# Patient Record
Sex: MALE | Race: ASIAN | NOT HISPANIC OR LATINO | Employment: FULL TIME | URBAN - METROPOLITAN AREA
[De-identification: names, ages, dates, MRNs, and addresses within clinical notes are randomized per-mention and may not be internally consistent; named-entity substitution may affect disease eponyms.]

---

## 2017-09-27 ENCOUNTER — OFFICE VISIT (OUTPATIENT)
Dept: FAMILY MEDICINE | Facility: CLINIC | Age: 62
End: 2017-09-27

## 2017-09-27 VITALS
HEIGHT: 65 IN | HEART RATE: 50 BPM | WEIGHT: 123.6 LBS | BODY MASS INDEX: 20.59 KG/M2 | SYSTOLIC BLOOD PRESSURE: 132 MMHG | DIASTOLIC BLOOD PRESSURE: 74 MMHG

## 2017-09-27 DIAGNOSIS — F64.0 GENDER DYSPHORIA IN ADULT: Primary | ICD-10-CM

## 2017-09-27 NOTE — MR AVS SNAPSHOT
"              After Visit Summary   9/27/2017    Larissa Rehman    MRN: [id number]           Patient Information     Date Of Birth          1955        Visit Information        Provider Department      9/27/2017 3:20 PM Lynnette Zuniga MD [facility]        Care Instructions              Informed Consent   Feminizing Medications      This form refers to the use of estrogen and/or androgen antagonists (sometimes called \"anti-androgens\" or \"androgen blockers\") by persons in the male-to-female spectrum who wish to become feminized to reduce gender dysphoria and facilitate a more feminine gender presentation. While there are risks associated with taking feminizing medications, when appropriately prescribed they can greatly improve mental health and quality of life.     Please seek another opinion if you want additional perspective on any aspect of your care.       Feminizing Effects     1. I understand that estrogen, androgen antagonists, or a combination of the two may be  prescribed to reduce male physical features and feminize my body.     2. I understand that the feminizing effects of estrogen and androgen antagonists can take several  months to a year to become noticeable, speed and degree of change is unpredictable.     3.  I understand that if I am taking estrogen I will probably develop breasts, and:        ? Breasts may take several years to develop to their full size.   ? Even if estrogen is stopped, the breast tissue that has developed will remain.   ? As soon as breasts start growing, it is recommended to have an annual breast exam.  ? There may be milky nipple discharge (galactorrhea). If you develop this, it is advised to check with a doctor to determine the cause.   ? It is not known if taking estrogen increases the risk of breast cancer.     4. I understand that the following changes are generally not permanent (that is, they will likely reverse if I stop taking feminizing " "medications):     ? Skin may become softer.   ? Muscle mass decreases and there may be a decrease in upper body strength.   ? Body hair growth may become less noticeable and grow more slowly,but won't stop.  ? Male pattern baldness may slow down, but will probably not stop completely, and hair that has already been lost will likely not grow back.   ? Fat may redistribute to a more feminine pattern (decreased in abdomen, increased on buttocks/hips/thighs - changing from \"apple shape\" to \"pear shape\").       5. I understand that taking feminizing medications will make my testicles produce less testosterone, which can affect my overall sexual function:    ? Fertility may be impaired, and I should consider sperm banking if I desire biological children.  ? Sperm may not mature, leading to reduced fertility. It is still possible to get someone pregnant however and contraception should be used if needed.  ? Testicles may shrink by 25-50%. Testicular examinations are still recommended.  ? The amount of fluid ejaculated may be reduced.   ? There is typically decrease in morning and spontaneous erections.   ? Erections may not be firm enough for penetrative sex.   ? Libido (sex drive) may decrease.     6. I understand that there are some aspects of my body that are not significantly changed by feminizing medications, though there may be other treatments that can be helpful.    ? Beavers/facial hair may grow more slowly and be less noticeable, but will not go away.   ? Voice pitch will not rise and speech patterns will not become more feminine.   ? The laryngeal prominence (\"Spencer's apple\") will not shrink.      Risks of Feminizing Medications     7. I understand that the medical effects and safety of feminizing medications are not fully understood, and that there may be long-term risks that are not yet known.     8. I understand that I am strongly advised not to take more medication than I am prescribed, as this increases health " risks. It won't help changes come faster.     9. I understand that feminizing medications can damage the liver. I have been advised that I should be monitored for possible liver damage as long as I am taking feminizing medications.     10. I understand that feminizing medications will result in changes that will be noticeable by other people, and that some people in similar circumstances have experienced harassment, discrimination, and violence, while others have lost support of loved ones. I have been advised that referrals can be made for support/counselling if this would be helpful.     Medical Risks Associated with Estrogen     11. I understand that taking estrogen increases the risk of blood clots, which can result in:     ? Pulmonary embolism (blood clot to the lungs), which may cause death.  Stroke, which may cause permanent brain damage or death   ? Heart attack   ? Chronic leg vein problems     I understand that the risk of blood clots is much worse if I smoke cigarettes, especially over age 40. I understand that the danger is so high that I should stop smoking completely if I start taking estrogen. I can ask my doctor for advice on quitting.    12. I understand that taking estrogen can increase deposits of fat around my internal organs, which is associated with increased risk for diabetes and heart disease.     13. I understand that taking estrogen can cause increased blood pressure,  estrogen increases the risk of gallstones,  estrogen can cause headaches or migraines and can cause nausea and vomiting. I have been advised that if I develop these, it is recommended that I discuss this with my doctor.     14. I understand that it is not known if taking estrogen increases the risk of non-cancerous tumors of the pituitary gland. I have been informed that although this is typically not life-threatening, it can damage vision. I understand that this will be monitored.    15. I have been informed that I am more  likely to have dangerous side effects from estrogen if I smoke, am overweight, am over 40 years old, or have a history of blood clots, high blood pressure, or a family history of breast cancer.     16. I have been informed that if I take too much estrogen, my body may convert it into testosterone, which may slow or stop feminization.     Medical Risks Associated with Androgen Antagonists     17. I have been informed that spironolactone affects the balance of water and salts in the kidneys, and that this may:     ? Increase the amount of urine produced, and I may urinate more frequently   ? Reduce blood pressure   ? Rarely, cause high levels of potassium in the blood, and this will be monitored    18. I understand that some androgen antagonists make it more difficult to evaluate the results of PSA test. If I am over 50, I should have my prostate evaluated every year.     Prevention of Medical Complications     19. I agree to take feminizing medications as prescribed and to tell my care provider if I am not happy with the treatment or am experiencing any problems.     20. I understand that the right dose or type of medication prescribed for me may not be the same as for someone else.     21. I understand that physical examinations and blood tests are needed on a regular basis (monthly, at first) to check for negative side effects of feminizing medications.     22. I understand that feminization medications can interact with other medication (including other sources of hormones), dietary supplements, herbs, alcohol, and street drugs. I understand that being honest with my care provider about what else I am taking will help prevent medical complications that could be life-threatening. I have been informed that I will continue to get medical care no matter what information I share.     23. I understand that some medical conditions make it dangerous to take estrogen or androgen antagonists. I agree to be checked for risky  conditions before the decision to start or continue feminizing medication is made.     24. I understand that I can choose to stop taking feminizing medication at any time, and that it is advised that I do this with the help of my doctor to make sure there are no negative reactions to stopping. I understand that my doctor may suggest I reduce, switch or stop taking feminizing medication, if there are severe health risks that can't be controlled.    My signature below confirms that:     ? My doctor has talked with me about the benefits and risks of feminizing medication, the possible or likely consequences of hormone therapy, and potential treatment options.   ? I understand the risks that may be involved.   ? I understand that this form covers known effects and risks and that there may be long-term effects or risks that are not yet known.   ? I have had sufficient opportunity to discuss treatment options with my doctor. All of my questions have been answered to my satisfaction.   ? I believe I have adequate knowledge on which to base informed consent to the provision of feminizing medication.     Based on this:     _____ I wish to begin taking estrogen.     _____ I wish to begin taking androgen antagonists (e.g., Spironolactone).     _____ I do not wish to begin taking feminizing medication at this time.     Whatever your current decision is, please talk with your doctor any time you have questions, concerns, or want to re-evaluate your options.         ____________________________________ __________________   Patient Signature     Date         ____________________________________ __________________   Prescribing Clinician Signature    Date    Some online resources for transgender health    [place] Transgender Health Coalition   Home to the [facility] at [address] [place] [address], [phone]. Also has support groups.  http://www.[place]transhealth.org/    [facility] for Transgender Health  Increasing  access to comprehensive, effective, and affirming healthcare services for trans and gender-variant communities.  http://www.transhealth.[facility].edu/trans?page=slade-00-05    [facility]   Community-based non-profit committed to advancing the health & wellness of LGBTQ communities through research, education and advocacy. http://www.[facility].org    [facility] Glossary of Transgender Terms  http://www.[facility].org/site/DocServer/Handout_7-C_Glossary_of_Gender_and_Transgender_Terms__fi.pdf?oemTD=3518    Safe, gender-neutral public restrooms in [facility]   http://www.[place]transhealth.org//index.php?option=com_content&task=view&id=12&Itemid    Trans Youth Support Network  For people 26 and under who identify as a trans or gender non-conforming person and want to be a part of an activist organization. Offers peer support, education and community building opportunities.   http://www.transyouthsupportnetwork.org/    Reclaim:  RECLAIM offers mental and integrative health services for LGBTQ youth and their families.  http://www.reclaim-lgbtyouth.org/    Gender Spectrum, for Trans Youth  Gender Spectrum provides education, training and support to help create a gender sensitive and inclusive environment for all children and teens. http://www.genderspectrum.org/    Steven s FTM Resource Guide  Information on topics of interest to female-to-male (FTM, F2M) trans men, and their friends and loved ones.  http://ftmguide.org/    Also check out your local [employer] resource center!  LGBTQIA Services at [facility]: http://www.[facility]/lgbtqia/  LGBTQ@[facility]: http://www.[facility].edu/multiculturallife/lgbtq/  GLBTA Programs office at  of : https://diversity.[facility].edu/glbta/          Effects and Expected Time Course of Feminizing Hormones    Effect Expected Onset Expected Maximum Effect   Body Fat redistribution 3-6 months 2-5 years   Decreased Muscle mass 3-6 months 1-2 years   Softening of  skin/decreased oiliness 3-6 months Unknown   Decreased Libido 1-3 months 1-2 years   Decreased Spontaneous Erections 1-3 months 3-6 months   Male Sexual Dysfunction Variable Variable   Breast Growth 3-6 months 2-3 years   Decreased Testicular volume 3-6 months 2-3 years   Decreased sperm production Variable Variable   Thinning and slowed growth of body and facial hair  + 6-12 months >3 years   Male pattern baldness No regrowth, loss stops 1-3 months 1-2 years   +complete removal of male facial hair and body hair requires electrolysis, laser treatment or both          Follow-ups after your visit        Who to contact     Please call your clinic at [phone] to:    Ask questions about your health    Make or cancel appointments    Discuss your medicines    Learn about your test results    Speak to your doctor   If you have compliments or concerns about an experience at your clinic, or if you wish to file a complaint, please contact [facility] Patient Relations at [phone] or email us at [email or web address]         Additional Information About Your Visit        [employer] Software Information     [employer] is an electronic gateway that provides easy, online access to your medical records. With [employer], you can request a clinic appointment, read your test results, renew a prescription or communicate with your care team.     To sign up for [employer] visit the website at [email or web address]   You will be asked to enter the access code listed below, as well as some personal information. Please follow the directions to create your username and password.     Your access code is: [email or web address]  Expires: 2017  4:13 PM     Your access code will  in 90 days. If you need help or a new code, please contact your [facility] or call [phone] for assistance.        Care EveryWhere ID     This is your Care EveryWhere ID. This could be used by other  "organizations to access your [facility] medical records  [id number]        Your Vitals Were     Pulse Height BMI (Body Mass Index)             50 5' 5\" (165.1 cm) 20.57 kg/m2          Blood Pressure from Last 3 Encounters:   09/27/17 132/74   07/28/14 117/81    Weight from Last 3 Encounters:   09/27/17 123 lb 9.6 oz (56.1 kg)   07/28/14 129 lb (58.5 kg)              Today, you had the following     No orders found for display       Primary Care Provider Office Phone # Fax #    Tk Marin [phone]       [facility] [address] [facility] [address]        Equal Access to Services     TE MOROCHO : Hadii rolando roqueo Sosandy, waaxda luqadaha, qaybta kaalmada adeegyada, gloria feldman. So [facility] [phone].    ATENCIÓN: Si habla español, tiene a bermudez disposición servicios gratuitos de asistencia lingüística. [facility] [phone].    We comply with applicable federal civil rights laws and [place] laws. We do not discriminate on the basis of race, color, national origin, age, disability sex, sexual orientation or gender identity.            Thank you!     Thank you for choosing [facility]  for your care. Our goal is always to provide you with excellent care. Hearing back from our patients is one way we can continue to improve our services. Please take a few minutes to complete the written survey that you may receive in the mail after your visit with us. Thank you!             Your Updated Medication List - Protect others around you: Learn how to safely use, store and throw away your medicines at www.disposemymeds.org.          This list is accurate as of: 9/27/17  4:19 PM.  Always use your most recent med list.                   Brand Name Dispense Instructions for use Diagnosis    ASPIRIN PO      Take 81 mg by mouth daily        BENADRYL PO      Take 25 mg by mouth        EPINEPHrine 0.3 MG/0.3ML injection 2-pack    " EPIPEN/ADRENACLICK/or ANY BX GENERIC EQUIV    2 each    Inject 0.3 mLs (0.3 mg) into the muscle once as needed for anaphylaxis        SIMVASTATIN PO

## 2017-09-27 NOTE — PATIENT INSTRUCTIONS
"          Informed Consent   Feminizing Medications      This form refers to the use of estrogen and/or androgen antagonists (sometimes called \"anti-androgens\" or \"androgen blockers\") by persons in the male-to-female spectrum who wish to become feminized to reduce gender dysphoria and facilitate a more feminine gender presentation. While there are risks associated with taking feminizing medications, when appropriately prescribed they can greatly improve mental health and quality of life.     Please seek another opinion if you want additional perspective on any aspect of your care.       Feminizing Effects     1. I understand that estrogen, androgen antagonists, or a combination of the two may be  prescribed to reduce male physical features and feminize my body.     2. I understand that the feminizing effects of estrogen and androgen antagonists can take several  months to a year to become noticeable, speed and degree of change is unpredictable.     3.  I understand that if I am taking estrogen I will probably develop breasts, and:        ? Breasts may take several years to develop to their full size.   ? Even if estrogen is stopped, the breast tissue that has developed will remain.   ? As soon as breasts start growing, it is recommended to have an annual breast exam.  ? There may be milky nipple discharge (galactorrhea). If you develop this, it is advised to check with a doctor to determine the cause.   ? It is not known if taking estrogen increases the risk of breast cancer.     4. I understand that the following changes are generally not permanent (that is, they will likely reverse if I stop taking feminizing medications):     ? Skin may become softer.   ? Muscle mass decreases and there may be a decrease in upper body strength.   ? Body hair growth may become less noticeable and grow more slowly,but won't stop.  ? Male pattern baldness may slow down, but will probably not stop completely, and hair that has " "already been lost will likely not grow back.   ? Fat may redistribute to a more feminine pattern (decreased in abdomen, increased on buttocks/hips/thighs - changing from \"apple shape\" to \"pear shape\").       5. I understand that taking feminizing medications will make my testicles produce less testosterone, which can affect my overall sexual function:    ? Fertility may be impaired, and I should consider sperm banking if I desire biological children.  ? Sperm may not mature, leading to reduced fertility. It is still possible to get someone pregnant however and contraception should be used if needed.  ? Testicles may shrink by 25-50%. Testicular examinations are still recommended.  ? The amount of fluid ejaculated may be reduced.   ? There is typically decrease in morning and spontaneous erections.   ? Erections may not be firm enough for penetrative sex.   ? Libido (sex drive) may decrease.     6. I understand that there are some aspects of my body that are not significantly changed by feminizing medications, though there may be other treatments that can be helpful.    ? Beavers/facial hair may grow more slowly and be less noticeable, but will not go away.   ? Voice pitch will not rise and speech patterns will not become more feminine.   ? The laryngeal prominence (\"Spencer's apple\") will not shrink.      Risks of Feminizing Medications     7. I understand that the medical effects and safety of feminizing medications are not fully understood, and that there may be long-term risks that are not yet known.     8. I understand that I am strongly advised not to take more medication than I am prescribed, as this increases health risks. It won't help changes come faster.     9. I understand that feminizing medications can damage the liver. I have been advised that I should be monitored for possible liver damage as long as I am taking feminizing medications.     10. I understand that feminizing medications will result in changes " that will be noticeable by other people, and that some people in similar circumstances have experienced harassment, discrimination, and violence, while others have lost support of loved ones. I have been advised that referrals can be made for support/counselling if this would be helpful.     Medical Risks Associated with Estrogen     11. I understand that taking estrogen increases the risk of blood clots, which can result in:     ? Pulmonary embolism (blood clot to the lungs), which may cause death.  Stroke, which may cause permanent brain damage or death   ? Heart attack   ? Chronic leg vein problems     I understand that the risk of blood clots is much worse if I smoke cigarettes, especially over age 40. I understand that the danger is so high that I should stop smoking completely if I start taking estrogen. I can ask my doctor for advice on quitting.    12. I understand that taking estrogen can increase deposits of fat around my internal organs, which is associated with increased risk for diabetes and heart disease.     13. I understand that taking estrogen can cause increased blood pressure,  estrogen increases the risk of gallstones,  estrogen can cause headaches or migraines and can cause nausea and vomiting. I have been advised that if I develop these, it is recommended that I discuss this with my doctor.     14. I understand that it is not known if taking estrogen increases the risk of non-cancerous tumors of the pituitary gland. I have been informed that although this is typically not life-threatening, it can damage vision. I understand that this will be monitored.    15. I have been informed that I am more likely to have dangerous side effects from estrogen if I smoke, am overweight, am over 40 years old, or have a history of blood clots, high blood pressure, or a family history of breast cancer.     16. I have been informed that if I take too much estrogen, my body may convert it into testosterone, which  may slow or stop feminization.     Medical Risks Associated with Androgen Antagonists     17. I have been informed that spironolactone affects the balance of water and salts in the kidneys, and that this may:     ? Increase the amount of urine produced, and I may urinate more frequently   ? Reduce blood pressure   ? Rarely, cause high levels of potassium in the blood, and this will be monitored    18. I understand that some androgen antagonists make it more difficult to evaluate the results of PSA test. If I am over 50, I should have my prostate evaluated every year.     Prevention of Medical Complications     19. I agree to take feminizing medications as prescribed and to tell my care provider if I am not happy with the treatment or am experiencing any problems.     20. I understand that the right dose or type of medication prescribed for me may not be the same as for someone else.     21. I understand that physical examinations and blood tests are needed on a regular basis (monthly, at first) to check for negative side effects of feminizing medications.     22. I understand that feminization medications can interact with other medication (including other sources of hormones), dietary supplements, herbs, alcohol, and street drugs. I understand that being honest with my care provider about what else I am taking will help prevent medical complications that could be life-threatening. I have been informed that I will continue to get medical care no matter what information I share.     23. I understand that some medical conditions make it dangerous to take estrogen or androgen antagonists. I agree to be checked for risky conditions before the decision to start or continue feminizing medication is made.     24. I understand that I can choose to stop taking feminizing medication at any time, and that it is advised that I do this with the help of my doctor to make sure there are no negative reactions to stopping. I  understand that my doctor may suggest I reduce, switch or stop taking feminizing medication, if there are severe health risks that can't be controlled.    My signature below confirms that:     ? My doctor has talked with me about the benefits and risks of feminizing medication, the possible or likely consequences of hormone therapy, and potential treatment options.   ? I understand the risks that may be involved.   ? I understand that this form covers known effects and risks and that there may be long-term effects or risks that are not yet known.   ? I have had sufficient opportunity to discuss treatment options with my doctor. All of my questions have been answered to my satisfaction.   ? I believe I have adequate knowledge on which to base informed consent to the provision of feminizing medication.     Based on this:     _____ I wish to begin taking estrogen.     _____ I wish to begin taking androgen antagonists (e.g., Spironolactone).     _____ I do not wish to begin taking feminizing medication at this time.     Whatever your current decision is, please talk with your doctor any time you have questions, concerns, or want to re-evaluate your options.         ____________________________________ __________________   Patient Signature     Date         ____________________________________ __________________   Prescribing Clinician Signature    Date    Some online resources for transgender health    [place] Transgender Health Coalition   Home to the [facility] at [address] [place] [address], [phone]. Also has support groups.  http://www.[place]transhealth.org/    Center of Excellence for Transgender Health  Increasing access to comprehensive, effective, and affirming healthcare services for trans and gender-variant communities.  http://www.transhealth.[facility].edu/trans?page=slade-00-05    [facility]   Community-based non-profit committed to advancing the health & wellness of LGBTQ communities through  research, education and advocacy. http://www.rainbowhealth.org    [facility] Glossary of Transgender Terms  http://www.[facility].org/site/DocServer/Handout_7-C_Glossary_of_Gender_and_Transgender_Terms__fi.pdf?cneIY=0669    Safe, gender-neutral public restrooms in [facility]   http://www.[place]transhealth.org//index.php?option=com_content&task=view&id=12&Itemid    Trans Youth Support Network  For people 26 and under who identify as a trans or gender non-conforming person and want to be a part of an activist organization. Offers peer support, education and community building opportunities.   http://www.transyouthsupportnetwork.org/    Reclaim:  RECLAIM offers mental and integrative health services for LGBTQ youth and their families.  http://www.reclaim-lgbtyouth.org/    Gender Spectrum, for Trans Youth  Gender Spectrum provides education, training and support to help create a gender sensitive and inclusive environment for all children and teens. http://www.genderspectrum.org/    Steven [facility] FTM Resource Guide  Information on topics of interest to female-to-male (FTM, F2M) trans men, and their friends and loved ones.  http://ftmguide.org/    Also check out your local [employer] resource center!  LGBTQIA Services at [facility]: http://www.[facility]/lgbtqia/  LGBTQ@[facility]: http://www.[facility]/multiculturallife/lgbtq/  GLBTA Programs office at [facility]: https://diversity.[facility].edu/glbta/          Effects and Expected Time Course of Feminizing Hormones    Effect Expected Onset Expected Maximum Effect   Body Fat redistribution 3-6 months 2-5 years   Decreased Muscle mass 3-6 months 1-2 years   Softening of skin/decreased oiliness 3-6 months Unknown   Decreased Libido 1-3 months 1-2 years   Decreased Spontaneous Erections 1-3 months 3-6 months   Male Sexual Dysfunction Variable Variable   Breast Growth 3-6 months 2-3 years   Decreased Testicular volume 3-6 months 2-3 years   Decreased sperm production Variable  Variable   Thinning and slowed growth of body and facial hair  + 6-12 months >3 years   Male pattern baldness No regrowth, loss stops 1-3 months 1-2 years   +complete removal of male facial hair and body hair requires electrolysis, laser treatment or both

## 2017-09-27 NOTE — PROGRESS NOTES
Preceptor Attestation:   Patient seen and discussed with the resident. Assessment and plan reviewed with resident and agreed upon.   Supervising Physician:  Meng Avendaño MD  [facility]

## 2017-10-06 ENCOUNTER — TELEPHONE (OUTPATIENT)
Dept: FAMILY MEDICINE | Facility: CLINIC | Age: 62
End: 2017-10-06

## 2017-10-31 ENCOUNTER — OFFICE VISIT (OUTPATIENT)
Dept: FAMILY MEDICINE | Facility: CLINIC | Age: 62
End: 2017-10-31

## 2017-10-31 VITALS
TEMPERATURE: 97.7 F | RESPIRATION RATE: 16 BRPM | HEIGHT: 65 IN | OXYGEN SATURATION: 100 % | SYSTOLIC BLOOD PRESSURE: 130 MMHG | WEIGHT: 125.4 LBS | BODY MASS INDEX: 20.89 KG/M2 | HEART RATE: 53 BPM | DIASTOLIC BLOOD PRESSURE: 69 MMHG

## 2017-10-31 DIAGNOSIS — F64.0 GENDER DYSPHORIA IN ADULT: Primary | ICD-10-CM

## 2017-10-31 NOTE — Clinical Note
"Davon Mojica I work at [facility] in our new Gender Support consultation clinic with Dr. Boyer. I hope we get to meet soon!   I am referring this patient to you to discuss an orchiectomy. Keith is very sweet, and identifies as non-binary/\"thirdsex\".  Please see my note for more details.   Thanks!  Kirstin Johns"

## 2017-10-31 NOTE — PROGRESS NOTES
"    [facility] Gender Support Clinic Consultation:        Referred by: Yonathan Zuniga MD  Referral Question: Discuss options for gender related care in older, non-binary patient  History Provided by: Patient        Assessment:     1. Gender Dysphoria  2. Hypercholesterolemia      Today s visit included assessment of interventions to alleviate symptoms related to gender dysphoria or gender nonconformity, including psychological support, medical treatment, and options for social support or changes in gender expression.     Keith identities as \"thirdsex\", and uses non-binary gender pronouns (they/them/theirs) and is hoping to live rather gender-less.  Keith has a realistic understanding that, at age 62, physical changes through medical therapy would be hard to achieve, and they aren't really looking for this. Keith is hoping for \"castration\" in order to feel more comfortable in their body as thirdsex/non-binary and to feel out of the binary. Since medical therapy with androgen blockers and estrogen may have significant risks (CVA, VTE, etc) at their age, and since Keith isn't desiring physical feminization, I think an evaluation for an orchiectomy would be very appropriate. Keith was very interested in pursuing this option. I told Keith the surgeon he sees would need to be comfortable with the desired procedure and would likely need letters of support from his therapist and/or medical providers. We'd be happy to write one if needed.    If orchiectomy is not deemed appropriate, or if Keith, after consultation with surgery, decides this is not the right approach to take, Keith can return to [facility] for consideration of low dose feminization medical therapy, understanding that there may be risks of adverse events. Keith is quite healthy and physically active, so I think risk would be fairly low. Keith is very much willing to undergo potential risks in order to suppress testosterone and feel more non-binary.          Recommendations: " "     -     PLASTIC SURGERY REFERRAL - INTERNAL, N. Will request to be evaluated by Dr. Mojica for consideration or gender affirming surgery  --    If no orchiectomy, recommended to return to [facility] (Dr. Zuniga) and consider starting low dose spironolactone/transdermal estrogen therapy through informed consent process. Goal of therapy would be to suppress total testosterone to ideally < 200 ng/dL with close monitoring and modification or further risk factors (cholesterol, blood pressure, etc). If this is pursued, may benefit from switching primary care to [facility].      Kirstin Johns DO  [facility]         HPI:       Keith is a 62 year old individual that uses pronouns they/them/theirs and is consulting to discuss gender affirming care. Keith has previously seen Dr. Zuniga at [facility] to establish care, and this note from 9/27/17 was reviewed before our visit today.    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 I was emotionally and physically ignoring everything\". Keith has become more aware of their gender though aging, and Keith doesn't want to age \"as a man\". Keith reports that seeing an article in the NYT some years back about \"thirdsex\" was very enlightening in developing a non-binary identity. 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    Keith states they desire a \"physical or chemical castration\". Keith feels very uncomfortable with a \"big penis\", and has had this discomfort ever since a young age.  Keith does not want to experience erections and would like to feel more comfortable in their body when touching themself. Keith is not interested in vaginoplasy or breast augmentation. Keith doesn't have significant desire to change noticeable physical characteristics, and also knows this would be hard to do at age 62. Keith did try hormones off the " "internet for a couple months a year or so ago, but stopped after not seeing much physical difference. Keith states they are ok with possible risks of feminizing medications if it means being able to live as more non-binary.    In terms of social support, Keith has a great relationship with their PCP, Tk Marin (they've actually been friends since college), and Keith is planning to tell Dr. Marin about their non-binary gender next week at an upcoming physical exam. Keith has good support with two friends, who are aware of his gender identity. Keith's daughter is unaware of Keith's gender, but Keith thinks she has some idea and is going to tell her soon.    Keith has been seeing Debbi Watkins for gender related therapy, and this has been helpful. Keith was also was seeing a gender therapist, Graham Mcgee, but not anymore. Keith reports no mental health diagnoses or treatments in the past (besides gender dysphoria).    CVD/stroke history/family history?: Dad had \"heart trouble\" but notes he was quite obese.   Smoking?: No  High cholesterol:? Yes, treated through Dr. Marin. Says recent HDL was 120. No recent labs in our system.     Outside records reviewed.  ----  No past surgical history on file.    Patient Active Problem List   Diagnosis     Familial hypercholesteremia     Gender dysphoria in adult     Seasonal mood disorder (H)     Past Medical History:   Diagnosis Date     High cholesterol        Current Outpatient Prescriptions   Medication Sig Dispense Refill     SIMVASTATIN PO        ASPIRIN PO Take 81 mg by mouth daily       DiphenhydrAMINE HCl (BENADRYL PO) Take 25 mg by mouth       EPINEPHrine (EPIPEN) 0.3 MG/0.3ML injection Inject 0.3 mLs (0.3 mg) into the muscle once as needed for anaphylaxis 2 each 1       No family history on file.    Allergies   Allergen Reactions     Bee Venom        Social History     Social History     Marital status: Single     Spouse name: N/A     Number of children: N/A     Years of education: N/A " "    Social History Main Topics     Smoking status: Never Smoker     Smokeless tobacco: Not on file     Alcohol use Yes      Comment: wine     Drug use: No     Sexual activity: Not on file     Other Topics Concern     Not on file     Social History Narrative     Lives in [facility] in [place]  Very physically active (long distance runner)  Coaches running at [employer]    Sexual health and relationships:  Current:   None. Not interested in sexual relationships  Past:      Women    Mental Health Assessment:  Non gender related therapist?  No  Gender related therapist? Yes, Debbi Watkins  Chemical use history No  Psych dx history: None, No diagnoses in the past or treatment for mental health           Review of Systems:       ROS: 10 point ROS neg other than the symptoms noted above in the HPI.          Physical Exam:     Vitals:    10/31/17 0807   BP: 130/69   Pulse: 53   Resp: 16   Temp: 97.7  F (36.5  C)   TempSrc: Oral   SpO2: 100%   Weight: 125 lb 6.4 oz (56.9 kg)   Height: 5' 5\" (165.1 cm)     BMI= There is no height or weight on file to calculate BMI.   Wt Readings from Last 10 Encounters:   09/27/17 123 lb 9.6 oz (56.1 kg)   07/28/14 129 lb (58.5 kg)     Appearance: No distress, calm and pleasant  GENERAL:: healthy, alert and no distress  RESP: normal respirations on room air  CV: bradycardic, well perfused  : deferred  Psych: Alert and oriented times 3; coherent speech, normal rate and volume, able to articulate logical thoughts, able to abstract reason, no tangential thoughts, no hallucinations or delusions. Affect is bright.         Labs:     Admission on 07/28/2014, Discharged on 07/28/2014   Component Date Value Ref Range Status     Interpretation ECG 07/28/2014 Click View Image link to view waveform and result   Final         I spent 60 min face to face with the patient and >50% was spent counselling the patient about the above medical conditions, educating patient and discussing the " recommendations and followup.

## 2017-10-31 NOTE — PATIENT INSTRUCTIONS
-I will figure out who to refer you to for a surgery (orchiectomy) referral (likely a Dr. Rizzo or Dr. Mojica)  -If you don't hear from the referral in a week or so, call our clinic back for assistance  -If you do not go a head with orchiectomy, or if it is not possible per the surgeons, please come back here to discuss further options, and discussion of risk vs benefits of medication treatment.        Thank you for coming to [facility].    **If you had lab testing today and your results are reassuring or normal they will be be mailed to you or sent to your MyChart and you will be notified by email within 7 days.   **If the lab tests need quick action we will call you with the results.  The phone number we will call with results is # [phone] (home)    (home) . If this is not the best number please call our clinic and change the number.  **If any referrals were ordered today you should be getting a call in the next week.  If you don't hear about this within a week please call one of the following numbers   If your referral is for P please call [phone]  If your referral is to outside [facility] please call the clinic number ([phone]) and ask for your team care coordinator.  If you need any refills please call your pharmacy and they will contact us.  If you have any concerns about today's visit or wish to schedule another appointment  please call our office during normal business hours  [phone] (8-5:00 M-F)  If you have urgent medical concerns please call [phone] at any time of the day.  If you have a medical emergency please call [phone]    Again thank you for choosing [facility] and please let us know how we can best partner with you to improve your and your family's health.

## 2017-10-31 NOTE — MR AVS SNAPSHOT
After Visit Summary   10/31/2017    Larissa Rehman    MRN: [id number]           Patient Information     Date Of Birth          1955        Visit Information        Provider Department      10/31/2017 8:00 AM Kirstin Fernandez DO [facility]        Care Instructions    -I will figure out who to refer you to for a surgery (orchiectomy) referral (likely a Dr. Rizzo or Dr. Mojica)  -If you don't hear from the referral in a week or so, call our clinic back for assistance  -If you do not go a head with orchiectomy, or if it is not possible per the surgeons, please come back here to discuss further options, and discussion of risk vs benefits of medication treatment.        Thank you for coming to [facility].    **If you had lab testing today and your results are reassuring or normal they will be be mailed to you or sent to your MyChart and you will be notified by email within 7 days.   **If the lab tests need quick action we will call you with the results.  The phone number we will call with results is # [phone] (home)    (home) . If this is not the best number please call our clinic and change the number.  **If any referrals were ordered today you should be getting a call in the next week.  If you don't hear about this within a week please call one of the following numbers   If your referral is for [facility] please call [phone]  If your referral is to outside [facility] please call the clinic number ([phone]) and ask for your team care coordinator.  If you need any refills please call your pharmacy and they will contact us.  If you have any concerns about today's visit or wish to schedule another appointment  please call our office during normal business hours  [phone] (8-5:00 M-F)  If you have urgent medical concerns please call [phone] at any time of the day.  If you have a medical emergency please call [phone]    Again thank you for choosing [facility] and please  "let us know how we can best partner with you to improve your and your family's health.          Follow-ups after your visit        Who to contact     Please call your clinic at [phone] to:    Ask questions about your health    Make or cancel appointments    Discuss your medicines    Learn about your test results    Speak to your doctor   If you have compliments or concerns about an experience at your clinic, or if you wish to file a complaint, please contact [facility] Patient Relations at [phone] or email us at [email or web address]         Additional Information About Your Visit        Surve[employer]alhar[employer] Information     PeerIndex is an electronic gateway that provides easy, online access to your medical records. With PeerIndex, you can request a clinic appointment, read your test results, renew a prescription or communicate with your care team.     To sign up for PeerIndex visit the website at [email or web address]   You will be asked to enter the access code listed below, as well as some personal information. Please follow the directions to create your username and password.     Your access code is: [email or web address]  Expires: 2017  4:13 PM     Your access code will  in 90 days. If you need help or a new code, please contact your [facility] or call [phone] for assistance.        Care EveryWhere ID     This is your Care EveryWhere ID. This could be used by other organizations to access your [facility] medical records  [id number]        Your Vitals Were     Pulse Temperature Respirations Height Pulse Oximetry BMI (Body Mass Index)    53 97.7  F (36.5  C) (Oral) 16 5' 5\" (165.1 cm) 100% 20.87 kg/m2       Blood Pressure from Last 3 Encounters:   10/31/17 130/69   17 132/74   14 117/81    Weight from Last 3 Encounters:   10/31/17 125 lb 6.4 oz (56.9 kg)   17 123 lb 9.6 oz (56.1 kg)   14 129 lb (58.5 kg)            "   Today, you had the following     No orders found for display       Primary Care Provider Office Phone # Fax #    Tk Marin [phone]       [facility] [address]  [facility] [address]        Equal Access to Services     TE MOROCHO : Hadii aad ku hadjosé miguelo Soomaali, waaxda luqadaha, qaybta kaalmada adeegyada, waxay idiin hayaan aderenay degrootsantos feldman. So [facility] [phone].    ATENCIÓN: Si habla español, tiene a bermudez disposición servicios gratuitos de asistencia lingüística. Llame al [phone].    We comply with applicable federal civil rights laws and [place] laws. We do not discriminate on the basis of race, color, national origin, age, disability, sex, sexual orientation, or gender identity.            Thank you!     Thank you for choosing [facility]  for your care. Our goal is always to provide you with excellent care. Hearing back from our patients is one way we can continue to improve our services. Please take a few minutes to complete the written survey that you may receive in the mail after your visit with us. Thank you!             Your Updated Medication List - Protect others around you: Learn how to safely use, store and throw away your medicines at www.disposemymeds.org.          This list is accurate as of: 10/31/17  9:02 AM.  Always use your most recent med list.                   Brand Name Dispense Instructions for use Diagnosis    ASPIRIN PO      Take 81 mg by mouth daily        BENADRYL PO      Take 25 mg by mouth        EPINEPHrine 0.3 MG/0.3ML injection 2-pack    EPIPEN/ADRENACLICK/or ANY BX GENERIC EQUIV    2 each    Inject 0.3 mLs (0.3 mg) into the muscle once as needed for anaphylaxis        SIMVASTATIN PO

## 2017-11-08 ENCOUNTER — MEDICAL CORRESPONDENCE (OUTPATIENT)
Dept: HEALTH INFORMATION MANAGEMENT | Facility: CLINIC | Age: 62
End: 2017-11-08

## 2017-11-13 ENCOUNTER — TELEPHONE (OUTPATIENT)
Dept: FAMILY MEDICINE | Facility: CLINIC | Age: 62
End: 2017-11-13

## 2017-11-13 NOTE — TELEPHONE ENCOUNTER
RN returned call to patient to give phone number for plastic surgery office [phone]. Unable to reach left detailed VM on secure line with phone number    Audrey Bowman RN

## 2017-11-13 NOTE — TELEPHONE ENCOUNTER
[facility] phone call message - order or referral request for patient:     Order or referral being requested: Order or referral to see Dr Rizzo or Dr Mojica-the patient has not heard from these offices to schedule yet.  Please assist the patient in setting this up.      Additional Comments: Please call the patient    OK to leave a message on voice mail? Yes    Primary language: English      needed? No    Call taken on November 13, 2017 at 3:06 PM by Priscilla Ríos

## 2017-11-17 ENCOUNTER — MEDICAL CORRESPONDENCE (OUTPATIENT)
Dept: HEALTH INFORMATION MANAGEMENT | Facility: CLINIC | Age: 62
End: 2017-11-17

## 2017-11-28 ENCOUNTER — OFFICE VISIT (OUTPATIENT)
Dept: PLASTIC SURGERY | Facility: CLINIC | Age: 62
End: 2017-11-28

## 2017-11-28 VITALS
HEIGHT: 65 IN | WEIGHT: 125 LBS | SYSTOLIC BLOOD PRESSURE: 129 MMHG | BODY MASS INDEX: 20.83 KG/M2 | OXYGEN SATURATION: 100 % | HEART RATE: 49 BPM | DIASTOLIC BLOOD PRESSURE: 77 MMHG

## 2017-11-28 DIAGNOSIS — F64.0 GENDER DYSPHORIA IN ADULT: Primary | ICD-10-CM

## 2017-11-28 ASSESSMENT — PAIN SCALES - GENERAL: PAINLEVEL: NO PAIN (0)

## 2017-11-28 NOTE — MR AVS SNAPSHOT
"              After Visit Summary   11/28/2017    Larissa Rehman    MRN: [id number]           Patient Information     Date Of Birth          1955        Visit Information        Provider Department      11/28/2017 5:30 PM Madhu Mjoica MD [facility]        Today's Diagnoses     Gender dysphoria in adult    -  1       Follow-ups after your visit        Who to contact     Please call your clinic at [phone] to:    Ask questions about your health    Make or cancel appointments    Discuss your medicines    Learn about your test results    Speak to your doctor   If you have compliments or concerns about an experience at your clinic, or if you wish to file a complaint, please contact [facility] Patient Relations at [phone] or email us at [email or web address]         Additional Information About Your Visit        MyChart Information     Voucherlinkt gives you secure access to your electronic health record. If you see a primary care provider, you can also send messages to your care team and make appointments. If you have questions, please call your primary care clinic.  If you do not have a primary care provider, please call [phone] and they will assist you.      GoToTags is an electronic gateway that provides easy, online access to your medical records. With GoToTags, you can request a clinic appointment, read your test results, renew a prescription or communicate with your care team.     To access your existing account, please contact your [facility] or call [phone] for assistance.        Care EveryWhere ID     This is your Care EveryWhere ID. This could be used by other organizations to access your [place] medical records  [id number]        Your Vitals Were     Pulse Height Pulse Oximetry BMI (Body Mass Index)          49 5' 5\" 100% 20.8 kg/m2         Blood Pressure from Last 3 Encounters: "   11/28/17 129/77   10/31/17 130/69   09/27/17 132/74    Weight from Last 3 Encounters:   11/28/17 125 lb   10/31/17 125 lb 6.4 oz   09/27/17 123 lb 9.6 oz              Today, you had the following     No orders found for display       Primary Care Provider Office Phone # Fax #    Tk Marin [phone]       [facility] [address] [place] [id number]        Equal Access to Services     TE MOROCHO : Hadii aad ku hadasho Soomaali, waaxda luqadaha, qaybta kaalmada adeegyada, waxay alirezain hayaan manolo cooney . So [facility] [phone].    ATENCIÓN: Si habla español, tiene a bermudez disposición servicios gratuitos de asistencia lingüística. [facility] [phone].    We comply with applicable federal civil rights laws and [place] laws. We do not discriminate on the basis of race, color, national origin, age, disability, sex, sexual orientation, or gender identity.            Thank you!     Thank you for choosing [facility] RECON[facility] SURG[facility]  for your care. Our goal is always to provide you with excellent care. Hearing back from our patients is one way we can continue to improve our services. Please take a few minutes to complete the written survey that you may receive in the mail after your visit with us. Thank you!             Your Updated Medication List - Protect others around you: Learn how to safely use, store and throw away your medicines at www.disposemymeds.org.          This list is accurate as of: 11/28/17  7:31 PM.  Always use your most recent med list.                   Brand Name Dispense Instructions for use Diagnosis    ASPIRIN PO      Take 81 mg by mouth daily        BENADRYL PO      Take 25 mg by mouth        EPINEPHrine 0.3 MG/0.3ML injection 2-pack    EPIPEN/ADRENACLICK/or ANY BX GENERIC EQUIV    2 each    Inject 0.3 mLs (0.3 mg) into the muscle once as needed for anaphylaxis        SIMVASTATIN PO

## 2017-11-28 NOTE — NURSING NOTE
"Chief Complaint   Patient presents with     Consult     transgender surgery        Vitals:    11/28/17 1650   BP: 129/77   Pulse: (!) 49   SpO2: 100%   Weight: 125 lb   Height: 5' 5\"       Body mass index is 20.8 kg/(m^2).    Killian BURGER.                  "

## 2017-11-28 NOTE — LETTER
11/28/2017     RE: Larissa Rehman  [address] [place] [address]     Dear Colleague,    Thank you for referring your patient, Larissa Rehman, to the [facility]LASTIC [facility] RECONSTRUCTIVE SURGERY at [facility]. Please see a copy of my visit note below.    REFERRING PROVIDER: Kirstin Fernandez    REASON FOR CONSULTATION: Gender dysphoria.    HPI: Larissa Rehman is a 62-year-old trans-woman who has been interested in gender confirming genital surgery for the past 50 years. However it wasn't until this year that her insurance company is willing to pay for the procedure. She comes today interested in information regarding the surgery. Reports me that she is mostly interested in penectomy and orchiectomy to remove the body parts that cause such intense dysphoria. In fact she does not want creation of a vagina due to the risks involving injury to the rectum, rectovaginal fistula, and neovaginal stenosis.    Patient transitioned to a female gender more than 50 years ago. Was evaluated for hormone therapy at [facility]. She was recommended to not undergo hormone therapy due to risks with her cardiovascular health at her age.    She reports that she does not have any sexual partners and does not plan on having any penetrative sexual intercourse in the future. She does masturbate, and is able to achieve orgasm by stimulation of her glans. She denies any history of urinary issues. Also denies any history of penile or testicular issues. She is not interested in sperm banking. She is not interested in breast augmentation.    MEDS:   Prior to Admission medications    Medication Sig Start Date End Date Taking? Authorizing Provider   SIMVASTATIN PO    Yes Reported, Patient   ASPIRIN PO Take 81 mg by mouth daily   Yes Reported, Patient   DiphenhydrAMINE HCl (BENADRYL PO) Take 25 mg by mouth   Yes Reported, Patient   EPINEPHrine (EPIPEN) 0.3 MG/0.3ML injection Inject 0.3 mLs (0.3 mg) into the  "muscle once as needed for anaphylaxis 7/28/14  Yes Kaia Vergara MD       ALLERGIES:   Allergies   Allergen Reactions     Bee Venom        PMH:   Past Medical History:   Diagnosis Date     High cholesterol        PSH: Left foot fourth metatarsal fusion    SH:   Social History   Substance Use Topics     Smoking status: Never Smoker     Smokeless tobacco: Never Used     Alcohol use Yes      Comment: wine    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    ROS: Denies chest pain, shortness of breath, MI, CVA, diabetes, DVT, PE, and bleeding disorders.    PHYSICAL EXAMINATION: /77  Pulse (!) 49  Ht 5' 5\"  Wt 125 lb  SpO2 100%  BMI 20.8 kg/m2  Gen.: In no acute distress.  On exam of the patient's genitals, the penis is uncircumcised. At rest the penile shaft has a length of 6 cm. There is no irregularity or lesion along the penile shaft or the glans. 2 testicles are palpable within the scrotum with no irregularity. Patient reports that there is sensation to light touch intact on the glans. There is no inguinal hernia bilaterally.    ASSESSMENT: Gender dysphoria    PLAN: We discussed our surgical options. Patient is interested in orchiectomy, penectomy, and clitoral plasty but does not wish to have a vagina. Risks benefits and alternatives are discussed. Patient understands the risks to include clitoris necrosis, urinary issues including stenosis stricture and abnormal stream, bleeding, infection, and need for further surgery. The patient brought in two letters of support. I have asked the patient to undergo evaluation with center for sexual health. We will see the patient back after this has been completed. All questions were answered.    Total time spent with patient was 45 min of which greater than 50% was in counseling.    Again, thank you for allowing me to participate in the care of your patient.      Sincerely,    Madhu Mojica MD    "

## 2017-11-29 NOTE — PROGRESS NOTES
REFERRING PROVIDER: Kirstin Fernandez    REASON FOR CONSULTATION: Gender dysphoria.    HPI: Larissa Rehman is a 62-year-old trans-woman who has been interested in gender confirming genital surgery for the past 50 years. However it wasn't until this year that her insurance company is willing to pay for the procedure. She comes today interested in information regarding the surgery. Reports me that she is mostly interested in penectomy and orchiectomy to remove the body parts that cause such intense dysphoria. In fact she does not want creation of a vagina due to the risks involving injury to the rectum, rectovaginal fistula, and neovaginal stenosis.    Patient transitioned to a female gender more than 50 years ago. Was evaluated for hormone therapy at [facility]. She was recommended to not undergo hormone therapy due to risks with her cardiovascular health at her age.    She reports that she does not have any sexual partners and does not plan on having any penetrative sexual intercourse in the future. She does masturbate, and is able to achieve orgasm by stimulation of her glans. She denies any history of urinary issues. Also denies any history of penile or testicular issues. She is not interested in sperm banking. She is not interested in breast augmentation.    MEDS:   Prior to Admission medications    Medication Sig Start Date End Date Taking? Authorizing Provider   SIMVASTATIN PO    Yes Reported, Patient   ASPIRIN PO Take 81 mg by mouth daily   Yes Reported, Patient   DiphenhydrAMINE HCl (BENADRYL PO) Take 25 mg by mouth   Yes Reported, Patient   EPINEPHrine (EPIPEN) 0.3 MG/0.3ML injection Inject 0.3 mLs (0.3 mg) into the muscle once as needed for anaphylaxis 7/28/14  Yes Kaia Vergara MD       ALLERGIES:   Allergies   Allergen Reactions     Bee Venom        PMH:   Past Medical History:   Diagnosis Date     High cholesterol        PSH: Left foot fourth metatarsal fusion    SH:   Social History  "  Substance Use Topics     Smoking status: Never Smoker     Smokeless tobacco: Never Used     Alcohol use Yes      Comment: wine    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    ROS: Denies chest pain, shortness of breath, MI, CVA, diabetes, DVT, PE, and bleeding disorders.    PHYSICAL EXAMINATION: /77  Pulse (!) 49  Ht 5' 5\"  Wt 125 lb  SpO2 100%  BMI 20.8 kg/m2  Gen.: In no acute distress.  On exam of the patient's genitals, the penis is uncircumcised. At rest the penile shaft has a length of 6 cm. There is no irregularity or lesion along the penile shaft or the glans. 2 testicles are palpable within the scrotum with no irregularity. Patient reports that there is sensation to light touch intact on the glans. There is no inguinal hernia bilaterally.    ASSESSMENT: Gender dysphoria    PLAN: We discussed our surgical options. Patient is interested in orchiectomy, penectomy, and clitoral plasty but does not wish to have a vagina. Risks benefits and alternatives are discussed. Patient understands the risks to include clitoris necrosis, urinary issues including stenosis stricture and abnormal stream, bleeding, infection, and need for further surgery. The patient brought in two letters of support. I have asked the patient to undergo evaluation with center for sexual health. We will see the patient back after this has been completed. All questions were answered.    Total time spent with patient was 45 min of which greater than 50% was in counseling.  "

## 2018-01-31 ENCOUNTER — OFFICE VISIT (OUTPATIENT)
Dept: OTHER | Facility: OUTPATIENT CENTER | Age: 63
End: 2018-01-31
Payer: COMMERCIAL

## 2018-01-31 DIAGNOSIS — F64.0 GENDER DYSPHORIA IN ADULT: Primary | ICD-10-CM

## 2018-01-31 ASSESSMENT — ANXIETY QUESTIONNAIRES
GAD7 TOTAL SCORE: 1
3. WORRYING TOO MUCH ABOUT DIFFERENT THINGS: SEVERAL DAYS
1. FEELING NERVOUS, ANXIOUS, OR ON EDGE: NOT AT ALL
5. BEING SO RESTLESS THAT IT IS HARD TO SIT STILL: NOT AT ALL
2. NOT BEING ABLE TO STOP OR CONTROL WORRYING: NOT AT ALL
6. BECOMING EASILY ANNOYED OR IRRITABLE: NOT AT ALL
7. FEELING AFRAID AS IF SOMETHING AWFUL MIGHT HAPPEN: NOT AT ALL

## 2018-01-31 ASSESSMENT — PATIENT HEALTH QUESTIONNAIRE - PHQ9: 5. POOR APPETITE OR OVEREATING: NOT AT ALL

## 2018-01-31 NOTE — MR AVS SNAPSHOT
After Visit Summary   1/31/2018    Larissa Rehman    MRN: [id number]           Patient Information     Date Of Birth          1955        Visit Information        Provider Department      1/31/2018 2:00 PM [facility]        Today's Diagnoses     Gender dysphoria in adult    -  1       Follow-ups after your visit        Your next 10 appointments already scheduled     Mar 29, 2018   Procedure with Madhu Mojica MD   [facility] [place], Same Day Surgery (--)    [address]  [facility] [id number] [phone]            Apr 05, 2018  8:00 AM CDT   Return Visit with Lynnette Zuniga MD   [facility]    [address],  [address]  [facility] [address]   [phone]            Apr 10, 2018  4:00 PM CDT   (Arrive by 3:45 PM)   Return Plastic Surgery with Madhu Mojica MD   [facility]astic [facility] Reconstructive [facility] ([facility])    [address]  [address]  [facility] [address] [phone]           Do not wear perfume.              Who to contact     Please call your clinic at [phone] to:    Ask questions about your health    Make or cancel appointments    Discuss your medicines    Learn about your test results    Speak to your doctor            Additional Information About Your Visit        HealthCare Partnershart Information     Social [employer]ov gives you secure access to your electronic health record. If you see a primary care provider, you can also send messages to your care team and make appointments. If you have questions, please call your primary care clinic.  If you do not have a primary care provider, please call [phone] and they will assist you.      [employer]ov is an electronic gateway that provides easy, online access to your medical records. With [employer], you can request a clinic appointment, read your test results, renew a prescription or communicate with your care team.     To  access your existing account, please contact your [facility] or call [phone] for assistance.        Care EveryWhere ID     This is your Care EveryWhere ID. This could be used by other organizations to access your [place] medical records  [id number]         Blood Pressure from Last 3 Encounters:   03/08/18 137/81   02/09/18 120/70   11/28/17 129/77    Weight from Last 3 Encounters:   03/08/18 54.4 kg (120 lb)   02/09/18 56.7 kg (125 lb)   11/28/17 56.7 kg (125 lb)              We Performed the Following     Diagnostic Assessment (complete) [03827]        Primary Care Provider Office Phone # Fax #    Tk Marin [phone]       [facility] [address] [address]        Equal Access to Services     TE MOROCHO : Hadii rolando ku hadasho Sosandy, waaxda luqadaha, qaybta kaalmada adeegyada, gloria sheikh hayjanae cooney . So [facility] [phone].    ATENCIÓN: Si habla español, tiene a bermudez disposición servicios gratuitos de asistencia lingüística. [facility] [phone].    We comply with applicable federal civil rights laws and [place] laws. We do not discriminate on the basis of race, color, national origin, age, disability, sex, sexual orientation, or gender identity.            Thank you!     Thank you for choosing [place] [facility]  for your care. Our goal is always to provide you with excellent care. Hearing back from our patients is one way we can continue to improve our services. Please take a few minutes to complete the written survey that you may receive in the mail after your visit with us. Thank you!             Your Updated Medication List - Protect others around you: Learn how to safely use, store and throw away your medicines at www.disposemymeds.org.          This list is accurate as of 1/31/18 11:59 PM.  Always use your most recent med list.                   Brand Name Dispense Instructions for use  Diagnosis    ASPIRIN PO      Take 81 mg by mouth daily        BENADRYL PO      Take 25 mg by mouth nightly as needed for Insomnia or itching        EPINEPHrine 0.3 MG/0.3ML injection 2-pack    EPIPEN/ADRENACLICK/or ANY BX GENERIC EQUIV    2 each    Inject 0.3 mLs (0.3 mg) into the muscle once as needed for anaphylaxis        SIMVASTATIN PO      Take 20 mg by mouth daily

## 2018-01-31 NOTE — PROGRESS NOTES
"[facility]  Program in Human Sexuality  Department of Family Medicine & Community Health  [facility]   [address]               [place] [address]  Phone: [phone]  Fax: [phone]  [email or web address]    Transgender Pre-Surgical Diagnostic Assessment Interview      Date of Service: 1/31/18  Client Name: Larissa Rehman, prefers Eleazar (pronounced \"Ashley\") prefers no pronouns  YOB: 1955  62 year old  MRN:  [id number]  Gender/Gender Identity: third sex  Treating Provider: Fab  Program:   PENNY  Type of Session: Assessment  Present in Session: patient only  Number of Minutes:  55                         Ethnicity: Chinese    Referral Source: Dr. Mojica    Chief Complaint/Presenting Problem and Goals:  Eleazar is pursuing gender affirmation surgery and is desiring to have a penectomy, clitoroplasty, and orchiectomy. Eleazar stated Eleazar does not desire a vaginoplasty because of the level of complications and Eleazar's age.       _________________________________________________________________________    History of gender dysphoria   Eleazar stated when they were very young they were treated openly and allowed to be girly, and as they for older they were not allowed to be girly and their dad would \"rough up\" on them to stop their girly behavior. Stated they never felt like a man or identified as male, but they just \"rolled with the punches\" and tried to ignore their gender feelings. Stated when they were a teenager they would sneak into their mom's room and weat their mom's clothes. Stated in  society and growing up in a Chinese household, they had a hard time. Stated they have always been small and this was hard for them, and they had ADHD and were suspended from school for emotional and behavioral problems. Stated their problems preoccupied them from thinking about their gender too much, but stated they walked a line of trying to present as " feminine as they could without getting beat up. Stated they were suicidal when they were young (ages 6-10) but then were able to embrace themselves as different and philosophical. Eleazar stated in their 20s it was the 1970s and they were studying at the [employer], and they identified as a hippie, and was able to dress more feminine, and drank a lot and did a lot of drugs. Stated this made tolerating living as male more tolerable because they could be more feminine and more androgynous. Stated they wanted to be a  and went to law school. Stated they met their ex-wife and were able to share values and philosophies, and were able to not think about sex.     Eleazar stated they have read about eunuchs in Chinese history and thought they would like to be a eunuch and had considered self castration in the past. Stated they identify as female inside, but with the constraints of the world they would like to do what they can to feel as congruent as they can.     Eleazar stated they decided to pursue surgery in the last few years after the rise of more information about transgender identities, and that they felt hopeful that they may be able to transition in the way they would like to. Stated they have met with several psychologists to discuss their gender concerns, and have two letters of referral for surgery from Dr. Graham Mcgee and Dr. Debbi Watkins. Eleazar reported they feel confident in their decision and aftercare plan.      Body Image/Anatomical dysphoria:  Eleazar stated they hate their genitals, and that deep inside they have identified as female. Stated they hate touching their penis and do not like masturbation. Stated they ordered hormones online, but they did not work. Stated their dysphoria focused ont ehir anatomy has been consistent since childhood, and their non-binary identity has been consistent since childhood.       Relevant Sexuality Information:  Eleazar stated they are not currently sexual, and do  not have any current partners. Stated they do not plan on being sexual, they identify as more sexless.     _________________________________________________________________________      Mental Health History    Substance Use:   Eleazar reported no concerns about substance use.     CAGE  Have you ever felt you should Cut down on your drinking or drug use?: No  Have people Annoyed you by criticizing your drinking or drug use?: No  Have you ever felt bad or Guilty about your drinking or drug use?: No  Have you ever had a drink or used drugs first thing in the morning to steady your nerves or to get rid of a hangover? (Eye opener): No  CAGE-AID SCORE: 0       Medical History   Completed a referral for hormones and because of health concerns, was referred for orchiectomy rather than start spirolactone and estradial.       Family History   Eleazar stated both of their parents have passed, over ten years ago.       Legal Issues:  none    _________________________________________________________________________     CONCLUSIONS    Strengths and Liabilities:   Eleazar is motivated to address their concerns and educated about their options.     Symptoms:  See PHQ-9 and NORTH-7 for current symptoms.    PHQ-9:  PHQ-9 ([employer]) 1/31/2018   1.  Little interest or pleasure in doing things 0   2.  Feeling down, depressed, or hopeless 0   3.  Trouble falling or staying asleep, or sleeping too much 0   4.  Feeling tired or having little energy 0   5.  Poor appetite or overeating 0   6.  Feeling bad about yourself 0   7.  Trouble concentrating 0   8.  Moving slowly or restless 0   9.  Suicidal or self-harm thoughts 0   PHQ-9 Total Score 0         PHQ-9 SCORING CARE FOR SEVERITY  For health professional use only.     Scoring - add up all selected items on PHQ-9  For every item selected:  Not at all = 0  Several days = 1  More than half the days = 2  Nearly every day = 3      Interpretation of Total Score  Total Score Depression Severity and  Recommendations   0-9 No Major Depression   10-14 Moderate.  Initial weekly follow up.  If patient is responding, monthly contacts.  Meds or therapy.   15-19 Moderate severe.  Initial weekly follow up.  If patient is responding, 2-4 week contacts.  Meds and/or therapy.   >20 Severe.  Weekly contact.  Meds and therapy.             NORTH  1. Feeling nervous, anxious, or on edge: Not at all  2. Not being able to stop or control worrying: Not at all  3. Worrying too much about different things: Several days  4. Trouble relaxing: Not at all  5. Being so restless that it is hard to sit still: Not at all  6. Becoming easily annoyed or irritable: Not at all  7. Feeling afraid, as if something awful might happen: Not at all    NORTH-7 Total Score: 1    Interpretation:    NORTH-7 total score for the 7 items ranges from 0 - 21.    0 - 5     Minimal anxiety  6 - 10   Mild anxiety  11 - 15 Moderate anxiety  16 - 21 Severe anxiety         Mental Status   Appearance:  Casually dressed, Adequately groomed and Appears stated age  Behavior/relationship to examiner/demeanor:  Cooperative, Engaged and Pleasant  Orientation: Oriented to person, place, time and situation  Speech Rate:  Normal  Speech Spontaneity:  Normal  Mood:  euthymic, calm and friendly  Affect:  Appropriate/mood-congruent  Thought Process (Associations):  Logical, Linear and Goal directed  Thought Content:  no evidence of suicidal or homicidal ideation and no overt psychosis  Abnormal Perception:  None  Attention/Concentration:  Normal  Language:  Intact  Insight:  Good  Judgment:  Good        DSM-5 Diagnosis:        Interactive Complexity  Communication difficulties present during current the psychiatric procedure included:  1. The need to manage maladaptive communication (related to e.g. high anxiety, high reactivity, repeated questions, or disagreement, etc.) among participants that complicated delivery of care.  2. Caregiver emotion/behavior that interfered with  implementation of the treatment plan.  3. Evidence/disclosure of a sentinel event and mandated report to a third party (e.g. abuse or neglect with report to state agency, etc) with initiation of discussion of the sentinel event and/or report with patient and other visit participants.  4. Use of play equipment, physical devices,  or  to overcome significant communication barriers.  5. N/A  6.   Conclusions/Recommendations/Initial Treatment Goals: (The treatment plan should be developed from the assessment and in the chart the 1st session following the completion of the assessment.  It needs to be reviewed and signed by the patient and therapist).    The client is a 62 year old Chinese person assigned male at birth who identifies as third sex, transgender. Based on the client's current report of symptoms, client meets criteria for gender dysphoria. The client's gender dysphoria causes serious distress and is appropriate for medical intervention to relieve clinical distress. Client does not meet criteria for other diagnoses at this time. Client denies safety concerns. Based on the client's reported symptoms and impact on functioning, the plan for the patient is:  1. Continue process of moving forward for genital surgery with Dr. Madhu Mojica  2. Coordinate care if needed.           Angelique Sanon, PhD, LP    Coordinator, Transgender Health Services  Program in Human Sexuality, [facility]  Department of Family Medicine and Community Health  [employer] [place] [employer]

## 2018-02-01 ENCOUNTER — TELEPHONE (OUTPATIENT)
Dept: SURGERY | Facility: CLINIC | Age: 63
End: 2018-02-01

## 2018-02-09 ENCOUNTER — OFFICE VISIT (OUTPATIENT)
Dept: PLASTIC SURGERY | Facility: CLINIC | Age: 63
End: 2018-02-09
Payer: COMMERCIAL

## 2018-02-09 VITALS
BODY MASS INDEX: 20.83 KG/M2 | HEIGHT: 65 IN | DIASTOLIC BLOOD PRESSURE: 70 MMHG | SYSTOLIC BLOOD PRESSURE: 120 MMHG | WEIGHT: 125 LBS | HEART RATE: 80 BPM | OXYGEN SATURATION: 100 %

## 2018-02-09 DIAGNOSIS — F64.0 GENDER DYSPHORIA IN ADULT: Primary | ICD-10-CM

## 2018-02-09 ASSESSMENT — PAIN SCALES - GENERAL: PAINLEVEL: NO PAIN (0)

## 2018-02-09 NOTE — LETTER
"2/9/2018       RE: Larissa Rehman  [address] [place] [address]     Dear Colleague,    Thank you for referring your patient, Larissa Rehman, to the [facility] PLASTIC AND RECONSTRUCTIVE SURGERY at [facility]. Please see a copy of my visit note below.    Patient returns for a follow-up visit for gender dysphoria.    INTERVAL HISTORY: [facility]. Continues to be interested in lower surgery. Prefers she and her pronouns, but other pronouns are OK as well. Denies previous urinary issues, denies history of UTI's, denies history of STI's, and denies history of GI issues. Again, patient discloses to me that she will not be having sexual intercourse and wishes not to undergo the risk of creating a vaginal cavity. She however does masturbate by stimulating the glans of her penis and wishes to have this ability postoperatively. She is mainly interested in removal of her penis and testicles along with feminizing the outward appearance of her genitalia.    PHYSICAL EXAMINATION:  /70  Pulse 80  Ht 5' 5\"  Wt 125 lb  SpO2 100%  BMI 20.8 kg/m2  General: In no acute distress.  On exam of the genitals, the exam has essentially unchanged from our previous visit.  Penis is uncircumcised.  2 testes are palpable within the scrotum with no irregularity.  Patient has sensation to light touch along the glans.    ASSESSMENT: Gender dysphoria.    PLAN: Patient is a candidate for zero depth vaginoplasty, which includes penectomy, orchiectomy, clitoroplasty, and labioplasty, but without creation of a vaginal cavity.  This allows treatment of patient's gender dysphoria regarding her genitals, but avoids the devastating risks of rectal injury and rectovaginal fistula along with the need for lifelong vaginal dilation.  Multiple times today the patient reaffirmed that she has no plans for sexual intercourse and does not wish creation of a vaginal " cavity.  We discussed the risks involved with zero depth vaginoplasty to include bleeding, infection, fluid collection, injury to surrounding structures, clitoris necrosis, urinary stream issues, wound healing difficulties, and need for revision surgery.  Patient accepts these risks and wishes to proceed with surgery.  All questions were answered today.    Total time spent with patient was 15 min of which greater than 50% was in counseling.    Again, thank you for allowing me to participate in the care of your patient.      Sincerely,    Madhu Mojica MD

## 2018-02-09 NOTE — PROGRESS NOTES
"Patient returns for a follow-up visit for gender dysphoria.    INTERVAL HISTORY: [facility]. Continues to be interested in lower surgery. Prefers she and her pronouns, but other pronouns are OK as well. Denies previous urinary issues, denies history of UTI's, denies history of STI's, and denies history of GI issues. Again, patient discloses to me that she will not be having sexual intercourse and wishes not to undergo the risk of creating a vaginal cavity. She however does masturbate by stimulating the glans of her penis and wishes to have this ability postoperatively. She is mainly interested in removal of her penis and testicles along with feminizing the outward appearance of her genitalia.    PHYSICAL EXAMINATION:  /70  Pulse 80  Ht 5' 5\"  Wt 125 lb  SpO2 100%  BMI 20.8 kg/m2  General: In no acute distress.  On exam of the genitals, the exam has essentially unchanged from our previous visit.  Penis is uncircumcised.  2 testes are palpable within the scrotum with no irregularity.  Patient has sensation to light touch along the glans.    ASSESSMENT: Gender dysphoria.    PLAN: Patient is a candidate for zero depth vaginoplasty, which includes penectomy, orchiectomy, clitoroplasty, and labioplasty, but without creation of a vaginal cavity.  This allows treatment of patient's gender dysphoria regarding her genitals, but avoids the devastating risks of rectal injury and rectovaginal fistula along with the need for lifelong vaginal dilation.  Multiple times today the patient reaffirmed that she has no plans for sexual intercourse and does not wish creation of a vaginal cavity.  We discussed the risks involved with zero depth vaginoplasty to include bleeding, infection, fluid collection, injury to surrounding structures, clitoris necrosis, urinary stream issues, wound healing difficulties, and need for revision surgery.  Patient accepts these risks and wishes " to proceed with surgery.  All questions were answered today.    Total time spent with patient was 15 min of which greater than 50% was in counseling.

## 2018-02-09 NOTE — NURSING NOTE
"Chief Complaint   Patient presents with     RECHECK     lower surgery        Vitals:    02/09/18 1551   BP: 120/70   Pulse: 80   SpO2: 100%   Weight: 125 lb   Height: 5' 5\"       Body mass index is 20.8 kg/(m^2).      Killian MÁRQUEZ                     "

## 2018-02-12 ENCOUNTER — TELEPHONE (OUTPATIENT)
Dept: SURGERY | Facility: CLINIC | Age: 63
End: 2018-02-12

## 2018-02-13 ASSESSMENT — PATIENT HEALTH QUESTIONNAIRE - PHQ9: SUM OF ALL RESPONSES TO PHQ QUESTIONS 1-9: 0

## 2018-02-13 ASSESSMENT — ANXIETY QUESTIONNAIRES: GAD7 TOTAL SCORE: 1

## 2018-02-19 DIAGNOSIS — F64.0 GENDER DYSPHORIA IN ADULT: Primary | ICD-10-CM

## 2018-02-22 ENCOUNTER — TELEPHONE (OUTPATIENT)
Dept: PLASTIC SURGERY | Facility: CLINIC | Age: 63
End: 2018-02-22

## 2018-02-26 ENCOUNTER — TELEPHONE (OUTPATIENT)
Dept: SURGERY | Facility: CLINIC | Age: 63
End: 2018-02-26

## 2018-02-26 NOTE — TELEPHONE ENCOUNTER
Received [facility] prior authorization approval letter-#[id number] for codes that I requested one for, will call Ashley and let her know

## 2018-03-08 ENCOUNTER — OFFICE VISIT (OUTPATIENT)
Dept: FAMILY MEDICINE | Facility: CLINIC | Age: 63
End: 2018-03-08
Payer: COMMERCIAL

## 2018-03-08 VITALS
BODY MASS INDEX: 19.97 KG/M2 | DIASTOLIC BLOOD PRESSURE: 81 MMHG | WEIGHT: 120 LBS | SYSTOLIC BLOOD PRESSURE: 137 MMHG | RESPIRATION RATE: 16 BRPM | TEMPERATURE: 97.5 F | OXYGEN SATURATION: 95 % | HEART RATE: 50 BPM

## 2018-03-08 DIAGNOSIS — Z01.818 PRE-OP EXAM: Primary | ICD-10-CM

## 2018-03-08 DIAGNOSIS — Z98.890 S/P FOOT SURGERY, LEFT: ICD-10-CM

## 2018-03-08 DIAGNOSIS — E78.01 FAMILIAL HYPERCHOLESTEREMIA: ICD-10-CM

## 2018-03-08 DIAGNOSIS — Z01.818 PREOP GENERAL PHYSICAL EXAM: ICD-10-CM

## 2018-03-08 LAB — HEMOGLOBIN: 15.3 G/DL (ref 13.3–17.7)

## 2018-03-08 NOTE — MR AVS SNAPSHOT
After Visit Summary   3/8/2018    Larissa Rehman    MRN: [id number]           Patient Information     Date Of Birth          1955        Visit Information        Provider Department      3/8/2018 8:00 AM Amelie Palmer MD [facility]        Today's Diagnoses     Pre-op exam    -  1    Preop general physical exam        S/P foot surgery, left        Familial hypercholesteremia          Care Instructions      Presurgery Checklist  You are scheduled to have surgery. The healthcare staff will try to make your stay comfortable. Use the guidelines below to remind yourself what to do before surgery. Be sure to follow any specific pre-op instructions from your surgeon or nurse.   Preparing for Surgery  Ask your surgeon if you ll need a blood transfusion during surgery and if so, how to prepare for it. In some cases, you can donate blood before surgery. If needed, this blood can be given back (transfused) to you during or after surgery.  If you are having abdominal surgery, ask what you need to do to clear your bowel.  Tell your surgeon if you have allergies to any medications or foods.  Arrange for an adult family member or friend to drive you home after surgery. If possible, have someone ready to help you at home as you recover.  Call the surgeon if you get a cold, fever, sore throat, diarrhea, or other health problem just before surgery. Your surgeon can decide whether or not to postpone the surgery.  Medications  Tell your surgeon about all medications you take, including prescription and over-the-counter products such as herbal remedies and vitamins. Ask if you should continue taking them.  Hold the aspirin for the week before surgery.  Do not take any ibuprofen or aleve the week before surgery.   You may be told to take antibiotics just before surgery to prevent infection. If so, follow instructions carefully on how to take them.    Stop Smoking  If you smoke, healing may take  longer. So at least 4 week(s) before surgery, stop smoking.  Bathing or Showering Before Surgery  If instructed, wash with antibacterial soap. Afterward, do not use lotions or powders.  If you are having surgery on the head, you may be asked to shampoo with antibacterial soap. Follow instructions for doing so.  Do Not Remove Hair from the Surgery Site  Do not shave hair from the incision site, unless you are given specific instructions to do so. Usually, if hair needs to be removed, it will be done at the hospital right before surgery.  Don t Eat or Drink  Your doctor will tell you when to stop eating and drinking. If you do not follow your doctor's instructions, your procedure may be postponed or rescheduled for another day.  If your surgeon tells you to continue any medications, take them with small sips of water.  You can brush your teeth and rinse your mouth, but don t swallow any water.  Day of Surgery  Do not wear makeup. Do not use perfume, deodorant, or hairspray. Remove nail polish and artificial nails.  Leave jewelry (including rings), watches, and other valuables at home.  Be sure to bring health insurance cards or forms and a photo ID.  Bring a list of your medications (include the name, dose, how often you take them, and the time last dose was taken).  Arrive on time at the hospital or surgery facility.          Follow-ups after your visit        Your next 10 appointments already scheduled     Mar 13, 2018  6:45 PM CDT   (Arrive by 6:30 PM)   Return Plastic Surgery with Madhu Mojica MD   [facility] Plastic and Reconstructive Surgery ([facility] [place])    [address]  [address] [facility] [address] [phone]           Do not wear perfume.            Mar 29, 2018   Procedure with Madhu Mojica MD   [facility] [place], Same Day Surgery (--)    [address] [address]   [phone]            Apr 05, 2018  8:00 AM CDT   Return Visit with Lynnette Zuniga MD    [facility] ([facility])    [address],  [address]  [facility] [address]   [phone]            Apr 10, 2018  4:00 PM CDT   (Arrive by 3:45 PM)   Return Plastic Surgery with Madhu Mojica MD   [facility] Plastic and Reconstructive Surgery ([facility] [place])    [address]  4th Floor  [facility] [id number] [phone]           Do not wear perfume.              Who to contact     Please call your clinic at [phone] to:    Ask questions about your health    Make or cancel appointments    Discuss your medicines    Learn about your test results    Speak to your doctor            Additional Information About Your Visit        [employer] Information     [employer] gives you secure access to your electronic health record. If you see a primary care provider, you can also send messages to your care team and make appointments. If you have questions, please call your primary care clinic.  If you do not have a primary care provider, please call [phone] and they will assist you.      [employer] is an electronic gateway that provides easy, online access to your medical records. With [employer], you can request a clinic appointment, read your test results, renew a prescription or communicate with your care team.     To access your existing account, please contact your [facility] or call [phone] for assistance.        Care EveryWhere ID     This is your Care EveryWhere ID. This could be used by other organizations to access your [facility] medical records  [id number]        Your Vitals Were     Pulse Temperature Respirations Pulse Oximetry BMI (Body Mass Index)       50 97.5  F (36.4  C) (Oral) 16 95% 19.97 kg/m2        Blood Pressure from Last 3 Encounters:   03/08/18 137/81   02/09/18 120/70   11/28/17 129/77    Weight from Last 3 Encounters:   03/08/18 120 lb (54.4 kg)   02/09/18 125 lb (56.7 kg)   11/28/17 125 lb (56.7  kg)              We Performed the Following     EKG 12-LEAD RADIOLOGY     Hemoglobin (HGB) ([facility])        Primary Care Provider Office Phone # Fax #    Tk Marin [phone]       [facility] [address]  [facility] [address]        Equal Access to Services     TE MOROCHO : Hadii aad ku hadasho Soomaali, waaxda luqadaha, qaybta kaalmada adeegyada, waxay idiin hayaan adeeg zanesantos lastephen feldman. So [facility] [phone].    ATENCIÓN: Si habla español, tiene a bermudez disposición servicios gratuitos de asistencia lingüística. [facility] [phone].    We comply with applicable federal civil rights laws and [place] laws. We do not discriminate on the basis of race, color, national origin, age, disability, sex, sexual orientation, or gender identity.            Thank you!     Thank you for choosing [facility]  for your care. Our goal is always to provide you with excellent care. Hearing back from our patients is one way we can continue to improve our services. Please take a few minutes to complete the written survey that you may receive in the mail after your visit with us. Thank you!             Your Updated Medication List - Protect others around you: Learn how to safely use, store and throw away your medicines at www.disposemymeds.org.          This list is accurate as of 3/8/18  9:14 AM.  Always use your most recent med list.                   Brand Name Dispense Instructions for use Diagnosis    ASPIRIN PO      Take 81 mg by mouth daily        BENADRYL PO      Take 25 mg by mouth nightly as needed for Insomnia or itching        EPINEPHrine 0.3 MG/0.3ML injection 2-pack    EPIPEN/ADRENACLICK/or ANY BX GENERIC EQUIV    2 each    Inject 0.3 mLs (0.3 mg) into the muscle once as needed for anaphylaxis        SIMVASTATIN PO      Take 20 mg by mouth daily

## 2018-03-08 NOTE — PROGRESS NOTES
Discussed the following results during the clinic visit. See progress note for details.     Amelie Palmer MD

## 2018-03-08 NOTE — PATIENT INSTRUCTIONS
Presurgery Checklist  You are scheduled to have surgery. The healthcare staff will try to make your stay comfortable. Use the guidelines below to remind yourself what to do before surgery. Be sure to follow any specific pre-op instructions from your surgeon or nurse.   Preparing for Surgery  Ask your surgeon if you ll need a blood transfusion during surgery and if so, how to prepare for it. In some cases, you can donate blood before surgery. If needed, this blood can be given back (transfused) to you during or after surgery.  If you are having abdominal surgery, ask what you need to do to clear your bowel.  Tell your surgeon if you have allergies to any medications or foods.  Arrange for an adult family member or friend to drive you home after surgery. If possible, have someone ready to help you at home as you recover.  Call the surgeon if you get a cold, fever, sore throat, diarrhea, or other health problem just before surgery. Your surgeon can decide whether or not to postpone the surgery.  Medications  Tell your surgeon about all medications you take, including prescription and over-the-counter products such as herbal remedies and vitamins. Ask if you should continue taking them.  Hold the aspirin for the week before surgery.  Do not take any ibuprofen or aleve the week before surgery.   You may be told to take antibiotics just before surgery to prevent infection. If so, follow instructions carefully on how to take them.    Stop Smoking  If you smoke, healing may take longer. So at least 4 week(s) before surgery, stop smoking.  Bathing or Showering Before Surgery  If instructed, wash with antibacterial soap. Afterward, do not use lotions or powders.  If you are having surgery on the head, you may be asked to shampoo with antibacterial soap. Follow instructions for doing so.  Do Not Remove Hair from the Surgery Site  Do not shave hair from the incision site, unless you are given specific instructions to do so.  Usually, if hair needs to be removed, it will be done at the hospital right before surgery.  Don t Eat or Drink  Your doctor will tell you when to stop eating and drinking. If you do not follow your doctor's instructions, your procedure may be postponed or rescheduled for another day.  If your surgeon tells you to continue any medications, take them with small sips of water.  You can brush your teeth and rinse your mouth, but don t swallow any water.  Day of Surgery  Do not wear makeup. Do not use perfume, deodorant, or hairspray. Remove nail polish and artificial nails.  Leave jewelry (including rings), watches, and other valuables at home.  Be sure to bring health insurance cards or forms and a photo ID.  Bring a list of your medications (include the name, dose, how often you take them, and the time last dose was taken).  Arrive on time at the hospital or surgery facility.

## 2018-03-08 NOTE — PROGRESS NOTES
[facility]  [address] [place] [address],  [address]  [facility] [address]  Phone: [phone]  Fax: [phone]    3/8/2018    Adult PRE-OP Evaluation:    Larissa Rehman, 1955 presents for pre-operative evaluation and assessment as requested by Dr. Mojica, prior to undergoing surgery/procedure for treatment of  .  Gender dysphoria  Proposed procedure: Orchiectomy, penectomy and vaginoplasty    Date of Surgery/ Procedure: 3/29/2018  Hospital/Surgical Facility:    [facility]      [address]      Fax: [phone]      Phone: [phone]     Primary Physician: Tk Marin  Type of Anesthesia Anticipated: General  History of anesthesia complications: NONE  History of  abnormal bleeding: NONE   History of blood transfusions: NO  Patient has a Health Care Directive or Living Will:  YES - has one notarized and gave to daughter. Daughter is decision maker.  Made copies.      Preoperative Questions   1. NO - Do you have a history of heart attack, stroke, stent, bypass or surgery on an artery in the head, neck, heart or legs?  2. NO - Do you ever have any pain or discomfort in your chest?  3. NO - Have you ever had a severe pain across the front of your chest lasting for half an hour or more?  4. NO - Do you have a history of Congestive Heart Failure?  5. NO - Are you troubled by shortness of breath when: walking on the level/ up a slight hill/ at night?  6. NO - Does your chest ever sound wheezy or whistling?  7. NO - Do you currently have a cold, bronchitis or other respiratory infection?  8. NO - Have you had a cold, bronchitis or other respiratory infection within the last 2 weeks?  9. NO - Do you usually have a cough?  10. NO - Do you sometimes get pains in the calves of your legs when you walk?  11. NO - Do you or anyone in your family have previous history of blood clots?  12. NO - Do you or does anyone in your family have a serious bleeding problem such as prolonged  bleeding following surgeries or cuts?  13. NO - Have you ever had problems with anemia or been told to take iron pills?  14. NO - Have you had any abnormal blood loss such as black, tarry or bloody stools, or abnormal vaginal bleeding?  15. NO - Have you ever had a blood transfusion?  16. NO - Have you or any of your relatives ever had problems with anesthesia?  17. NO - Do you have sleep apnea, excessive snoring or daytime drowsiness?  18. NO - Do you have any prosthetic heart valves?  19. NO - Do you have prosthetic joints?  20. NO - Is there any chance that you may be pregnant?    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  Recent lipids. 10/2017: HDL - 102, LDL - 70. (at this level since 2012).   On Simvastatin 10 or 20 mg daily.     Patient Active Problem List   Diagnosis     Familial hypercholesteremia     Gender dysphoria in adult     Seasonal mood disorder (H)     Stung by Bees, trouble breathing, lips swollen, 3 - 4 years ago, seen in ED and told to carry an Epipen.     Current Outpatient Prescriptions on File Prior to Visit:  SIMVASTATIN PO Take 20 mg by mouth daily   ASPIRIN PO Take 81 mg by mouth daily   DiphenhydrAMINE HCl (BENADRYL PO) Take 25 mg by mouth nightly as needed for Insomnia or itching   EPINEPHrine (EPIPEN) 0.3 MG/0.3ML injection Inject 0.3 mLs (0.3 mg) into the muscle once as needed for anaphylaxis (Patient not taking: Reported on 3/8/2018)     No current facility-administered medications on file prior to visit.     OTC products: None, except as noted above    Allergies   Allergen Reactions     Bee Venom      Latex Allergy: NO    Social History     Social History     Marital status: Single     Spouse name: N/A     Number of children: N/A     Years of education: N/A     Social History Main Topics     Smoking status: Never Smoker     Smokeless tobacco: Never Used     Alcohol use Yes      Comment: wine     Drug use: No     Sexual activity: Not Asked     Other Topics  Concern     None     Social History Narrative       REVIEW OF SYSTEMS:   Constitutional, HEENT, cardiovascular, pulmonary, GI, , musculoskeletal, neuro, skin, endocrine and psych systems are negative, except as otherwise noted.    EXAM:     Patient Vitals for the past 24 hrs:   BP Temp Temp src Pulse Resp SpO2 Weight   03/08/18 0824 137/81 - - - - - -   03/08/18 0823 134/79 97.5  F (36.4  C) Oral 50 16 95 % 120 lb (54.4 kg)     Body mass index is 19.97 kg/(m^2).  GENERAL: healthy, alert and no distress  EYES: Eyes grossly normal to inspection, extraocular movements - intact, and PERRL  HENT: ear canals- normal; TMs- normal; Nose- normal; Mouth- no ulcers, no lesions  NECK: no tenderness, no adenopathy, no asymmetry, no masses, no stiffness; thyroid- normal to palpation  RESP: lungs clear to auscultation - no rales, no rhonchi, no wheezes  CV: regular rates and rhythm, normal S1 S2, no S3 or S4 and no murmur, no click or rub -  ABDOMEN: soft, no tenderness, no  hepatosplenomegaly, no masses, normal bowel sounds  MS: extremities- no gross deformities noted, no edema  SKIN: no suspicious lesions, has multiple hyperpigmented areas on their back from scratching.   NEURO: strength and tone- normal, sensory exam- grossly normal, mentation- intact, speech- normal, reflexes- symmetric  BACK: no CVA tenderness, no paralumbar tenderness  PSYCH: Alert and oriented times 3; speech- coherent , normal rate and volume; able to articulate logical thoughts  LYMPHATICS: ant. cervical- normal, post. cervical- normal    DIAGNOSTICS:      EKG: appears normal, NSR, sinus bradycardia, normal axis, normal intervals, no acute ST/T changes c/w ischemia, no LVH by voltage criteria, nonspecific ST-T changes  Hgb: 15.3.    RISK ASSESSMENT:     Cardiovascular Risk:  -Patient is able to participate in strenuous activities without chest pain.  -The patient does not have chest pain with exertion.  -Patient does not have a history of congestive  heart failure.    -The patient does not have a history of stroke and does not have a history of valvular disease.    Pulmonary Risk:  -In terms of risk factors for pulmonary complication, the patient has no risk factors    Perioperative Complications:  -The patient does not have a history of bleeding or clotting problems in the past.    -The patient has not had complications from surgeries.    -The patient does not have a family history of any anesthesia or surgical complications.      IMPRESSION:   Reason for surgery/procedure: Gender confirming surgery    The proposed surgical procedure is considered INTERMEDIATE risk.    For above listed surgery and anesthesia:   Patient is at low risk for surgery/procedure and perioperative/procedure complications.    RECOMMENDATIONS:     Labs:  Hgb  EKG - shows some slight j point elevation consistent with healthy, bradycardic heart.  Not concerned that has ischemia.     Fasting:  May have clear liquids until 2 hours preoperatively and solids until 6 hours preoperatively    Preop Plan:  --Approval given to proceed with proposed procedure, without further diagnostic evaluation  --Patient is currently taking    Anticoagulant or Antiplatelet Medication Use  ASPIRIN: Discontinue ASA 7-10 days prior to procedure to reduce bleeding risk.  It should be resumed post-operatively.      --Patient is to take all other scheduled medications on the day of surgery although recommend not taking benadryl the night before.     Medications:  Patient should take their regular medications the morning of surgery unless otherwise instructed.    Hold aspirin 7 days prior to surgery.      Amelie Palmer MD    Please contact our office if there are any further questions or information required about this patient.

## 2018-03-13 ENCOUNTER — OFFICE VISIT (OUTPATIENT)
Dept: PLASTIC SURGERY | Facility: CLINIC | Age: 63
End: 2018-03-13
Payer: COMMERCIAL

## 2018-03-13 DIAGNOSIS — F64.0 GENDER DYSPHORIA IN ADULT: Primary | ICD-10-CM

## 2018-03-13 NOTE — LETTER
3/13/2018       RE: Larissa Rehman  [address] [place] [address]     Dear Colleague,    Thank you for referring your patient, Larissa Rehman, to the [facility] PLASTIC AND RECONSTRU[facility]IVE SURGERY at [facility]. Please see a copy of my visit note below.    Patient returns for a preoperative visit for zero depth vaginoplasty for gender dysphoria.    INTERVAL HISTORY: Patient continues to be interested in surgery. Expresses understanding of the procedure and the associated risks. 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 Was seen for a preop clearance H&P on 3/8/2018.    PHYSICAL EXAMINATION:  General: In no acute distress.  On exam, the genitalia are essentially unchanged from previous examinations. 6 cm penile shaft length. Viable and sensate glans. No rash, lesion, or scars. Scrotum appears normal size. Two palpable testes within.    ASSESSMENT: Gender dysphoria, candidate for zero depth vaginoplasty.    PLAN: Patient is scheduled for March 29, 2018. We went over the details of the procedure again. We plan to forego creation of the vaginal cavity. We discussed the risks involved again. Patient expressed understanding. Patient understands the need for postoperative admission. All questions were answered and consents obtained.    Total time spent with patient was 15 min of which greater than 50% was in counseling.    Again, thank you for allowing me to participate in the care of your patient.      Sincerely,    Madhu Mojica MD

## 2018-03-14 NOTE — PROGRESS NOTES
Patient returns for a preoperative visit for zero depth vaginoplasty for gender dysphoria.    INTERVAL HISTORY: Patient continues to be interested in surgery. Expresses understanding of the procedure and the associated risks. 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 Was seen for a preop clearance H&P on 3/8/2018.    PHYSICAL EXAMINATION:  General: In no acute distress.  On exam, the genitalia are essentially unchanged from previous examinations. 6 cm penile shaft length. Viable and sensate glans. No rash, lesion, or scars. Scrotum appears normal size. Two palpable testes within.    ASSESSMENT: Gender dysphoria, candidate for zero depth vaginoplasty.    PLAN: Patient is scheduled for March 29, 2018. We went over the details of the procedure again. We plan to forego creation of the vaginal cavity. We discussed the risks involved again. Patient expressed understanding. Patient understands the need for postoperative admission. All questions were answered and consents obtained.    Total time spent with patient was 15 min of which greater than 50% was in counseling.

## 2018-03-26 ENCOUNTER — ANESTHESIA EVENT (OUTPATIENT)
Dept: SURGERY | Facility: CLINIC | Age: 63
DRG: 876 | End: 2018-03-26
Payer: COMMERCIAL

## 2018-03-29 ENCOUNTER — ANESTHESIA (OUTPATIENT)
Dept: SURGERY | Facility: CLINIC | Age: 63
DRG: 876 | End: 2018-03-29
Payer: COMMERCIAL

## 2018-03-29 ENCOUNTER — SURGERY (OUTPATIENT)
Age: 63
End: 2018-03-29

## 2018-03-29 ENCOUNTER — HOSPITAL ENCOUNTER (INPATIENT)
Facility: CLINIC | Age: 63
LOS: 4 days | Discharge: HOME | DRG: 876 | End: 2018-04-02
Attending: ANESTHESIOLOGY | Admitting: PLASTIC SURGERY
Payer: COMMERCIAL

## 2018-03-29 DIAGNOSIS — F64.0 GENDER DYSPHORIA IN ADULT: Primary | ICD-10-CM

## 2018-03-29 LAB
ABO + RH BLD: NORMAL
ABO + RH BLD: NORMAL
ANION GAP SERPL CALCULATED.3IONS-SCNC: 6 MMOL/L (ref 3–14)
BLD GP AB SCN SERPL QL: NORMAL
BLOOD BANK CMNT PATIENT-IMP: NORMAL
BUN SERPL-MCNC: 10 MG/DL (ref 7–30)
CALCIUM SERPL-MCNC: 8.9 MG/DL (ref 8.5–10.1)
CHLORIDE SERPL-SCNC: 105 MMOL/L (ref 94–109)
CO2 SERPL-SCNC: 28 MMOL/L (ref 20–32)
CREAT SERPL-MCNC: 1.04 MG/DL (ref 0.66–1.25)
CREAT SERPL-MCNC: 1.06 MG/DL (ref 0.66–1.25)
ERYTHROCYTE [DISTWIDTH] IN BLOOD BY AUTOMATED COUNT: 13.3 % (ref 10–15)
GFR SERPL CREATININE-BSD FRML MDRD: 70 ML/MIN/1.7M2
GFR SERPL CREATININE-BSD FRML MDRD: 72 ML/MIN/1.7M2
GLUCOSE BLDC GLUCOMTR-MCNC: 121 MG/DL (ref 70–99)
GLUCOSE SERPL-MCNC: 110 MG/DL (ref 70–99)
HCT VFR BLD AUTO: 43.9 % (ref 40–53)
HGB BLD-MCNC: 14.8 G/DL (ref 13.3–17.7)
MCH RBC QN AUTO: 33.1 PG (ref 26.5–33)
MCHC RBC AUTO-ENTMCNC: 33.7 G/DL (ref 31.5–36.5)
MCV RBC AUTO: 98 FL (ref 78–100)
PLATELET # BLD AUTO: 179 10E9/L (ref 150–450)
PLATELET # BLD AUTO: 187 10E9/L (ref 150–450)
POTASSIUM SERPL-SCNC: 4 MMOL/L (ref 3.4–5.3)
RBC # BLD AUTO: 4.47 10E12/L (ref 4.4–5.9)
SODIUM SERPL-SCNC: 139 MMOL/L (ref 133–144)
SPECIMEN EXP DATE BLD: NORMAL
WBC # BLD AUTO: 3.3 10E9/L (ref 4–11)

## 2018-03-29 PROCEDURE — 12000008 ZZH R&B INTERMEDIATE UMMC

## 2018-03-29 PROCEDURE — 80048 BASIC METABOLIC PNL TOTAL CA: CPT | Performed by: PLASTIC SURGERY

## 2018-03-29 PROCEDURE — 25000128 H RX IP 250 OP 636: Performed by: PLASTIC SURGERY

## 2018-03-29 PROCEDURE — 25000125 ZZHC RX 250: Performed by: PLASTIC SURGERY

## 2018-03-29 PROCEDURE — 85027 COMPLETE CBC AUTOMATED: CPT | Performed by: PLASTIC SURGERY

## 2018-03-29 PROCEDURE — 86901 BLOOD TYPING SEROLOGIC RH(D): CPT | Performed by: PLASTIC SURGERY

## 2018-03-29 PROCEDURE — 37000008 ZZH ANESTHESIA TECHNICAL FEE, 1ST 30 MIN: Performed by: PLASTIC SURGERY

## 2018-03-29 PROCEDURE — 88302 TISSUE EXAM BY PATHOLOGIST: CPT | Performed by: PLASTIC SURGERY

## 2018-03-29 PROCEDURE — 25000128 H RX IP 250 OP 636: Performed by: ANESTHESIOLOGY

## 2018-03-29 PROCEDURE — 25000125 ZZHC RX 250: Performed by: NURSE ANESTHETIST, CERTIFIED REGISTERED

## 2018-03-29 PROCEDURE — 71000014 ZZH RECOVERY PHASE 1 LEVEL 2 FIRST HR: Performed by: PLASTIC SURGERY

## 2018-03-29 PROCEDURE — 00000146 ZZHCL STATISTIC GLUCOSE BY METER IP

## 2018-03-29 PROCEDURE — 86850 RBC ANTIBODY SCREEN: CPT | Performed by: PLASTIC SURGERY

## 2018-03-29 PROCEDURE — 25000132 ZZH RX MED GY IP 250 OP 250 PS 637: Performed by: PLASTIC SURGERY

## 2018-03-29 PROCEDURE — 25000566 ZZH SEVOFLURANE, EA 15 MIN: Performed by: PLASTIC SURGERY

## 2018-03-29 PROCEDURE — C9399 UNCLASSIFIED DRUGS OR BIOLOG: HCPCS | Performed by: NURSE ANESTHETIST, CERTIFIED REGISTERED

## 2018-03-29 PROCEDURE — 88305 TISSUE EXAM BY PATHOLOGIST: CPT | Performed by: PLASTIC SURGERY

## 2018-03-29 PROCEDURE — 86900 BLOOD TYPING SEROLOGIC ABO: CPT | Performed by: PLASTIC SURGERY

## 2018-03-29 PROCEDURE — 82565 ASSAY OF CREATININE: CPT | Performed by: PLASTIC SURGERY

## 2018-03-29 PROCEDURE — 25000128 H RX IP 250 OP 636: Performed by: NURSE ANESTHETIST, CERTIFIED REGISTERED

## 2018-03-29 PROCEDURE — 40000170 ZZH STATISTIC PRE-PROCEDURE ASSESSMENT II: Performed by: PLASTIC SURGERY

## 2018-03-29 PROCEDURE — 36000070 ZZH SURGERY LEVEL 5 EA 15 ADDTL MIN - UMMC: Performed by: PLASTIC SURGERY

## 2018-03-29 PROCEDURE — 0W4M070 CREATION OF VAGINA IN MALE PERINEUM WITH AUTOLOGOUS TISSUE SUBSTITUTE, OPEN APPROACH: ICD-10-PCS | Performed by: PLASTIC SURGERY

## 2018-03-29 PROCEDURE — 85049 AUTOMATED PLATELET COUNT: CPT | Performed by: PLASTIC SURGERY

## 2018-03-29 PROCEDURE — 27210794 ZZH OR GENERAL SUPPLY STERILE: Performed by: PLASTIC SURGERY

## 2018-03-29 PROCEDURE — 25000128 H RX IP 250 OP 636

## 2018-03-29 PROCEDURE — 36000068 ZZH SURGERY LEVEL 5 1ST 30 MIN - UMMC: Performed by: PLASTIC SURGERY

## 2018-03-29 PROCEDURE — 36415 COLL VENOUS BLD VENIPUNCTURE: CPT | Performed by: PLASTIC SURGERY

## 2018-03-29 PROCEDURE — 37000009 ZZH ANESTHESIA TECHNICAL FEE, EACH ADDTL 15 MIN: Performed by: PLASTIC SURGERY

## 2018-03-29 RX ORDER — GLYCOPYRROLATE 0.2 MG/ML
INJECTION, SOLUTION INTRAMUSCULAR; INTRAVENOUS PRN
Status: DISCONTINUED | OUTPATIENT
Start: 2018-03-29 | End: 2018-03-29

## 2018-03-29 RX ORDER — ONDANSETRON 2 MG/ML
4 INJECTION INTRAMUSCULAR; INTRAVENOUS EVERY 6 HOURS PRN
Status: DISCONTINUED | OUTPATIENT
Start: 2018-03-29 | End: 2018-04-02 | Stop reason: HOSPADM

## 2018-03-29 RX ORDER — ONDANSETRON 2 MG/ML
4 INJECTION INTRAMUSCULAR; INTRAVENOUS EVERY 30 MIN PRN
Status: DISCONTINUED | OUTPATIENT
Start: 2018-03-29 | End: 2018-03-29

## 2018-03-29 RX ORDER — OXYCODONE HCL 5 MG/5 ML
5 SOLUTION, ORAL ORAL EVERY 4 HOURS PRN
Status: DISCONTINUED | OUTPATIENT
Start: 2018-03-29 | End: 2018-03-30

## 2018-03-29 RX ORDER — SODIUM CHLORIDE, SODIUM LACTATE, POTASSIUM CHLORIDE, CALCIUM CHLORIDE 600; 310; 30; 20 MG/100ML; MG/100ML; MG/100ML; MG/100ML
INJECTION, SOLUTION INTRAVENOUS CONTINUOUS
Status: DISCONTINUED | OUTPATIENT
Start: 2018-03-29 | End: 2018-03-29 | Stop reason: HOSPADM

## 2018-03-29 RX ORDER — ONDANSETRON 4 MG/1
4 TABLET, ORALLY DISINTEGRATING ORAL EVERY 30 MIN PRN
Status: DISCONTINUED | OUTPATIENT
Start: 2018-03-29 | End: 2018-03-29 | Stop reason: HOSPADM

## 2018-03-29 RX ORDER — PROPOFOL 10 MG/ML
INJECTION, EMULSION INTRAVENOUS PRN
Status: DISCONTINUED | OUTPATIENT
Start: 2018-03-29 | End: 2018-03-29

## 2018-03-29 RX ORDER — NALOXONE HYDROCHLORIDE 0.4 MG/ML
.1-.4 INJECTION, SOLUTION INTRAMUSCULAR; INTRAVENOUS; SUBCUTANEOUS
Status: DISCONTINUED | OUTPATIENT
Start: 2018-03-29 | End: 2018-03-30

## 2018-03-29 RX ORDER — NALOXONE HYDROCHLORIDE 0.4 MG/ML
.1-.4 INJECTION, SOLUTION INTRAMUSCULAR; INTRAVENOUS; SUBCUTANEOUS
Status: DISCONTINUED | OUTPATIENT
Start: 2018-03-29 | End: 2018-04-02 | Stop reason: HOSPADM

## 2018-03-29 RX ORDER — FENTANYL CITRATE 50 UG/ML
25-50 INJECTION, SOLUTION INTRAMUSCULAR; INTRAVENOUS
Status: DISCONTINUED | OUTPATIENT
Start: 2018-03-29 | End: 2018-03-29

## 2018-03-29 RX ORDER — AMOXICILLIN 250 MG
1 CAPSULE ORAL 2 TIMES DAILY
Status: DISCONTINUED | OUTPATIENT
Start: 2018-03-29 | End: 2018-03-30

## 2018-03-29 RX ORDER — AMOXICILLIN 250 MG
2 CAPSULE ORAL 2 TIMES DAILY
Status: DISCONTINUED | OUTPATIENT
Start: 2018-03-29 | End: 2018-03-30

## 2018-03-29 RX ORDER — ONDANSETRON 2 MG/ML
4 INJECTION INTRAMUSCULAR; INTRAVENOUS EVERY 30 MIN PRN
Status: DISCONTINUED | OUTPATIENT
Start: 2018-03-29 | End: 2018-03-29 | Stop reason: HOSPADM

## 2018-03-29 RX ORDER — SODIUM CHLORIDE, SODIUM LACTATE, POTASSIUM CHLORIDE, CALCIUM CHLORIDE 600; 310; 30; 20 MG/100ML; MG/100ML; MG/100ML; MG/100ML
INJECTION, SOLUTION INTRAVENOUS CONTINUOUS
Status: DISCONTINUED | OUTPATIENT
Start: 2018-03-29 | End: 2018-03-29

## 2018-03-29 RX ORDER — DEXAMETHASONE SODIUM PHOSPHATE 4 MG/ML
INJECTION, SOLUTION INTRA-ARTICULAR; INTRALESIONAL; INTRAMUSCULAR; INTRAVENOUS; SOFT TISSUE PRN
Status: DISCONTINUED | OUTPATIENT
Start: 2018-03-29 | End: 2018-03-29

## 2018-03-29 RX ORDER — NALOXONE HYDROCHLORIDE 0.4 MG/ML
.1-.4 INJECTION, SOLUTION INTRAMUSCULAR; INTRAVENOUS; SUBCUTANEOUS
Status: DISCONTINUED | OUTPATIENT
Start: 2018-03-29 | End: 2018-03-29

## 2018-03-29 RX ORDER — SODIUM CHLORIDE, SODIUM LACTATE, POTASSIUM CHLORIDE, CALCIUM CHLORIDE 600; 310; 30; 20 MG/100ML; MG/100ML; MG/100ML; MG/100ML
INJECTION, SOLUTION INTRAVENOUS CONTINUOUS
Status: DISCONTINUED | OUTPATIENT
Start: 2018-03-29 | End: 2018-03-30

## 2018-03-29 RX ORDER — ACETAMINOPHEN 325 MG/1
650 TABLET ORAL EVERY 4 HOURS PRN
Status: DISCONTINUED | OUTPATIENT
Start: 2018-04-01 | End: 2018-04-02 | Stop reason: HOSPADM

## 2018-03-29 RX ORDER — LIDOCAINE 40 MG/G
CREAM TOPICAL
Status: DISCONTINUED | OUTPATIENT
Start: 2018-03-29 | End: 2018-03-29 | Stop reason: HOSPADM

## 2018-03-29 RX ORDER — ONDANSETRON 2 MG/ML
INJECTION INTRAMUSCULAR; INTRAVENOUS PRN
Status: DISCONTINUED | OUTPATIENT
Start: 2018-03-29 | End: 2018-03-29

## 2018-03-29 RX ORDER — GINSENG 100 MG
CAPSULE ORAL PRN
Status: DISCONTINUED | OUTPATIENT
Start: 2018-03-29 | End: 2018-03-29 | Stop reason: HOSPADM

## 2018-03-29 RX ORDER — LIDOCAINE 40 MG/G
CREAM TOPICAL
Status: DISCONTINUED | OUTPATIENT
Start: 2018-03-29 | End: 2018-04-02 | Stop reason: HOSPADM

## 2018-03-29 RX ORDER — CEFAZOLIN SODIUM 1 G/3ML
1 INJECTION, POWDER, FOR SOLUTION INTRAMUSCULAR; INTRAVENOUS SEE ADMIN INSTRUCTIONS
Status: DISCONTINUED | OUTPATIENT
Start: 2018-03-29 | End: 2018-03-29 | Stop reason: HOSPADM

## 2018-03-29 RX ORDER — FENTANYL CITRATE 50 UG/ML
25-50 INJECTION, SOLUTION INTRAMUSCULAR; INTRAVENOUS
Status: DISCONTINUED | OUTPATIENT
Start: 2018-03-29 | End: 2018-03-29 | Stop reason: HOSPADM

## 2018-03-29 RX ORDER — DIPHENHYDRAMINE HYDROCHLORIDE 50 MG/ML
25 INJECTION INTRAMUSCULAR; INTRAVENOUS EVERY 6 HOURS PRN
Status: DISCONTINUED | OUTPATIENT
Start: 2018-03-29 | End: 2018-03-30

## 2018-03-29 RX ORDER — DIPHENHYDRAMINE HCL 25 MG
25 CAPSULE ORAL EVERY 6 HOURS PRN
Status: DISCONTINUED | OUTPATIENT
Start: 2018-03-29 | End: 2018-03-30

## 2018-03-29 RX ORDER — ONDANSETRON 4 MG/1
4 TABLET, ORALLY DISINTEGRATING ORAL EVERY 30 MIN PRN
Status: DISCONTINUED | OUTPATIENT
Start: 2018-03-29 | End: 2018-03-29

## 2018-03-29 RX ORDER — FENTANYL CITRATE 50 UG/ML
INJECTION, SOLUTION INTRAMUSCULAR; INTRAVENOUS PRN
Status: DISCONTINUED | OUTPATIENT
Start: 2018-03-29 | End: 2018-03-29

## 2018-03-29 RX ORDER — ACETAMINOPHEN 325 MG/1
975 TABLET ORAL EVERY 8 HOURS
Status: DISPENSED | OUTPATIENT
Start: 2018-03-29 | End: 2018-04-01

## 2018-03-29 RX ORDER — SODIUM CHLORIDE, SODIUM LACTATE, POTASSIUM CHLORIDE, CALCIUM CHLORIDE 600; 310; 30; 20 MG/100ML; MG/100ML; MG/100ML; MG/100ML
INJECTION, SOLUTION INTRAVENOUS CONTINUOUS PRN
Status: DISCONTINUED | OUTPATIENT
Start: 2018-03-29 | End: 2018-03-29

## 2018-03-29 RX ORDER — ONDANSETRON 4 MG/1
4 TABLET, ORALLY DISINTEGRATING ORAL EVERY 6 HOURS PRN
Status: DISCONTINUED | OUTPATIENT
Start: 2018-03-29 | End: 2018-04-02 | Stop reason: HOSPADM

## 2018-03-29 RX ORDER — HYDROMORPHONE HYDROCHLORIDE 1 MG/ML
.3-.5 INJECTION, SOLUTION INTRAMUSCULAR; INTRAVENOUS; SUBCUTANEOUS
Status: DISCONTINUED | OUTPATIENT
Start: 2018-03-29 | End: 2018-03-31

## 2018-03-29 RX ORDER — HYDROMORPHONE HYDROCHLORIDE 1 MG/ML
INJECTION, SOLUTION INTRAMUSCULAR; INTRAVENOUS; SUBCUTANEOUS
Status: COMPLETED
Start: 2018-03-29 | End: 2018-03-29

## 2018-03-29 RX ORDER — OXYCODONE HYDROCHLORIDE 5 MG/1
5-10 TABLET ORAL
Status: DISCONTINUED | OUTPATIENT
Start: 2018-03-29 | End: 2018-04-02 | Stop reason: HOSPADM

## 2018-03-29 RX ORDER — BACITRACIN ZINC 500 [USP'U]/G
OINTMENT TOPICAL ONCE
Status: DISCONTINUED | OUTPATIENT
Start: 2018-03-29 | End: 2018-03-29

## 2018-03-29 RX ORDER — LIDOCAINE HYDROCHLORIDE 20 MG/ML
INJECTION, SOLUTION INFILTRATION; PERINEURAL PRN
Status: DISCONTINUED | OUTPATIENT
Start: 2018-03-29 | End: 2018-03-29

## 2018-03-29 RX ORDER — CEFAZOLIN SODIUM 2 G/100ML
2 INJECTION, SOLUTION INTRAVENOUS
Status: COMPLETED | OUTPATIENT
Start: 2018-03-29 | End: 2018-03-29

## 2018-03-29 RX ADMIN — HYDROMORPHONE HYDROCHLORIDE 0.2 MG: 1 INJECTION, SOLUTION INTRAMUSCULAR; INTRAVENOUS; SUBCUTANEOUS at 19:05

## 2018-03-29 RX ADMIN — BACITRACIN 15 G: 500 OINTMENT TOPICAL at 14:18

## 2018-03-29 RX ADMIN — DEXAMETHASONE SODIUM PHOSPHATE 4 MG: 4 INJECTION, SOLUTION INTRA-ARTICULAR; INTRALESIONAL; INTRAMUSCULAR; INTRAVENOUS; SOFT TISSUE at 09:07

## 2018-03-29 RX ADMIN — MIDAZOLAM 2 MG: 1 INJECTION INTRAMUSCULAR; INTRAVENOUS at 07:16

## 2018-03-29 RX ADMIN — HYDROMORPHONE HYDROCHLORIDE 0.5 MG: 1 INJECTION, SOLUTION INTRAMUSCULAR; INTRAVENOUS; SUBCUTANEOUS at 13:50

## 2018-03-29 RX ADMIN — ROCURONIUM BROMIDE 20 MG: 10 INJECTION INTRAVENOUS at 10:00

## 2018-03-29 RX ADMIN — SENNOSIDES AND DOCUSATE SODIUM 1 TABLET: 8.6; 5 TABLET ORAL at 20:26

## 2018-03-29 RX ADMIN — ACETAMINOPHEN 975 MG: 325 TABLET, FILM COATED ORAL at 18:22

## 2018-03-29 RX ADMIN — SODIUM CHLORIDE, POTASSIUM CHLORIDE, SODIUM LACTATE AND CALCIUM CHLORIDE: 600; 310; 30; 20 INJECTION, SOLUTION INTRAVENOUS at 07:16

## 2018-03-29 RX ADMIN — FENTANYL CITRATE 50 MCG: 50 INJECTION, SOLUTION INTRAMUSCULAR; INTRAVENOUS at 10:32

## 2018-03-29 RX ADMIN — SODIUM CHLORIDE, POTASSIUM CHLORIDE, SODIUM LACTATE AND CALCIUM CHLORIDE: 600; 310; 30; 20 INJECTION, SOLUTION INTRAVENOUS at 21:22

## 2018-03-29 RX ADMIN — ROCURONIUM BROMIDE 20 MG: 10 INJECTION INTRAVENOUS at 12:41

## 2018-03-29 RX ADMIN — FENTANYL CITRATE 50 MCG: 50 INJECTION, SOLUTION INTRAMUSCULAR; INTRAVENOUS at 07:16

## 2018-03-29 RX ADMIN — HYDROMORPHONE HYDROCHLORIDE 0.5 MG: 1 INJECTION, SOLUTION INTRAMUSCULAR; INTRAVENOUS; SUBCUTANEOUS at 13:19

## 2018-03-29 RX ADMIN — CEFAZOLIN 1 G: 1 INJECTION, POWDER, FOR SOLUTION INTRAMUSCULAR; INTRAVENOUS at 13:38

## 2018-03-29 RX ADMIN — OXYCODONE HYDROCHLORIDE 5 MG: 5 TABLET ORAL at 21:20

## 2018-03-29 RX ADMIN — ROCURONIUM BROMIDE 20 MG: 10 INJECTION INTRAVENOUS at 08:31

## 2018-03-29 RX ADMIN — ONDANSETRON 4 MG: 2 INJECTION INTRAMUSCULAR; INTRAVENOUS at 14:49

## 2018-03-29 RX ADMIN — ROCURONIUM BROMIDE 10 MG: 10 INJECTION INTRAVENOUS at 13:50

## 2018-03-29 RX ADMIN — LIDOCAINE HYDROCHLORIDE 40 MG: 20 INJECTION, SOLUTION INFILTRATION; PERINEURAL at 07:29

## 2018-03-29 RX ADMIN — ROCURONIUM BROMIDE 20 MG: 10 INJECTION INTRAVENOUS at 11:02

## 2018-03-29 RX ADMIN — BACITRACIN 15 G: 500 OINTMENT TOPICAL at 13:43

## 2018-03-29 RX ADMIN — SUGAMMADEX 125 MG: 100 INJECTION, SOLUTION INTRAVENOUS at 14:16

## 2018-03-29 RX ADMIN — SODIUM CHLORIDE, POTASSIUM CHLORIDE, SODIUM LACTATE AND CALCIUM CHLORIDE: 600; 310; 30; 20 INJECTION, SOLUTION INTRAVENOUS at 12:10

## 2018-03-29 RX ADMIN — PROPOFOL 20 MG: 10 INJECTION, EMULSION INTRAVENOUS at 14:23

## 2018-03-29 RX ADMIN — CEFAZOLIN SODIUM 2 G: 2 INJECTION, SOLUTION INTRAVENOUS at 07:45

## 2018-03-29 RX ADMIN — GLYCOPYRROLATE 0.2 MG: 0.2 INJECTION, SOLUTION INTRAMUSCULAR; INTRAVENOUS at 07:27

## 2018-03-29 RX ADMIN — FENTANYL CITRATE 50 MCG: 50 INJECTION, SOLUTION INTRAMUSCULAR; INTRAVENOUS at 07:29

## 2018-03-29 RX ADMIN — PROPOFOL 30 MG: 10 INJECTION, EMULSION INTRAVENOUS at 14:19

## 2018-03-29 RX ADMIN — FENTANYL CITRATE 50 MCG: 50 INJECTION, SOLUTION INTRAMUSCULAR; INTRAVENOUS at 08:43

## 2018-03-29 RX ADMIN — OXYCODONE HYDROCHLORIDE 5 MG: 5 TABLET ORAL at 23:31

## 2018-03-29 RX ADMIN — FENTANYL CITRATE 50 MCG: 50 INJECTION, SOLUTION INTRAMUSCULAR; INTRAVENOUS at 07:59

## 2018-03-29 RX ADMIN — ONDANSETRON 4 MG: 2 INJECTION INTRAMUSCULAR; INTRAVENOUS at 13:50

## 2018-03-29 RX ADMIN — BACITRACIN 15 G: 500 OINTMENT TOPICAL at 14:15

## 2018-03-29 RX ADMIN — CEFAZOLIN 1 G: 1 INJECTION, POWDER, FOR SOLUTION INTRAMUSCULAR; INTRAVENOUS at 11:40

## 2018-03-29 RX ADMIN — HYDROMORPHONE HYDROCHLORIDE 0.3 MG: 1 INJECTION, SOLUTION INTRAMUSCULAR; INTRAVENOUS; SUBCUTANEOUS at 18:42

## 2018-03-29 RX ADMIN — CEFAZOLIN 1 G: 1 INJECTION, POWDER, FOR SOLUTION INTRAMUSCULAR; INTRAVENOUS at 09:45

## 2018-03-29 RX ADMIN — ROCURONIUM BROMIDE 50 MG: 10 INJECTION INTRAVENOUS at 07:29

## 2018-03-29 RX ADMIN — PROPOFOL 110 MG: 10 INJECTION, EMULSION INTRAVENOUS at 07:29

## 2018-03-29 RX ADMIN — FENTANYL CITRATE 100 MCG: 50 INJECTION, SOLUTION INTRAMUSCULAR; INTRAVENOUS at 08:04

## 2018-03-29 ASSESSMENT — PAIN DESCRIPTION - DESCRIPTORS
DESCRIPTORS: SHARP;SORE;ACHING
DESCRIPTORS: SORE;PRESSURE
DESCRIPTORS: ACHING;SHARP
DESCRIPTORS: SHARP;STABBING
DESCRIPTORS: SHARP

## 2018-03-29 NOTE — IP AVS SNAPSHOT
[address] [address]    Phone:  [phone]                                       After Visit Summary   3/29/2018    Larissa Rehman    MRN: [id number]           After Visit Summary Signature Page     I have received my discharge instructions, and my questions have been answered. I have discussed any challenges I see with this plan with the nurse or doctor.    ..........................................................................................................................................  Patient/Patient Representative Signature      ..........................................................................................................................................  Patient Representative Print Name and Relationship to Patient    ..................................................               ................................................  Date                                            Time    ..........................................................................................................................................  Reviewed by Signature/Title    ...................................................              ..............................................  Date                                                            Time

## 2018-03-29 NOTE — OR NURSING
Notified Dr. Cormier with anesthesia of pt's IPI level of 5 and RR of 6-10. 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 MD ok with those values at this time as pt is awake, alert and oriented as well as maintaining O2 sats.

## 2018-03-29 NOTE — ANESTHESIA CARE TRANSFER NOTE
Patient: Larissa Rehman    Procedure(s):  Zero Depth Transgender Vaginoplasty - Wound Class: II-Clean Contaminated    Diagnosis: Gender Dysphoria In Adult   Diagnosis Additional Information: No value filed.    Anesthesia Type:   No value filed.     Note:  Airway :Face Mask  Patient transferred to:PACU  Comments: Anesthesia Care Transfer Note    Patient: Larissa Rehman    Transferred to: PACU    Patient vital signs: stable    Airway: none    Monitors on, VSS, pt. Stable, Report given to PACU RN.     Ted Hicks CRNA  3/29/2018 2:39 PM      Handoff Report: Identifed the Patient, Identified the Reponsible Provider, Reviewed the pertinent medical history, Discussed the surgical course, Reviewed Intra-OP anesthesia mangement and issues during anesthesia, Set expectations for post-procedure period and Allowed opportunity for questions and acknowledgement of understanding      Vitals: (Last set prior to Anesthesia Care Transfer)    CRNA VITALS  3/29/2018 1403 - 3/29/2018 1439      3/29/2018             Pulse: 98    SpO2: 100 %    Resp Rate (observed): 9                Electronically Signed By: ADEN Carter CRNA  March 29, 2018  2:39 PM

## 2018-03-29 NOTE — ANESTHESIA POSTPROCEDURE EVALUATION
Patient: Larissa Rehman    Procedure(s):  Zero Depth Transgender Vaginoplasty - Wound Class: II-Clean Contaminated    Diagnosis:Gender Dysphoria In Adult   Diagnosis Additional Information: No value filed.    Anesthesia Type:  No value filed.    Note:  Anesthesia Post Evaluation    Patient location during evaluation: PACU  Patient participation: Able to fully participate in evaluation  Level of consciousness: awake and alert  Pain management: adequate  Airway patency: patent  Cardiovascular status: acceptable  Respiratory status: acceptable  Hydration status: acceptable  PONV: none     Anesthetic complications: None          Last vitals:  Vitals:    03/29/18 1437 03/29/18 1445 03/29/18 1500   BP: 111/69 112/66 113/70   Resp: 19 10 12   Temp: 37.6  C (99.6  F)  37.2  C (98.9  F)   SpO2: 100% 100% 100%         Electronically Signed By: Alexa Monroe MD  March 29, 2018  3:12 PM

## 2018-03-29 NOTE — IP AVS SNAPSHOT
MRN:[id number]                      After Visit Summary   3/29/2018    Larissa Rehman    MRN: [id number]           Thank you!     Thank you for choosing [facility] for your care. Our goal is always to provide you with excellent care. Hearing back from our patients is one way we can continue to improve our services. Please take a few minutes to complete the written survey that you may receive in the mail after you visit with us. Thank you!        Patient Information     Date Of Birth          1955        Designated Caregiver       Most Recent Value    Caregiver    Will someone help with your care after discharge? yes    Name of designated caregiver neighbors    Phone number of caregiver n/a    Caregiver address n/a      About your hospital stay     You were admitted on:  March 29, 2018 You last received care in the:  [address] [facility]    You were discharged on:  April 2, 2018       Who to Call     For medical emergencies, please call 911.  For non-urgent questions about your medical care, please call your primary care provider or clinic, [phone]  For questions related to your surgery, please call your surgery clinic        Attending Provider     Provider Specialty    Tian Teran MD Anesthesiology    Plastic Surgery, Physician --    Madhu Mojica MD Plastic Surgery       Primary Care Provider Office Phone # Fax #    Tk Marin [phone]      Your next 10 appointments already scheduled     Apr 05, 2018  8:00 AM CDT   Return Visit with Lynnette Zuniga MD   [facility] ([facility])    [address] [place],  [address]  [facility] [address]   [phone]            Apr 10, 2018  4:00 PM CDT   (Arrive by 3:45 PM)   Return Plastic Surgery with Madhu Mojica MD   [facility] ([facility])    [address]  4th Floor  [facility] [id number] [phone]         "   Do not wear perfume.              Pending Results     Date and Time Order Name Status Description    3/29/2018 0822 Surgical pathology exam In process             Statement of Approval     Ordered          04/02/18 3106  I have reviewed and agree with all the recommendations and orders detailed in this document.  EFFECTIVE NOW     Approved and electronically signed by:  Madhu Mojica MD             Admission Information     Date & Time Provider Department Dept. Phone    3/29/2018 Madhu Mojica MD [address] [facility] [place] [phone]      Your Vitals Were     Blood Pressure Pulse Temperature Respirations Height Weight    105/61 (BP Location: Right arm) 56 97.2  F (36.2  C) (Oral) 16 1.664 m (5' 5.5\") 56.2 kg (123 lb 14.4 oz)    Pulse Oximetry BMI (Body Mass Index)                98% 20.3 kg/m2          MyChart Information     [employer] gives you secure access to your electronic health record. If you see a primary care provider, you can also send messages to your care team and make appointments. If you have questions, please call your primary care clinic.  If you do not have a primary care provider, please call [phone] and they will assist you.        Care EveryWhere ID     This is your Care EveryWhere ID. This could be used by other organizations to access your [facility] medical records  [id number]        Equal Access to Services     TE MOROCHO : Lenny Motley, waaxda luqadaha, qaybta kaalmada aderenayyamariah, gloria feldman. So [facility] [phone].    ATENCIÓN: Si habla español, tiene a bermudez disposición servicios gratuitos de asistencia lingüística. Llame al [phone].    We comply with applicable federal civil rights laws and [place] laws. We do not discriminate on the basis of race, color, national origin, age, disability, sex, sexual orientation, or gender identity.               Review of your medicines      START taking        Dose / Directions    oxyCODONE IR 5 " MG tablet   Commonly known as:  ROXICODONE   Used for:  Gender dysphoria in adult        Dose:  5-10 mg   Take 1-2 tablets (5-10 mg) by mouth every 6 hours as needed for moderate to severe pain maximum 6 tablet(s) per day   Quantity:  30 tablet   Refills:  0       polyethylene glycol powder   Commonly known as:  MIRALAX   Used for:  Gender dysphoria in adult        Dose:  1 capful   Take 17 g (1 capful) by mouth daily   Quantity:  510 g   Refills:  1         CONTINUE these medicines which have NOT CHANGED        Dose / Directions    ASPIRIN PO        Dose:  81 mg   Take 81 mg by mouth daily   Refills:  0       BENADRYL PO        Dose:  25 mg   Take 25 mg by mouth nightly as needed for Insomnia or itching   Refills:  0       EPINEPHrine 0.3 MG/0.3ML injection 2-pack   Commonly known as:  EPIPEN/ADRENACLICK/or ANY BX GENERIC EQUIV        Dose:  0.3 mg   Inject 0.3 mLs (0.3 mg) into the muscle once as needed for anaphylaxis   Quantity:  2 each   Refills:  1       SIMVASTATIN PO        Dose:  20 mg   Take 20 mg by mouth At Bedtime   Refills:  0            Where to get your medicines      Some of these will need a paper prescription and others can be bought over the counter. Ask your nurse if you have questions.     Bring a paper prescription for each of these medications     oxyCODONE IR 5 MG tablet    polyethylene glycol powder                Protect others around you: Learn how to safely use, store and throw away your medicines at www.disposemymeds.org.        Information about OPIOIDS     PRESCRIPTION OPIOIDS: WHAT YOU NEED TO KNOW    Prescription opioids can be used to help relieve moderate to severe pain and are often prescribed following a surgery or injury, or for certain health conditions. These medications can be an important part of treatment but also come with serious risks. It is important to work with your health care provider to make sure you are getting the safest, most effective care.    WHAT ARE THE  RISKS AND SIDE EFFECTS OF OPIOID USE?  Prescription opioids carry serious risks of addiction and overdose, especially with prolonged use. An opioid overdose, often marked by slowed breathing can cause sudden death. The use of prescription opioids can have a number of side effects as well, even when taken as directed:      Tolerance - meaning you might need to take more of a medication for the same pain relief    Physical dependence - meaning you have symptoms of withdrawal when a medication is stopped    Increased sensitivity to pain    Constipation    Nausea, vomiting, and dry mouth    Sleepiness and dizziness    Confusion    Depression    Low levels of testosterone that can result in lower sex drive, energy, and strength    Itching and sweating    RISKS ARE GREATER WITH:    History of drug misuse, substance use disorder, or overdose    Mental health conditions (such as depression or anxiety)    Sleep apnea    Older age (65 years or older)    Pregnancy    Avoid alcohol while taking prescription opioids.   Also, unless specifically advised by your health care provider, medications to avoid include:    Benzodiazepines (such as Xanax or Valium)    Muscle relaxants (such as Soma or Flexeril)    Hypnotics (such as Ambien or Lunesta)    Other prescription opioids    KNOW YOUR OPTIONS:  Talk to your health care provider about ways to manage your pain that do not involve prescription opioids. Some of these options may actually work better and have fewer risks and side effects:    Pain relievers such as acetaminophen, ibuprofen, and naproxen    Some medications that are also used for depression or seizures    Physical therapy and exercise    Cognitive behavioral therapy, a psychological, goal-directed approach, in which patients learn how to modify physical, behavioral, and emotional triggers of pain and stress    IF YOU ARE PRESCRIBED OPIOIDS FOR PAIN:    Never take opioids in greater amounts or more often than  prescribed    Follow up with your primary health care provider and work together to create a plan on how to manage your pain.    Talk about ways to help manage your pain that do not involve prescription opioids    Talk about all concerns and side effects    Help prevent misuse and abuse    Never sell or share prescription opioids    Never use another person's prescription opioids    Store prescription opioids in a secure place and out of reach of others (this may include visitors, children, friends, and family)    Visit www.cdc.gov/drugoverdose to learn about risks of opioid abuse and overdose    If you believe you may be struggling with addiction, tell your health care provider and ask for guidance or call [facility]'s National Helpline at 0-471-630-HELP    LEARN MORE / www.cdc.gov/drugoverdose/prescribing/guideline.html    Safely dispose of unused prescription opioids: Find your local drug take-back programs and more information about the importance of safe disposal at www.doseofreality.mn.gov             Medication List: This is a list of all your medications and when to take them. Check marks below indicate your daily home schedule. Keep this list as a reference.      Medications           Morning Afternoon Evening Bedtime As Needed    ASPIRIN PO   Take 81 mg by mouth daily                                BENADRYL PO   Take 25 mg by mouth nightly as needed for Insomnia or itching   Last time this was given:  25 mg on 4/1/2018  9:09 PM                                EPINEPHrine 0.3 MG/0.3ML injection 2-pack   Commonly known as:  EPIPEN/ADRENACLICK/or ANY BX GENERIC EQUIV   Inject 0.3 mLs (0.3 mg) into the muscle once as needed for anaphylaxis                                oxyCODONE IR 5 MG tablet   Commonly known as:  ROXICODONE   Take 1-2 tablets (5-10 mg) by mouth every 6 hours as needed for moderate to severe pain maximum 6 tablet(s) per day   Last time this was given:  10 mg on 3/30/2018  5:52 AM                                 polyethylene glycol powder   Commonly known as:  MIRALAX   Take 17 g (1 capful) by mouth daily                                SIMVASTATIN PO   Take 20 mg by mouth At Bedtime   Last time this was given:  20 mg on 4/1/2018  9:09 PM

## 2018-03-29 NOTE — ANESTHESIA PREPROCEDURE EVALUATION
Anesthesia Evaluation     . Pt has had prior anesthetic. Type: MAC and General    No history of anesthetic complications          ROS/MED HX    ENT/Pulmonary:  - neg pulmonary ROS     Neurologic:  - neg neurologic ROS     Cardiovascular:  - neg cardiovascular ROS       METS/Exercise Tolerance: Comment: Competitive runner >4 METS   Hematologic:  - neg hematologic  ROS       Musculoskeletal:  - neg musculoskeletal ROS       GI/Hepatic:  - neg GI/hepatic ROS       Renal/Genitourinary:  - ROS Renal section negative       Endo:  - neg endo ROS       Psychiatric:  - neg psychiatric ROS       Infectious Disease:  - neg infectious disease ROS       Malignancy:      - no malignancy   Other:    - neg other ROS                 Physical Exam  Normal systems: pulmonary and dental    Airway   Mallampati: II  TM distance: <3 FB  Neck ROM: full    Dental     Cardiovascular   Rhythm and rate: regular and normal      Pulmonary     Other findings: LABS:    BMP:  Lab Test        03/29/18                       0631          NA           139           POTASSIUM    4.0           CHLORIDE     105           CO2          28            BUN          10            CR           1.04          GLC          110*          DEBORA          8.9             CBC:   Lab Test        03/29/18                       0631          WBC          3.3*          RBC          4.47          HGB          14.8          HCT          43.9          MCV          98            MCH          33.1*         MCHC         33.7          RDW          13.3          PLT          179             ECG NSR, J point elev; WNL  T&S pending                Anesthesia Plan      History & Physical Review  History and physical reviewed and following examination; no interval change.    ASA Status:  1 .    NPO Status:  > 8 hours    Plan for General and ETT with Intravenous and Propofol induction. Maintenance will be Balanced.    PONV prophylaxis:  Ondansetron (or other 5HT-3) and Dexamethasone or  Brandon Judge MD  3/29/2018        Postoperative Care  Postoperative pain management:  IV analgesics.      Consents  Anesthetic plan, risks, benefits and alternatives discussed with:  Patient.  Use of blood products discussed: Yes.   Use of blood products discussed with Patient.  Consented to blood products.  .                          .

## 2018-03-29 NOTE — PROGRESS NOTES
Admitted to 7C at 1615 from PACU via stretcher.   With chief c/o s/p vaginoplasty  awake and alert, oriented x4  VSS, bradypnea, O2 sats >92% on RA,  IPI 6-8    SANTY x2 with serosanguinous output.  Packer patent. Pressure dressing to surgical site in place.  Pt denies pain, but c/o pressure in surgical area. Declines offer of pain meds.     Oriented to room/call light/unit routines.  Bedrest tonight except to dangle later.

## 2018-03-29 NOTE — BRIEF OP NOTE
[facility], [place]    Brief Operative Note    Pre-operative diagnosis: Gender Dysphoria In Adult   Post-operative diagnosis Same  Procedure: Procedure(s):  Zero Depth Transgender Vaginoplasty - Wound Class: II-Clean Contaminated  Surgeon: Surgeon(s) and Role:     * Madhu Mojica MD - Primary     * Richard Latham - Assisting     * Tk Hernandez MD - Assisting  Anesthesia: General   Estimated blood loss: 200 ml  Drains: Eyad-Aaron x2  Specimens:   ID Type Source Tests Collected by Time Destination   A : right testicle Tissue Testicle, Right SURGICAL PATHOLOGY EXAM Madhu Mojica MD 3/29/2018  8:22 AM    B : Left Testicle Tissue Testicle, Left SURGICAL PATHOLOGY EXAM Madhu Mojica MD 3/29/2018  8:26 AM    C : Penis Tissue Penis SURGICAL PATHOLOGY EXAM Madhu Mojica MD 3/29/2018 11:29 AM      Findings:   Anatomy as expected.  Complications: None.  Implants: None.

## 2018-03-29 NOTE — OP NOTE
DATE OF OPERATION: March 29, 2018    PREOPERATIVE DIAGNOSIS: Gender dysphoria.    POSTOPERATIVE DIAGNOSIS: Gender dysphoria.    PROCEDURES PERFORMED: Zero depth vaginoplasty (orchiectomy, penectomy, clitoroplasty, urethroplasty, and creation of feminine genital appearance, but without creation of a neovaginal cavity).    SURGEON: Madhu Mojica MD    ASSISTANT:   1. Tk Hernandez MD  2. Richard Latham MD  3. Gabriel Can MD    ANESTHESIA: General.    ESTIMATED BLOOD LOSS: 150 mL    FINDINGS: Testicles without any irregularities and no inguinal hernia.  Viable neoclitoris following neurovascular dissection.    SPECIMENS: Bilateral testicles and penectomy specimen.    COMPLICATIONS: None.    DRAINS: 10 Amharic drains ×2.    IMPLANTS: None.    INDICATIONS: Patient is a 62-year-old individual with gender dysphoria who has been interested in gender confirming vaginoplasty for 50 years.  However, they were unable to find an appropriate surgeon in their local area and neither were they able to find the financial means of undergoing such surgery until now.  Despite social difficulties, patient transitioned to a feminine gender identity many years ago using pronouns she, her, they, and them.  Patient was evaluated for possible hormone therapy, but given their age and the cardiovascular risk with estradiol therapy, patient was not recommended hormones.  This sparked further interest in the patient for orchiectomy.  We had a thorough discussion regarding vaginoplasty and the risks involved.  Patient was not able to accept the risks of rectal injury and rectovaginal fistula given that they have not recently had and does not plan on ever having penetrative sexual intercourse.  Patient's main mode for achieving orgasm is masturbation via stimulation of the tip of the penis.  They were mostly interested in orchiectomy, penectomy, and clitoroplasty.  Multiple letters of recommendation were obtained, which all supported the  patient's desire to undergo such surgery.  Therefore, I recommended zero depth vaginoplasty to eliminate the possibility of injury to the rectum, which the patient was able to accept.  Patient did understand the risks involving bleeding, fluid collection, infection, asymmetry, contour deformity, tissue necrosis, urinary stream abnormalities, and need for revision surgery were all still there.  Patient accepted the risks and wished to proceed with surgery.    DESCRIPTION OF PROCEDURE: Patient was greeted in the preoperative holding area where informed consent was reviewed.  They had no further questions and therefore was marked appropriately.  Patient was then taken to the operating room and placed in a supine position.  Preoperative antibiotics were given.  Bilateral lower leg SCDs were applied.  General anesthesia was obtained.  Arms were left out on arm boards, and all pressure points were well-padded.  The patient's lower abdomen and genital area were then prepped and draped in a sterile fashion.  A timeout was performed.    I began by marking the penis for degloving and also the midline scrotum for orchiectomy.  We began with the midline scrotal incision with a 15 blade scalpel and carried this incision down through the soft tissues using Bovie cautery to reach the corpus spongiosum.  Bilateral testes were palpated and accessed with Bovie cautery.  The spermatic cords were traced down towards the external inguinal ring and structures identified, including the vas deferens.  No inguinal hernia were identified bilaterally.  The spermatic cord was suture ligated with 0 Vicryl stick tie bilaterally at the level of the external inguinal ring with care taken to ligate the vas deferens separately on its own.  Bilateral testes were sent for pathology review.    Attention was then turned to degloving the penis.  A circumferential incision was made at the junction of the squamous and mucosal epithelium of the foreskin.   This incision was brought down through the areolar tissue using Bovie cautery along the ventral aspect until the corpus spongiosum was met.  The penis was brought through the ventral opening of degloved skin, and the remaining dorsal degloving was performed using Bovie cautery at the level of this areolar tissue.  Further Bovie dissection was performed along the anterior pubis to clear a 2 x 2 centimeter area where the neoclitoris neurovascular bundle would lay.  The degloved penile skin was then placed superiorly and protected at all times.    A Packer was then placed without issue.  Sharp Bovie dissection was performed through the soft tissues along the junction of bilateral pelvis cavernosum and spongiosum down to the tunica layer along the entire length of the penile shaft.  A W shaped neoclitoris design was marked on the glans penis, and wing designs were made along the mucosal penile foreskin to later create the labia minora.  These incisions were made with a 15 blade scalpel until pulsatile arterial flow was found at the midline glans penis.  These small arterioles were cauterized and sharp tenotomy dissection was performed at a level just deep to these arterioles from distal to proximal, just superficial to the tunica layer.  By doing this, the dorsal neurovascular bundle was carried in a thin soft tissue layer along with the neoclitoris and labia minora.  This dissection was tediously performed to a point proximal where the corpus cavernosa were becoming divergent.  Bright red bleeding was noted from the neoclitoris tissue following this dissection.  3-0 Vicryl deep dermal suture was placed to tubularize the neoclitoris, and 4-0 Vicryl simple interrupted sutures were placed to close the neoclitoris.  At this point, all dissected structures were placed into the scrotum, and the midline incision was closed temporarily with clem.  Ioban was placed over the surgical site, and drapes were removed.  Patient was  then transitioned to a lithotomy position using stirrups.    Bilateral thighs, perineum, genitals, and lower abdomen were prepped and draped again using a rectal condom drape.  Ioban over the surgical site was removed and temporary staples removed as well.  A 5 x 2 cm dorsal perineal flap was designed 2 cm anterior to the anal verge and overlying the perineal body.  incisions for this was made with a 15 blade scalpel, which was then connected to the midline scrotal incision.  Bovie cautery was used to deepen these incisions and a thin dorsal perineal flap was raised.  Further dissection deeper revealed the bulbospongiosus muscle overlying the corpus spongiosum in which the Packer was palpable.  The bulbospongiosus muscle was detached from the corpus spongiosum in its entirety using Bovie cautery and completely resected.  The perineal body was identified and a small distance of blunt dissection was performed at this level just posterior to the corpus spongiosum.  A finger was placed into the rectal condom to prevent any rectal injury.  No further dissection was performed other than a small stump to avoid any possibility of rectal injury as this is a zero depth vaginoplasty.    The bulbous urethra was found to be excessively large.  A small window of resection was planned along its posterior aspect in the shape of a lens.  With care taken not to damage the urethra by palpating the Packer, this lens design was resected using Bovie cautery.  3-0 Vicryl hemostatic sutures were placed to sew the edges together.  In appropriate position for a new urethral opening was chosen at a level where the urethra would be pointing inferiorly.  The urethra was opened at this level sharply around the Packer.  The Packer was then removed from the remaining distal urethra.  The neourethra was spatulated and edges oversewn with 3-0 Vicryl to prevent excessive bleeding.  The corpus cavernosal tissues were then ligated bilaterally at the level  of divergence using 0 Vicryl locking sutures.  The cavernosal stumps were then sewn together again with 0 Vicryl in an interrupted fashion.  The neoclitoris was then sutured to this stump at 2 separate locations using 3-0 Vicryl.  The neoclitoris dorsal neurovascular bundle was then draped over the pubis and sutured down under minimal tension using 3-0 Vicryl.  The labia minora were trimmed appropriately and sutured together at the base of the neoclitoris using 4-0 Vicryl.  The neourethra was then sutured down just posterior to the neoclitoris using 3-0 Vicryl.  The labia minora edges were then sutured down to the superior portion of the neourethra with 4-0 Vicryl.    The degloved penile skin was then stretched down to meet the vaginal stump and temporary staples were placed.  A Y shaped opening was designed at the midline for access to the clitoris and urethra.  Bilateral excess scrotal skin was removed to create the labia majora.  Two 10 Pashto drains were placed on either side.  Incisions were closed with 3-0 Vicryl approximating the deeper tissues and 4-0 Vicryl approximating the skin in an interrupted fashion.  The ventral aspect of the degloved penile skin was sutured to the dorsal perineal flap and anchored down to the vaginal stump.  The clitoris was pink and viable at the end of the case.  Packer was intact and draining clear urine at the end of the case.  Drains were holding suction at the end of the case.  All counts were correct at the end of the case.  Vaseline gauze was placed over the incisions.  Bacitracin ointment soaked vaginal packing was cut to the appropriate size and placed to hold the vaginal stump intact.  Dry gauze and ABD pad dressings placed over these with compression applied by long pieces of tape from the buttocks to the anterior abdomen.  Patient was then extubated, awakened, and transferred to the postop area in stable condition.    DISPOSITION: Inpatient floor.

## 2018-03-30 LAB
ANION GAP SERPL CALCULATED.3IONS-SCNC: 5 MMOL/L (ref 3–14)
BUN SERPL-MCNC: 14 MG/DL (ref 7–30)
CALCIUM SERPL-MCNC: 8.5 MG/DL (ref 8.5–10.1)
CHLORIDE SERPL-SCNC: 104 MMOL/L (ref 94–109)
CO2 SERPL-SCNC: 30 MMOL/L (ref 20–32)
CREAT SERPL-MCNC: 1.13 MG/DL (ref 0.66–1.25)
ERYTHROCYTE [DISTWIDTH] IN BLOOD BY AUTOMATED COUNT: 13.6 % (ref 10–15)
GFR SERPL CREATININE-BSD FRML MDRD: 65 ML/MIN/1.7M2
GLUCOSE BLDC GLUCOMTR-MCNC: 144 MG/DL (ref 70–99)
GLUCOSE SERPL-MCNC: 103 MG/DL (ref 70–99)
HCT VFR BLD AUTO: 36.1 % (ref 40–53)
HGB BLD-MCNC: 11.7 G/DL (ref 13.3–17.7)
MAGNESIUM SERPL-MCNC: 2.1 MG/DL (ref 1.6–2.3)
MCH RBC QN AUTO: 32.5 PG (ref 26.5–33)
MCHC RBC AUTO-ENTMCNC: 32.4 G/DL (ref 31.5–36.5)
MCV RBC AUTO: 100 FL (ref 78–100)
PLATELET # BLD AUTO: 165 10E9/L (ref 150–450)
POTASSIUM SERPL-SCNC: 4.2 MMOL/L (ref 3.4–5.3)
RBC # BLD AUTO: 3.6 10E12/L (ref 4.4–5.9)
SODIUM SERPL-SCNC: 139 MMOL/L (ref 133–144)
WBC # BLD AUTO: 9.6 10E9/L (ref 4–11)

## 2018-03-30 PROCEDURE — 00000146 ZZHCL STATISTIC GLUCOSE BY METER IP

## 2018-03-30 PROCEDURE — 25000132 ZZH RX MED GY IP 250 OP 250 PS 637: Performed by: PLASTIC SURGERY

## 2018-03-30 PROCEDURE — 83735 ASSAY OF MAGNESIUM: CPT | Performed by: PLASTIC SURGERY

## 2018-03-30 PROCEDURE — 25000128 H RX IP 250 OP 636: Performed by: PLASTIC SURGERY

## 2018-03-30 PROCEDURE — 25000132 ZZH RX MED GY IP 250 OP 250 PS 637: Performed by: SURGERY

## 2018-03-30 PROCEDURE — 12000008 ZZH R&B INTERMEDIATE UMMC

## 2018-03-30 PROCEDURE — 36415 COLL VENOUS BLD VENIPUNCTURE: CPT | Performed by: PLASTIC SURGERY

## 2018-03-30 PROCEDURE — 80048 BASIC METABOLIC PNL TOTAL CA: CPT | Performed by: PLASTIC SURGERY

## 2018-03-30 PROCEDURE — 85027 COMPLETE CBC AUTOMATED: CPT | Performed by: PLASTIC SURGERY

## 2018-03-30 RX ORDER — SIMETHICONE 80 MG
80 TABLET,CHEWABLE ORAL EVERY 6 HOURS PRN
Status: DISCONTINUED | OUTPATIENT
Start: 2018-03-30 | End: 2018-04-02 | Stop reason: HOSPADM

## 2018-03-30 RX ORDER — DIPHENHYDRAMINE HCL 25 MG
25 CAPSULE ORAL EVERY 6 HOURS PRN
Status: DISCONTINUED | OUTPATIENT
Start: 2018-03-30 | End: 2018-03-31

## 2018-03-30 RX ORDER — DIPHENHYDRAMINE HYDROCHLORIDE 50 MG/ML
25 INJECTION INTRAMUSCULAR; INTRAVENOUS EVERY 6 HOURS PRN
Status: DISCONTINUED | OUTPATIENT
Start: 2018-03-30 | End: 2018-03-31

## 2018-03-30 RX ORDER — SIMVASTATIN 20 MG
20 TABLET ORAL EVERY EVENING
Status: DISCONTINUED | OUTPATIENT
Start: 2018-03-30 | End: 2018-04-02 | Stop reason: HOSPADM

## 2018-03-30 RX ADMIN — OXYCODONE HYDROCHLORIDE 10 MG: 5 TABLET ORAL at 05:52

## 2018-03-30 RX ADMIN — ENOXAPARIN SODIUM 40 MG: 40 INJECTION SUBCUTANEOUS at 10:48

## 2018-03-30 RX ADMIN — ACETAMINOPHEN 975 MG: 325 TABLET, FILM COATED ORAL at 03:30

## 2018-03-30 RX ADMIN — DIPHENHYDRAMINE HYDROCHLORIDE 25 MG: 25 CAPSULE ORAL at 20:09

## 2018-03-30 RX ADMIN — ACETAMINOPHEN 975 MG: 325 TABLET, FILM COATED ORAL at 10:48

## 2018-03-30 RX ADMIN — HYDROMORPHONE HYDROCHLORIDE 0.5 MG: 1 INJECTION, SOLUTION INTRAMUSCULAR; INTRAVENOUS; SUBCUTANEOUS at 03:30

## 2018-03-30 RX ADMIN — SIMVASTATIN 20 MG: 20 TABLET, FILM COATED ORAL at 20:09

## 2018-03-30 RX ADMIN — OXYCODONE HYDROCHLORIDE 10 MG: 5 TABLET ORAL at 01:11

## 2018-03-30 RX ADMIN — SIMETHICONE CHEW TAB 80 MG 80 MG: 80 TABLET ORAL at 09:14

## 2018-03-30 ASSESSMENT — PAIN DESCRIPTION - DESCRIPTORS
DESCRIPTORS: ACHING
DESCRIPTORS: SORE
DESCRIPTORS: ACHING
DESCRIPTORS: ACHING

## 2018-03-30 NOTE — PLAN OF CARE
Problem: Patient Care Overview  Goal: Plan of Care/Patient Progress Review  Outcome: Improving  VSS.  UAL in hallways x 4 thus far this shift.  Denies need for pain meds other than tylenol.  +Flatus.  Drinking fluids well without nausea.  Cr slightly elevated to 1.13 today but urine is clear and yellow.  Dressing on perineum intact with small amount of SS drainage on bed pad.  SANTY x 2 with scant output.  Lungs clear.  Progressing well.  PLAN: activity, use IS, monitor dressing status.  Per Dr. Mojica, no sitting restrictions, however this will likely cause increased pain.  If patient has BM, page Dr. Mojica if dressing is soiled as he will need to come and change it.  Likely dressing and lovett removal tomorrow.

## 2018-03-30 NOTE — PLAN OF CARE
Problem: Patient Care Overview  Goal: Plan of Care/Patient Progress Review  Outcome: Improving  VSS, on RA, Capno WNL.  Perineal dressing c/d/i.  SANTY x2 with minimal serosanguinous output.  Lovett with marginal UOP.  Taking PO fluids very well, tolerating small amounts regular diet with no c/o nausea.  Repositioning in bed independently. Per Dr. Mojica, pt to remain on bedrest tonight and dangle/ambulate tomorrow.  Will also need to avoid sitting for first 24-48 hours per Dr. Mojica.  Dressing and lovett to remain in place x 2 days, both will be removed by Dr. Mojica.  Pain managed with oxycodone, tylenol and IV dilaudid.  Continue post-op monitoring.    Ok for bedside report.

## 2018-03-30 NOTE — PROGRESS NOTES
Plastic Surgery Progress Note    Pain control better this morning than it was yesterday. Reports tolerating diet without nausea. Passing flatus. Would like to ambulate this AM.    Temp:  [96.3  F (35.7  C)-99.6  F (37.6  C)] 98.6  F (37  C)  Pulse:  [67] 67  Heart Rate:  [62-87] 66  Resp:  [5-19] 12  BP: (103-115)/(57-74) 105/57  SpO2:  [94 %-100 %] 97 %  I/O last 3 completed shifts:  In: 3170 [P.O.:680; I.V.:2490]  Out: 2327 [Urine:2165; Drains:12; Blood:150]  L perineal drain output: 8 cc of serosang since surgery.  R perineal drain output: 4 cc of serosang since surgery.    General: In no acute distress.  Abd: Soft, nondistended, and nontender.  On exam of perineum, dressing is intact and applying adequate compression to perineum.  Lovett is draining clear urine.  Bilateral lower legs without swelling, and nontender at calves.    CBC  Recent Labs  Lab 03/29/18  1731 03/29/18  0631   WBC  --  3.3*   RBC  --  4.47   HGB  --  14.8   HCT  --  43.9   MCV  --  98   MCH  --  33.1*   MCHC  --  33.7   RDW  --  13.3    179     BMP  Recent Labs  Lab 03/29/18  1731 03/29/18  0631   NA  --  139   POTASSIUM  --  4.0   CHLORIDE  --  105   DEBORA  --  8.9   CO2  --  28   BUN  --  10   CR 1.06 1.04   GLC  --  110*       ASSESSMENT: POD 1 s/p zero depth vaginoplasty. Doing well.    PLAN:   - Pain control with oral oxycodone and IV dilaudid for breakthrough.  - Dressings to remain intact today and plan for removal tomorrow unless they become soiled.  - Continue lovett and drain cares.  - Activity: Out of bed and ambulate with assist. Bedside commode as needed.  - Labs this AM.  - Restart home meds: simvastatin.  - Lovenox 40 daily starting this AM.  - Plan for discharge home early next week pending no complications.    Madhu Mojica  Pager: [phone]

## 2018-03-30 NOTE — PLAN OF CARE
Problem: Patient Care Overview  Goal: Plan of Care/Patient Progress Review  Outcome: Improving  Pt AVSS. Pt capno WNL. Pt sleeping between cares. Pain managed with tylenol and oxycodone 10mg q 4hr. Pt awakened from a dream and stated he jerked awake. Increase in perineal pain-received dilaudid iv x1 at that time. Pt perineum pressure drsg D/I. Slight shadowing of dressing noted- but remains unchanged since 0000. SANTY's intact with scant BRB output noted. Packer patent with large uv's. Lungs clear, abd flat, +bs, +gas. Pt felt the urge for a BM but flatus only. IS enc =2000 ml. IV fluids at 75/hr via piv. Pneumo boots intact. Pt on strict Bedrest until am. Cont to maintain pain control. Monitor dressings. ENc increased activity as ordered.

## 2018-03-31 LAB
CREAT SERPL-MCNC: 0.98 MG/DL (ref 0.66–1.25)
GFR SERPL CREATININE-BSD FRML MDRD: 77 ML/MIN/1.7M2

## 2018-03-31 PROCEDURE — 36415 COLL VENOUS BLD VENIPUNCTURE: CPT | Performed by: PLASTIC SURGERY

## 2018-03-31 PROCEDURE — 82565 ASSAY OF CREATININE: CPT | Performed by: PLASTIC SURGERY

## 2018-03-31 PROCEDURE — 12000001 ZZH R&B MED SURG/OB UMMC

## 2018-03-31 PROCEDURE — 25000132 ZZH RX MED GY IP 250 OP 250 PS 637: Performed by: SURGERY

## 2018-03-31 PROCEDURE — 25000132 ZZH RX MED GY IP 250 OP 250 PS 637: Performed by: PLASTIC SURGERY

## 2018-03-31 RX ORDER — DIPHENHYDRAMINE HCL 25 MG
25 CAPSULE ORAL EVERY 6 HOURS PRN
Status: DISCONTINUED | OUTPATIENT
Start: 2018-03-31 | End: 2018-04-02 | Stop reason: HOSPADM

## 2018-03-31 RX ADMIN — ACETAMINOPHEN 975 MG: 325 TABLET, FILM COATED ORAL at 04:12

## 2018-03-31 RX ADMIN — ACETAMINOPHEN 975 MG: 325 TABLET, FILM COATED ORAL at 21:28

## 2018-03-31 RX ADMIN — SIMVASTATIN 20 MG: 20 TABLET, FILM COATED ORAL at 21:27

## 2018-03-31 RX ADMIN — DIPHENHYDRAMINE HYDROCHLORIDE 25 MG: 25 CAPSULE ORAL at 04:13

## 2018-03-31 RX ADMIN — DIPHENHYDRAMINE HYDROCHLORIDE 25 MG: 25 CAPSULE ORAL at 21:27

## 2018-03-31 ASSESSMENT — PAIN DESCRIPTION - DESCRIPTORS
DESCRIPTORS: SORE

## 2018-03-31 NOTE — PLAN OF CARE
Problem: Patient Care Overview  Goal: Plan of Care/Patient Progress Review  Outcome: Therapy, progress towards functional goals is fair  Patient AVSS.  Packer with adequate UV.  Bilateral JPs with minimal serosanguinous output.  Dr. Mojica changed patient's perineal dressing.  Some bleeding noted. New pressure dressing applied.  Has remained dry and intact.  Continue to observe dressing for bleeding.  Pt ambulating periodically.  Advised by physician to only walk every 1-2 hrs, so as not to start up the bleeding again with activity.  Pt declines Tylenol.  Only has intermittent discomfort.  Tolerating a regular diet.  Passing gas, no BM yet.

## 2018-03-31 NOTE — PLAN OF CARE
Problem: Patient Care Overview  Goal: Plan of Care/Patient Progress Review  Outcome: Improving  VSS. Pt pain controlled using scheduled tylenol and ambulation. Pt tolerating regular diet; denies N/V. UOP via lovett adequate. +gas/no BM. Vaginal packing/dressing remains intact with small amount serosanguinous drainage noted on bedding. SANTY's have minimal output x2. Pt up ad slade; walked during the night x3. PIV SL. PLAN: continue POC.    Pt ok with bedside report if in room.

## 2018-03-31 NOTE — PLAN OF CARE
"Problem: Patient Care Overview  Goal: Plan of Care/Patient Progress Review  Outcome: Improving  POD #1 zero depth vaginoplasty. Up ad slade. Ambulated frequently. Packer in place. Catheter cares done. Perineum pressure wrap in place. SANTY x 2. Minimal output. Leaked onto bed. Packer and dressing to be removed tomorrow by MDs. PIV saline locked. Regular diet. Tolerating well. Drinking well. Denies pain. Creatinine slightly elevated. Will re-draw tomorrow. +PG/-BM. *Prefers pronoun \"she.\" Address pt as \"Ashley.\"Continue POC.     Pt would NOT like to be awoken for bedside shift report.  "

## 2018-03-31 NOTE — PROGRESS NOTES
Plastic Surgery Progress Note    Denies any pain. Tolerating diet. Passing flatus but no BM. Walked 5K yesterday around the halls.    Temp:  [95.7  F (35.4  C)-98.1  F (36.7  C)] 95.7  F (35.4  C)  Pulse:  [65] 65  Heart Rate:  [53-68] 53  Resp:  [13-16] 16  BP: ()/(55-63) 114/62  SpO2:  [97 %-99 %] 99 %  I/O last 3 completed shifts:  In: 1800 [P.O.:1800]  Out: 4300 [Urine:4275; Drains:25]  L perineal drain output: 13 cc of serosang yesterday.  R perineal drain output: 17 cc of serosang yesterday.    General: In no acute distress.  Abd: Soft, nondistended, and nontender.  Perineal dressing was taken down today. This revealed intact incisions. Neoclitoris is pink and sensate. There was bright red bleeding from the junction between the urethra and neoclitoris. Vaginal stump held in position. Labia majora with minimal swelling but symmetric bilaterally.  Lovett is draining clear urine.    CBC    Recent Labs  Lab 03/30/18  0804 03/29/18  1731 03/29/18  0631   WBC 9.6  --  3.3*   RBC 3.60*  --  4.47   HGB 11.7*  --  14.8   HCT 36.1*  --  43.9     --  98   MCH 32.5  --  33.1*   MCHC 32.4  --  33.7   RDW 13.6  --  13.3    187 179     BMP    Recent Labs  Lab 03/31/18  0847 03/30/18  0804 03/29/18  1731 03/29/18  0631   NA  --  139  --  139   POTASSIUM  --  4.2  --  4.0   CHLORIDE  --  104  --  105   DEBORA  --  8.5  --  8.9   CO2  --  30  --  28   BUN  --  14  --  10   CR 0.98 1.13 1.06 1.04   GLC  --  103*  --  110*   MAG  --  2.1  --   --        ASSESSMENT: POD 2 s/p zero depth vaginoplasty. Doing well.    PLAN:  - New dressing applied with vaseline gauze covering and applying pressure over the clitoris, urethra, and vaginal stump. This was then covered with two ABDs held in place with compression using mesh panties.   - Given the bleeding encountered today during the dressing change, we will keep the lovett in for now. In addition, we will stop the prophylactic daily since patient is ambulating  frequently.  - Pain control with oral oxycodone and IV dilaudid for breakthrough.  - Continue drain cares.  - Activity: Out of bed and ambulate with assist. Bedside commode as needed.  - Labs tomorrow AM.  - Regular diet.  - Plan for discharge home in 2 - 3 days pending no complications.    Madhu Mojica  Pager: [phone]

## 2018-04-01 LAB
ERYTHROCYTE [DISTWIDTH] IN BLOOD BY AUTOMATED COUNT: 13.2 % (ref 10–15)
HCT VFR BLD AUTO: 36.6 % (ref 40–53)
HGB BLD-MCNC: 12.4 G/DL (ref 13.3–17.7)
MCH RBC QN AUTO: 33.5 PG (ref 26.5–33)
MCHC RBC AUTO-ENTMCNC: 33.9 G/DL (ref 31.5–36.5)
MCV RBC AUTO: 99 FL (ref 78–100)
PLATELET # BLD AUTO: 187 10E9/L (ref 150–450)
RBC # BLD AUTO: 3.7 10E12/L (ref 4.4–5.9)
WBC # BLD AUTO: 6.3 10E9/L (ref 4–11)

## 2018-04-01 PROCEDURE — 12000001 ZZH R&B MED SURG/OB UMMC

## 2018-04-01 PROCEDURE — 25000132 ZZH RX MED GY IP 250 OP 250 PS 637: Performed by: PLASTIC SURGERY

## 2018-04-01 PROCEDURE — 25000132 ZZH RX MED GY IP 250 OP 250 PS 637: Performed by: SURGERY

## 2018-04-01 PROCEDURE — 36415 COLL VENOUS BLD VENIPUNCTURE: CPT | Performed by: SURGERY

## 2018-04-01 PROCEDURE — 85027 COMPLETE CBC AUTOMATED: CPT | Performed by: SURGERY

## 2018-04-01 RX ADMIN — DIPHENHYDRAMINE HYDROCHLORIDE 25 MG: 25 CAPSULE ORAL at 21:09

## 2018-04-01 RX ADMIN — SIMVASTATIN 20 MG: 20 TABLET, FILM COATED ORAL at 21:09

## 2018-04-01 NOTE — PROGRESS NOTES
Plastic Surgery Progress Note    Pain well controlled. Tolerating diet and had BM x 2. Ambulating without difficulty.    Temp:  [95.8  F (35.4  C)-97.7  F (36.5  C)] 97.7  F (36.5  C)  Pulse:  [59] 59  Heart Rate:  [52-60] 52  Resp:  [14-16] 14  BP: (113-120)/(63-70) 115/64  SpO2:  [97 %-100 %] 100 %  I/O last 3 completed shifts:  In: 500 [P.O.:500]  Out: 5775 [Urine:5775]  L perineal drain output: 0 cc yesterday.  R perineal drain output: 0 cc yesterday.    General: In no acute distress.  Abd: Soft, nondistended, and nontender.  Perineal dressing taken down. This revealed intact incisions. Neoclitoris is pink and sensate. Minimal bleeding between the urethra and neoclitoris.  Lovett is draining clear urine.    CBC    Recent Labs  Lab 04/01/18  0827 03/30/18  0804 03/29/18  1731 03/29/18  0631   WBC 6.3 9.6  --  3.3*   RBC 3.70* 3.60*  --  4.47   HGB 12.4* 11.7*  --  14.8   HCT 36.6* 36.1*  --  43.9   MCV 99 100  --  98   MCH 33.5* 32.5  --  33.1*   MCHC 33.9 32.4  --  33.7   RDW 13.2 13.6  --  13.3    165 187 179     BMP    Recent Labs  Lab 03/31/18  0847 03/30/18  0804 03/29/18  1731 03/29/18  0631   NA  --  139  --  139   POTASSIUM  --  4.2  --  4.0   CHLORIDE  --  104  --  105   DEBORA  --  8.5  --  8.9   CO2  --  30  --  28   BUN  --  14  --  10   CR 0.98 1.13 1.06 1.04   GLC  --  103*  --  110*   MAG  --  2.1  --   --        ASSESSMENT: POD 3 s/p zero depth vaginoplasty.    PLAN:  - Plan to keep lovett in one more day given the small area of bleeding along the anterior neourethra.  - Convert to new dressing with vaseline gauze covering the clitoris, urethra, and vaginal stump. This was then covered with fluffy kerlix and ABD held in place with compression using mesh panties.   - Will plan to start perineal irrigation and skin cares tomorrow.  - Pain control with oral oxycodone.  - Continue drain cares.  - Activity: Ambulate. Bedside commode as needed. Bilateral lower leg SCD's.  - Regular diet.  - Plan for  discharge home in 1 - 2 days pending no complications.    Madhu Mojica  Pager: [phone]

## 2018-04-01 NOTE — PLAN OF CARE
Problem: Patient Care Overview  Goal: Plan of Care/Patient Progress Review  Outcome: Therapy, progress towards functional goals is fair  Pt AVSS.  Denies pain except for intermittent aching sensation in danny area, and burning sensation after dressing change.  Declines any pain meds including scheduled Tylenol.  Packer with good UV.  Bilateral JPs with scant serosanguinous output.  Tolerating regular diet. Dressing changed by Dr. Mojica and pressure dressing removed.  Has vaseline gauze with kerlix and ABD in place over surgical area.  See dressing orders.  Pt ambulating independently in the hallway.  Continue to monitor.

## 2018-04-01 NOTE — PLAN OF CARE
Problem: Patient Care Overview  Goal: Plan of Care/Patient Progress Review  Outcome: No Change  VSS. Pt denies c/o discomfort. Pt tolerating regular diet; denies N/V. UOP via lovett adequate. + copious gas/no BM. Vaginal packing/dressing remains intact with small amount serosanguinous drainage noted on bedding- removed posterior tape so she could attempt to have a BM but unable to so re- adhered tape and reinforced. SANTY's have minimal output x2. Pt up ad slade; walked during the night x2. PIV SL. PLAN: continue POC.     Pt ok with bedside report if in room.     0525- pt had large formed BM and dressing reinforced again. No active bleeding noted.

## 2018-04-02 VITALS
SYSTOLIC BLOOD PRESSURE: 105 MMHG | OXYGEN SATURATION: 98 % | HEIGHT: 66 IN | WEIGHT: 123.9 LBS | TEMPERATURE: 97.2 F | HEART RATE: 56 BPM | DIASTOLIC BLOOD PRESSURE: 61 MMHG | BODY MASS INDEX: 19.91 KG/M2 | RESPIRATION RATE: 16 BRPM

## 2018-04-02 LAB — COPATH REPORT: NORMAL

## 2018-04-02 RX ORDER — OXYCODONE HYDROCHLORIDE 5 MG/1
5-10 TABLET ORAL EVERY 6 HOURS PRN
Qty: 30 TABLET | Refills: 0 | Status: SHIPPED | OUTPATIENT
Start: 2018-04-02 | End: 2018-07-06

## 2018-04-02 RX ORDER — POLYETHYLENE GLYCOL 3350 17 G/17G
1 POWDER, FOR SOLUTION ORAL DAILY
Qty: 510 G | Refills: 1 | Status: SHIPPED | OUTPATIENT
Start: 2018-04-02 | End: 2018-07-06

## 2018-04-02 NOTE — PLAN OF CARE
"Problem: Patient Care Overview  Goal: Plan of Care/Patient Progress Review  Outcome: Improving  /68 (BP Location: Right arm)  Pulse 56  Temp 96  F (35.6  C) (Oral)  Resp 15  Ht 1.664 m (5' 5.5\")  Wt 56.2 kg (123 lb 14.4 oz)  SpO2 99%  BMI 20.3 kg/m2    Denies pain and nausea. Tolerating diet. PIV saline locked. Vaginal inc C/D/I. Pt has been changing her own dressing changes. BM x1. Saved urine x1 but then voided and didn't save x2. Eating well. Ambulating in the halls. SANTY with scant output of serosanguinous output. Pt is waiting for Dr. Mojica to round again. Possible d/c.       "

## 2018-04-02 NOTE — PLAN OF CARE
Problem: Patient Care Overview  Goal: Plan of Care/Patient Progress Review  Outcome: Improving  POD #4 zero depth vaginoplasty. *Preferred pronoun she/her or they/them. Pt states soreness in vaginal area but declines the need for pain medication. Denies nausea. Vaginal incision covered with Vaseline gauze, kerlix fluffs, pad and mesh panties. Christina irrigations starting today if pt has BM's. Last BM yesterday. Passing gas. Packer producing adequate urine output. Possible removal today pending Dr. Mojica's approval. 2x SANTY drains with very small SS output, to bulb suction. Tolerating regular diet. UAL, ambulating frequently. Continue to monitor dressings and post-op status.     Pt would like bedside report at shift change.     Addendum: Packer removed, pt due to void

## 2018-04-02 NOTE — PROGRESS NOTES
Plastic Surgery Progress Note    Pain well controlled. Tolerating diet and had BM again. Continues to ambulate without difficulty.    Temp:  [96  F (35.6  C)-97.8  F (36.6  C)] 96  F (35.6  C)  Pulse:  [56] 56  Heart Rate:  [52-61] 61  Resp:  [14-15] 15  BP: (110-121)/(64-68) 121/68  SpO2:  [98 %-100 %] 99 %  I/O last 3 completed shifts:  In: 600 [P.O.:600]  Out: 3610 [Urine:3600; Drains:10]  L perineal drain output: 5 cc yesterday.  R perineal drain output: 5 cc yesterday.    General: In no acute distress.  Abd: Soft, nondistended, and nontender.  Perineal dressing taken down. This revealed intact incisions. R labia minora swollen compared to L, but both sides pink and viable. Neoclitoris is pink and sensate. No bleeding from incision sites anymore. Bilateral labia majora with symmetric appearance and intact incisions. No sign of infection such as erythema or drainage.  Packer intact and draining clear urine.    CBC    Recent Labs  Lab 04/01/18  0827 03/30/18  0804 03/29/18  1731 03/29/18  0631   WBC 6.3 9.6  --  3.3*   RBC 3.70* 3.60*  --  4.47   HGB 12.4* 11.7*  --  14.8   HCT 36.6* 36.1*  --  43.9   MCV 99 100  --  98   MCH 33.5* 32.5  --  33.1*   MCHC 33.9 32.4  --  33.7   RDW 13.2 13.6  --  13.3    165 187 179     BMP    Recent Labs  Lab 03/31/18  0847 03/30/18  0804 03/29/18  1731 03/29/18  0631   NA  --  139  --  139   POTASSIUM  --  4.2  --  4.0   CHLORIDE  --  104  --  105   DEBORA  --  8.5  --  8.9   CO2  --  30  --  28   BUN  --  14  --  10   CR 0.98 1.13 1.06 1.04   GLC  --  103*  --  110*   MAG  --  2.1  --   --        ASSESSMENT: POD 4 s/p zero depth vaginoplasty.    PLAN:  - Packer removal this morning and allow patient to urinate on own.  - Nursing perineal cares: If patient urinates or has a BM, irrigate perineum and vulva with warm water, then replace dressing with vaseline gauze covering the clitoris, urethra, and vaginal stump, followed by fluffy kerlix and ABD held in place with compression  using mesh panties.  - Pain control with oral oxycodone.  - Continue drain cares, will plan to removal later today if output continues to be low.  - Activity: Ambulate. Bedside commode as needed. Bilateral lower leg SCD's.  - Regular diet.  - Plan for discharge home today or tomorrow pending no complications.    Madhu Mojica  Pager: [phone]

## 2018-04-02 NOTE — DISCHARGE SUMMARY
[facility]  Discharge Summary    Date of admission: 3/29/2018  5:28 AM  Service: Plastic Surgery   Date of discharge: 4/2/2018  Staff: Madhu Mojica  Admission diagnosis: (F64.0) Gender dysphoria in adult  (primary encounter diagnosis)    Plan: oxyCODONE IR (ROXICODONE) 5 MG tablet,         polyethylene glycol (MIRALAX) powder              HPI: Patient is a 62-year-old individual with gender dysphoria who has been interested in gender confirming vaginoplasty for 50 years.  However, they were unable to find an appropriate surgeon in their local area and neither were they able to find the financial means of undergoing such surgery until now.  Despite social difficulties, patient transitioned to a feminine gender identity many years ago using pronouns she, her, they, and them.  Patient was evaluated for possible hormone therapy, but given their age and the cardiovascular risk with estradiol therapy, patient was not recommended hormones.  This sparked further interest in the patient for orchiectomy.  We had a thorough discussion regarding vaginoplasty and the risks involved.  Patient was not able to accept the risks of rectal injury and rectovaginal fistula given that they have not recently had and does not plan on ever having penetrative sexual intercourse.  Patient's main mode for achieving orgasm is masturbation via stimulation of the tip of the penis.  They were mostly interested in orchiectomy, penectomy, and clitoroplasty.  Multiple letters of recommendation were obtained, which all supported the patient's desire to undergo such surgery.  Therefore, I recommended zero depth vaginoplasty to eliminate the possibility of injury to the rectum, which the patient was able to accept.  Patient did understand the risks involving bleeding, fluid collection, infection, asymmetry, contour deformity, tissue necrosis, urinary stream abnormalities, and need for revision surgery were all still there.   Patient accepted the risks and wished to proceed with surgery.    Consults: None.    Procedures: Zero depth vaginoplasty (orchiectomy, penectomy, clitoroplasty, urethroplasty, and creation of feminine genital appearance, but without creation of a neovaginal cavity).    Hospital course: Patient was admitted to the inpatient floor following the aforementioned procedure. Their hospital course was unremarkable other than incisional site bleeding that was noted on POD 2 and POD 3 during dressing changes, requiring lovett to remain in. On POD 4, patient had no more bleeding from their incision site and lovett was removed. Patient was able to urinate on own with normal urinary stream following this. Neoclitoris was found to be viable and sensate. Tolerated a regular diet by POD 1. Had a BM on POD 3. Ambulated without problems starting POD 1. There were no complications during this hospitalization. Decision was made for discharge from hospital on POD 4.    Discharge condition: Stable.    Discharge medications   Current Discharge Medication List      START taking these medications    Details   oxyCODONE IR (ROXICODONE) 5 MG tablet Take 1-2 tablets (5-10 mg) by mouth every 6 hours as needed for moderate to severe pain maximum 6 tablet(s) per day  Qty: 30 tablet, Refills: 0    Associated Diagnoses: Gender dysphoria in adult      polyethylene glycol (MIRALAX) powder Take 17 g (1 capful) by mouth daily  Qty: 510 g, Refills: 1    Associated Diagnoses: Gender dysphoria in adult         CONTINUE these medications which have NOT CHANGED    Details   SIMVASTATIN PO Take 20 mg by mouth At Bedtime       ASPIRIN PO Take 81 mg by mouth daily      DiphenhydrAMINE HCl (BENADRYL PO) Take 25 mg by mouth nightly as needed for Insomnia or itching      EPINEPHrine (EPIPEN) 0.3 MG/0.3ML injection Inject 0.3 mLs (0.3 mg) into the muscle once as needed for anaphylaxis  Qty: 2 each, Refills: 1              Follow up: On 4/10/2018.      Madhu Mojica  MD  Plastic Surgery  4/2/2018 5:34 PM

## 2018-04-02 NOTE — PLAN OF CARE
Problem: Patient Care Overview  Goal: Discharge Needs Assessment  Outcome: Adequate for Discharge Date Met: 04/02/18  Dr Mojica saw patient and removed SANTY drains x 2. Patient reviewed dressing change instructions. Patient doing own dressing changes p each void. Pain well controled. Patient declines pain medication. Discharge instructions reviewed c patient. No questions or concerns at this time. Patient has f/u numbers for questions or concerns. Patient will f/u as ordered. Patient took discharge paper prescriptions with her at discharge.  Discharged to home @ 1830.

## 2018-04-03 ENCOUNTER — CARE COORDINATION (OUTPATIENT)
Dept: CARE COORDINATION | Facility: CLINIC | Age: 63
End: 2018-04-03

## 2018-04-03 NOTE — PROGRESS NOTES
Patient has clinic visit within 24-72 hours of Discharge so no post DC follow up call is needed            Apr 05, 2018  8:00 AM CDT   Return Visit with Lynnette Zuniga MD   [facility] ([facility])     [facility]. [address] [place] [address],  [address]  [facility] [id number] [phone]

## 2018-04-05 ENCOUNTER — TELEPHONE (OUTPATIENT)
Dept: FAMILY MEDICINE | Facility: CLINIC | Age: 63
End: 2018-04-05

## 2018-04-05 ENCOUNTER — OFFICE VISIT (OUTPATIENT)
Dept: FAMILY MEDICINE | Facility: CLINIC | Age: 63
End: 2018-04-05
Payer: COMMERCIAL

## 2018-04-05 VITALS
BODY MASS INDEX: 19.67 KG/M2 | DIASTOLIC BLOOD PRESSURE: 73 MMHG | HEART RATE: 50 BPM | RESPIRATION RATE: 16 BRPM | SYSTOLIC BLOOD PRESSURE: 120 MMHG | WEIGHT: 120 LBS | TEMPERATURE: 97.5 F | OXYGEN SATURATION: 99 %

## 2018-04-05 DIAGNOSIS — F64.0 GENDER DYSPHORIA IN ADULT: Primary | ICD-10-CM

## 2018-04-05 DIAGNOSIS — Z90.79 H/O BILATERAL ORCHIECTOMIES: ICD-10-CM

## 2018-04-05 RX ORDER — ESTRADIOL 0.04 MG/D
1 PATCH, EXTENDED RELEASE TRANSDERMAL
Qty: 8 PATCH | Refills: 1 | Status: SHIPPED | OUTPATIENT
Start: 2018-04-05 | End: 2018-04-26

## 2018-04-05 NOTE — PROGRESS NOTES
Preceptor Attestation:   Patient seen, evaluated and discussed with the resident. I have verified the content of the note, which accurately reflects my assessment of the patient and the plan of care.   Supervising Physician:  Troy Rosen MD MD

## 2018-04-05 NOTE — PROGRESS NOTES
HPI   TANISHA Pronounced Ashley They Them is a 62 year old individual that uses pronouns She/Her/Hers/Herself that presents today for follow up of:  feminizing hormone therapy. Gender identity: female  S/p Zero depth vaginioplasty on 3/29/2018, went well. Has a follow up appointment with Dr Mojica on 4/10/2017. Here to talk about estrogen. Loves running, concerned about bone health.     On hormones?  No, and interested to start. She did lot of research and is convinced she wants to be on hormones.   ---    Past Surgical History:   Procedure Laterality Date     COLONOSCOPY       ORTHOPEDIC SURGERY      foot surgery      TRANSGENDER VAGINOPLASTY ZERO DEPTH N/A 3/29/2018    Procedure: TRANSGENDER VAGINOPLASTY ZERO DEPTH;  Zero Depth Transgender Vaginoplasty;  Surgeon: Madhu Mojica MD;  Location: [facility]       Patient Active Problem List   Diagnosis     Familial hypercholesteremia     Gender dysphoria in adult     Seasonal mood disorder (H)     S/P foot surgery, left       Current Outpatient Prescriptions   Medication Sig Dispense Refill     oxyCODONE IR (ROXICODONE) 5 MG tablet Take 1-2 tablets (5-10 mg) by mouth every 6 hours as needed for moderate to severe pain maximum 6 tablet(s) per day 30 tablet 0     polyethylene glycol (MIRALAX) powder Take 17 g (1 capful) by mouth daily 510 g 1     SIMVASTATIN PO Take 20 mg by mouth At Bedtime        ASPIRIN PO Take 81 mg by mouth daily       DiphenhydrAMINE HCl (BENADRYL PO) Take 25 mg by mouth nightly as needed for Insomnia or itching       EPINEPHrine (EPIPEN) 0.3 MG/0.3ML injection Inject 0.3 mLs (0.3 mg) into the muscle once as needed for anaphylaxis (Patient not taking: Reported on 3/8/2018) 2 each 1       History   Smoking Status     Never Smoker   Smokeless Tobacco     Never Used          Allergies   Allergen Reactions     Bee Venom        Problem, Medication and Allergy Lists were reviewed and updated if needed..         Review of Systems:      General    Fat  "redistribution: no    Weight change: no HEENT    Voice change: no     Cardiovascular (CV)    Chest Pains: no    Shortness of breath: no Chest    Decreased exercise tolerance:  no    Breast changes/development: not applicable     Gastrointestinal (GI)    Abdominal pain: no    Change in appetite: no Skin    Acne or oily skin: no    Change in hair: no     Genitourinary ()    Abnormal vaginal bleeding: not applicable     Decreased spontaneous erections: not applicable    Change in libido: no reported changes    New sexual partners: no Musculoskeletal    Leg pain or swelling: no     Psychiatric (Psych)    Depression: no    Anxiety/Panic: no    Mood:  \"good\"                    Physical Exam:     Vitals:    04/05/18 0758   BP: 120/73   Pulse: 50   Resp: 16   Temp: 97.5  F (36.4  C)   TempSrc: Oral   SpO2: 99%   Weight: 120 lb (54.4 kg)     BMI= Body mass index is 19.67 kg/(m^2).   Wt Readings from Last 10 Encounters:   04/05/18 120 lb (54.4 kg)   03/30/18 123 lb 14.4 oz (56.2 kg)   03/08/18 120 lb (54.4 kg)   02/09/18 125 lb (56.7 kg)   11/28/17 125 lb (56.7 kg)   10/31/17 125 lb 6.4 oz (56.9 kg)   09/27/17 123 lb 9.6 oz (56.1 kg)   07/28/14 129 lb (58.5 kg)     Appearance: Both appearance and dress    GENERAL:: healthy, alert and no distress  RESP: lungs clear to auscultation - no rales, no rhonchi, no wheezes  CV: regular rates and rhythm, normal S1 S2, no S3 or S4 and no murmur, no click or rub -  : deferred today since patient will be seen by Dr Mojica on 4/10/2018  Affect: Appropriate/mood-congruent           Labs:     No pending results.  Assessment and Plan     1. Gender dysphoria in adult. S/p Zero depth vaginoplasty (orchiectomy, penectomy, clitoroplasty, urethroplasty, and creation of feminine genital appearance, but without creation of a neovaginal cavity) on 3/29/2018  We discussed in length why we would recommend HRT (estrogen or testosterone): promoition of the bone health, help with possible mood swings  - " we discussed that HRT will be started approximately 14 days after surgery (roughly after Eleazar sees Dr Mojica for follow up)  - start estradiol (VIVELLE-DOT) 0.0375 MG/24HR BIW patch; Place 1 patch onto the skin twice a week  Dispense: 8 patch; Refill: 1. It looks like ot os not covered by the insurance so a prior authorization is most likely needed.   - discussed that it patch falling off to clean w/ EtOH first, apply & press x 30 sec, use baby oil to remove adhesive  - follow up in 1 month    Contraception:  not needed     Counselled patient about controlled substances: No    Follow up:  Follow up in 1 month.  Results by mychart  Questions were elicited and answered.     Lynnette Zuniga MD, PhD  [facility],  PGY-3 Family Medicine Resident  #[id number]

## 2018-04-05 NOTE — MR AVS SNAPSHOT
After Visit Summary   4/5/2018    Larissa Rehman    MRN: [id number]           Patient Information     Date Of Birth          1955        Visit Information        Provider Department      4/5/2018 8:00 AM Taiwo Zuniga MD [facility]        Today's Diagnoses     Gender dysphoria in adult    -  1    H/O bilateral orchiectomies          Care Instructions    Here is the plan from today's visit    1. Gender dysphoria in adult  - start estradiol (VIVELLE-DOT) 0.0375 MG/24HR BIW patch; Place 1 patch onto the skin twice a week  Dispense: 8 patch; Refill: 1  - Patch falling off? Clean w/ EtOH first, apply & press x30sec. Baby oil to remove adhesive    2. H/O bilateral orchiectomies  - estradiol (VIVELLE-DOT) 0.0375 MG/24HR BIW patch; Place 1 patch onto the skin twice a week  Dispense: 8 patch; Refill: 1      Please call or return to clinic if your symptoms don't go away.    Follow up plan  Please make a clinic appointment for follow up with me (TAIWO ZUNIGA) in 1  month for HRT follow up.    Thank you for coming to [facility] today.  Lab Testing:  **If you had lab testing today and your results are reassuring or normal they will be mailed to you or sent through H[employer] within 7 days.   **If the lab tests need quick action we will call you with the results.  The phone number we will call with results is # [phone] (home) . If this is not the best number please call our clinic and change the number.  Medication Refills:  If you need any refills please call your pharmacy and they will contact us.   If you need to  your refill at a new pharmacy, please contact the new pharmacy directly. The new pharmacy will help you get your medications transferred faster.   Scheduling:  If you have any concerns about today's visit or wish to schedule another appointment please call our office during normal business hours [phone] (8-5:00 M-F)  If a referral was made to a  [facility] and you don't get a call from central scheduling please call [phone].  If a Mammogram was ordered for you at [facility] call [phone] to schedule or change your appointment.  If you had an XRay/CT/Ultrasound/MRI ordered the number is [phone] to schedule or change your radiology appointment.   Medical Concerns:  If you have urgent medical concerns please call [phone] at any time of the day.    Lynnette Zuniga MD            Follow-ups after your visit        Your next 10 appointments already scheduled     Apr 10, 2018  4:00 PM CDT   (Arrive by 3:45 PM)   Return Plastic Surgery with Madhu Mojica MD   [facility] ([facility])    [address] [place] [address] [phone]           Do not wear perfume.              Who to contact     Please call your clinic at [phone] to:    Ask questions about your health    Make or cancel appointments    Discuss your medicines    Learn about your test results    Speak to your doctor            Additional Information About Your Visit        Linux Voice Information     Linux Voice gives you secure access to your electronic health record. If you see a primary care provider, you can also send messages to your care team and make appointments. If you have questions, please call your primary care clinic.  If you do not have a primary care provider, please call [phone] and they will assist you.      Linux Voice is an electronic gateway that provides easy, online access to your medical records. With Linux Voice, you can request a clinic appointment, read your test results, renew a prescription or communicate with your care team.     To access your existing account, please contact your [facility] or call [phone] for assistance.        Care EveryWhere ID     This is your Care EveryWhere ID. This could be used by  other organizations to access your [facility] medical records  [id number]        Your Vitals Were     Pulse Temperature Respirations Pulse Oximetry BMI (Body Mass Index)       50 97.5  F (36.4  C) (Oral) 16 99% 19.67 kg/m2        Blood Pressure from Last 3 Encounters:   04/05/18 120/73   04/02/18 105/61   03/08/18 137/81    Weight from Last 3 Encounters:   04/05/18 120 lb (54.4 kg)   03/30/18 123 lb 14.4 oz (56.2 kg)   03/08/18 120 lb (54.4 kg)              Today, you had the following     No orders found for display         Today's Medication Changes          These changes are accurate as of 4/5/18  8:29 AM.  If you have any questions, ask your nurse or doctor.               Start taking these medicines.        Dose/Directions    estradiol 0.0375 MG/24HR BIW patch   Commonly known as:  VIVELLE-DOT   Used for:  Gender dysphoria in adult, H/O bilateral orchiectomies   Started by:  Lynnette Zuniga MD        Dose:  1 patch   Place 1 patch onto the skin twice a week   Quantity:  8 patch   Refills:  1            Where to get your medicines      These medications were sent to [facility] [address] [place] [address] [place], [place] - [address] [facility]  [address] [place] [address]     Phone:  [phone]     estradiol 0.0375 MG/24HR BIW patch                Primary Care Provider Office Phone # Fax #    Tk Marin [phone]       [facility] [address] [facility] [address]        Equal Access to Services     REY MOROCHO AH: Hadflaquita Motley, wafarhad luqadaha, qaybta kaalmagloria gr. So [facility] [phone].    ATENCIÓN: Si habla español, tiene a bermudez disposición servicios gratuitos de asistencia lingüística. Heike snyder [phone].    We comply with applicable federal civil rights laws and [place] laws. We do not discriminate on the basis of race, color, national origin, age, disability, sex,  sexual orientation, or gender identity.            Thank you!     Thank you for choosing [facility]  for your care. Our goal is always to provide you with excellent care. Hearing back from our patients is one way we can continue to improve our services. Please take a few minutes to complete the written survey that you may receive in the mail after your visit with us. Thank you!             Your Updated Medication List - Protect others around you: Learn how to safely use, store and throw away your medicines at www.disposemymeds.org.          This list is accurate as of 4/5/18  8:29 AM.  Always use your most recent med list.                   Brand Name Dispense Instructions for use Diagnosis    ASPIRIN PO      Take 81 mg by mouth daily        BENADRYL PO      Take 25 mg by mouth nightly as needed for Insomnia or itching        EPINEPHrine 0.3 MG/0.3ML injection 2-pack    EPIPEN/ADRENACLICK/or ANY BX GENERIC EQUIV    2 each    Inject 0.3 mLs (0.3 mg) into the muscle once as needed for anaphylaxis        estradiol 0.0375 MG/24HR BIW patch    VIVELLE-DOT    8 patch    Place 1 patch onto the skin twice a week    Gender dysphoria in adult, H/O bilateral orchiectomies       oxyCODONE IR 5 MG tablet    ROXICODONE    30 tablet    Take 1-2 tablets (5-10 mg) by mouth every 6 hours as needed for moderate to severe pain maximum 6 tablet(s) per day    Gender dysphoria in adult       polyethylene glycol powder    MIRALAX    510 g    Take 17 g (1 capful) by mouth daily    Gender dysphoria in adult       SIMVASTATIN PO      Take 20 mg by mouth At Bedtime

## 2018-04-05 NOTE — TELEPHONE ENCOUNTER
[facility] phone call message- medication clarification/question:    Full Medication Name: estradiol (VIVELLE-DOT) 0.0375 MG/24HR BIW patch    Question: Pharmacy needs clarification if this patch is the correct refill for patient.  Patient IS waiting at pharmacy.  (I tried to reach Nurse, not available)    Pharmacy confirmed as [facility] [address] [place] [address], [place] - [address] AT  [address] [place] [address] [facility] [address]: Yes    OK to leave a message on voice mail? Yes    Primary language: English      needed? No    Call taken on April 5, 2018 at 9:11 AM by Humaira Hernandez

## 2018-04-05 NOTE — TELEPHONE ENCOUNTER
Patient was seen in clinic today by Dr. Zuniga. Medication was intentionally prescribed for hormone replacement therapy    Pharmacy verbalized understanding and just wanted to confirm.    Xiomara Weems RN

## 2018-04-05 NOTE — PATIENT INSTRUCTIONS
Here is the plan from today's visit    1. Gender dysphoria in adult  - start estradiol (VIVELLE-DOT) 0.0375 MG/24HR BIW patch; Place 1 patch onto the skin twice a week  Dispense: 8 patch; Refill: 1  - Patch falling off? Clean w/ EtOH first, apply & press x30sec. Baby oil to remove adhesive    2. H/O bilateral orchiectomies  - estradiol (VIVELLE-DOT) 0.0375 MG/24HR BIW patch; Place 1 patch onto the skin twice a week  Dispense: 8 patch; Refill: 1      Please call or return to clinic if your symptoms don't go away.    Follow up plan  Please make a clinic appointment for follow up with me (TAIWO TONY) in 1  month for HRT follow up.    Thank you for coming to [facility] today.  Lab Testing:  **If you had lab testing today and your results are reassuring or normal they will be mailed to you or sent through [employer] within 7 days.   **If the lab tests need quick action we will call you with the results.  The phone number we will call with results is # [phone] (home) . If this is not the best number please call our clinic and change the number.  Medication Refills:  If you need any refills please call your pharmacy and they will contact us.   If you need to  your refill at a new pharmacy, please contact the new pharmacy directly. The new pharmacy will help you get your medications transferred faster.   Scheduling:  If you have any concerns about today's visit or wish to schedule another appointment please call our office during normal business hours [phone] (8-5:00 M-F)  If a referral was made to a [facility] and you don't get a call from central scheduling please call [phone].  If a Mammogram was ordered for you at [facility] call [phone] to schedule or change your appointment.  If you had an XRay/CT/Ultrasound/MRI ordered the number is [phone] to schedule or change your radiology appointment.   Medical Concerns:  If you have urgent medical  "concerns please call [phone] at any time of the day.    Lynnette Zuniga MD    [facility] Trans Website    [email or web address]      STEPS TO ACCESS  1.  Click the link  2.  Click \"Sign in via google\".  Enter your [facility] email username and password.  If you prefer not to log in via google you can click \"Create an account\" which requires only your name, email address, and password and is free.  3.  Click \"Start course\"  4.  Explore at your will.  There are tabs on the left-hand side to help you navigate the sections easier. If you have questions, concerns, or suggestions, email Juany Carlson ([email or web address]) or Shwetha Ackerman ([email or web address]).    Thank you!    "

## 2018-04-10 ENCOUNTER — OFFICE VISIT (OUTPATIENT)
Dept: PLASTIC SURGERY | Facility: CLINIC | Age: 63
End: 2018-04-10
Payer: COMMERCIAL

## 2018-04-10 VITALS
OXYGEN SATURATION: 100 % | BODY MASS INDEX: 19.29 KG/M2 | DIASTOLIC BLOOD PRESSURE: 70 MMHG | HEIGHT: 66 IN | HEART RATE: 50 BPM | SYSTOLIC BLOOD PRESSURE: 120 MMHG | WEIGHT: 120 LBS

## 2018-04-10 DIAGNOSIS — F64.0 GENDER DYSPHORIA IN ADULT: Primary | ICD-10-CM

## 2018-04-10 ASSESSMENT — PAIN SCALES - GENERAL: PAINLEVEL: NO PAIN (0)

## 2018-04-10 NOTE — LETTER
"4/10/2018       RE: Larissa Rehman  [address] [place] [address]     Dear Colleague,    Thank you for referring your patient, Larissa Rehman, to the [facility] PLASTIC AND RECONSTRUCTIVE SURGERY at [facility]. Please see a copy of my visit note below.    Patient returns for a postoperative follow-up after zero depth vaginoplasty.    INTERVAL HISTORY: Pain is controlled.  Patient's urinary stream is mostly erratic, but it either points down or sprays.  It does not spray anteriorly.  Patient has been doing light jogging without any issues.  She has returned to work.    PHYSICAL EXAMINATION:  /70  Pulse 50  Ht 1.664 m (5' 5.5\")  Wt 54.4 kg (120 lb)  SpO2 100%  BMI 19.67 kg/m2  General: In no acute distress.  On exam of the genitals, incisions are mostly intact except for small areas along the posterior most aspect where there is superficial dehiscence.  This does not appear concerning.  There is no erythema or drainage indicative of infection.  Urethra appears viable.  Clitoris appears viable and is sensate to light touch.    PATHOLOGY:   SPECIMEN(S):   A: Right testicle   B: Left testicle   C: Penis     FINAL DIAGNOSIS:   A. RIGHT TESTIS AND SPERMATIC CORD, ORCHIECTOMY:   - Testis with variably atrophic seminiferous tubules   - Unremarkable epididymis, and spermatic cord   - Negative for malignancy     B. LEFT TESTIS AND SPERMATIC CORD, ORCHIECTOMY:   - Testis with variably atrophic seminiferous tubules   - Unremarkable epididymis, and spermatic cord   - Negative for malignancy     C. PENIS; PENECTOMY:   - Unremarkable penis   - Negative for dysplasia or malignancy     ASSESSMENT: 12 days status post zero depth vaginoplasty.  This is complicated by small areas of wound dehiscence along the posterior most aspect of the surgical sites.    PLAN: Patient is to continue daily and as needed dressing changes with mesh panties, gauze, and ABD pads.  Patient is to continue " cleansing the area with warm water and daily showers.  I will see the patient back in 2-3 weeks at which time I will expect wounds to be nearly fully healed.  We will start to explore erogenous sensation around the neoclitoris.    Total time spent with patient was 15 min of which greater than 50% was in counseling.    Again, thank you for allowing me to participate in the care of your patient.      Sincerely,    Madhu Mojica MD

## 2018-04-10 NOTE — MR AVS SNAPSHOT
After Visit Summary   4/10/2018    Larissa Rehman    MRN: [id number]           Patient Information     Date Of Birth          1955        Visit Information        Provider Department      4/10/2018 4:00 PM Madhu Mojica MD [facility]        Today's Diagnoses     Gender dysphoria in adult    -  1       Follow-ups after your visit        Follow-up notes from your care team     Return in about 3 weeks (around 5/1/2018).      Your next 10 appointments already scheduled     Apr 26, 2018  4:00 PM CDT   Return Visit with Lynnette Zuniga MD   [facility] ([facility])    [address],  [address]  [place] [address]   [phone]              Who to contact     Please call your clinic at [phone] to:    Ask questions about your health    Make or cancel appointments    Discuss your medicines    Learn about your test results    Speak to your doctor            Additional Information About Your Visit        MyChart Information     Sunfire gives you secure access to your electronic health record. If you see a primary care provider, you can also send messages to your care team and make appointments. If you have questions, please call your primary care clinic.  If you do not have a primary care provider, please call [phone] and they will assist you.      Sunfire is an electronic gateway that provides easy, online access to your medical records. With Sunfire, you can request a clinic appointment, read your test results, renew a prescription or communicate with your care team.     To access your existing account, please contact your [facility] or call [phone] for assistance.        Care EveryWhere ID     This is your Care EveryWhere ID. This could be used by other organizations to access your [place] medical records  [id number]        Your Vitals Were     Pulse Height Pulse Oximetry BMI  "(Body Mass Index)          50 1.664 m (5' 5.5\") 100% 19.67 kg/m2         Blood Pressure from Last 3 Encounters:   04/10/18 120/70   04/05/18 120/73   04/02/18 105/61    Weight from Last 3 Encounters:   04/10/18 54.4 kg (120 lb)   04/05/18 54.4 kg (120 lb)   03/30/18 56.2 kg (123 lb 14.4 oz)              Today, you had the following     No orders found for display       Primary Care Provider Office Phone # Fax #    Tk Marin [phone]       [facility] [address]  [facility] [address]        Equal Access to Services     TE MOROCHO : Lenny roqueo Sosandy, waaxda luqadaha, qaybta kaalmada adeegyada, gloria cooney . So [facility] [phone].    ATENCIÓN: Si habla español, tiene a bermudez disposición servicios gratuitos de asistencia lingüística. [facility] [phone].    We comply with applicable federal civil rights laws and [place] laws. We do not discriminate on the basis of race, color, national origin, age, disability, sex, sexual orientation, or gender identity.            Thank you!     Thank you for choosing [facility]  for your care. Our goal is always to provide you with excellent care. Hearing back from our patients is one way we can continue to improve our services. Please take a few minutes to complete the written survey that you may receive in the mail after your visit with us. Thank you!             Your Updated Medication List - Protect others around you: Learn how to safely use, store and throw away your medicines at www.disposemymeds.org.          This list is accurate as of 4/10/18  4:41 PM.  Always use your most recent med list.                   Brand Name Dispense Instructions for use Diagnosis    ASPIRIN PO      Take 81 mg by mouth daily        BENADRYL PO      Take 25 mg by mouth nightly as needed for Insomnia or itching        EPINEPHrine 0.3 MG/0.3ML injection 2-pack    " EPIPEN/ADRENACLICK/or ANY BX GENERIC EQUIV    2 each    Inject 0.3 mLs (0.3 mg) into the muscle once as needed for anaphylaxis        estradiol 0.0375 MG/24HR BIW patch    VIVELLE-DOT    8 patch    Place 1 patch onto the skin twice a week    Gender dysphoria in adult, H/O bilateral orchiectomies       oxyCODONE IR 5 MG tablet    ROXICODONE    30 tablet    Take 1-2 tablets (5-10 mg) by mouth every 6 hours as needed for moderate to severe pain maximum 6 tablet(s) per day    Gender dysphoria in adult       polyethylene glycol powder    MIRALAX    510 g    Take 17 g (1 capful) by mouth daily    Gender dysphoria in adult       SIMVASTATIN PO      Take 20 mg by mouth At Bedtime

## 2018-04-10 NOTE — PROGRESS NOTES
"Patient returns for a postoperative follow-up after zero depth vaginoplasty.    INTERVAL HISTORY: Pain is controlled.  Patient's urinary stream is mostly erratic, but it either points down or sprays.  It does not spray anteriorly.  Patient has been doing light jogging without any issues.  She has returned to work.    PHYSICAL EXAMINATION:  /70  Pulse 50  Ht 1.664 m (5' 5.5\")  Wt 54.4 kg (120 lb)  SpO2 100%  BMI 19.67 kg/m2  General: In no acute distress.  On exam of the genitals, incisions are mostly intact except for small areas along the posterior most aspect where there is superficial dehiscence.  This does not appear concerning.  There is no erythema or drainage indicative of infection.  Urethra appears viable.  Clitoris appears viable and is sensate to light touch.    PATHOLOGY:   SPECIMEN(S):   A: Right testicle   B: Left testicle   C: Penis     FINAL DIAGNOSIS:   A. RIGHT TESTIS AND SPERMATIC CORD, ORCHIECTOMY:   - Testis with variably atrophic seminiferous tubules   - Unremarkable epididymis, and spermatic cord   - Negative for malignancy     B. LEFT TESTIS AND SPERMATIC CORD, ORCHIECTOMY:   - Testis with variably atrophic seminiferous tubules   - Unremarkable epididymis, and spermatic cord   - Negative for malignancy     C. PENIS; PENECTOMY:   - Unremarkable penis   - Negative for dysplasia or malignancy     ASSESSMENT: 12 days status post zero depth vaginoplasty.  This is complicated by small areas of wound dehiscence along the posterior most aspect of the surgical sites.    PLAN: Patient is to continue daily and as needed dressing changes with mesh panties, gauze, and ABD pads.  Patient is to continue cleansing the area with warm water and daily showers.  I will see the patient back in 2-3 weeks at which time I will expect wounds to be nearly fully healed.  We will start to explore erogenous sensation around the neoclitoris.    Total time spent with patient was 15 min of which greater than 50% " was in counseling.

## 2018-04-10 NOTE — NURSING NOTE
"Chief Complaint   Patient presents with     Surgical Followup     post op        Vitals:    04/10/18 1620   BP: 120/70   Pulse: 50   SpO2: 100%   Weight: 120 lb   Height: 5' 5.5\"       Body mass index is 19.67 kg/(m^2).      WOUND EVALUATION:                        "

## 2018-04-26 ENCOUNTER — OFFICE VISIT (OUTPATIENT)
Dept: FAMILY MEDICINE | Facility: CLINIC | Age: 63
End: 2018-04-26
Payer: COMMERCIAL

## 2018-04-26 VITALS
DIASTOLIC BLOOD PRESSURE: 73 MMHG | WEIGHT: 125.8 LBS | HEART RATE: 61 BPM | TEMPERATURE: 99.1 F | BODY MASS INDEX: 20.62 KG/M2 | RESPIRATION RATE: 16 BRPM | SYSTOLIC BLOOD PRESSURE: 110 MMHG | OXYGEN SATURATION: 98 %

## 2018-04-26 DIAGNOSIS — Z90.79 H/O BILATERAL ORCHIECTOMIES: ICD-10-CM

## 2018-04-26 DIAGNOSIS — F64.0 GENDER DYSPHORIA IN ADULT: ICD-10-CM

## 2018-04-26 RX ORDER — ESTRADIOL 0.04 MG/D
1 PATCH, EXTENDED RELEASE TRANSDERMAL
Qty: 8 PATCH | Refills: 3 | Status: SHIPPED | OUTPATIENT
Start: 2018-04-26 | End: 2018-05-25 | Stop reason: ALTCHOICE

## 2018-04-26 NOTE — PROGRESS NOTES
KENDRA Bush is a 62 year old individual that uses pronouns She/Her/Hers/Herself that presents today for follow up of:  feminizing hormone therapy. Gender identity: female    S/p Zero depth vaginioplasty on 3/29/2018. Still having some tenderness in the surgery area, does not sleep well. Having hot flashes and how getting better with hormone therapy.     Seeing Dr Mojica on 5/4 for follow up. Runs every day and denies any shortness of breath or chest tightness.     Started taking Estrogen on 4/10/2017    Any special concerns today? Hot flashes as discussed above    On hormones?  YES +++ Shot day of the week? Not applicable-taking pills/patch/gel      Due for labs?  No      +++ Refills of meds needed?  Yes  ---    Past Surgical History:   Procedure Laterality Date     COLONOSCOPY       ORTHOPEDIC SURGERY      foot surgery      TRANSGENDER VAGINOPLASTY ZERO DEPTH N/A 3/29/2018    Procedure: TRANSGENDER VAGINOPLASTY ZERO DEPTH;  Zero Depth Transgender Vaginoplasty;  Surgeon: Madhu Mojica MD;  Location: [facility] OR       Patient Active Problem List   Diagnosis     Familial hypercholesteremia     Gender dysphoria in adult     Seasonal mood disorder (H)     S/P foot surgery, left     H/O bilateral orchiectomies and vaginoplasty 3/29/2018       Current Outpatient Prescriptions   Medication Sig Dispense Refill     ASPIRIN PO Take 81 mg by mouth daily       estradiol (VIVELLE-DOT) 0.0375 MG/24HR BIW patch Place 1 patch onto the skin twice a week 8 patch 3     SIMVASTATIN PO Take 20 mg by mouth At Bedtime        DiphenhydrAMINE HCl (BENADRYL PO) Take 25 mg by mouth nightly as needed for Insomnia or itching       EPINEPHrine (EPIPEN) 0.3 MG/0.3ML injection Inject 0.3 mLs (0.3 mg) into the muscle once as needed for anaphylaxis (Patient not taking: Reported on 4/26/2018) 2 each 1     oxyCODONE IR (ROXICODONE) 5 MG tablet Take 1-2 tablets (5-10 mg) by mouth every 6 hours as needed for moderate to severe pain maximum 6 tablet(s)  "per day (Patient not taking: Reported on 4/26/2018) 30 tablet 0     polyethylene glycol (MIRALAX) powder Take 17 g (1 capful) by mouth daily (Patient not taking: Reported on 4/26/2018) 510 g 1     [DISCONTINUED] estradiol (VIVELLE-DOT) 0.0375 MG/24HR BIW patch Place 1 patch onto the skin twice a week 8 patch 1       History   Smoking Status     Never Smoker   Smokeless Tobacco     Never Used          Allergies   Allergen Reactions     Bee Venom        Problem, Medication and Allergy Lists were reviewed and updated if needed..         Review of Systems:      General    Fat redistribution: no    Weight change: no HEENT    Voice change: no     Cardiovascular (CV)    Chest Pains: no    Shortness of breath: no Chest    Decreased exercise tolerance:  not applicable    Breast changes/development: not applicable     Gastrointestinal (GI)    Abdominal pain: no    Change in appetite: no Skin    Acne or oily skin: no    Change in hair: no     Genitourinary ()    Abnormal vaginal bleeding: not applicable     Decreased spontaneous erections: not applicable    Change in libido: YES    New sexual partners: no Musculoskeletal    Leg pain or swelling: no     Psychiatric (Psych)    Depression: no    Anxiety/Panic: no    Mood:  \"good\"                    Physical Exam:     Vitals:    04/26/18 1553   BP: 110/73   BP Location: Left arm   Patient Position: Sitting   Cuff Size: Adult Regular   Pulse: 61   Resp: 16   Temp: 99.1  F (37.3  C)   TempSrc: Oral   SpO2: 98%   Weight: 125 lb 12.8 oz (57.1 kg)     BMI= Body mass index is 20.62 kg/(m^2).   Wt Readings from Last 10 Encounters:   04/26/18 125 lb 12.8 oz (57.1 kg)   04/10/18 120 lb (54.4 kg)   04/05/18 120 lb (54.4 kg)   03/30/18 123 lb 14.4 oz (56.2 kg)   03/08/18 120 lb (54.4 kg)   02/09/18 125 lb (56.7 kg)   11/28/17 125 lb (56.7 kg)   10/31/17 125 lb 6.4 oz (56.9 kg)   09/27/17 123 lb 9.6 oz (56.1 kg)   07/28/14 129 lb (58.5 kg)     Appearance: Both appearance and " dress    GENERAL: healthy, alert and no distress  RESP: lungs clear to auscultation - no rales, no rhonchi, no wheezes  CV: regular rates and rhythm, normal S1 S2, no S3 or S4 and no murmur, no click or rub -  Affect: Appropriate/mood-congruent           Labs:     No pending results.   Assessment and Plan     1. Gender dysphoria in adult  2. H/O bilateral orchiectomy  3. Hot flashes, getting better with HRT  - since symptoms are getting better we recommended to continue with estradiol (VIVELLE-DOT) 0.0375 MG/24HR BIW patch; Place 1 patch onto the skin twice a week  Dispense: 8 patch; Refill: 3    Contraception: not needed     Counselled patient about controlled substances: No    Follow up:  Follow up in 1 month for labs  Results by martin  Questions were elicited and answered.     Lynnette Zuniga MD, PhD  [facility],  PGY-3 Family Medicine Resident  #[id number]

## 2018-04-26 NOTE — PROGRESS NOTES
Preceptor Attestation:   Patient seen and discussed with the resident. Assessment and plan reviewed with resident and agreed upon.   Supervising Physician:  [facility]

## 2018-05-04 ENCOUNTER — OFFICE VISIT (OUTPATIENT)
Dept: PLASTIC SURGERY | Facility: CLINIC | Age: 63
End: 2018-05-04
Payer: COMMERCIAL

## 2018-05-04 DIAGNOSIS — F64.0 GENDER DYSPHORIA IN ADULT: Primary | ICD-10-CM

## 2018-05-04 NOTE — PROGRESS NOTES
Patient returns for a postoperative follow-up after zero depth vaginoplasty.    INTERVAL HISTORY: Pain control.  Patient is basically back to baseline activity level.  Patient is happy with results.  Has no regrets.  Is able to feel the clitoris.  Urinary stream is vertical and occasionally sprays to the right.    PHYSICAL EXAMINATION:  General: No acute distress.  On exam of the genitals, bilateral labia majora are symmetric.  Incisions are healing appropriately other than a small wound dehiscence along the right labia minora at the midline.  The previously noted posterior dehiscence is fully healed.  Clitoris is pink and viable.  It is also sensitive but sensate to light touch.  There is no erythema or drainage that is indicative of an infection.    ASSESSMENT: 5 weeks status post zero depth vaginoplasty.  Doing well.    PLAN: I recommended patient continue to keep the incisions clean and dry.  At this point, I have encouraged the patient to explore their new genitalia to feel erogenous zones.  I have no activity restrictions for the patient.  I will see the patient back in 4-6 weeks.    Total time spent with patient was 15 min of which greater than 50% was in counseling.

## 2018-05-04 NOTE — MR AVS SNAPSHOT
After Visit Summary   5/4/2018    Larissa Rehman    MRN: [id number]           Patient Information     Date Of Birth          1955        Visit Information        Provider Department      5/4/2018 4:00 PM Madhu Mojica MD [facility]        Today's Diagnoses     Gender dysphoria in adult    -  1       Follow-ups after your visit        Follow-up notes from your care team     Return in about 6 weeks (around 6/15/2018).      Your next 10 appointments already scheduled     Jul 06, 2018  1:45 PM CDT   (Arrive by 1:30 PM)   Return Plastic Surgery with Madhu Mojica MD   [facility] ([facility])    [address]  [address] [facility] [address] [phone]           Do not wear perfume.              Who to contact     Please call your clinic at [phone] to:    Ask questions about your health    Make or cancel appointments    Discuss your medicines    Learn about your test results    Speak to your doctor            Additional Information About Your Visit        [employer] [place] Information     [employer] gives you secure access to your electronic health record. If you see a primary care provider, you can also send messages to your care team and make appointments. If you have questions, please call your primary care clinic.  If you do not have a primary care provider, please call [phone] and they will assist you.      [employer] is an electronic gateway that provides easy, online access to your medical records. With [employer], you can request a clinic appointment, read your test results, renew a prescription or communicate with your care team.     To access your existing account, please contact your [facility] or call [phone] for assistance.        Care EveryWhere ID     This is your Care EveryWhere ID. This could be used by other organizations to access your [place]  medical records  [id number]         Blood Pressure from Last 3 Encounters:   04/26/18 110/73   04/10/18 120/70   04/05/18 120/73    Weight from Last 3 Encounters:   04/26/18 125 lb 12.8 oz   04/10/18 120 lb   04/05/18 120 lb              Today, you had the following     No orders found for display       Primary Care Provider Office Phone # Fax #    Tk Marin [phone]       [facility] [address]  [facility] [place] [address]        Equal Access to Services     [facility] : Hadii aad ku hadjc Sosandy, waaxda luqadaha, qaybta kaalmada candy, gloria cooney . So [facility] [phone].    ATENCIÓN: Si habla español, tiene a bermudez disposición servicios gratuitos de asistencia lingüística. [facility] [phone].    We comply with applicable federal civil rights laws and [place] laws. We do not discriminate on the basis of race, color, national origin, age, disability, sex, sexual orientation, or gender identity.            Thank you!     Thank you for choosing [facility]  for your care. Our goal is always to provide you with excellent care. Hearing back from our patients is one way we can continue to improve our services. Please take a few minutes to complete the written survey that you may receive in the mail after your visit with us. Thank you!             Your Updated Medication List - Protect others around you: Learn how to safely use, store and throw away your medicines at www.disposemymeds.org.          This list is accurate as of 5/4/18  4:56 PM.  Always use your most recent med list.                   Brand Name Dispense Instructions for use Diagnosis    ASPIRIN PO      Take 81 mg by mouth daily        BENADRYL PO      Take 25 mg by mouth nightly as needed for Insomnia or itching        EPINEPHrine 0.3 MG/0.3ML injection 2-pack    EPIPEN/ADRENACLICK/or ANY BX GENERIC EQUIV    2 each    Inject 0.3  mLs (0.3 mg) into the muscle once as needed for anaphylaxis        estradiol 0.0375 MG/24HR BIW patch    VIVELLE-DOT    8 patch    Place 1 patch onto the skin twice a week    Gender dysphoria in adult, H/O bilateral orchiectomies       oxyCODONE IR 5 MG tablet    ROXICODONE    30 tablet    Take 1-2 tablets (5-10 mg) by mouth every 6 hours as needed for moderate to severe pain maximum 6 tablet(s) per day    Gender dysphoria in adult       polyethylene glycol powder    MIRALAX    510 g    Take 17 g (1 capful) by mouth daily    Gender dysphoria in adult       SIMVASTATIN PO      Take 20 mg by mouth At Bedtime

## 2018-05-04 NOTE — LETTER
5/4/2018     RE: Larissa Rehman  [address] [place] [address]     Dear Colleague,    Thank you for referring your patient, Larissa Rehman, to the [facility] at [facility]. Please see a copy of my visit note below.    Patient returns for a postoperative follow-up after zero depth vaginoplasty.    INTERVAL HISTORY: Pain control.  Patient is basically back to baseline activity level.  Patient is happy with results.  Has no regrets.  Is able to feel the clitoris.  Urinary stream is vertical and occasionally sprays to the right.    PHYSICAL EXAMINATION:  General: No acute distress.  On exam of the genitals, bilateral labia majora are symmetric.  Incisions are healing appropriately other than a small wound dehiscence along the right labia minora at the midline.  The previously noted posterior dehiscence is fully healed.  Clitoris is pink and viable.  It is also sensitive but sensate to light touch.  There is no erythema or drainage that is indicative of an infection.    ASSESSMENT: 5 weeks status post zero depth vaginoplasty.  Doing well.    PLAN: I recommended patient continue to keep the incisions clean and dry.  At this point, I have encouraged the patient to explore their new genitalia to feel erogenous zones.  I have no activity restrictions for the patient.  I will see the patient back in 4-6 weeks.    Total time spent with patient was 15 min of which greater than 50% was in counseling.    Again, thank you for allowing me to participate in the care of your patient.      Sincerely,    Madhu Mojica MD

## 2018-05-08 ENCOUNTER — TELEPHONE (OUTPATIENT)
Dept: FAMILY MEDICINE | Facility: CLINIC | Age: 63
End: 2018-05-08

## 2018-05-08 NOTE — TELEPHONE ENCOUNTER
RN called patient to follow up. Patient states the hot flashes are about the same since the last appointment. Reports getting hot flashes daily. Reports usually one. Sometimes the hot flashes are so bad they want to take off all their clothes. The majority of the time it is manageable. Patient states they will wait to discuss this more in dept during upcoming appointment on 5/25/18.    Xiomara Weems RN

## 2018-05-08 NOTE — TELEPHONE ENCOUNTER
[facility] phone call message-patient reporting a symptom:     Symptom: Hot flashes     Same Day Visit Offered: Yes, declined    Additional comments: Patient stated that they were told to call and update Dr. Zuniga if their hot flashes continued.    OK to leave message on voice mail? Yes    Primary language: English      needed? No    Call taken on May 8, 2018 at 9:49 AM by Adela Henning

## 2018-05-25 ENCOUNTER — OFFICE VISIT (OUTPATIENT)
Dept: FAMILY MEDICINE | Facility: CLINIC | Age: 63
End: 2018-05-25
Payer: COMMERCIAL

## 2018-05-25 VITALS
WEIGHT: 125.6 LBS | HEART RATE: 61 BPM | SYSTOLIC BLOOD PRESSURE: 111 MMHG | BODY MASS INDEX: 20.58 KG/M2 | TEMPERATURE: 98.2 F | DIASTOLIC BLOOD PRESSURE: 72 MMHG | RESPIRATION RATE: 16 BRPM | OXYGEN SATURATION: 97 %

## 2018-05-25 DIAGNOSIS — N95.1 MENOPAUSAL SYNDROME (HOT FLASHES): ICD-10-CM

## 2018-05-25 DIAGNOSIS — Z90.79 H/O BILATERAL ORCHIECTOMIES: ICD-10-CM

## 2018-05-25 DIAGNOSIS — F64.0 GENDER DYSPHORIA IN ADULT: Primary | ICD-10-CM

## 2018-05-25 LAB
ALBUMIN SERPL-MCNC: 4.6 MG/DL (ref 3.5–4.7)
ALP SERPL-CCNC: 35.5 U/L (ref 31.7–110.7)
ALT SERPL-CCNC: 13.8 U/L (ref 0–45)
AST SERPL-CCNC: 22.9 U/L (ref 0–55)
BILIRUB SERPL-MCNC: 0.5 MG/DL (ref 0.2–1.3)
BUN SERPL-MCNC: 18.4 MG/DL (ref 7–21)
CALCIUM SERPL-MCNC: 9.6 MG/DL (ref 8.5–10.1)
CHLORIDE SERPLBLD-SCNC: 104.7 MMOL/L (ref 98–110)
CHOLEST SERPL-MCNC: 197.4 MG/DL (ref 0–200)
CHOLEST/HDLC SERPL: 2.4 {RATIO} (ref 0–5)
CO2 SERPL-SCNC: 34.4 MMOL/L (ref 20–32)
CREAT SERPL-MCNC: 1 MG/DL (ref 0.7–1.3)
ESTRADIOL SERPL-MCNC: 47 PG/ML (ref 6–50)
GFR SERPL CREATININE-BSD FRML MDRD: 80 ML/MIN/1.7 M2
GLUCOSE SERPL-MCNC: 112 MG'DL (ref 70–99)
HDLC SERPL-MCNC: 82.5 MG/DL
HEMOGLOBIN: 12.8 G/DL (ref 13.3–17.7)
LDLC SERPL CALC-MCNC: 103 MG/DL (ref 0–129)
POTASSIUM SERPL-SCNC: 4 MMOL/DL (ref 3.3–4.5)
PROT SERPL-MCNC: 7.2 G/DL (ref 6.8–8.8)
SODIUM SERPL-SCNC: 142.3 MMOL/L (ref 132.6–141.4)
TRIGL SERPL-MCNC: 60.5 MG/DL (ref 0–150)
VLDL CHOLESTEROL: 12.1 MG/DL (ref 7–32)

## 2018-05-25 RX ORDER — ESTRADIOL 0.1 MG/D
1 FILM, EXTENDED RELEASE TRANSDERMAL
Qty: 8 PATCH | Refills: 1 | Status: SHIPPED | OUTPATIENT
Start: 2018-05-28 | End: 2018-05-25

## 2018-05-25 RX ORDER — ESTRADIOL 0.05 MG/D
1 PATCH, EXTENDED RELEASE TRANSDERMAL
Qty: 24 PATCH | Refills: 1 | Status: SHIPPED | OUTPATIENT
Start: 2018-05-28 | End: 2018-07-13

## 2018-05-25 NOTE — PATIENT INSTRUCTIONS
Here is the plan from today's visit    1. Gender dysphoria in adult  - Estradiol  - Testosterone Total  - Comprehensive Metabolic Panel  - Hemoglobin (HGB)  - Lipid Cascade  - Glucose  - estradiol (VIVELLE-DOT) 0.05 MG/24HR BIW patch; Place 1 patch onto the skin twice a week  Dispense: 8 patch; Refill: 1    2. H/O bilateral orchiectomies  3. Hot flashes  - estradiol (VIVELLE-DOT) 0.1 MG/24HR BIW patch; Place 1 patch onto the skin twice a week  Dispense: 8 patch; Refill: 1      Please call or return to clinic if your symptoms don't go away.    Follow up plan  Please make a clinic appointment for follow up with me (TAIWO TONY) in 1  month for HRT.    Thank you for coming to [facility] today.  Lab Testing:  **If you had lab testing today and your results are reassuring or normal they will be mailed to you or sent through [facility] within 7 days.   **If the lab tests need quick action we will call you with the results.  The phone number we will call with results is # [phone] (home) . If this is not the best number please call our clinic and change the number.  Medication Refills:  If you need any refills please call your pharmacy and they will contact us.   If you need to  your refill at a new pharmacy, please contact the new pharmacy directly. The new pharmacy will help you get your medications transferred faster.   Scheduling:  If you have any concerns about today's visit or wish to schedule another appointment please call our office during normal business hours [phone] (8-5:00 M-F)  If a referral was made to a [facility] and you don't get a call from central scheduling please call [phone].  If a Mammogram was ordered for you at [facility] call [phone] to schedule or change your appointment.  If you had an XRay/CT/Ultrasound/MRI ordered the number is [phone] to schedule or change your radiology appointment.   Medical Concerns:  If you have  urgent medical concerns please call [phone] at any time of the day.    Lynnette Zuniga MD

## 2018-05-25 NOTE — PROGRESS NOTES
KENDRA Bush is a 62 year old individual that uses pronouns She/Her/Hers/Herself that presents today for follow up of:  feminizing hormone therapy. Gender identity: female.   S/p Zero depth vaginioplasty on 3/29/2018. No tenderness in the surgery area. Still having hot flashes.      On hormones?  YES +++ Shot day of the week? Not applicable-taking pills/patch/gel      Due for labs?  Yes      +++ Refills of meds needed?  Yes  ---    Past Surgical History:   Procedure Laterality Date     COLONOSCOPY       ORTHOPEDIC SURGERY      foot surgery      TRANSGENDER VAGINOPLASTY ZERO DEPTH N/A 3/29/2018    Procedure: TRANSGENDER VAGINOPLASTY ZERO DEPTH;  Zero Depth Transgender Vaginoplasty;  Surgeon: Madhu Mojica MD;  Location: [facility] OR       Patient Active Problem List   Diagnosis     Familial hypercholesteremia     Gender dysphoria in adult     Seasonal mood disorder (H)     S/P foot surgery, left     H/O bilateral orchiectomies and vaginoplasty 3/29/2018       Current Outpatient Prescriptions   Medication Sig Dispense Refill     ASPIRIN PO Take 81 mg by mouth daily       DiphenhydrAMINE HCl (BENADRYL PO) Take 25 mg by mouth nightly as needed for Insomnia or itching       EPINEPHrine (EPIPEN) 0.3 MG/0.3ML injection Inject 0.3 mLs (0.3 mg) into the muscle once as needed for anaphylaxis 2 each 1     estradiol (VIVELLE-DOT) 0.0375 MG/24HR BIW patch Place 1 patch onto the skin twice a week 8 patch 3     oxyCODONE IR (ROXICODONE) 5 MG tablet Take 1-2 tablets (5-10 mg) by mouth every 6 hours as needed for moderate to severe pain maximum 6 tablet(s) per day (Patient not taking: Reported on 4/26/2018) 30 tablet 0     polyethylene glycol (MIRALAX) powder Take 17 g (1 capful) by mouth daily (Patient not taking: Reported on 4/26/2018) 510 g 1     SIMVASTATIN PO Take 20 mg by mouth At Bedtime          History   Smoking Status     Never Smoker   Smokeless Tobacco     Never Used          Allergies   Allergen Reactions     Bee Venom  "       Problem, Medication and Allergy Lists were reviewed and updated if needed.         Review of Systems:      General    Fat redistribution: no    Weight change: no, noticing decreased muscle mass, especially in her legs HEENT    Voice change: no     Cardiovascular (CV)    Chest Pains: no    Shortness of breath: no Chest    Decreased exercise tolerance:  no    Breast changes/development: not applicable     Gastrointestinal (GI)    Abdominal pain: no    Change in appetite: no Skin    Acne or oily skin: no    Change in hair: no     Genitourinary ()    Abnormal vaginal bleeding: not applicable     Decreased spontaneous erections: not applicable    Change in libido: YES- decreased    New sexual partners: no Musculoskeletal    Leg pain or swelling: no     Psychiatric (Psych)    Depression: no    Anxiety/Panic: no    Mood:  \"good\"                    Physical Exam:     Vitals:    05/25/18 1548   BP: 111/72   Pulse: 61   Resp: 16   Temp: 98.2  F (36.8  C)   TempSrc: Oral   SpO2: 97%   Weight: 125 lb 9.6 oz (57 kg)     BMI= Body mass index is 20.58 kg/(m^2).   Wt Readings from Last 10 Encounters:   05/25/18 125 lb 9.6 oz (57 kg)   04/26/18 125 lb 12.8 oz (57.1 kg)   04/10/18 120 lb (54.4 kg)   04/05/18 120 lb (54.4 kg)   03/30/18 123 lb 14.4 oz (56.2 kg)   03/08/18 120 lb (54.4 kg)   02/09/18 125 lb (56.7 kg)   11/28/17 125 lb (56.7 kg)   10/31/17 125 lb 6.4 oz (56.9 kg)   09/27/17 123 lb 9.6 oz (56.1 kg)     Appearance: Both appearance and dress    GENERAL: healthy, alert and no distress  RESP: lungs clear to auscultation - no rales, no rhonchi, no wheezes  CV: regular rates and rhythm, normal S1 S2, no S3 or S4 and no murmur, no click or rub  Affect: Appropriate/mood-congruent           Labs:   HRT panel obtained  Assessment and Plan     1. Gender dysphoria in adult  - Estradiol  - Testosterone Total  - Comprehensive Metabolic Panel  - Hemoglobin (HGB)  - Lipid Cascade  - Glucose  - increase estradiol (VIVELLE-DOT) " 0.05 MG/24HR BIW patch; Place 1 patch onto the skin twice a week  Dispense: 8 patch; Refill: 1    2. H/O bilateral orchiectomies  3. Hot flashes  - increase estradiol (VIVELLE-DOT) 0.05 MG/24HR BIW patch; Place 1 patch onto the skin twice a week  Dispense: 8 patch; Refill: 1  - follow up in 1 month    Contraception:   not needed     Counselled patient about controlled substances: No    Follow up:  Follow up in 1 month.  Results by ketant  Questions were elicited and answered.     Lynnette Zuniga MD, PhD  [facility],  PGY-3 Family Medicine Resident  #[id number]

## 2018-05-25 NOTE — LETTER
May 30, 2018      Larissa Rehman  [address] [place] [address]        Dear Larissa,    Thank you for getting your care at [facility]. Please see below for your test results.    Your hemoglobin is minimally low so please take any multivitamins with iron daily. Your estradiol level is high normal which is reassuring.     Resulted Orders   Estradiol   Result Value Ref Range    Estradiol 47 6 - 50 pg/mL   Testosterone Total   Result Value Ref Range    Testosterone Total <2 (L) 240 - 950 ng/dL      Comment:      This test was developed and its performance characteristics determined by the   [facility],  Special Chemistry Laboratory. It has   not been cleared or approved by the FDA. The laboratory is regulated under   CLIA as qualified to perform high-complexity testing. This test is used for   clinical purposes. It should not be regarded as investigational or for   research.     Comprehensive Metabolic Panel   Result Value Ref Range    Albumin 4.6 3.5 - 4.7 mg/dL    Alkaline Phosphatase 35.5 31.7 - 110.7 U/L    ALT 13.8 0.0 - 45.0 U/L    AST 22.9 0.0 - 55.0 U/L    Bilirubin Total 0.5 0.2 - 1.3 mg/dL    Urea Nitrogen 18.4 7.0 - 21.0 mg/dL    Calcium 9.6 8.5 - 10.1 mg/dL    Chloride 104.7 98.0 - 110.0 mmol/L    Carbon Dioxide 34.4 (H) 20.0 - 32.0 mmol/L    Creatinine 1.0 0.7 - 1.3 mg/dL    Glucose 112.0 (H) 70.0 - 99.0 mg'dL    Potassium 4.0 3.3 - 4.5 mmol/dL    Sodium 142.3 (H) 132.6 - 141.4 mmol/L    Protein Total 7.2 6.8 - 8.8 g/dL    GFR Estimate 80.0 >60.0 mL/min/1.7 m2    GFR Estimate If Black >90 >60.0 mL/min/1.7 m2   Hemoglobin (HGB)   Result Value Ref Range    Hemoglobin 12.8 (L) 13.3 - 17.7 g/dL   Lipid Cascade   Result Value Ref Range    Cholesterol 197.4 0.0 - 200.0 mg/dL    Cholesterol/HDL Ratio 2.4 0.0 - 5.0    HDL Cholesterol 82.5 >40.0 mg/dL    LDL Cholesterol Calculated 103 0 - 129 mg/dL    Triglycerides 60.5 0.0 - 150.0 mg/dL    VLDL Cholesterol 12.1 7.0 - 32.0 mg/dL        If you have any concerns about these results please call and leave a message for me or send a MyCSlackt message to the clinic.    Sincerely,    Lynnette Zuniga MD

## 2018-05-25 NOTE — MR AVS SNAPSHOT
After Visit Summary   5/25/2018    Larissa Rehman    MRN: [id number]           Patient Information     Date Of Birth          1955        Visit Information        Provider Department      5/25/2018 3:40 PM Taiwo Zuniga MD [facility]        Today's Diagnoses     Gender dysphoria in adult    -  1    H/O bilateral orchiectomies        Menopausal syndrome (hot flashes)          Care Instructions    Here is the plan from today's visit    1. Gender dysphoria in adult  - Estradiol  - Testosterone Total  - Comprehensive Metabolic Panel  - Hemoglobin (HGB)  - Lipid Cascade  - Glucose  - estradiol (VIVELLE-DOT) 0.1 MG/24HR BIW patch; Place 1 patch onto the skin twice a week  Dispense: 8 patch; Refill: 1    2. H/O bilateral orchiectomies  3. Hot flashes  - estradiol (VIVELLE-DOT) 0.1 MG/24HR BIW patch; Place 1 patch onto the skin twice a week  Dispense: 8 patch; Refill: 1      Please call or return to clinic if your symptoms don't go away.    Follow up plan  Please make a clinic appointment for follow up with me (TAIWO ZUNIGA) in 1  month for HRT.    Thank you for coming to [facility] today.  Lab Testing:  **If you had lab testing today and your results are reassuring or normal they will be mailed to you or sent through [employer] within 7 days.   **If the lab tests need quick action we will call you with the results.  The phone number we will call with results is # [phone] (home) . If this is not the best number please call our clinic and change the number.  Medication Refills:  If you need any refills please call your pharmacy and they will contact us.   If you need to  your refill at a new pharmacy, please contact the new pharmacy directly. The new pharmacy will help you get your medications transferred faster.   Scheduling:  If you have any concerns about today's visit or wish to schedule another appointment please call our office during normal business  hours [phone] (8-5:00 M-F)  If a referral was made to a [facility] and you don't get a call from central scheduling please call [phone].  If a Mammogram was ordered for you at [facility] call [phone] to schedule or change your appointment.  If you had an XRay/CT/Ultrasound/MRI ordered the number is [phone] to schedule or change your radiology appointment.   Medical Concerns:  If you have urgent medical concerns please call [phone] at any time of the day.    Lynnette Zuniga MD            Follow-ups after your visit        Your next 10 appointments already scheduled     Jul 06, 2018  1:45 PM CDT   (Arrive by 1:30 PM)   Return Plastic Surgery with Madhu Mojica MD   [facility] ([facility])    [address] [place] [address] [phone]           Do not wear perfume.              Who to contact     Please call your clinic at [phone] to:    Ask questions about your health    Make or cancel appointments    Discuss your medicines    Learn about your test results    Speak to your doctor            Additional Information About Your Visit        Beststudy Information     Beststudy gives you secure access to your electronic health record. If you see a primary care provider, you can also send messages to your care team and make appointments. If you have questions, please call your primary care clinic.  If you do not have a primary care provider, please call [phone] and they will assist you.      Beststudy is an electronic gateway that provides easy, online access to your medical records. With Beststudy, you can request a clinic appointment, read your test results, renew a prescription or communicate with your care team.     To access your existing account, please contact your [facility] or call [phone] for assistance.        Care EveryWhere ID      This is your Care EveryWhere ID. This could be used by other organizations to access your [facility] medical records  [id number]        Your Vitals Were     Pulse Temperature Respirations Pulse Oximetry BMI (Body Mass Index)       61 98.2  F (36.8  C) (Oral) 16 97% 20.58 kg/m2        Blood Pressure from Last 3 Encounters:   05/25/18 111/72   04/26/18 110/73   04/10/18 120/70    Weight from Last 3 Encounters:   05/25/18 125 lb 9.6 oz (57 kg)   04/26/18 125 lb 12.8 oz (57.1 kg)   04/10/18 120 lb (54.4 kg)              We Performed the Following     Comprehensive Metabolic Panel     Estradiol     Glucose     Hemoglobin (HGB)     Lipid Cascade     Testosterone Total          Today's Medication Changes          These changes are accurate as of 5/25/18  4:05 PM.  If you have any questions, ask your nurse or doctor.               Start taking these medicines.        Dose/Directions    estradiol 0.1 MG/24HR BIW patch   Commonly known as:  VIVELLE-DOT   Used for:  Gender dysphoria in adult, H/O bilateral orchiectomies, Menopausal syndrome (hot flashes)   Replaces:  estradiol 0.0375 MG/24HR BIW patch   Started by:  Lynnette Zuniga MD        Dose:  1 patch   Start taking on:  5/28/2018   Place 1 patch onto the skin twice a week   Quantity:  8 patch   Refills:  1         Stop taking these medicines if you haven't already. Please contact your care team if you have questions.     estradiol 0.0375 MG/24HR BIW patch   Commonly known as:  VIVELLE-DOT   Replaced by:  estradiol 0.1 MG/24HR BIW patch   Stopped by:  Lynnette Zuniga MD                Where to get your medicines      These medications were sent to [facility] [id number] [facility] [address]  [address] [place] [address]     Phone:  [phone]     estradiol 0.1 MG/24HR BIW patch                Primary Care Provider Office Phone # Fax #    Tk Marin [phone]       [place]  [facility] [address]  [facility] [place] [address]        Equal Access to Services     TE MOROCHO : Hadii aad ku hadjosé miguelo Sopatriciaali, waaxda luqadaha, qaybta kaalmada adekate, gloria alirezain hayaasary patelrenay stuart ivet feldman. So [facility] [phone].    ATENCIÓN: Si habla español, tiene a bermudez disposición servicios gratuitos de asistencia lingüística. Llame al [phone].    We comply with applicable federal civil rights laws and [place] laws. We do not discriminate on the basis of race, color, national origin, age, disability, sex, sexual orientation, or gender identity.            Thank you!     Thank you for choosing [facility]  for your care. Our goal is always to provide you with excellent care. Hearing back from our patients is one way we can continue to improve our services. Please take a few minutes to complete the written survey that you may receive in the mail after your visit with us. Thank you!             Your Updated Medication List - Protect others around you: Learn how to safely use, store and throw away your medicines at www.disposemymeds.org.          This list is accurate as of 5/25/18  4:05 PM.  Always use your most recent med list.                   Brand Name Dispense Instructions for use Diagnosis    ASPIRIN PO      Take 81 mg by mouth daily        BENADRYL PO      Take 25 mg by mouth nightly as needed for Insomnia or itching        EPINEPHrine 0.3 MG/0.3ML injection 2-pack    EPIPEN/ADRENACLICK/or ANY BX GENERIC EQUIV    2 each    Inject 0.3 mLs (0.3 mg) into the muscle once as needed for anaphylaxis        estradiol 0.1 MG/24HR BIW patch   Start taking on:  5/28/2018    VIVELLE-DOT    8 patch    Place 1 patch onto the skin twice a week    Gender dysphoria in adult, H/O bilateral orchiectomies, Menopausal syndrome (hot flashes)       oxyCODONE IR 5 MG tablet    ROXICODONE    30 tablet    Take 1-2 tablets (5-10 mg) by mouth every 6 hours as needed for  moderate to severe pain maximum 6 tablet(s) per day    Gender dysphoria in adult       polyethylene glycol powder    MIRALAX    510 g    Take 17 g (1 capful) by mouth daily    Gender dysphoria in adult       SIMVASTATIN PO      Take 20 mg by mouth At Bedtime

## 2018-05-30 LAB — TESTOST SERPL-MCNC: <2 NG/DL (ref 240–950)

## 2018-06-01 NOTE — PROGRESS NOTES
Preceptor Attestation:   Patient seen, evaluated and discussed with the resident. I have verified the content of the note, which accurately reflects my assessment of the patient and the plan of care.   Supervising Physician:  Brayan Genao MD

## 2018-06-29 ENCOUNTER — OFFICE VISIT (OUTPATIENT)
Dept: FAMILY MEDICINE | Facility: CLINIC | Age: 63
End: 2018-06-29
Payer: COMMERCIAL

## 2018-06-29 VITALS
HEART RATE: 48 BPM | OXYGEN SATURATION: 97 % | DIASTOLIC BLOOD PRESSURE: 70 MMHG | RESPIRATION RATE: 16 BRPM | SYSTOLIC BLOOD PRESSURE: 111 MMHG | TEMPERATURE: 97.5 F | BODY MASS INDEX: 20.55 KG/M2 | WEIGHT: 125.4 LBS

## 2018-06-29 DIAGNOSIS — F64.0 GENDER DYSPHORIA IN ADULT: Primary | ICD-10-CM

## 2018-06-29 DIAGNOSIS — Z90.79 H/O BILATERAL ORCHIECTOMIES: ICD-10-CM

## 2018-06-29 DIAGNOSIS — Z12.11 SPECIAL SCREENING FOR MALIGNANT NEOPLASMS, COLON: ICD-10-CM

## 2018-06-29 RX ORDER — ESTRADIOL 0.04 MG/D
1 PATCH, EXTENDED RELEASE TRANSDERMAL
Qty: 8 PATCH | Refills: 3 | Status: SHIPPED | OUTPATIENT
Start: 2018-07-02 | End: 2018-09-05

## 2018-06-29 NOTE — PATIENT INSTRUCTIONS
Here is the plan from today's visit    1. Gender dysphoria in adult  - continue with Estradiol therapy: 0.05+0.035 mg/24h  - estradiol (VIVELLE-DOT) 0.0375 MG/24HR BIW patch; Place 1 patch onto the skin twice a week  Dispense: 8 patch; Refill: 3    2. Special screening for malignant neoplasms, colon  - GASTROENTEROLOGY ADULT REF PROCEDURE ONLY    Please call or return to clinic if your symptoms don't go away.    Follow up plan  Please make a clinic appointment for follow up in 2-3  months for blood work.    Thank you for coming to [facility] today.  Lab Testing:  **If you had lab testing today and your results are reassuring or normal they will be mailed to you or sent through [employer] within 7 days.   **If the lab tests need quick action we will call you with the results.  The phone number we will call with results is # [phone] (home) . If this is not the best number please call our clinic and change the number.  Medication Refills:  If you need any refills please call your pharmacy and they will contact us.   If you need to  your refill at a new pharmacy, please contact the new pharmacy directly. The new pharmacy will help you get your medications transferred faster.   Scheduling:  If you have any concerns about today's visit or wish to schedule another appointment please call our office during normal business hours [phone] (8-5:00 M-F)  If a referral was made to a [facility] and you don't get a call from central scheduling please call [phone].  If a Mammogram was ordered for you at [facility] call [phone] to schedule or change your appointment.  If you had an XRay/CT/Ultrasound/MRI ordered the number is [phone] to schedule or change your radiology appointment.   Medical Concerns:  If you have urgent medical concerns please call [phone] at any time of the day.    Lynnette Zuniga MD

## 2018-06-29 NOTE — MR AVS SNAPSHOT
After Visit Summary   6/29/2018    Larissa Rehman    MRN: [id number]           Patient Information     Date Of Birth          1955        Visit Information        Provider Department      6/29/2018 1:00 PM Lynnette Zuniga MD [facility]        Today's Diagnoses     Gender dysphoria in adult    -  1    H/O bilateral orchiectomies        Special screening for malignant neoplasms, colon          Care Instructions    Here is the plan from today's visit    1. Gender dysphoria in adult  - continue with Estradiol therapy: 0.05+0.035 mg/24h  - estradiol (VIVELLE-DOT) 0.0375 MG/24HR BIW patch; Place 1 patch onto the skin twice a week  Dispense: 8 patch; Refill: 3    2. Special screening for malignant neoplasms, colon  - GASTROENTEROLOGY ADULT REF PROCEDURE ONLY    Please call or return to clinic if your symptoms don't go away.    Follow up plan  Please make a clinic appointment for follow up in 2-3  months for blood work.    Thank you for coming to [facility] today.  Lab Testing:  **If you had lab testing today and your results are reassuring or normal they will be mailed to you or sent through [employer] within 7 days.   **If the lab tests need quick action we will call you with the results.  The phone number we will call with results is # [phone] (home) . If this is not the best number please call our clinic and change the number.  Medication Refills:  If you need any refills please call your pharmacy and they will contact us.   If you need to  your refill at a new pharmacy, please contact the new pharmacy directly. The new pharmacy will help you get your medications transferred faster.   Scheduling:  If you have any concerns about today's visit or wish to schedule another appointment please call our office during normal business hours [phone] (8-5:00 M-F)  If a referral was made to a [facility] and you don't get a call from [place]  scheduling please call [phone].  If a Mammogram was ordered for you at [facility] call [phone] to schedule or change your appointment.  If you had an XRay/CT/Ultrasound/MRI ordered the number is [phone] to schedule or change your radiology appointment.   Medical Concerns:  If you have urgent medical concerns please call [phone] at any time of the day.    Lynnette Zuniga MD            Follow-ups after your visit        Additional Services     GASTROENTEROLOGY ADULT REF PROCEDURE ONLY       Last Lab Result: Creatinine (mg/dL)       Date                     Value                 05/25/2018               1.0              ----------  Body mass index is 20.55 kg/(m^2).     Needed:  No  Language:  English    Patient will be contacted to schedule procedure.     Please be aware that coverage of these services is subject to the terms and limitations of your health insurance plan.  Call member services at your health plan with any benefit or coverage questions.  Any procedures must be performed at a [facility] facility OR coordinated by your clinic's referral office.    Please bring the following with you to your appointment:    (1) Any X-Rays, CTs or MRIs which have been performed.  Contact the facility where they were done to arrange for  prior to your scheduled appointment.    (2) List of current medications   (3) This referral request   (4) Any documents/labs given to you for this referral                  Your next 10 appointments already scheduled     Jul 06, 2018  1:45 PM CDT   (Arrive by 1:30 PM)   Return Plastic Surgery with Madhu Mojica MD   [facility] ([facility] [place])    [address] [address] [phone]           Do not wear perfume.              Who to contact     Please call your clinic at [phone] to:    Ask questions about your health    Make or cancel  appointments    Discuss your medicines    Learn about your test results    Speak to your doctor            Additional Information About Your Visit        [employer] Information     ishBowl gives you secure access to your electronic health record. If you see a primary care provider, you can also send messages to your care team and make appointments. If you have questions, please call your primary care clinic.  If you do not have a primary care provider, please call [phone] and they will assist you.      ishBowl is an electronic gateway that provides easy, online access to your medical records. With ishBowl, you can request a clinic appointment, read your test results, renew a prescription or communicate with your care team.     To access your existing account, please contact your [facility] or call [phone] for assistance.        Care EveryWhere ID     This is your Care EveryWhere ID. This could be used by other organizations to access your [facility] medical records  [id number]        Your Vitals Were     Pulse Temperature Respirations Pulse Oximetry BMI (Body Mass Index)       48 97.5  F (36.4  C) (Oral) 16 97% 20.55 kg/m2        Blood Pressure from Last 3 Encounters:   06/29/18 111/70   05/25/18 111/72   04/26/18 110/73    Weight from Last 3 Encounters:   06/29/18 125 lb 6.4 oz (56.9 kg)   05/25/18 125 lb 9.6 oz (57 kg)   04/26/18 125 lb 12.8 oz (57.1 kg)              We Performed the Following     GASTROENTEROLOGY ADULT REF PROCEDURE ONLY          Today's Medication Changes          These changes are accurate as of 6/29/18  1:31 PM.  If you have any questions, ask your nurse or doctor.               These medicines have changed or have updated prescriptions.        Dose/Directions    * estradiol 0.05 MG/24HR BIW patch   Commonly known as:  VIVELLE-DOT   This may have changed:  Another medication with the same name was added. Make sure you understand how and when to take each.    Used for:  Gender dysphoria in adult, H/O bilateral orchiectomies, Menopausal syndrome (hot flashes)   Changed by:  Lynnette Zuniga MD        Dose:  1 patch   Place 1 patch onto the skin twice a week   Quantity:  24 patch   Refills:  1       * estradiol 0.0375 MG/24HR BIW patch   Commonly known as:  VIVELLE-DOT   This may have changed:  You were already taking a medication with the same name, and this prescription was added. Make sure you understand how and when to take each.   Used for:  Gender dysphoria in adult, H/O bilateral orchiectomies   Changed by:  Lynnette Zuniga MD        Dose:  1 patch   Start taking on:  7/2/2018   Place 1 patch onto the skin twice a week   Quantity:  8 patch   Refills:  3       * Notice:  This list has 2 medication(s) that are the same as other medications prescribed for you. Read the directions carefully, and ask your doctor or other care provider to review them with you.         Where to get your medicines      These medications were sent to [facility] [address] [place] [address] [address] [place] [address] [place] [address]     Phone:  [phone]     estradiol 0.0375 MG/24HR BIW patch                Primary Care Provider Office Phone # Fax #    Sujata MarkDO [phone]       [address] [address]        Equal Access to Services     TE MOROCHO AH: Hadii rolando ku hadasho Soomaali, waaxda luqadaha, qaybta kaalmada adeegyada, gloria feldman. So [facility] [phone].    ATENCIÓN: Si habla español, tiene a bermudez disposición servicios gratuitos de asistencia lingüística. Llame al [phone].    We comply with applicable federal civil rights laws and [place] laws. We do not discriminate on the basis of race, color, national origin, age, disability, sex, sexual orientation, or gender identity.            Thank you!     Thank you for choosing [facility]  for your care. Our goal is always to provide you with excellent care. Hearing back from our patients is one way we can continue to improve our services. Please take a few minutes to complete the written survey that you may receive in the mail after your visit with us. Thank you!             Your Updated Medication List - Protect others around you: Learn how to safely use, store and throw away your medicines at www.disposemymeds.org.          This list is accurate as of 6/29/18  1:31 PM.  Always use your most recent med list.                   Brand Name Dispense Instructions for use Diagnosis    ASPIRIN PO      Take 81 mg by mouth daily        BENADRYL PO      Take 25 mg by mouth nightly as needed for Insomnia or itching        EPINEPHrine 0.3 MG/0.3ML injection 2-pack    EPIPEN/ADRENACLICK/or ANY BX GENERIC EQUIV    2 each    Inject 0.3 mLs (0.3 mg) into the muscle once as needed for anaphylaxis        * estradiol 0.05 MG/24HR BIW patch    VIVELLE-DOT    24 patch    Place 1 patch onto the skin twice a week    Gender dysphoria in adult, H/O bilateral orchiectomies, Menopausal syndrome (hot flashes)       * estradiol 0.0375 MG/24HR BIW patch   Start taking on:  7/2/2018    VIVELLE-DOT    8 patch    Place 1 patch onto the skin twice a week    Gender dysphoria in adult, H/O bilateral orchiectomies       oxyCODONE IR 5 MG tablet    ROXICODONE    30 tablet    Take 1-2 tablets (5-10 mg) by mouth every 6 hours as needed for moderate to severe pain maximum 6 tablet(s) per day    Gender dysphoria in adult       polyethylene glycol powder    MIRALAX    510 g    Take 17 g (1 capful) by mouth daily    Gender dysphoria in adult       SIMVASTATIN PO      Take 20 mg by mouth At Bedtime        * Notice:  This list has 2 medication(s) that are the same as other medications prescribed for you. Read the directions carefully, and ask your doctor or other care provider to review them with you.

## 2018-06-29 NOTE — PROGRESS NOTES
HPI   Eleazar is a 62 year old individual that uses pronouns She/Her/Hers/Herself that presents today for follow up of:  feminizing hormone therapy. Gender identity: female.     Any special concerns today?  Still hot flashes once a week however much less. Added a smaller patch to a bigger one 2 weeks ago. Since then only minor   Done with muscle mass, but still same muscle strength in UE and less muscle strength in LE. Since changes were expected Eleazar is okay with it.   Nipples do not hurt anymore.     Pretty happy in overall.     Going to see Dr Mojica next Friday.     S/p 2x colonoscopies. Last one 6-7 years ago. [facility] not at the [facility]. Eleazar was sent a letter that she is post due last year.     On hormones?  YES +++ Shot day of the week? Not applicable-taking pills/patch/gel      Due for labs?  No      +++ Refills of meds needed?  No  ---    Past Surgical History:   Procedure Laterality Date     COLONOSCOPY       ORTHOPEDIC SURGERY      foot surgery      TRANSGENDER VAGINOPLASTY ZERO DEPTH N/A 3/29/2018    Procedure: TRANSGENDER VAGINOPLASTY ZERO DEPTH;  Zero Depth Transgender Vaginoplasty;  Surgeon: Madhu Mojica MD;  [facility]       Patient Active Problem List   Diagnosis     Familial hypercholesteremia     Gender dysphoria in adult     Seasonal mood disorder (H)     S/P foot surgery, left     H/O bilateral orchiectomies and vaginoplasty 3/29/2018       Current Outpatient Prescriptions   Medication Sig Dispense Refill     ASPIRIN PO Take 81 mg by mouth daily       DiphenhydrAMINE HCl (BENADRYL PO) Take 25 mg by mouth nightly as needed for Insomnia or itching       estradiol (VIVELLE-DOT) 0.05 MG/24HR BIW patch Place 1 patch onto the skin twice a week 24 patch 1     SIMVASTATIN PO Take 20 mg by mouth At Bedtime        EPINEPHrine (EPIPEN) 0.3 MG/0.3ML injection Inject 0.3 mLs (0.3 mg) into the muscle once as needed for anaphylaxis 2 each 1     oxyCODONE IR (ROXICODONE) 5 MG tablet Take 1-2 tablets (5-10 mg)  "by mouth every 6 hours as needed for moderate to severe pain maximum 6 tablet(s) per day (Patient not taking: Reported on 4/26/2018) 30 tablet 0     polyethylene glycol (MIRALAX) powder Take 17 g (1 capful) by mouth daily (Patient not taking: Reported on 4/26/2018) 510 g 1       History   Smoking Status     Never Smoker   Smokeless Tobacco     Never Used          Allergies   Allergen Reactions     Bee Venom        Problem, Medication and Allergy Lists were reviewed and updated if needed..         Review of Systems:      General    Fat redistribution: no    Weight change: no HEENT    Voice change: no     Cardiovascular (CV)    Chest Pains: no    Shortness of breath: no Chest    Decreased exercise tolerance:  No but decreased muscle mass in LE    Breast changes/development: YES- very sensitive nipples      Gastrointestinal (GI)    Abdominal pain: no    Change in appetite: no Skin    Acne or oily skin: no    Change in hair: YES- hair got softer     Genitourinary ()    Abnormal vaginal bleeding: not applicable     Decreased spontaneous erections: not applicable    Change in libido: no    New sexual partners: no Musculoskeletal    Leg pain or swelling: no     Psychiatric (Psych)    Depression: no    Anxiety/Panic: no    Mood:  \"good\"                    Physical Exam:     Vitals:    06/29/18 1307   BP: 111/70   BP Location: Left arm   Patient Position: Sitting   Cuff Size: Adult Regular   Pulse: (!) 48   Resp: 16   Temp: 97.5  F (36.4  C)   TempSrc: Oral   SpO2: 97%   Weight: 125 lb 6.4 oz (56.9 kg)     BMI= Body mass index is 20.55 kg/(m^2).   Wt Readings from Last 10 Encounters:   06/29/18 125 lb 6.4 oz (56.9 kg)   05/25/18 125 lb 9.6 oz (57 kg)   04/26/18 125 lb 12.8 oz (57.1 kg)   04/10/18 120 lb (54.4 kg)   04/05/18 120 lb (54.4 kg)   03/30/18 123 lb 14.4 oz (56.2 kg)   03/08/18 120 lb (54.4 kg)   02/09/18 125 lb (56.7 kg)   11/28/17 125 lb (56.7 kg)   10/31/17 125 lb 6.4 oz (56.9 kg)     Appearance: Both " appearance and dress    GENERAL: healthy, alert and no distress  RESP: lungs clear to auscultation - no rales, no rhonchi, no wheezes  CV: regular rates and rhythm, normal S1 S2, no S3 or S4 and no murmur, no click or rub -  Affect: Appropriate/mood-congruent           Labs:   No pending results.   Assessment and Plan     1. Gender dysphoria in adult  - continue with Estradiol therapy: 0.05+0.035 mg/24h  - refilled only estradiol (VIVELLE-DOT) 0.0375 MG/24HR BIW patch; Place 1 patch onto the skin twice a week  Dispense: 8 patch; Refill: 3    2. Special screening for malignant neoplasms, colon: discussed colonoscopy today and patient agrees  - GASTROENTEROLOGY ADULT REF PROCEDURE ONLY    Contraception:   not needed     Counselled patient about controlled substances: N/A    Follow up:  follow up in 2-3  months for blood work.  [facility]  Questions were elicited and answered.     Lynnette Zuniga MD, PhD  [facility],  PGY-3 Family Medicine Resident  #[id number]

## 2018-07-06 ENCOUNTER — OFFICE VISIT (OUTPATIENT)
Dept: PLASTIC SURGERY | Facility: CLINIC | Age: 63
End: 2018-07-06
Payer: COMMERCIAL

## 2018-07-06 VITALS
OXYGEN SATURATION: 100 % | HEART RATE: 46 BPM | SYSTOLIC BLOOD PRESSURE: 117 MMHG | DIASTOLIC BLOOD PRESSURE: 73 MMHG | WEIGHT: 126.3 LBS | BODY MASS INDEX: 20.7 KG/M2

## 2018-07-06 DIAGNOSIS — F64.0 GENDER DYSPHORIA IN ADULT: Primary | ICD-10-CM

## 2018-07-06 ASSESSMENT — PAIN SCALES - GENERAL: PAINLEVEL: NO PAIN (0)

## 2018-07-06 NOTE — LETTER
7/6/2018        RE: Larissa Rehman  [address] [place] [address]     Dear Colleague,    Thank you for referring your patient, Larissa Rehman, to the [facility] PLASTIC AND RECONSTRUCTIVE SURGERY at [facility]. Please see a copy of my visit note below.    Patient returns for a postoperative follow-up after limited depth vaginoplasty for gender dysphoria.    INTERVAL HISTORY: Patient reports she is doing very well. There is no pain. She is urinating well. Having normal bowel movements. She has no issues with activities. She has gained erogenous sensation in the clitoris. Patient is very happy she had surgery and has no regrets.    PHYSICAL EXAMINATION:  /73 (BP Location: Left arm, Patient Position: Chair, Cuff Size: Adult Regular)  Pulse (!) 46  Wt 126 lb 4.8 oz  SpO2 100%  BMI 20.7 kg/m2  Gen.: In no acute distress.  On exam of the genitalia, all incisions are well-healed. Clitoris is viable and sensate to light touch. Urethral meatus is viable.    ASSESSMENT: 3 months status post zero depth penile inversion vaginoplasty for gender dysphoria.    PLAN: Patient is doing very well. I have no activity restrictions for the patient. I will see her back for a one-year follow-up.    Total time spent with patient was 15 min of which greater than 50% was in counseling.    Again, thank you for allowing me to participate in the care of your patient.      Sincerely,    Madhu Mojica MD

## 2018-07-06 NOTE — NURSING NOTE
Chief Complaint   Patient presents with     Clinic Care Coordination - Follow-up     Follow up from 5/4/18.        Vitals:    07/06/18 1409   BP: 117/73   BP Location: Left arm   Patient Position: Chair   Cuff Size: Adult Regular   Pulse: (!) 46   SpO2: 100%   Weight: 126 lb 4.8 oz       Body mass index is 20.7 kg/(m^2).    Mikel PADRON LPN

## 2018-07-06 NOTE — MR AVS SNAPSHOT
After Visit Summary   7/6/2018    Larissa Rehman    MRN: [id number]           Patient Information     Date Of Birth          1955        Visit Information        Provider Department      7/6/2018 1:45 PM Madhu Mojica MD [facility] Plastic and Reconstructive Surgery        Today's Diagnoses     Gender dysphoria in adult    -  1       Follow-ups after your visit        Follow-up notes from your care team     Return in about 9 months (around 4/6/2019).      Who to contact     Please call your clinic at [phone] to:    Ask questions about your health    Make or cancel appointments    Discuss your medicines    Learn about your test results    Speak to your doctor            Additional Information About Your Visit        [employer] Information     ebindle gives you secure access to your electronic health record. If you see a primary care provider, you can also send messages to your care team and make appointments. If you have questions, please call your primary care clinic.  If you do not have a primary care provider, please call [phone] and they will assist you.      ebindle is an electronic gateway that provides easy, online access to your medical records. With ebindle, you can request a clinic appointment, read your test results, renew a prescription or communicate with your care team.     To access your existing account, please contact your [facility] or call [phone] for assistance.        Care EveryWhere ID     This is your Care EveryWhere ID. This could be used by other organizations to access your [place] medical records  [id number]        Your Vitals Were     Pulse Pulse Oximetry BMI (Body Mass Index)             46 100% 20.7 kg/m2          Blood Pressure from Last 3 Encounters:   07/06/18 117/73   06/29/18 111/70   05/25/18 111/72    Weight from Last 3 Encounters:   07/06/18 126 lb 4.8 oz   06/29/18 125 lb 6.4 oz   05/25/18 125 lb 9.6 oz               Today, you had the following     No orders found for display       Primary Care Provider Office Phone # Fax #    Sujata Mark [phone]       [address] [address]        Equal Access to Services     TE MOROCHO : Hadii rolando ku hadjosé miguelo Soomaali, waaxda luqadaha, qaybta kaalmada adeegyada, gloria colesary patelrenay stuart ivet feldman. So [facility] [phone].    ATENCIÓN: Si habla español, tiene a bermudez disposición servicios gratuitos de asistencia lingüística. Llame al [phone].    We comply with applicable federal civil rights laws and [place] laws. We do not discriminate on the basis of race, color, national origin, age, disability, sex, sexual orientation, or gender identity.            Thank you!     Thank you for choosing [facility] PLASTIC AND RECONSTRUCTIVE SURGERY  for your care. Our goal is always to provide you with excellent care. Hearing back from our patients is one way we can continue to improve our services. Please take a few minutes to complete the written survey that you may receive in the mail after your visit with us. Thank you!             Your Updated Medication List - Protect others around you: Learn how to safely use, store and throw away your medicines at www.disposemymeds.org.          This list is accurate as of 7/6/18  3:12 PM.  Always use your most recent med list.                   Brand Name Dispense Instructions for use Diagnosis    ASPIRIN PO      Take 81 mg by mouth daily        BENADRYL PO      Take 25 mg by mouth nightly as needed for Insomnia or itching        EPINEPHrine 0.3 MG/0.3ML injection 2-pack    EPIPEN/ADRENACLICK/or ANY BX GENERIC EQUIV    2 each    Inject 0.3 mLs (0.3 mg) into the muscle once as needed for anaphylaxis        * estradiol 0.05 MG/24HR BIW patch    VIVELLE-DOT    24 patch    Place 1 patch onto the skin twice a week    Gender dysphoria in adult, H/O bilateral orchiectomies, Menopausal syndrome (hot flashes)        * estradiol 0.0375 MG/24HR BIW patch    VIVELLE-DOT    8 patch    Place 1 patch onto the skin twice a week    Gender dysphoria in adult, H/O bilateral orchiectomies       SIMVASTATIN PO      Take 20 mg by mouth At Bedtime        * Notice:  This list has 2 medication(s) that are the same as other medications prescribed for you. Read the directions carefully, and ask your doctor or other care provider to review them with you.

## 2018-07-06 NOTE — PROGRESS NOTES
Patient returns for a postoperative follow-up after limited depth vaginoplasty for gender dysphoria.    INTERVAL HISTORY: Patient reports she is doing very well. There is no pain. She is urinating well. Having normal bowel movements. She has no issues with activities. She has gained erogenous sensation in the clitoris. Patient is very happy she had surgery and has no regrets.    PHYSICAL EXAMINATION:  /73 (BP Location: Left arm, Patient Position: Chair, Cuff Size: Adult Regular)  Pulse (!) 46  Wt 126 lb 4.8 oz  SpO2 100%  BMI 20.7 kg/m2  Gen.: In no acute distress.  On exam of the genitalia, all incisions are well-healed. Clitoris is viable and sensate to light touch. Urethral meatus is viable.    ASSESSMENT: 3 months status post zero depth penile inversion vaginoplasty for gender dysphoria.    PLAN: Patient is doing very well. I have no activity restrictions for the patient. I will see her back for a one-year follow-up.    Total time spent with patient was 15 min of which greater than 50% was in counseling.

## 2018-07-09 ENCOUNTER — TELEPHONE (OUTPATIENT)
Dept: GASTROENTEROLOGY | Facility: CLINIC | Age: 63
End: 2018-07-09

## 2018-07-10 ENCOUNTER — TELEPHONE (OUTPATIENT)
Dept: GASTROENTEROLOGY | Facility: CLINIC | Age: 63
End: 2018-07-10

## 2018-07-11 ENCOUNTER — TELEPHONE (OUTPATIENT)
Dept: GASTROENTEROLOGY | Facility: CLINIC | Age: 63
End: 2018-07-11

## 2018-07-13 ENCOUNTER — TELEPHONE (OUTPATIENT)
Dept: FAMILY MEDICINE | Facility: CLINIC | Age: 63
End: 2018-07-13

## 2018-07-13 DIAGNOSIS — N95.1 MENOPAUSAL SYNDROME (HOT FLASHES): ICD-10-CM

## 2018-07-13 DIAGNOSIS — Z90.79 H/O BILATERAL ORCHIECTOMIES: ICD-10-CM

## 2018-07-13 DIAGNOSIS — F64.0 GENDER DYSPHORIA IN ADULT: ICD-10-CM

## 2018-07-13 RX ORDER — ESTRADIOL 0.05 MG/D
1 PATCH, EXTENDED RELEASE TRANSDERMAL
Qty: 8 PATCH | Refills: 2 | Status: SHIPPED | OUTPATIENT
Start: 2018-07-16 | End: 2018-07-17

## 2018-07-13 RX ORDER — ESTRADIOL 0.1 MG/D
1 FILM, EXTENDED RELEASE TRANSDERMAL
Qty: 8 PATCH | Refills: 1 | Status: CANCELLED | OUTPATIENT
Start: 2018-07-16

## 2018-07-13 NOTE — TELEPHONE ENCOUNTER
Per chart review, patient was seen in clinic on 6/29/2018 by Dr. Trinidad. At that time, they discussed continuing Estradiol therapy with 0.05 patch twice weekly + 0.035mg/24H patch twice weekly. Refill at that visit was given for 0.0375mg/24H patch.    Patient requesting refill for Estradiol 0.1mg patch, but it appears patient is not using the 0.1mg patch and should be using a 0.05mg patch with the 0.035mg patch.    Will send refill for 0.05mg patch. One month supply with 2 refills sent. Patient will need follow-up appointment in 3 months for blood work.    Sujata Mark DO  PGY2 Family Medicine Resident  Pager: [phone]

## 2018-07-13 NOTE — TELEPHONE ENCOUNTER

## 2018-07-17 RX ORDER — ESTRADIOL 0.05 MG/D
1 PATCH, EXTENDED RELEASE TRANSDERMAL
Qty: 8 PATCH | Refills: 2 | Status: SHIPPED | OUTPATIENT
Start: 2018-07-19 | End: 2018-09-05

## 2018-07-17 NOTE — TELEPHONE ENCOUNTER
Emely calling from [facility] in [place] to check status of refill request for Estradiol patch. Advised Emely appears that it was sent to [facility] in [place] on 7/13/18 and pharmacy confirmed receipt at 11:57am on 7/13/18. Emely advised appears that it was not received; please re-send.

## 2018-07-17 NOTE — TELEPHONE ENCOUNTER
RN resent rx to pharmacy since they did not receive.     Message routed ot FD to call and schedule f/u appt with Dr. Mark in 2-3 months for blood work    Audrey Bowman RN

## 2018-07-18 NOTE — TELEPHONE ENCOUNTER
RN relayed that order is to be 0.05mg + 0.0375mg patch together twice a week. Pharmacist stated they would call patient to relay and any issues have patient call us    Audrey Bowman RN

## 2018-07-18 NOTE — TELEPHONE ENCOUNTER
Pharmacy needs clarification on the patches because patient states one thing and on file is something else.  Please return call with the correct strength/dose please.

## 2018-09-05 ENCOUNTER — OFFICE VISIT (OUTPATIENT)
Dept: FAMILY MEDICINE | Facility: CLINIC | Age: 63
End: 2018-09-05
Payer: COMMERCIAL

## 2018-09-05 VITALS
WEIGHT: 125.8 LBS | BODY MASS INDEX: 20.62 KG/M2 | DIASTOLIC BLOOD PRESSURE: 74 MMHG | TEMPERATURE: 97.8 F | SYSTOLIC BLOOD PRESSURE: 125 MMHG | OXYGEN SATURATION: 98 %

## 2018-09-05 DIAGNOSIS — F64.0 GENDER DYSPHORIA IN ADULT: ICD-10-CM

## 2018-09-05 DIAGNOSIS — Z90.79 H/O BILATERAL ORCHIECTOMIES: ICD-10-CM

## 2018-09-05 DIAGNOSIS — N95.1 MENOPAUSAL SYNDROME (HOT FLASHES): ICD-10-CM

## 2018-09-05 RX ORDER — ESTRADIOL 0.05 MG/D
1 PATCH, EXTENDED RELEASE TRANSDERMAL
Qty: 8 PATCH | Refills: 2 | Status: SHIPPED | OUTPATIENT
Start: 2018-09-06 | End: 2019-02-18

## 2018-09-05 RX ORDER — ESTRADIOL 0.04 MG/D
1 PATCH, EXTENDED RELEASE TRANSDERMAL
Qty: 8 PATCH | Refills: 3 | Status: SHIPPED | OUTPATIENT
Start: 2018-09-06 | End: 2019-02-21

## 2018-09-05 ASSESSMENT — PAIN SCALES - GENERAL: PAINLEVEL: NO PAIN (0)

## 2018-09-05 NOTE — PATIENT INSTRUCTIONS
Here is the plan from today's visit    1. Gender dysphoria in adult  - estradiol (VIVELLE-DOT) 0.0375 MG/24HR BIW patch; Place 1 patch onto the skin twice a week  Dispense: 8 patch; Refill: 3  - estradiol (VIVELLE-DOT) 0.05 MG/24HR BIW patch; Place 1 patch onto the skin twice a week  Dispense: 8 patch; Refill: 2    2. H/O bilateral orchiectomies  - estradiol (VIVELLE-DOT) 0.0375 MG/24HR BIW patch; Place 1 patch onto the skin twice a week  Dispense: 8 patch; Refill: 3  - estradiol (VIVELLE-DOT) 0.05 MG/24HR BIW patch; Place 1 patch onto the skin twice a week  Dispense: 8 patch; Refill: 2    3. Menopausal syndrome (hot flashes)  - estradiol (VIVELLE-DOT) 0.05 MG/24HR BIW patch; Place 1 patch onto the skin twice a week  Dispense: 8 patch; Refill: 2    Please call or return to clinic if your symptoms don't go away.    Follow up plan  Please make a clinic appointment for follow up with me (FLOR MORTON) in 6  months for routine follow-up.    Thank you for coming to [facility] today.  Lab Testing:  **If you had lab testing today and your results are reassuring or normal they will be mailed to you or sent through [employer] within 7 days.   **If the lab tests need quick action we will call you with the results.  The phone number we will call with results is # [phone] (home) . If this is not the best number please call our clinic and change the number.  Medication Refills:  If you need any refills please call your pharmacy and they will contact us.   If you need to  your refill at a new pharmacy, please contact the new pharmacy directly. The new pharmacy will help you get your medications transferred faster.   Scheduling:  If you have any concerns about today's visit or wish to schedule another appointment please call our office during normal business hours [phone] (8-5:00 M-F)  If a referral was made to a [facility] and you don't get a call from central scheduling please call  [phone].  If a Mammogram was ordered for you at [facility] call [phone] to schedule or change your appointment.  If you had an XRay/CT/Ultrasound/MRI ordered the number is [phone] to schedule or change your radiology appointment.   Medical Concerns:  If you have urgent medical concerns please call [phone] at any time of the day.    Sujata Mark, DO

## 2018-09-05 NOTE — MR AVS SNAPSHOT
After Visit Summary   9/5/2018    Larissa Rehman    MRN: [id number]           Patient Information     Date Of Birth          1955        Visit Information        Provider Department      9/5/2018 3:40 PM Flor Morton DO [facility]        Today's Diagnoses     Gender dysphoria in adult        H/O bilateral orchiectomies        Menopausal syndrome (hot flashes)          Care Instructions    Here is the plan from today's visit    1. Gender dysphoria in adult  - estradiol (VIVELLE-DOT) 0.0375 MG/24HR BIW patch; Place 1 patch onto the skin twice a week  Dispense: 8 patch; Refill: 3  - estradiol (VIVELLE-DOT) 0.05 MG/24HR BIW patch; Place 1 patch onto the skin twice a week  Dispense: 8 patch; Refill: 2    2. H/O bilateral orchiectomies  - estradiol (VIVELLE-DOT) 0.0375 MG/24HR BIW patch; Place 1 patch onto the skin twice a week  Dispense: 8 patch; Refill: 3  - estradiol (VIVELLE-DOT) 0.05 MG/24HR BIW patch; Place 1 patch onto the skin twice a week  Dispense: 8 patch; Refill: 2    3. Menopausal syndrome (hot flashes)  - estradiol (VIVELLE-DOT) 0.05 MG/24HR BIW patch; Place 1 patch onto the skin twice a week  Dispense: 8 patch; Refill: 2    Please call or return to clinic if your symptoms don't go away.    Follow up plan  Please make a clinic appointment for follow up with me (FLOR MORTON) in 6  months for routine follow-up.    Thank you for coming to [facility] today.  Lab Testing:  **If you had lab testing today and your results are reassuring or normal they will be mailed to you or sent through Dig[employer] within 7 days.   **If the lab tests need quick action we will call you with the results.  The phone number we will call with results is # [phone] (home) . If this is not the best number please call our clinic and change the number.  Medication Refills:  If you need any refills please call your pharmacy and they will contact us.   If you need to  your refill at a  new pharmacy, please contact the new pharmacy directly. The new pharmacy will help you get your medications transferred faster.   Scheduling:  If you have any concerns about today's visit or wish to schedule another appointment please call our office during normal business hours [phone] (8-5:00 M-F)  If a referral was made to a [facility] and you don't get a call from central scheduling please call [phone].  If a Mammogram was ordered for you at [facility] call [phone] to schedule or change your appointment.  If you had an XRay/CT/Ultrasound/MRI ordered the number is [phone] to schedule or change your radiology appointment.   Medical Concerns:  If you have urgent medical concerns please call [phone] at any time of the day.    Sujata Mark, DO            Follow-ups after your visit        Your next 10 appointments already scheduled     Sep 21, 2018  1:45 PM CDT   Colonoscopy with Dougie Silva MD   [facility])    [address] [place] [id number] [phone]              Who to contact     Please call your clinic at [phone] to:    Ask questions about your health    Make or cancel appointments    Discuss your medicines    Learn about your test results    Speak to your doctor            Additional Information About Your Visit        [employer] Information     [employer] gives you secure access to your electronic health record. If you see a primary care provider, you can also send messages to your care team and make appointments. If you have questions, please call your primary care clinic.  If you do not have a primary care provider, please call [phone] and they will assist you.      [employer] is an electronic gateway that provides easy, online access to your medical records. With [employer], you can request a clinic appointment, read your test results, renew a prescription or  communicate with your care team.     To access your existing account, please contact your [facility] or call [phone] for assistance.        Care EveryWhere ID     This is your Care EveryWhere ID. This could be used by other organizations to access your [facility] medical records  [id number]        Your Vitals Were     Temperature Pulse Oximetry BMI (Body Mass Index)             97.8  F (36.6  C) (Oral) 98% 20.62 kg/m2          Blood Pressure from Last 3 Encounters:   09/05/18 125/74   07/06/18 117/73   06/29/18 111/70    Weight from Last 3 Encounters:   09/05/18 125 lb 12.8 oz (57.1 kg)   07/06/18 126 lb 4.8 oz (57.3 kg)   06/29/18 125 lb 6.4 oz (56.9 kg)              Today, you had the following     No orders found for display         Where to get your medicines      These medications were sent to [facility] [address] [place] [address] [address]  [address] [place] [id number] [place] [address]     Phone:  [phone]     estradiol 0.0375 MG/24HR BIW patch    estradiol 0.05 MG/24HR BIW patch          Primary Care Provider Office Phone # Fax #    Sujata HuDO sary [phone]       [address] [address]        Equal Access to Services     TE MOROCHO AH: Hadii rolando ku hadasho Sopatriciaali, waaxda luqadaha, qaybta kaalmada adeegyada, gloria feldman. So [facility] [phone].    ATENCIÓN: Si habla español, tiene a bermudez disposición servicios gratuitos de asistencia lingüística. Llame al [phone].    We comply with applicable federal civil rights laws and [place] laws. We do not discriminate on the basis of race, color, national origin, age, disability, sex, sexual orientation, or gender identity.            Thank you!     Thank you for choosing [facility]  for your care. Our goal is always to provide you with excellent care. Hearing back from our patients is one  way we can continue to improve our services. Please take a few minutes to complete the written survey that you may receive in the mail after your visit with us. Thank you!             Your Updated Medication List - Protect others around you: Learn how to safely use, store and throw away your medicines at www.disposemymeds.org.          This list is accurate as of 9/5/18  4:16 PM.  Always use your most recent med list.                   Brand Name Dispense Instructions for use Diagnosis    ASPIRIN PO      Take 81 mg by mouth daily        BENADRYL PO      Take 25 mg by mouth nightly as needed for Insomnia or itching        EPINEPHrine 0.3 MG/0.3ML injection 2-pack    EPIPEN/ADRENACLICK/or ANY BX GENERIC EQUIV    2 each    Inject 0.3 mLs (0.3 mg) into the muscle once as needed for anaphylaxis        * estradiol 0.0375 MG/24HR BIW patch   Start taking on:  9/6/2018    VIVELLE-DOT    8 patch    Place 1 patch onto the skin twice a week    Gender dysphoria in adult, H/O bilateral orchiectomies       * estradiol 0.05 MG/24HR BIW patch   Start taking on:  9/6/2018    VIVELLE-DOT    8 patch    Place 1 patch onto the skin twice a week    Gender dysphoria in adult, H/O bilateral orchiectomies, Menopausal syndrome (hot flashes)       SIMVASTATIN PO      Take 20 mg by mouth At Bedtime        * Notice:  This list has 2 medication(s) that are the same as other medications prescribed for you. Read the directions carefully, and ask your doctor or other care provider to review them with you.

## 2018-09-05 NOTE — PROGRESS NOTES
KENDRA Bush is a 62 year old individual that uses pronouns She/Her/Hers/Herself and They/Them/Their/Theirs that presents today for follow up of:  feminizing hormone therapy. Gender identity: Female    Any special concerns today?  concerns about estrogen patch. Itching, infrequent, not consistent. Has had to taken it off and placed new patches.     On hormones?  YES +++ Shot day of the week? Not applicable-taking pills/patch/gel      Due for labs?  No      +++ Refills of meds needed?  Yes  ---    Past Surgical History:   Procedure Laterality Date     COLONOSCOPY       ORTHOPEDIC SURGERY      foot surgery      TRANSGENDER VAGINOPLASTY ZERO DEPTH N/A 3/29/2018    Procedure: TRANSGENDER VAGINOPLASTY ZERO DEPTH;  Zero Depth Transgender Vaginoplasty;  Surgeon: Madhu Mojica MD;  Location: [facility]     Patient Active Problem List   Diagnosis     Familial hypercholesteremia     Gender dysphoria in adult     Seasonal mood disorder (H)     S/P foot surgery, left     H/O bilateral orchiectomies and zero-depth vaginoplasty 3/29/2018     Current Outpatient Prescriptions   Medication Sig Dispense Refill     ASPIRIN PO Take 81 mg by mouth daily       DiphenhydrAMINE HCl (BENADRYL PO) Take 25 mg by mouth nightly as needed for Insomnia or itching       estradiol (VIVELLE-DOT) 0.0375 MG/24HR BIW patch Place 1 patch onto the skin twice a week 8 patch 3     estradiol (VIVELLE-DOT) 0.05 MG/24HR BIW patch Place 1 patch onto the skin twice a week 8 patch 2     SIMVASTATIN PO Take 20 mg by mouth At Bedtime        EPINEPHrine (EPIPEN) 0.3 MG/0.3ML injection Inject 0.3 mLs (0.3 mg) into the muscle once as needed for anaphylaxis (Patient not taking: Reported on 9/5/2018) 2 each 1     History   Smoking Status     Never Smoker   Smokeless Tobacco     Never Used      Allergies   Allergen Reactions     Bee Venom      Problem, Medication and Allergy Lists were reviewed and are current..         Review of Systems:      General    Fat  "redistribution: no    Weight change: YES, breast changes HEENT    Voice change: no     Cardiovascular (CV)    Chest Pains: no    Shortness of breath: no Chest    Decreased exercise tolerance:  no    Breast changes/development: YES     Gastrointestinal (GI)    Abdominal pain: no    Change in appetite: no Skin    Acne or oily skin: no    Change in hair: no     Genitourinary ()    Abnormal vaginal bleeding: not applicable     Decreased spontaneous erections: not applicable    Change in libido: no    New sexual partners: no Musculoskeletal    Leg pain or swelling: no     Psychiatric (Psych)    Depression: no    Anxiety/Panic: no    Mood:  \"good\"                Physical Exam:     Vitals:    09/05/18 1552   BP: 125/74   Temp: 97.8  F (36.6  C)   TempSrc: Oral   SpO2: 98%   Weight: 125 lb 12.8 oz (57.1 kg)     BMI= Body mass index is 20.62 kg/(m^2).   Wt Readings from Last 10 Encounters:   09/05/18 125 lb 12.8 oz (57.1 kg)   07/06/18 126 lb 4.8 oz (57.3 kg)   06/29/18 125 lb 6.4 oz (56.9 kg)   05/25/18 125 lb 9.6 oz (57 kg)   04/26/18 125 lb 12.8 oz (57.1 kg)   04/10/18 120 lb (54.4 kg)   04/05/18 120 lb (54.4 kg)   03/30/18 123 lb 14.4 oz (56.2 kg)   03/08/18 120 lb (54.4 kg)   02/09/18 125 lb (56.7 kg)     Appearance: neutral appearance and dress    Physical Exam   Constitutional: He is well-developed, well-nourished, and in no distress. No distress.   Cardiovascular: Normal rate.    Neurological: He is alert.   Skin: He is not diaphoretic.   Psychiatric: Mood, memory, affect and judgment normal.   Nursing note and vitals reviewed.    Affect: Appropriate/mood-congruent         Labs:   Results from last visit:  Office Visit on 05/25/2018   Component Date Value Ref Range Status     Estradiol 05/25/2018 47  6 - 50 pg/mL Final     Testosterone Total 05/25/2018 <2* 240 - 950 ng/dL Final    Comment: This test was developed and its performance characteristics determined by the   [facility],  " [facility]. It has   not been cleared or approved by the FDA. The laboratory is regulated under   CLIA as qualified to perform high-complexity testing. This test is used for   clinical purposes. It should not be regarded as investigational or for   research.       Albumin 05/25/2018 4.6  3.5 - 4.7 mg/dL Final     Alkaline Phosphatase 05/25/2018 35.5  31.7 - 110.7 U/L Final     ALT 05/25/2018 13.8  0.0 - 45.0 U/L Final     AST 05/25/2018 22.9  0.0 - 55.0 U/L Final     Bilirubin Total 05/25/2018 0.5  0.2 - 1.3 mg/dL Final     Urea Nitrogen 05/25/2018 18.4  7.0 - 21.0 mg/dL Final     Calcium 05/25/2018 9.6  8.5 - 10.1 mg/dL Final     Chloride 05/25/2018 104.7  98.0 - 110.0 mmol/L Final     Carbon Dioxide 05/25/2018 34.4* 20.0 - 32.0 mmol/L Final     Creatinine 05/25/2018 1.0  0.7 - 1.3 mg/dL Final     Glucose 05/25/2018 112.0* 70.0 - 99.0 mg'dL Final     Potassium 05/25/2018 4.0  3.3 - 4.5 mmol/dL Final     Sodium 05/25/2018 142.3* 132.6 - 141.4 mmol/L Final     Protein Total 05/25/2018 7.2  6.8 - 8.8 g/dL Final     GFR Estimate 05/25/2018 80.0  >60.0 mL/min/1.7 m2 Final     GFR Estimate If Black 05/25/2018 >90  >60.0 mL/min/1.7 m2 Final     Hemoglobin 05/25/2018 12.8* 13.3 - 17.7 g/dL Final     Cholesterol 05/25/2018 197.4  0.0 - 200.0 mg/dL Final     Cholesterol/HDL Ratio 05/25/2018 2.4  0.0 - 5.0 Final     HDL Cholesterol 05/25/2018 82.5  >40.0 mg/dL Final     LDL Cholesterol Calculated 05/25/2018 103  0 - 129 mg/dL Final     Triglycerides 05/25/2018 60.5  0.0 - 150.0 mg/dL Final     VLDL Cholesterol 05/25/2018 12.1  7.0 - 32.0 mg/dL Final       Assessment and Plan     Larissa was seen today for recheck. [facility]    Diagnoses and all orders for this visit:    Gender dysphoria in adult  -     estradiol (VIVELLE-DOT) 0.0375 MG/24HR BIW patch; Place 1 patch onto the skin twice a week  -     estradiol (VIVELLE-DOT) 0.05 MG/24HR BIW patch;  Place 1 patch onto the skin twice a week    H/O bilateral orchiectomies  -     estradiol (VIVELLE-DOT) 0.0375 MG/24HR BIW patch; Place 1 patch onto the skin twice a week  -     estradiol (VIVELLE-DOT) 0.05 MG/24HR BIW patch; Place 1 patch onto the skin twice a week    Menopausal syndrome (hot flashes)  -     estradiol (VIVELLE-DOT) 0.05 MG/24HR BIW patch; Place 1 patch onto the skin twice a week      Discussed continued use of estrogen.  Patient has been having difficulties with the past.  Will sometimes get itchy and she will take them off.  At this time, she does not want to switch forms of estrogen.  She will continue to look at options and will be in touch if she decides to make any changes.  Patient wishes to wean down on estradiol soon, she states she will keep the patch off if she has to take it off from itchiness, and if she experiences hot flashes and she will replace it.  Otherwise she will continue using the 2 patches twice weekly.    Contraception: not needed.     Counselled patient about controlled substances: No    Follow up:  Follow up in 6 months.  Results by martin  Questions were elicited and answered.     Sujata Mark, DO

## 2018-09-10 NOTE — PROGRESS NOTES
Preceptor Attestation:   Patient seen, evaluated and discussed with the resident. I have verified the content of the note, which accurately reflects my assessment of the patient and the plan of care.   Supervising Physician:  Amelie Palmer MD

## 2018-09-14 ENCOUNTER — TELEPHONE (OUTPATIENT)
Dept: GASTROENTEROLOGY | Facility: OUTPATIENT CENTER | Age: 63
End: 2018-09-14

## 2018-09-14 NOTE — TELEPHONE ENCOUNTER
Patient taking any blood thinners ? Aspirin    Heart disease ? denies    Lung disease ? denies      Sleep apnea ? denies    Diabetic ? denies    Kidney disease ? denies    Dialysis ? n/a    Electronic implanted medical devices ? denies    Are you taking any narcotic pain medication ?   No What is your daily dosage ?    PTSD ? n/a    Prep instructions reviewed with patient ? Patient declined review.  policy, MAC sedation plan reviewed. Advised patient to have someone stay with them post exam    Pharmacy : n/a    Indication for procedure :Special screening for malignant neoplasms, colon [Z12.11    Referring provider :Lynnette Zuniga MD      Arrival Time : 12;45 PM

## 2018-09-21 ENCOUNTER — DOCUMENTATION ONLY (OUTPATIENT)
Dept: GASTROENTEROLOGY | Facility: OUTPATIENT CENTER | Age: 63
End: 2018-09-21
Payer: COMMERCIAL

## 2018-09-21 ENCOUNTER — TRANSFERRED RECORDS (OUTPATIENT)
Dept: HEALTH INFORMATION MANAGEMENT | Facility: CLINIC | Age: 63
End: 2018-09-21

## 2018-09-26 LAB — COPATH REPORT: NORMAL

## 2019-01-15 ENCOUNTER — OFFICE VISIT (OUTPATIENT)
Dept: FAMILY MEDICINE | Facility: CLINIC | Age: 64
End: 2019-01-15
Payer: COMMERCIAL

## 2019-01-15 ENCOUNTER — OFFICE VISIT (OUTPATIENT)
Dept: PHARMACY | Facility: CLINIC | Age: 64
End: 2019-01-15
Payer: COMMERCIAL

## 2019-01-15 VITALS
HEIGHT: 65 IN | SYSTOLIC BLOOD PRESSURE: 112 MMHG | DIASTOLIC BLOOD PRESSURE: 68 MMHG | BODY MASS INDEX: 20.79 KG/M2 | WEIGHT: 124.8 LBS | OXYGEN SATURATION: 99 % | TEMPERATURE: 97.4 F | HEART RATE: 51 BPM

## 2019-01-15 DIAGNOSIS — Z00.00 PREVENTATIVE HEALTH CARE: Primary | ICD-10-CM

## 2019-01-15 DIAGNOSIS — Z00.00 ROUTINE GENERAL MEDICAL EXAMINATION AT A HEALTH CARE FACILITY: ICD-10-CM

## 2019-01-15 DIAGNOSIS — Z78.9 DNI (DO NOT INTUBATE): ICD-10-CM

## 2019-01-15 DIAGNOSIS — Z66 DNR (DO NOT RESUSCITATE): ICD-10-CM

## 2019-01-15 DIAGNOSIS — Z00.00 MEDICARE ANNUAL WELLNESS VISIT, INITIAL: Primary | ICD-10-CM

## 2019-01-15 ASSESSMENT — PATIENT HEALTH QUESTIONNAIRE - PHQ9: SUM OF ALL RESPONSES TO PHQ QUESTIONS 1-9: 1

## 2019-01-15 ASSESSMENT — MIFFLIN-ST. JEOR: SCORE: 1277.97

## 2019-01-15 NOTE — PROGRESS NOTES
Preceptor Attestation:   Patient seen, evaluated and discussed with the resident. I have verified the content of the note, which accurately reflects my assessment of the patient and the plan of care.   Supervising Physician:  Melly Marquez MD

## 2019-01-15 NOTE — PROGRESS NOTES
Medicare Annual Wellness Visit         HPI     This 63 year old male presents to Dr. Wood for an initial Medicare Wellness Exam.      Patient Active Problem List   Diagnosis     Familial hypercholesteremia     Gender dysphoria in adult     Seasonal mood disorder (H)     S/P foot surgery, left     H/O bilateral orchiectomies and zero-depth vaginoplasty 3/29/2018       Past Medical History:   Diagnosis Date     High cholesterol         History reviewed. No pertinent family history.      Past Surgical History:   Procedure Laterality Date     COLONOSCOPY       ORTHOPEDIC SURGERY      foot surgery      TRANSGENDER VAGINOPLASTY ZERO DEPTH N/A 3/29/2018    Procedure: TRANSGENDER VAGINOPLASTY ZERO DEPTH;  Zero Depth Transgender Vaginoplasty;  Surgeon: Madhu Mojica MD;  Location:  OR       Reviewed no other significant FH    Family History and past Medical History reviewed and it is unchanged/updated.       Review of Systems       Constitutional:   fevers, night sweats or unintentional weight change ?  NO      Eyes:   vision change, diplopia or red eyes?  NO      Ears, Nose, Mouth, Throat:   tinnitus or hearing change,  epistaxis or nasal discharge,  oral lesions, throat pain ?  NO      Neck:   stiffness?  NO           Cardiovascular:   chest pain, palpitations, or pain with walking, orthopnea or PND?  NO   Breasts:  Any bumps or unusual discharge?     NO         Respiratory:   dyspnea, cough, shortness of breath or wheezing?  NO         GI:   nausea, vomiting, diarrhea or constipation,  abdominal pain ?  NO         :   change in urine,  dysuria or hematuria,  sexual dysfunction ?  NO        Musculoskeletal:   joint or muscle pain or swelling?  NO            Skin:   concerning lesions or moles?  NO           Nervous System:   loss of strength or sensation,  numbness or tingling,  tremor,  dizziness,  headache?  NO (was more when pt was younger when biking)  Endocrine/Homone:   polyuria or polydipsia,  temperature  intolerance?  NO            Blood and Lymphnodes:   concerning bumps,  bleeding problems?  NO            Allergy:   environmental allergies?  NO            Mental Health:   depression or anxiety,  sleep problems?  NO               Medical Care     Have you been to an ER or a hospital in the last year? No  What other specialists or organizations are involved in your medical care?  NA  Current providers sharing in care for this patient include:  Patient Care Team:  Sujata Mark DO as PCP - General (Student in organized health care education/training program)  Madhu Mojica MD as MD (Plastic Surgery)         Social History     Social History     Tobacco Use     Smoking status: Never Smoker     Smokeless tobacco: Never Used   Substance Use Topics     Alcohol use: Yes     Comment: wine     Marital Status:Single  Who lives in your household? self  Does your home have any of the following safety concerns? Loose rugs in the hallway, no grab bars in the bathroom, no handrails on the stairs or have poorly lit areas?  No  Do you feel threatened or controlled by a partner, ex-partner or anyone in your life? No  Has anyone hurt you physically, for example by pushing, hitting, slapping or kicking you   or forcing you to have sex? No  Do you need help with the phone, transportation, shopping, preparing meals, housework, laundry, medications or managing money? No   Have you noticed any hearing difficulties? No      Risk Behaviors and Healthy Habits     How many servings of fruits and vegetables do you eat a day? 3  How often do you exercise and what do you do? 60 minutes 7 days a week  Do you frequently ride without a seatbelt? No  Do you use tobacco?  No  Do you use any other drugs? No         Do you use alcohol?Yes  Number of drinks per day 1/2  Number of drinking days a week 3-4    Today's PHQ-2 Score: 0    Sexual Health     Are you sexually active?  No   Have you had any sexually transmitted infections? No   Any sexual  "concerns? No     EVALUATION OF COGNITIVE FUNCTION     Mood/affect:Normal  Appearance:Normal  Family member/caregiver input: Normal    Mini Cog Scoring   2 points   Clock Draw Test result:  Normal      SCREENING FOR PREVENTION and EARLY DETECTION   CV Risk based on Pooled Cohort Risk:  The 10-year ASCVD risk score (Norris HICKMAN Jr., et al., 2013) is: 7.6%    Values used to calculate the score:      Age: 63 years      Sex: Male      Is Non- : No      Diabetic: No      Tobacco smoker: No      Systolic Blood Pressure: 112 mmHg      Is BP treated: No      HDL Cholesterol: 82.5 mg/dL      Total Cholesterol: 197.4 mg/dL      Advanced Directives: Discussed and patient desires to be DNR/DNI.      Immunization History   Administered Date(s) Administered     Influenza (IIV3) PF 10/11/2001, 10/15/2009, 10/19/2012, 10/24/2013, 09/30/2014, 10/06/2017     TDAP Vaccine (Boostrix) 07/15/2016       Reviewed Immunization Record Today  TDaP:Yes         Physical Exam     Vitals: /68   Pulse 51   Temp 97.4  F (36.3  C) (Oral)   Ht 1.651 m (5' 5\")   Wt 56.6 kg (124 lb 12.8 oz)   SpO2 99%   BMI 20.77 kg/m    BMI= Body mass index is 20.77 kg/m .  GENERAL APPEARANCE: healthy, alert and no distress  EYES: Eyes grossly normal to inspection  RESP: lungs clear to auscultation - no rales, rhonchi or wheezes  CV: regular rate and rhythm, normal S1 S2, no S3 or S4, no murmur, click or rub  ABDOMEN: soft, nontender, no hepatosplenomegaly, no masses and bowel sounds normal  MS: no musculoskeletal defects are noted and gait is age appropriate without ataxia  SKIN: no suspicious lesions or rashes on exposed skin  NEURO: Normal strength and tone, sensory exam grossly normal, mentation intact and speech normal  PSYCH: mentation appears normal and affect normal/bright        Assessment and Plan   Initial   Medicare Wellness Exam    Larissa was seen today for wellness visit.    Diagnoses and all orders for this " visit:    Medicare annual wellness visit, initial - Up to date on all screenings.    Routine general medical examination at a health care facility  -     ADMIN VACCINE, INITIAL  -     FLU VAC PRESRV FREE QUAD SPLIT VIR IM, 0.5 mL dosage    DNI (do not intubate)  DNR (do not resuscitate)  Reviewed advance care directives that were scanned into chart in 11/2017          Options for treatment and follow-up care were reviewed with the Larissa Rehman and/or guardian engaged in the decision making process and verbalized understanding of the options discussed and agreed with the final plan.    BRIAN FALCON  PGY-2

## 2019-01-15 NOTE — PROGRESS NOTES
Clinical Pharmacy Wellness Note     Larissa was referred by Dr. Wood for an annual wellness encounter       MEDICATION REVIEW:  Discussed all medication indications, dosage and effectiveness, adverse effects, and adherence with patient/caregiver.    Pt had meds with them: no  Pt had med list with them: no  Pt was knowledgeable about meds: yes  Medications set up by: [facility]  Medications administered by someone else (e.g., LTCF): No  Pt uses a medication box or automated dispenser: no  Called pharmacy to obtain or clarify med list:  no  Called HHN or LTCF to obtain or clarify med list:  no    Medication Discrepancies  Medications on EMR med list that pt is NOT taking:  none  Medications pt IS taking that are NOT on EMR med list (e.g., from specialist, hospital): none  OTC meds/ dietary supplements pt taking on own that are NOT on EMR med list:  none  Dosage listed differently than how patient is taking: none  Frequency listed differently than how patient is taking: none  Duplicate medication on list (two occurrences of the same medication):  none  TOTAL NUMBER OF MEDICATION DISCREPANCIES:  0  ______________________________________________________________________      Subjective:  Larissa is here today for an annual wellness assessment by Dr. Wood.    Eleazar does all his medications by name and appears to take them appropriately.  Eleazar has no new drug concerns.    Takes both theKei estrogen 0.0375 as well as a 0.05 mg patch every 3 days.  He is considering lowering this dose, but because of the winter and concern for change in mental health status he would prefer to reduce this dose in the summer.      Patient reported being compliant all of the time   Patient reports no side effects.      Objective:  ASCVD 10 year RISK:  Calculated 01/15/19   The 10-year ASCVD risk score (Redgranitedomi HICKMAN Jr., et al., 2013) is: 7.6%    Values used to calculate the score:      Age: 63 years      Sex: Male      Is Non- : No       Diabetic: No      Tobacco smoker: No      Systolic Blood Pressure: 112 mmHg      Is BP treated: No      HDL Cholesterol: 82.5 mg/dL      Total Cholesterol: 197.4 mg/dL     Therapy Plan:Moderate Intensity  (atorvastatin 10-20mg, rosuvastatin 5-10mg, simvastatin 20-40mg, pravastatin 40-80mg, lovastatin 40 mg)      63 year old  , Ethnic Group.Chinese,   History   Smoking Status     Never Smoker   Smokeless Tobacco     Never Used     BP Readings from Last 3 Encounters:   01/15/19 112/68   09/05/18 125/74   07/06/18 117/73     Lab Results   Component Value Date    CHOL 197.4 05/25/2018     Lab Results   Component Value Date    HDL 82.5 05/25/2018     Lab Results   Component Value Date     05/25/2018     Lab Results   Component Value Date    TRIG 60.5 05/25/2018     Lab Results   Component Value Date    CHOLHDLRATIO 2.4 05/25/2018     Current Outpatient Medications   Medication     ASPIRIN PO     DiphenhydrAMINE HCl (BENADRYL PO)     EPINEPHrine (EPIPEN) 0.3 MG/0.3ML injection     estradiol (VIVELLE-DOT) 0.0375 MG/24HR BIW patch     estradiol (VIVELLE-DOT) 0.05 MG/24HR BIW patch     SIMVASTATIN PO     No current facility-administered medications for this visit.    }        There were no vitals taken for this visit.  GFR Estimate   Date Value Ref Range Status   05/25/2018 80.0 >60.0 mL/min/1.7 m2 Final   03/31/2018 77 >60 mL/min/1.7m2 Final     Comment:     Non  GFR Calc   03/30/2018 65 >60 mL/min/1.7m2 Final     Comment:     Non  GFR Calc     GFR Estimate If Black   Date Value Ref Range Status   05/25/2018 >90 >60.0 mL/min/1.7 m2 Final   03/31/2018 >90 >60 mL/min/1.7m2 Final     Comment:      GFR Calc   03/30/2018 79 >60 mL/min/1.7m2 Final     Comment:      GFR Calc       Patient Active Problem List   Diagnosis     Familial hypercholesteremia     Gender dysphoria in adult     Seasonal mood disorder (H)     S/P foot surgery, left     H/O bilateral  orchiectomies and zero-depth vaginoplasty 3/29/2018     SOCIAL HISTORY:   reports that  has never smoked. he has never used smokeless tobacco. He reports that he drinks alcohol. He reports that he does not use drugs.    Current Outpatient Medications   Medication Sig Dispense Refill     ASPIRIN PO Take 81 mg by mouth daily       DiphenhydrAMINE HCl (BENADRYL PO) Take 25 mg by mouth nightly as needed for Insomnia or itching       EPINEPHrine (EPIPEN) 0.3 MG/0.3ML injection Inject 0.3 mLs (0.3 mg) into the muscle once as needed for anaphylaxis (Patient not taking: Reported on 9/5/2018) 2 each 1     estradiol (VIVELLE-DOT) 0.0375 MG/24HR BIW patch Place 1 patch onto the skin twice a week 8 patch 3     estradiol (VIVELLE-DOT) 0.05 MG/24HR BIW patch Place 1 patch onto the skin twice a week 8 patch 2     SIMVASTATIN PO Take 20 mg by mouth At Bedtime        Allergies   Allergen Reactions     Bee Venom        Environmental factors that may impact patient: none.    There were no vitals taken for this visit.    Drug Therapy Assessment:  Drug therapy problems identified:     I have reviewed All medications taken by the [facility].  Medications were reviewed and assessed for indicated, effective, safe and convenient. Drug therapy problem identified today listed described below:      Patient is compliant with all his medications.  Today we discussed disposal medications and appropriate ways to dispose of the estrogen and environmentally friendly manner.  I recommended the patient collect his patches and deposit them in the [facility] controlled medication takeback box.  Patient agreed and was happy to comply.    Drug Therapy Plan and Follow up:    Plan  Medication list was updated today to reflect the medication reconciliation performed.         Follow up: with PCP as directed   Patient was provided with written instructions/medication list via AVS        Options for treatment and/or follow-up care were reviewed with the patient. Patient  was engaged and actively involved in the decision making process.  Larissa Rehman verbalized understanding of the options discussed and was satisfied with the final plan.      Dr. Wood was provided my action today in clinic and Dr. Marquez was available for supervision during this visit and is the authorizing prescriber for this visit through the pharmacist collaborative practice agreement.      Ravi Juarez, Pharm.D               Total Time: 15  # DTPs Identified: 0    Medical Condition 1: Cholesterol, Goals of therapy: At Goal,  ,  ,  ,  ,  ,  ,     ,  ,  ,  ,  ,  ,  ,  ,     ,  ,  ,  ,  ,  ,  ,  ,     ,  ,  ,

## 2019-01-15 NOTE — PATIENT INSTRUCTIONS
Services Typically covered by Medicare Recommended Completed   Vaccines    Pneumonoccol    Influenza    Hepatitis B (if medium/high risk)     Once for patients after age 65    Yearly  Medium/high risk factors:    End Stage Kidney Disease    Hemophiliacs who received Factor XIII or IX concentrates    Clients of institutions for developmentally disabled    Persons who live in same house as a Hepatitis B carrier    Homosexual men    Illicit injectable drug users    Health care workers     Mammogram Covered: One-time screen between age 35-39, annually for age 40+     Pap and Pelvic Exam Covered: Annually if  high risk,  or childbearing age with abnormal Pap in last 3 years.  Q24 months for all other women     Prostate Cancer Screening    Digital rectal exam    PSA Covered: Annually for all men > age 50     Corolrectal Cancer Screening Screening colonoscopy every 10 years, more often for high risk patients     Diabetes Self-Management Training Requires referral by treating physician for patient with diabetes     Diabetes Screening    Fasting blood sugar or glucose tolerance test   Once yearly, twice yearly if prediabetic     Cardiovascular Screening Blood Tests    Total Cholesterol    HDL    Triglycerides Every 5 years     Medical Nutrition Therapy for Diabetes or Renal Disease Requires referral by treating physician for patient with diabetes or kidney disease     Glaucoma Screening Annually for patients with one of the following risk factors:    Diabetes Mellitus    Family history of Glaucoma    -American age 50 and over    -American age 65 and over     Bone Mass Measurement Every 24 months if one of the following risk factors:    Estrogen deficiency    Vertebral abnormalities on x-ray indicative of Osteoporosis, Osteopenia, or Vertebral fracture    Receiving/expected to receive the equivalent of at least 5 mg of Prednisone per day for > 3 months    Hyperparathyroidism    Patient being monitored for  response to Osteoporosis Therapy     One-time AAA screen  Must be ordered as part of Medicare IPPE   Any patient with a family history of AAA    Males Age 65-75, with history of smoking at least 100 cigarettes in lifetime     Smoking Cessation Counseling Beneficiaries who use tobacco are eligible to receive 2 cessation attempts per year; each attempt includes maximum of 4 sessions     HIV Screening Annually for beneficiaries at increased risk:       Increased risk for HIV infection is defined in the  National Coverage Determinations (NCD) Manual,  Publication 100-03 Sections 190.14 (diagnostic) and 210.7 (screening). See http://www.cms.gov/manuals/downloads/eqn611c1_Kgdg5.pdf and http://www.cms.gov/manuals/downloads/joa558y3_Ysva6.pdf on the Internet.  Three times per pregnancy for beneficiaries who are pregnant.     Future Annual Wellness Visit Annually, for all beneficiaries.       Preventive Health Recommendations:     See your health care provider every year to    Review health changes.     Discuss preventive care.      Review your medicines if your doctor has prescribed any.    Talk with your health care provider about whether you should have a test to screen for prostate cancer (PSA).    Every 3 years, have a diabetes test (fasting glucose). If you are at risk for diabetes, you should have this test more often.    Every 5 years, have a cholesterol test. Have this test more often if you are at risk for high cholesterol or heart disease.     Every 10 years, have a colonoscopy. Or, have a yearly FIT test (stool test). These exams will check for colon cancer.    Talk to with your health care provider about screening for Abdominal Aortic Aneurysm if you have a family history of AAA or have a history of smoking.  Shots:     Get a flu shot each year.     Get a tetanus shot every 10 years.     Talk to your doctor about your pneumonia vaccines. There are now two you should receive - Pneumovax (PPSV 23) and Prevnar (PCV  13).    Talk to your pharmacist about a shingles vaccine.     Talk to your doctor about the hepatitis B vaccine.  Nutrition:     Eat at least 5 servings of fruits and vegetables each day.     Eat whole-grain bread, whole-wheat pasta and brown rice instead of white grains and rice.     Get adequate Calcium and Vitamin D.   Lifestyle    Exercise for at least 150 minutes a week (30 minutes a day, 5 days a week). This will help you control your weight and prevent disease.     Limit alcohol to one drink per day.     No smoking.     Wear sunscreen to prevent skin cancer.     See your dentist every six months for an exam and cleaning.     See your eye doctor every 1 to 2 years to screen for conditions such as glaucoma, macular degeneration and cataracts.    Personalized Prevention Plan  You are due for the preventive services outlined below.  Your care team is available to assist you in scheduling these services.  If you have already completed any of these items, please share that information with your care team to update in your medical record.    Health Maintenance Due   Topic Date Due     Wellness Visit with your Primary Provider - yearly  1955     HIV SCREEN (SYSTEM ASSIGNED)  10/07/1971     Hepatitis C Screening  10/07/1971     Zoster (Chicken Pox) Vaccine (1 of 2) 10/07/2003     Discuss Advance Directive Planning  10/07/2008     FALL RISK ASSESSMENT  10/07/2018     Pneumococcal Vaccine (1 of 2 - PCV13) 10/07/2018     AORTIC ANEURYSM SCREENING (SYSTEM ASSIGNED)  10/07/2018     Depression Assessment - yearly  01/31/2019

## 2019-01-22 ENCOUNTER — OFFICE VISIT (OUTPATIENT)
Dept: FAMILY MEDICINE | Facility: CLINIC | Age: 64
End: 2019-01-22
Payer: COMMERCIAL

## 2019-01-22 VITALS
DIASTOLIC BLOOD PRESSURE: 70 MMHG | OXYGEN SATURATION: 97 % | HEART RATE: 58 BPM | RESPIRATION RATE: 14 BRPM | TEMPERATURE: 97.5 F | SYSTOLIC BLOOD PRESSURE: 108 MMHG

## 2019-01-22 DIAGNOSIS — Z13.6 SCREENING FOR AAA (ABDOMINAL AORTIC ANEURYSM): ICD-10-CM

## 2019-01-22 DIAGNOSIS — E78.01 FAMILIAL HYPERCHOLESTEREMIA: Primary | ICD-10-CM

## 2019-01-22 RX ORDER — SIMVASTATIN 20 MG
20 TABLET ORAL AT BEDTIME
Qty: 90 TABLET | Refills: 3 | Status: SHIPPED | OUTPATIENT
Start: 2019-01-22 | End: 2020-01-16

## 2019-01-23 ASSESSMENT — ENCOUNTER SYMPTOMS
CARDIOVASCULAR NEGATIVE: 1
RESPIRATORY NEGATIVE: 1
ACTIVITY CHANGE: 0
PSYCHIATRIC NEGATIVE: 1
MUSCULOSKELETAL NEGATIVE: 1
FATIGUE: 0
APPETITE CHANGE: 0

## 2019-01-23 NOTE — PROGRESS NOTES
"       HPI       Larissa Rehman is a 63 year old  who presents for   Chief Complaint   Patient presents with     RECHECK     pt needs cholesterol medication refilled       Here for cholesterol medication refill  Recently seen for Medicare Wellness Visit (past 2 weeks)  - but when they called to ask for refills was told they \"needed an appointment\"    Last lipid check was 5/2018    HDL 82      Problem, Medication and Allergy Lists were reviewed and updated if needed..    Patient is an established patient of this clinic..         Review of Systems:   Review of Systems   Constitutional: Negative for activity change, appetite change and fatigue.   Respiratory: Negative.    Cardiovascular: Negative.    Musculoskeletal: Negative.    Psychiatric/Behavioral: Negative.             Physical Exam:     Vitals:    01/22/19 1610   BP: 108/70   BP Location: Left arm   Patient Position: Sitting   Cuff Size: Adult Regular   Pulse: 58   Resp: 14   Temp: 97.5  F (36.4  C)   TempSrc: Oral   SpO2: 97%     There is no height or weight on file to calculate BMI.  Vitals were reviewed and were normal     Physical Exam   Constitutional: He is oriented to person, place, and time. He appears well-developed and well-nourished.   HENT:   Head: Normocephalic and atraumatic.   Pulmonary/Chest: Effort normal.   Musculoskeletal: Normal range of motion.   Neurological: He is alert and oriented to person, place, and time.   Psychiatric: He has a normal mood and affect. His behavior is normal. Judgment and thought content normal.         Results:   No testing ordered today    Assessment and Plan        1. Familial hypercholesteremia  Refills provided  - simvastatin (ZOCOR) 20 MG tablet; Take 1 tablet (20 mg) by mouth At Bedtime  Dispense: 90 tablet; Refill: 3    AAA screening recommendation alert was on  - no h/o tobacco use, no FHx of AAA  - does not require screening       Medications Discontinued During This Encounter   Medication " Reason     SIMVASTATIN PO Reorder       Options for treatment and follow-up care were reviewed with the patient. Larissa Rehman  engaged in the decision making process and verbalized understanding of the options discussed and agreed with the final plan.    Carolina Coleman MD

## 2019-02-18 DIAGNOSIS — N95.1 MENOPAUSAL SYNDROME (HOT FLASHES): ICD-10-CM

## 2019-02-18 DIAGNOSIS — F64.0 GENDER DYSPHORIA IN ADULT: ICD-10-CM

## 2019-02-18 DIAGNOSIS — Z90.79 H/O BILATERAL ORCHIECTOMIES: ICD-10-CM

## 2019-02-18 RX ORDER — ESTRADIOL 0.05 MG/D
1 PATCH, EXTENDED RELEASE TRANSDERMAL
Qty: 8 PATCH | Refills: 2 | Status: SHIPPED | OUTPATIENT
Start: 2019-02-18 | End: 2019-05-13 | Stop reason: DRUGHIGH

## 2019-02-18 NOTE — TELEPHONE ENCOUNTER

## 2019-02-20 DIAGNOSIS — F64.0 GENDER DYSPHORIA IN ADULT: ICD-10-CM

## 2019-02-20 DIAGNOSIS — Z90.79 H/O BILATERAL ORCHIECTOMIES: ICD-10-CM

## 2019-02-20 NOTE — TELEPHONE ENCOUNTER
[facility] phone call message- patient requesting a refill:    Full Medication Name: estradiol (VIVELLE-DOT) 0.0375 MG/24HR BIW patch    Dose: Place 1 patch onto the skin twice a week - Transdermal    Pharmacy confirmed as   [id number]  [place] - [address]  [address] [place] [address]  Phone: [phone] Fax: [phone]  : Yes    Additional Comments: Pharmacist would like clarification on the dosage the patient is supposed to be taking. There is a refill request from 2/18 for 0.05mg but the pharmacy records show the patient is only taking 0.0375 mg.     OK to leave a message on voice mail? Yes    Primary language: English      needed? No    Call taken on February 20, 2019 at 4:19 PM by Kristine Grimm

## 2019-02-21 RX ORDER — ESTRADIOL 0.04 MG/D
1 PATCH, EXTENDED RELEASE TRANSDERMAL
Qty: 8 PATCH | Refills: 3 | Status: SHIPPED | OUTPATIENT
Start: 2019-02-21 | End: 2019-06-07

## 2019-02-21 NOTE — TELEPHONE ENCOUNTER
"PER PCP \"  Davon Zuñiga,     Could you please call the pharmacy back and say yes this is accurate, the patient uses both patch concentrations.     Thanks!     Sujata Mark, DO   PGY2 Family Medicine Resident   Pager: [phone]    Routing Comment      \"    RN called pharmacy to relay. Pharmacy was requesting refill. Sending to PCP    Audrey Bowman RN    "

## 2019-05-13 ENCOUNTER — OFFICE VISIT (OUTPATIENT)
Dept: FAMILY MEDICINE | Facility: CLINIC | Age: 64
End: 2019-05-13
Payer: COMMERCIAL

## 2019-05-13 VITALS
RESPIRATION RATE: 16 BRPM | TEMPERATURE: 98.1 F | OXYGEN SATURATION: 99 % | SYSTOLIC BLOOD PRESSURE: 117 MMHG | HEART RATE: 53 BPM | DIASTOLIC BLOOD PRESSURE: 77 MMHG

## 2019-05-13 DIAGNOSIS — F64.9 GENDER DYSPHORIA: Primary | ICD-10-CM

## 2019-05-13 DIAGNOSIS — Z91.89 AT RISK FOR OSTEOPOROSIS: ICD-10-CM

## 2019-05-13 LAB
ALBUMIN SERPL-MCNC: 4.8 MG/DL (ref 3.5–4.7)
ALP SERPL-CCNC: 28.9 U/L (ref 31.7–110.7)
ALT SERPL-CCNC: 17.8 U/L (ref 0–45)
AST SERPL-CCNC: 28.3 U/L (ref 0–55)
BILIRUB SERPL-MCNC: 0.9 MG/DL (ref 0.2–1.3)
BUN SERPL-MCNC: 8.9 MG/DL (ref 7–21)
CALCIUM SERPL-MCNC: 9.2 MG/DL (ref 8.5–10.1)
CHLORIDE SERPLBLD-SCNC: 101.8 MMOL/L (ref 98–110)
CHOLEST SERPL-MCNC: 196.3 MG/DL (ref 0–200)
CHOLEST/HDLC SERPL: 2.4 {RATIO} (ref 0–5)
CO2 SERPL-SCNC: 27.2 MMOL/L (ref 20–32)
CREAT SERPL-MCNC: 0.8 MG/DL (ref 0.7–1.3)
GFR SERPL CREATININE-BSD FRML MDRD: >90 ML/MIN/1.7 M2
GLUCOSE SERPL-MCNC: 106.6 MG'DL (ref 70–99)
HDLC SERPL-MCNC: 81.4 MG/DL
HEMOGLOBIN: 13.2 G/DL (ref 13.3–17.7)
LDLC SERPL CALC-MCNC: 108 MG/DL (ref 0–129)
POTASSIUM SERPL-SCNC: 3.7 MMOL/DL (ref 3.3–4.5)
PROT SERPL-MCNC: 7 G/DL (ref 6.8–8.8)
SODIUM SERPL-SCNC: 136.7 MMOL/L (ref 132.6–141.4)
TRIGL SERPL-MCNC: 35.1 MG/DL (ref 0–150)
VLDL CHOLESTEROL: 7 MG/DL (ref 7–32)

## 2019-05-13 ASSESSMENT — ENCOUNTER SYMPTOMS
NERVOUS/ANXIOUS: 0
DEPRESSION: 0
DIZZINESS: 0
FEVER: 0
WEIGHT LOSS: 0
HEADACHES: 0

## 2019-05-13 NOTE — PROGRESS NOTES
KENDRA Bush is a 63 year old individual that uses pronouns They/Them/Their/Theirs that presents today for follow up of:  feminizing hormone therapy. Gender identity: Female    Any special concerns today?  Concerned about health maintenance -patient is wondering if they need to have any bone density scanning given long-term use of estrogen.     On hormones?  YES +++ Shot day of the week? Not applicable-taking pills/patch/gel      Due for labs?  Yes      +++ Refills of meds needed?  No    Patient continues to wean off estrogen patches.  Describes having hot flashes they stopped taking the patch, has cut down on  ---    Past Surgical History:   Procedure Laterality Date     COLONOSCOPY       ORTHOPEDIC SURGERY      foot surgery      TRANSGENDER VAGINOPLASTY ZERO DEPTH N/A 3/29/2018    Procedure: TRANSGENDER VAGINOPLASTY ZERO DEPTH;  Zero Depth Transgender Vaginoplasty;  Surgeon: Madhu Mojica MD;  Location:  OR       Patient Active Problem List   Diagnosis     Familial hypercholesteremia     Gender dysphoria in adult     Seasonal mood disorder (H)     S/P foot surgery, left     H/O bilateral orchiectomies and zero-depth vaginoplasty 3/29/2018     DNI (do not intubate)     DNR (do not resuscitate)       Current Outpatient Medications   Medication Sig Dispense Refill     ASPIRIN PO Take 81 mg by mouth daily       DiphenhydrAMINE HCl (BENADRYL PO) Take 25 mg by mouth nightly as needed for Insomnia or itching       estradiol (VIVELLE-DOT) 0.0375 MG/24HR BIW patch Place 1 patch onto the skin twice a week 8 patch 3     simvastatin (ZOCOR) 20 MG tablet Take 1 tablet (20 mg) by mouth At Bedtime 90 tablet 3     EPINEPHrine (EPIPEN) 0.3 MG/0.3ML injection Inject 0.3 mLs (0.3 mg) into the muscle once as needed for anaphylaxis (Patient not taking: Reported on 9/5/2018) 2 each 1       History   Smoking Status     Never Smoker   Smokeless Tobacco     Never Used          Allergies   Allergen Reactions     Bee Venom       Problem, Medication and Allergy Lists were reviewed and are current..         Review of Systems:   Review of Systems   Constitutional: Negative for fever, malaise/fatigue and weight loss.   Cardiovascular: Negative for chest pain and leg swelling.   Genitourinary:        No abnormalities noted on self breast exams.   Neurological: Negative for dizziness and headaches.   Psychiatric/Behavioral: Negative for depression and suicidal ideas. The patient is not nervous/anxious.           Physical Exam:     Vitals:    05/13/19 1604   BP: 117/77   BP Location: Left arm   Patient Position: Sitting   Pulse: 53   Resp: 16   Temp: 98.1  F (36.7  C)   TempSrc: Oral   SpO2: 99%     BMI= There is no height or weight on file to calculate BMI.   Wt Readings from Last 10 Encounters:   01/15/19 56.6 kg (124 lb 12.8 oz)   09/05/18 57.1 kg (125 lb 12.8 oz)   07/06/18 57.3 kg (126 lb 4.8 oz)   06/29/18 56.9 kg (125 lb 6.4 oz)   05/25/18 57 kg (125 lb 9.6 oz)   04/26/18 57.1 kg (125 lb 12.8 oz)   04/10/18 54.4 kg (120 lb)   04/05/18 54.4 kg (120 lb)   03/30/18 56.2 kg (123 lb 14.4 oz)   03/08/18 54.4 kg (120 lb)     Appearance: agender appearance and dress  Physical Exam   Constitutional: She appears well-developed. No distress.   Thin   Cardiovascular: Normal rate.   Pulmonary/Chest: Effort normal. She exhibits no mass, no tenderness, no laceration, no deformity and no swelling. Right breast exhibits no inverted nipple, no mass and no skin change. Left breast exhibits no inverted nipple, no mass and no skin change. No breast discharge. Breasts are symmetrical.   Neurological: She is alert.   Skin: She is not diaphoretic.   Psychiatric: She has a normal mood and affect. Her speech is normal and behavior is normal. Judgment and thought content normal. Cognition and memory are normal. She is attentive.   Vitals reviewed.    Affect: Appropriate/mood-congruent         Labs:   Labs ordered and pending    Assessment and Plan     Larissa was  seen today for follow up.    Diagnoses and all orders for this visit:    Gender dysphoria  -     Lipid [place] ([facility])  -     Hemoglobin (HGB) ([facility])  -     Comprehensive Metabolic Panel ([facility])  -     Hepatitis C antibody    At risk for osteoporosis  -     DX Hip/Pelvis/Spine (DEXA); Future    Discussed with patient about healthcare maintenance, recommended shingles vaccine which patient is agreeable to and will have done at a pharmacy.  Patient declined pneumonia vaccine at this time.  Patient states that if they were to find out they had any breast cancer, they would not do anything about this, but would want to know about the diagnosis.  We discussed mammogram versus clinical breast exam.  Patient does have a small amount of breast tissue, which could be imaged through mammogram.  Patient performs self breast exams, and opted for clinical breast exam today.  Given patient has a low body mass index, is of  descent, and has been on estrogen for more than 5 years, they were not DEXA scan for osteoporosis screening given risk factors.    Contraception:   not needed     Follow up:  Follow up in 1 year, unless imaging or labs show any abnormality.  Results by martin  Questions were elicited and answered.     Sujata Mark, DO

## 2019-05-13 NOTE — PATIENT INSTRUCTIONS
1. Recommend Shingles (Zoster) vaccine  2. Recommend pneumonia (PCV13) vaccine  3. Please schedule DEXA scan (bone density scan)  4. Think about mammogram or breast exam    Here is the plan from today's visit    1. Gender dysphoria  - Lipid [place] ([facility])  - Hemoglobin (HGB) ([facility])  - Comprehensive Metabolic Panel ([facility])  - Hepatitis C antibody    2. At risk for osteoporosis  - DX Hip/Pelvis/Spine (DEXA); Future    Please call or return to clinic if your symptoms don't go away.    Follow up plan  Please make a clinic appointment for follow up with me (FLOR MORTON) in 1  Year if lab results are normal.    Thank you for coming to [facility] today.  Lab Testing:  **If you had lab testing today and your results are reassuring or normal they will be mailed to you or sent through [facility] within 7 days.   **If the lab tests need quick action we will call you with the results.  The phone number we will call with results is # [phone] (home) . If this is not the best number please call our clinic and change the number.  Medication Refills:  If you need any refills please call your pharmacy and they will contact us.   If you need to  your refill at a new pharmacy, please contact the new pharmacy directly. The new pharmacy will help you get your medications transferred faster.   Scheduling:  If you have any concerns about today's visit or wish to schedule another appointment please call our office during normal business hours [phone] (8-5:00 M-F)  If a referral was made to a [facility] and you don't get a call from central scheduling please call [phone].  If a Mammogram was ordered for you at [facility] call [phone] to schedule or change your appointment.  If you had an XRay/CT/Ultrasound/MRI ordered the number is [phone] to schedule or change your radiology appointment.   Medical Concerns:  If you have urgent medical concerns please call  [phone] at any time of the day.    Sujata Mark, DO

## 2019-05-13 NOTE — PROGRESS NOTES
Preceptor Attestation:   Patient seen, evaluated and discussed with the resident. I have verified the content of the note, which accurately reflects my assessment of the patient and the plan of care.   Supervising Physician:  Sravani Boyer MD

## 2019-05-15 LAB — HCV AB SERPL QL IA: NONREACTIVE

## 2019-06-07 DIAGNOSIS — Z90.79 H/O BILATERAL ORCHIECTOMIES: ICD-10-CM

## 2019-06-07 DIAGNOSIS — F64.0 GENDER DYSPHORIA IN ADULT: ICD-10-CM

## 2019-06-07 RX ORDER — ESTRADIOL 0.04 MG/D
1 PATCH, EXTENDED RELEASE TRANSDERMAL
Qty: 8 PATCH | Refills: 3 | Status: SHIPPED | OUTPATIENT
Start: 2019-06-10 | End: 2019-08-29

## 2019-06-07 NOTE — TELEPHONE ENCOUNTER
"Request for medication refill:estradiol (VIVELLE-DOT) 0.0375 MG/24HR BIW patch    Providers if patient needs an appointment and you are willing to give a one month supply please refill for one month and  send a letter/MyChart using \".SMILLIMITEDREFILL\" .smillimited and route chart to \"P [facility] \" (Giving one month refill in non controlled medications is strongly recommended before denial)    If refill has been denied, meaning absolutely no refills without visit, please complete the smart phrase \".smirxrefuse\" and route it to the \"P [facility] MED REFILLS\"  pool to inform the patient and the pharmacy.    Law Na, MA        "

## 2019-08-29 ENCOUNTER — OFFICE VISIT (OUTPATIENT)
Dept: FAMILY MEDICINE | Facility: CLINIC | Age: 64
End: 2019-08-29
Payer: COMMERCIAL

## 2019-08-29 ENCOUNTER — PATIENT OUTREACH (OUTPATIENT)
Dept: PLASTIC SURGERY | Facility: CLINIC | Age: 64
End: 2019-08-29

## 2019-08-29 VITALS
SYSTOLIC BLOOD PRESSURE: 120 MMHG | BODY MASS INDEX: 20.83 KG/M2 | TEMPERATURE: 94.7 F | RESPIRATION RATE: 16 BRPM | WEIGHT: 125 LBS | DIASTOLIC BLOOD PRESSURE: 77 MMHG | OXYGEN SATURATION: 100 % | HEIGHT: 65 IN | HEART RATE: 50 BPM

## 2019-08-29 DIAGNOSIS — F64.0 GENDER DYSPHORIA IN ADULT: ICD-10-CM

## 2019-08-29 DIAGNOSIS — Z90.79 H/O BILATERAL ORCHIECTOMIES: ICD-10-CM

## 2019-08-29 DIAGNOSIS — T78.40XD ALLERGIC REACTION, SUBSEQUENT ENCOUNTER: Primary | ICD-10-CM

## 2019-08-29 RX ORDER — FEXOFENADINE HCL 180 MG/1
180 TABLET ORAL DAILY
Qty: 30 TABLET | Refills: 11 | Status: SHIPPED | OUTPATIENT
Start: 2019-08-29 | End: 2021-08-12

## 2019-08-29 RX ORDER — ESTRADIOL 0.04 MG/D
1 PATCH, EXTENDED RELEASE TRANSDERMAL
Qty: 8 PATCH | Refills: 3 | Status: SHIPPED | OUTPATIENT
Start: 2019-08-29 | End: 2019-09-17

## 2019-08-29 ASSESSMENT — MIFFLIN-ST. JEOR: SCORE: 1278.88

## 2019-08-29 NOTE — PATIENT INSTRUCTIONS
Left message for pt regarding if she would be interested in coming for a short follow up to discuss making the external appearance more feminine. If she has no concerns, this is not needed but we wanted to ensure she was aware it is an option. Provided direct contact information and requested call back to discuss. Maryellen HILL RNCC

## 2019-08-29 NOTE — PROGRESS NOTES
KENDRA Bush is a 63 year old individual that uses pronouns They/Them/Their/Theirs that presents today for follow up of:  feminizing hormone therapy.     Gender identity: female    Any special concerns today?    Feeling like the allergy season has been worse than usual. Broke out in hives after eating a corn product. Patient was frustrated and was concerned patches were related - stopped using patches for a few weeks.  Patient describes having a hypersensitive immune system during allergy season, which resulted in their reaction to eating a corn product, as well as breaking out in a rash underneath their estrogen patches.    Hot flashes are not as bad as last year. Hot flashes are disruptive to sleep for them, and this makes it hard for them to work.   Planning on retiring in a year, and so sleep will not be as critical.     One patch a week most of the time.     On hormones?  YES +++ Shot day of the week? Not applicable-taking pills/patch/gel      Due for labs?  No      +++ Refills of meds needed?  Yes    Past Surgical History:   Procedure Laterality Date     COLONOSCOPY       ORTHOPEDIC SURGERY      foot surgery      TRANSGENDER VAGINOPLASTY ZERO DEPTH N/A 3/29/2018    Procedure: TRANSGENDER VAGINOPLASTY ZERO DEPTH;  Zero Depth Transgender Vaginoplasty;  Surgeon: Madhu Mojica MD;  Location: [facility] OR       Patient Active Problem List   Diagnosis     Familial hypercholesteremia     Gender dysphoria in adult     Seasonal mood disorder (H)     S/P foot surgery, left     H/O bilateral orchiectomies and zero-depth vaginoplasty 3/29/2018     DNI (do not intubate)     DNR (do not resuscitate)       Current Outpatient Medications   Medication Sig Dispense Refill     ASPIRIN PO Take 81 mg by mouth daily       DiphenhydrAMINE HCl (BENADRYL PO) Take 25 mg by mouth nightly as needed for Insomnia or itching       estradiol (VIVELLE-DOT) 0.0375 MG/24HR BIW patch Place 1 patch onto the skin twice a week 8 patch 3      "simvastatin (ZOCOR) 20 MG tablet Take 1 tablet (20 mg) by mouth At Bedtime 90 tablet 3     EPINEPHrine (EPIPEN) 0.3 MG/0.3ML injection Inject 0.3 mLs (0.3 mg) into the muscle once as needed for anaphylaxis (Patient not taking: Reported on 9/5/2018) 2 each 1       History   Smoking Status     Never Smoker   Smokeless Tobacco     Never Used          Allergies   Allergen Reactions     Bee Venom        Problem, Medication and Allergy Lists were reviewed and are current..         Review of Systems:     Review of Systems   Constitutional: Negative for fever.   HENT: Negative.    Eyes: Negative for discharge and redness.   Respiratory: Negative for shortness of breath and wheezing.    Cardiovascular: Negative.    Musculoskeletal: Negative.    Skin: Negative for itching and rash.   Neurological: Negative.    Psychiatric/Behavioral: Negative.           Physical Exam:     Vitals:    08/29/19 0805   BP: 120/77   Pulse: 50   Resp: 16   Temp: 94.7  F (34.8  C)   TempSrc: Oral   SpO2: 100%   Weight: 56.7 kg (125 lb)   Height: 1.651 m (5' 5\")     BMI= Body mass index is 20.8 kg/m .   Wt Readings from Last 10 Encounters:   08/29/19 56.7 kg (125 lb)   01/15/19 56.6 kg (124 lb 12.8 oz)   09/05/18 57.1 kg (125 lb 12.8 oz)   07/06/18 57.3 kg (126 lb 4.8 oz)   06/29/18 56.9 kg (125 lb 6.4 oz)   05/25/18 57 kg (125 lb 9.6 oz)   04/26/18 57.1 kg (125 lb 12.8 oz)   04/10/18 54.4 kg (120 lb)   04/05/18 54.4 kg (120 lb)   03/30/18 56.2 kg (123 lb 14.4 oz)     Appearance: Female appearance and dress    Physical Exam   Constitutional: She appears well-developed and well-nourished. No distress.   thin   HENT:   Nose: Nose normal.   Mouth/Throat: Oropharynx is clear and moist. No oropharyngeal exudate.   Eyes: Conjunctivae are normal.   Cardiovascular: Normal rate.   Pulmonary/Chest: Effort normal.   Neurological: She is alert.   Skin: No rash noted. She is not diaphoretic.   Psychiatric: She has a normal mood and affect. Her behavior is normal. " Judgment and thought content normal.   Vitals reviewed.         Labs:   No testing ordered today    Assessment and Plan     Larissa was seen today for recheck.    Diagnoses and all orders for this visit:    Allergic reaction, subsequent encounter  -     fexofenadine (ALLEGRA) 180 MG tablet; Take 1 tablet (180 mg) by mouth daily    Gender dysphoria in adult  -     estradiol (VIVELLE-DOT) 0.0375 MG/24HR BIW patch; Place 1 patch onto the skin twice a week    H/O bilateral orchiectomies  -     estradiol (VIVELLE-DOT) 0.0375 MG/24HR BIW patch; Place 1 patch onto the skin twice a week      We discussed controlling systemic allergy symptoms when the season arises, patient tried previous medications, has not tried Allegra, would like to take in the spring when the seasons change and their allergy symptoms arise.  Plan will be to continue estradiol, patient is weaning off as tolerated given hot flashes, with goal to be off estradiol in the next year.  Patient feels physical body is in alignment with gender, does not need estrogen, however, given they have been on it for a long time is dependent on it and has withdrawal symptoms.  Patient is self weaning.    Follow up:  Follow up in 6 months.  Results by [facility]  Questions were elicited and answered.     Sujata Mark, DO

## 2019-08-29 NOTE — PROGRESS NOTES
Preceptor Attestation:   Patient seen, evaluated and discussed with the resident. I have verified the content of the note, which accurately reflects my assessment of the patient and the plan of care.   Supervising Physician:  Laura Auguste MD MD

## 2019-09-06 ASSESSMENT — ENCOUNTER SYMPTOMS
WHEEZING: 0
SHORTNESS OF BREATH: 0
MUSCULOSKELETAL NEGATIVE: 1
CARDIOVASCULAR NEGATIVE: 1
PSYCHIATRIC NEGATIVE: 1
FEVER: 0
EYE DISCHARGE: 0
EYE REDNESS: 0
NEUROLOGICAL NEGATIVE: 1

## 2019-09-16 DIAGNOSIS — Z90.79 H/O BILATERAL ORCHIECTOMIES: ICD-10-CM

## 2019-09-16 DIAGNOSIS — F64.0 GENDER DYSPHORIA IN ADULT: ICD-10-CM

## 2019-09-16 NOTE — TELEPHONE ENCOUNTER

## 2019-09-17 RX ORDER — ESTRADIOL 0.04 MG/D
1 PATCH, EXTENDED RELEASE TRANSDERMAL
Qty: 8 PATCH | Refills: 3 | Status: SHIPPED | OUTPATIENT
Start: 2019-09-19 | End: 2020-01-22

## 2019-10-03 ENCOUNTER — HEALTH MAINTENANCE LETTER (OUTPATIENT)
Age: 64
End: 2019-10-03

## 2020-01-13 DIAGNOSIS — E78.01 FAMILIAL HYPERCHOLESTEREMIA: ICD-10-CM

## 2020-01-13 NOTE — TELEPHONE ENCOUNTER
"Request for medication refill: simvastatin (ZOCOR) 20 MG tablet    Providers if patient needs an appointment and you are willing to give a one month supply please refill for one month and  send a letter/MyChart using \".SMILLIMITEDREFILL\" .smillimited and route chart to \"P [facility] \" (Giving one month refill in non controlled medications is strongly recommended before denial)    If refill has been denied, meaning absolutely no refills without visit, please complete the smart phrase \".smirxrefuse\" and route it to the \"P [facility] MED REFILLS\"  pool to inform the patient and the pharmacy.    Francoise Napoles CMA        "

## 2020-01-16 RX ORDER — SIMVASTATIN 20 MG
20 TABLET ORAL AT BEDTIME
Qty: 90 TABLET | Refills: 3 | Status: SHIPPED | OUTPATIENT
Start: 2020-01-16 | End: 2021-03-02

## 2020-01-22 DIAGNOSIS — F64.0 GENDER DYSPHORIA IN ADULT: ICD-10-CM

## 2020-01-22 DIAGNOSIS — Z90.79 H/O BILATERAL ORCHIECTOMIES: ICD-10-CM

## 2020-01-22 RX ORDER — ESTRADIOL 0.04 MG/D
1 PATCH, EXTENDED RELEASE TRANSDERMAL
Qty: 8 PATCH | Refills: 3 | Status: SHIPPED | OUTPATIENT
Start: 2020-01-23 | End: 2020-05-07

## 2020-01-22 NOTE — TELEPHONE ENCOUNTER
"Request for medication refill: estradiol (VIVELLE-DOT) 0.0375 MG/24HR BIW patch    Providers if patient needs an appointment and you are willing to give a one month supply please refill for one month and  send a letter/MyChart using \".SMILLIMITEDREFILL\" .smillimited and route chart to \"P SMI \" (Giving one month refill in non controlled medications is strongly recommended before denial)    If refill has been denied, meaning absolutely no refills without visit, please complete the smart phrase \".smirxrefuse\" and route it to the \"P SMI MED REFILLS\"  pool to inform the patient and the pharmacy.    Brigid Magdaleno CMA    \  "

## 2020-03-22 ENCOUNTER — HEALTH MAINTENANCE LETTER (OUTPATIENT)
Age: 65
End: 2020-03-22

## 2020-05-06 DIAGNOSIS — Z90.79 H/O BILATERAL ORCHIECTOMIES: ICD-10-CM

## 2020-05-06 DIAGNOSIS — F64.0 GENDER DYSPHORIA IN ADULT: ICD-10-CM

## 2020-05-06 NOTE — TELEPHONE ENCOUNTER
"Request for medication refill: estradiol (VIVELLE-DOT) 0.0375 MG/24HR BIW patch     Providers if patient needs an appointment and you are willing to give a one month supply please refill for one month and  send a letter/MyChart using \".SMILLIMITEDREFILL\" .smillimited and route chart to \"P SMI \" (Giving one month refill in non controlled medications is strongly recommended before denial)    If refill has been denied, meaning absolutely no refills without visit, please complete the smart phrase \".smirxrefuse\" and route it to the \"P SMI MED REFILLS\"  pool to inform the patient and the pharmacy.    Brigid Magdaleno, CMA        "

## 2020-05-07 RX ORDER — ESTRADIOL 0.04 MG/D
1 PATCH, EXTENDED RELEASE TRANSDERMAL
Qty: 8 PATCH | Refills: 3 | Status: SHIPPED | OUTPATIENT
Start: 2020-05-07 | End: 2020-08-24

## 2020-08-24 DIAGNOSIS — Z90.79 H/O BILATERAL ORCHIECTOMIES: ICD-10-CM

## 2020-08-24 DIAGNOSIS — F64.0 GENDER DYSPHORIA IN ADULT: ICD-10-CM

## 2020-08-24 RX ORDER — ESTRADIOL 0.04 MG/D
1 PATCH, EXTENDED RELEASE TRANSDERMAL
Qty: 24 PATCH | Refills: 0 | Status: SHIPPED | OUTPATIENT
Start: 2020-08-24 | End: 2020-09-25

## 2020-08-24 NOTE — TELEPHONE ENCOUNTER

## 2020-09-22 NOTE — PATIENT INSTRUCTIONS
Here is the plan from today's visit    1. Encounter for Medicare annual wellness exam  - FLUZONE HIGH DOSE 65+  [97900]  - Comprehensive Metabolic Panel (LabDAQ)  - Lipid Panel (LabDAQ)  - Hemoglobin (HGB) (LabDAQ)  - Glucose Casual (LabDAQ)    2. Gender dysphoria in adult  - Estradiol  - Testosterone total    3. Floaters, left  - EYE ADULT REFERRAL; Future      Please call or return to clinic if your symptoms don't go away.    Follow up plan  Please make a clinic appointment for follow up with your primary physician Kirstin Ott MD in 1 year for AVW.    Thank you for coming to [facility] today.  Lab Testing:  **If you had lab testing today and your results are reassuring or normal they will be mailed to you or sent through [facility] within 7 days.   **If the lab tests need quick action we will call you with the results.  The phone number we will call with results is # [phone] (home) . If this is not the best number please call our clinic and change the number.  Medication Refills:  If you need any refills please call your pharmacy and they will contact us.   If you need to  your refill at a new pharmacy, please contact the new pharmacy directly. The new pharmacy will help you get your medications transferred faster.   Scheduling:  If you have any concerns about today's visit or wish to schedule another appointment please call our office during normal business hours [phone] (8-5:00 M-F)  If a referral was made to a [facility] and you don't get a call from central scheduling please call [phone].  If a Mammogram was ordered for you at [facility] call [phone] to schedule or change your appointment.  If you had an XRay/CT/Ultrasound/MRI ordered the number is [phone] to schedule or change your radiology appointment.   Medical Concerns:  If you have urgent medical concerns please call [phone] at any time of the day.    Kirstin Ott,  MD      If you had an XRay/CT/Ultrasound/MRI ordered the number is [phone] to schedule or change your radiology appointment.   Medical Concerns:  If you have urgent medical concerns please call [phone] at any time of the day.    Kirstin Ott MD  [facility] PERSONAL PREVENTIVE SERVICES PLAN - SERVICES     Review these tests with your doctor then decide which ones you want and take this page home for your reference  Immunization History   Administered Date(s) Administered     Influenza (IIV3) PF 10/11/2001, 10/15/2009, 10/11/2011, 10/19/2012, 10/24/2013, 09/30/2014, 10/06/2017     Influenza Vaccine IM > 6 months Valent IIV4 09/25/2018     TDAP Vaccine (Boostrix) 07/15/2016          IMMUNIZATIONS Description Recommend today?     Influenza (flu shot) Helps to prevent flu; you should get it every year Yes; Recommended and ordered.   PCV 13 Prevents pneumonia; given once No; is up to date.   PPSV 23 Prevents pneumonia; given once No; is up to date.   Tetanus Prevents tetanus; need every 10 years No; is up to date.   Herpes Zoster (shingles) Prevents or lessens symptoms from shingles; given once Yes; Recommended and ordered.   Hepatitis B  If you have any of the following risk factors you should be immunized for hepatitis B: severe kidney disease, people who live in the same house as a carrier of Hepatitis B virus, people who live in  institutions (e.g. nursing homes or group homes), homosexual men, patients with hemophilia who received Factor VIII or IX concentrates, abusers of illicit injectable drugs Recommeded and patient declined.            Health Maintenance   Topic Date Due     ADVANCE CARE PLANNING  1955     MAMMO SCREENING  1955     ZOSTER IMMUNIZATION (1 of 2) 10/07/2003     PNEUMOCOCCAL IMMUNIZATION 65+ LOW/MEDIUM RISK (1 of 2 - PCV13) 10/07/2018     NORTH ASSESSMENT  01/31/2019     PHQ-2  01/01/2020     MEDICARE ANNUAL WELLNESS VISIT  01/15/2020     FALL RISK ASSESSMENT   01/15/2020     PHQ-9  01/15/2020     INFLUENZA VACCINE (1) 09/01/2020     LIPID  05/13/2024     DTAP/TDAP/TD IMMUNIZATION (2 - Td) 07/15/2026     COLORECTAL CANCER SCREENING  09/21/2028     HEPATITIS C SCREENING  Completed     AORTIC ANEURYSM SCREENING (SYSTEM ASSIGNED)  Completed     IPV IMMUNIZATION  Aged Out     MENINGITIS IMMUNIZATION  Aged Out     HEPATITIS B IMMUNIZATION  Aged Out       SCREENING TESTS     Description   Year of Last Screening   Recommended Today?   Heart disease screening blood tests    Cholesterol level Reducing cholesterol can reduce your risk of heart attacks by 25%.  Screening is recommended yearly if you are at risk of heart disease otherwise every 4-5 years  Yes; Recommended and ordered.   Diabetes screening tests    Hemoglobin A1c blood test   Finding and treating diabetes early can reduce complications.  Screening recommended/covered yearly if you have high blood pressure, high cholesterol, obesity (BMI >30), or a history of high blood glucose tests; or 2 of the following: family history of diabetes, overweight (BMI >25 but <30), age 65 years or older, and a history of diabetes of pregnancy or gave birth to baby weighing more than 9 lbs.  Yes; Recommended and ordered.   Hepatitis B screening Finding hepatitis B early can reduce complications.  Screening is recommended for persons with selected risk factors.  Recommeded and patient declined.    Hepatitis C screening Finding hepatitis C early can reduce complications.  Screening is recommended for all persons born from 1945 through 1965 and for those with selected other risk factors.   No; is up to date.   HIV screening Finding HIV early can reduce complications.  Screening is recommended for persons with risk factors for HIV infection.  No; is up to date.   Glaucoma screening Early detection of glaucoma can prevent blindness.   Please talk to your eye doctor about this.           SCREENING TESTS     Description   Year of Last Screening    Recommended Today?   Colorectal cancer screening    Fecal occult blood test     Screening colonoscopy Screening for colon cancer has been shown to reduce death from colon cancer by 25-30%. Screening recommended to start at 50 years and continuing until age 75 years.    No; is up to date.   Breast Cancer Screening (women)    Mammogram Mammogram screening for breast cancer has been shown to reduce the risk of dying from breast cancer and prolong life. Screening is recommended every 1-2 years for women aged 50 to 74 years.   Recommeded and patient declined.    Cervical Cancer screening (women)    Pap Cervical pap smears can reduce cervical cancer. Screening is recommended annually if high risk (history of abnormal pap smears) otherwise every 2-3 years, stop screening at 65 years of age if history of normal paps.  No Cervix    Screening for Osteoporosis:  Bone mass measurements (women)    Dexa Scan Screening and treating Osteoporosis can reduce the risk of hip and spine fractures. Screening is recommended in women 65 years or older and in women and men at risk of osteoporosis.  Recommeded and patient declined.    Screening for Lung Cancer     Low-dose CT scanning Screening can reduce mortality in persons aged 55-80 who have smoked at least 30 pack-years and who are either still smoking or have quit in the past 15 years.  No: is not indicated today.   Abdominal Aortic Aneurysm (AAA) screening    Ultrasound (US)   An aneurysm treated before rupture is very safe -a ruptured aneurysm can be fatal.  Screening  by US for AAA is limited to patients who meet one of the following criteria:    Men who are 65-75 years old and have smoked more than 100 cigarettes in their lifetime    Anyone with a family history of abdominal aortic aneurysm  No: is not indicated today.       MEDICARE WELLNESS EXAM PATIENT INSTRUCTIONS    Yearly exam:     See your health care provider every year in order to review changes in your health, review  medicines that you take, and discuss preventive care needs such as immunizations and cancer screening.    Get a flu shot each year.     Advance Directive:    If you have not done so, you are encouraged to complete an advance directive. If you would like support with this, please contact the clinic  through the main clinic line. More information about advance directives can be found at:     [email or web address]    Nutrition:     Eat at least 5 servings of fruits and vegetables each day.     Eat whole-grain bread, whole-wheat pasta and brown rice instead of white grains and rice.     Talk to your doctor about Calcium and Vitamin D.     Lifestyle:    Exercise for at least 150 minutes a week (30 minutes a day, 5 days a week). This will help you control your weight and prevent disease.     Limit alcohol to one drink per day.     If you smoke, try to quit - your doctor will be happy to help.     Wear sunscreen to prevent skin cancer.     See your dentist every six months for an exam and cleaning.     See your eye doctor every 1 to 2 years to screen for conditions such as glaucoma, macular degeneration and cataracts.          Personalized Prevention Plan  You are due for the preventive services outlined below.  Your care team is available to assist you in scheduling these services.  If you have already completed any of these items, please share that information with your care team to update in your medical record.  Health Maintenance Due   Topic Date Due     Discuss Advance Care Planning  1955     Mammogram  1955     Zoster (Shingles) Vaccine (1 of 2) 10/07/2003     Pneumococcal Vaccine (1 of 2 - PCV13) 10/07/2018     Anxiety Assessment  01/31/2019     PHQ-2  01/01/2020     Annual Wellness Visit  01/15/2020     FALL RISK ASSESSMENT  01/15/2020     Depression Assessment  01/15/2020     Flu Vaccine (1) 09/01/2020     Patient Education   Personalized  Prevention Plan  You are due for the preventive services outlined below.  Your care team is available to assist you in scheduling these services.  If you have already completed any of these items, please share that information with your care team to update in your medical record.  Health Maintenance Due   Topic Date Due     Discuss Advance Care Planning  1955     Mammogram  1955     Zoster (Shingles) Vaccine (1 of 2) 10/07/2003     Pneumococcal Vaccine (1 of 2 - PCV13) 10/07/2018     Anxiety Assessment  01/31/2019     PHQ-2  01/01/2020     Annual Wellness Visit  01/15/2020     FALL RISK ASSESSMENT  01/15/2020     Depression Assessment  01/15/2020     Flu Vaccine (1) 09/01/2020

## 2020-09-22 NOTE — PROGRESS NOTES
Medicare Annual Wellness Visit         HPI     This 64 year old adult presents as an established patient  Kirstin Ott who presents for an subsequent Medicare Wellness Exam.  Patient also reports checking hormones (checks every year)    Two weeks ago and blurry vision in left eye that lasted for a week where she was having floaters, but it's gone now.          Patient Active Problem List   Diagnosis     Familial hypercholesteremia     Gender dysphoria in adult     Seasonal mood disorder (H)     S/P foot surgery, left     H/O bilateral orchiectomies and zero-depth vaginoplasty 3/29/2018     DNI (do not intubate)     DNR (do not resuscitate)       Past Medical History:   Diagnosis Date     High cholesterol         History reviewed. No pertinent family history.      Past Surgical History:   Procedure Laterality Date     COLONOSCOPY       ORTHOPEDIC SURGERY      foot surgery      TRANSGENDER VAGINOPLASTY ZERO DEPTH N/A 3/29/2018    Procedure: TRANSGENDER VAGINOPLASTY ZERO DEPTH;  Zero Depth Transgender Vaginoplasty;  Surgeon: Madhu Mojica MD;  Location: [facility]       Reviewed no other significant FH    Family History and past Medical History reviewed and it is unchanged/updated.       Review of Systems       Constitutional:   fevers, night sweats or unintentional weight change ?  Yes, sweating (seems like hot flashes) happens out of nowhere getting  Eyes:   vision change, diplopia or red eyes?  Yes, blurred vision in one eye for a week has gone away      Ears, Nose, Mouth, Throat:   tinnitus or hearing change,  epistaxis or nasal discharge,  oral lesions, throat pain ?  NO      Neck:   stiffness?  NO           Cardiovascular:   chest pain, palpitations, or pain with walking, orthopnea or PND?  NO   Breasts:  Any bumps or unusual discharge?     NO         Respiratory:   dyspnea, cough, shortness of breath or wheezing?  NO         GI:   nausea, vomiting, diarrhea or constipation,  abdominal pain ?  NO         :    change in urine,  dysuria or hematuria,  sexual dysfunction ?  NO        Musculoskeletal:   joint or muscle pain or swelling?  NO            Skin:   concerning lesions or moles?  NO           Nervous System:   loss of strength or sensation,  numbness or tingling,  tremor,  dizziness,  headache?  NO   Endocrine/Homone:   polyuria or polydipsia,  temperature intolerance?  NO            Blood and Lymphnodes:   concerning bumps,  bleeding problems?  NO            Allergy:   environmental allergies?  NO            Mental Health:   depression or anxiety,  sleep problems?  NO               Medical Care     Have you been to an ER or a hospital in the last year? No  What other specialists or organizations are involved in your medical care?  No  Current providers sharing in care for this patient include:  Patient Care Team:  Kirstin Ott MD as PCP - General  Madhu Mojica MD as MD (Plastic Surgery)  Daryl Mishra as Specialty Care Coordinator (Plastic Surgery)  Kirstin Ott MD as Assigned PCP         Social History     Social History     Tobacco Use     Smoking status: Never Smoker     Smokeless tobacco: Never Used   Substance Use Topics     Alcohol use: Yes     Comment: wine     Marital Status:Single  Who lives in your household? Themselves   Does your home have any of the following safety concerns? Loose rugs in the hallway, no grab bars in the bathroom, no handrails on the stairs or have poorly lit areas?  No  Do you feel threatened or controlled by a partner, ex-partner or anyone in your life? No  Has anyone hurt you physically, for example by pushing, hitting, slapping or kicking you   or forcing you to have sex? No  Do you need help with the phone, transportation, shopping, preparing meals, housework, laundry, medications or managing money? No   Have you noticed any hearing difficulties? No      Risk Behaviors and Healthy Habits     How many servings of fruits and vegetables do you eat a day? 5 servings  vegetables per day, no fruit  How often do you exercise and what do you do? 2hrs per day, running, biking, lifting weights x's 2-3 days per week   Do you frequently ride without a seatbelt? No  Do you use tobacco?  No  Do you use any other drugs? No         Do you use alcohol?Yes, one bottle of wine per week    Today's PHQ-2 Score: 0    Sexual Health     Are you sexually active?  No   If yes, with men, women, or both? asexual  If yes, do you more than one current partner?No  If yes, are you using condoms?  No  Have you had any sexually transmitted infections? No   Any sexual concerns? No     FUNCTIONAL ABILITY/SAFETY SCREENING     Fall Risk Assessment Today:  no falls this year, Get up and go less than 10 seconds    EVALUATION OF COGNITIVE FUNCTION     Mood/affect:Normal  Appearance:Normal  Family member/caregiver input: Normal    Mini Cog Scoring   3 points   Clock Draw Test result:  Normal      SCREENING FOR PREVENTION and EARLY DETECTION     Screening Lipid Level (covered every 5 years ): Recommended and patient accepted testing.  Colon CA Screening (>50-75 ) (FITT annually or colonoscopy every 10years):  Date done 2018  Result(s) sessile adenoma 5 year follow up  Breast CA Screenin-2 years 50-74years:  Recommended and patient declined testing., Dexa Scan (>65 yrs) (covered every 2 years):  Recommended and patient declined testing. and Diabetes Screening : FBG covered if at risk (obesity, HTN, dyslipidemia, FH): Recommended and patient accepted testing.    CV Risk based on Pooled Cohort Risk:  The 10-year ASCVD risk score (Zahldomi HICKMAN Jr., et al., 2013) is: 9.5%    Values used to calculate the score:      Age: 64 years      Sex: Male      Is Non- : No      Diabetic: No      Tobacco smoker: No      Systolic Blood Pressure: 121 mmHg      Is BP treated: No      HDL Cholesterol: 81.4 mg/dL      Total Cholesterol: 196.3 mg/dL    Advanced Directives: Discussed and patient desires to be  "DNR/DNI.      Immunization History   Administered Date(s) Administered     Influenza (IIV3) PF 10/11/2001, 10/15/2009, 10/11/2011, 10/19/2012, 10/24/2013, 09/30/2014, 10/06/2017     Influenza Vaccine IM > 6 months Valent IIV4 09/25/2018     TDAP Vaccine (Boostrix) 07/15/2016       Reviewed Immunization Record Today  Pneumoccocal Vaccine: No  Varicella Vaccine: No  TDaP:No         Physical Exam     Vitals: /77   Pulse 57   Temp 97.9  F (36.6  C) (Oral)   Resp 16   Ht 1.665 m (5' 5.55\")   Wt 61.5 kg (135 lb 9.6 oz)   SpO2 97%   BMI 22.19 kg/m    BMI= Body mass index is 22.19 kg/m .  GENERAL APPEARANCE: healthy, alert and no distress  EYES: Eyes grossly normal to inspection, PERRL and conjunctivae and sclerae normal  HENT: ear canals and TM's normal, nose and mouth without ulcers or lesions, oropharynx clear and oral mucous membranes moist  NECK: no adenopathy, no asymmetry, masses, or scars and thyroid normal to palpation  RESP: lungs clear to auscultation - no rales, rhonchi or wheezes  BREAST: normal without masses, tenderness or nipple discharge and no palpable axillary masses or adenopathy  CV: regular rate and rhythm, normal S1 S2, no S3 or S4, no murmur, click or rub, no peripheral edema and peripheral pulses strong  ABDOMEN: soft, nontender, no hepatosplenomegaly, no masses and bowel sounds normal  MS: no musculoskeletal defects are noted and gait is age appropriate without ataxia  SKIN: no suspicious lesions or rashes  NEURO: Normal strength and tone, sensory exam grossly normal, mentation intact and speech normal  PSYCH: mentation appears normal and affect normal/bright        Assessment and Plan   subsequent   Medicare Wellness Exam  1. Health Care Maintenance: Normal Physical Exam    PLAN:  1. Reviewed [facility] Preventive Services form with patient and preventive service plan is as below., Lipid panel ordered. , Patient referred to ophthalmology for glaucoma testing. , Blood glucose ordered as " patient at risk for DM 2 and Routine follow up in one year.   2. Plan to be off estrogen patches entirely this year, currently off    Boukei was seen today for wellness visit and refill request.    Diagnoses and all orders for this visit:    Encounter for Medicare annual wellness exam          Options for treatment and follow-up care were reviewed with the Larissa Rehman and/or guardian engaged in the decision making process and verbalized understanding of the options discussed and agreed with the final plan.    JAD ABRAHAM

## 2020-09-23 ENCOUNTER — OFFICE VISIT (OUTPATIENT)
Dept: FAMILY MEDICINE | Facility: CLINIC | Age: 65
End: 2020-09-23
Payer: COMMERCIAL

## 2020-09-23 VITALS
WEIGHT: 135.6 LBS | HEART RATE: 57 BPM | DIASTOLIC BLOOD PRESSURE: 77 MMHG | SYSTOLIC BLOOD PRESSURE: 121 MMHG | HEIGHT: 66 IN | RESPIRATION RATE: 16 BRPM | BODY MASS INDEX: 21.79 KG/M2 | OXYGEN SATURATION: 97 % | TEMPERATURE: 97.9 F

## 2020-09-23 DIAGNOSIS — Z23 NEED FOR PROPHYLACTIC VACCINATION AND INOCULATION AGAINST INFLUENZA: ICD-10-CM

## 2020-09-23 DIAGNOSIS — Z90.79 H/O BILATERAL ORCHIECTOMIES: ICD-10-CM

## 2020-09-23 DIAGNOSIS — Z00.00 ENCOUNTER FOR MEDICARE ANNUAL WELLNESS EXAM: Primary | ICD-10-CM

## 2020-09-23 DIAGNOSIS — F64.0 GENDER DYSPHORIA IN ADULT: ICD-10-CM

## 2020-09-23 DIAGNOSIS — H43.392 FLOATERS, LEFT: ICD-10-CM

## 2020-09-23 LAB
ALBUMIN SERPL-MCNC: 4.9 MG/DL (ref 3.5–4.7)
ALP SERPL-CCNC: 33.8 U/L (ref 31.7–110.7)
ALT SERPL-CCNC: 14.3 U/L (ref 0–45)
AST SERPL-CCNC: 24.5 U/L (ref 0–55)
BILIRUB SERPL-MCNC: 0.7 MG/DL (ref 0.2–1.3)
BUN SERPL-MCNC: 17.8 MG/DL (ref 7–21)
CALCIUM SERPL-MCNC: 10.1 MG/DL (ref 8.5–10.1)
CHLORIDE SERPLBLD-SCNC: 96.8 MMOL/L (ref 98–110)
CHOLEST SERPL-MCNC: 200.7 MG/DL (ref 0–200)
CHOLEST/HDLC SERPL: 2.7 {RATIO} (ref 0–5)
CO2 SERPL-SCNC: 26.5 MMOL/L (ref 20–32)
CREAT SERPL-MCNC: 1 MG/DL (ref 0.7–1.3)
ESTRADIOL SERPL-MCNC: 15 PG/ML (ref 6–50)
GFR SERPL CREATININE-BSD FRML MDRD: 79.6 ML/MIN/1.7 M2
GLUCOSE CASUAL: 114 MG/DL (ref 51–200)
GLUCOSE SERPL-MCNC: 115.8 MG'DL (ref 70–99)
HDLC SERPL-MCNC: 75.4 MG/DL
LDLC SERPL CALC-MCNC: 111 MG/DL (ref 0–129)
POTASSIUM SERPL-SCNC: 4.2 MMOL/L (ref 3.3–4.5)
PROT SERPL-MCNC: 8 G/DL (ref 6.8–8.8)
SODIUM SERPL-SCNC: 137.8 MMOL/L (ref 132.6–141.4)
TRIGL SERPL-MCNC: 71.2 MG/DL (ref 0–150)
VLDL CHOLESTEROL: 14.2 MG/DL (ref 7–32)

## 2020-09-23 ASSESSMENT — MIFFLIN-ST. JEOR: SCORE: 1330.7

## 2020-09-24 ENCOUNTER — TELEPHONE (OUTPATIENT)
Dept: OPHTHALMOLOGY | Facility: CLINIC | Age: 65
End: 2020-09-24

## 2020-09-24 NOTE — TELEPHONE ENCOUNTER
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    Scheduled this Thursday with Dr. Snider/Dr. Mares    Pt aware of date/time/location at [facility] and aware to call for any new sudden vision changes/symptoms    Meng Haque RN 8:57 AM 09/29/20    ----      Left message with direct number at [phone]  Meng Haque RN 8:36 AM 09/25/20    --      Left message with direct number at [phone]  Meng Haque RN 1:44 PM 09/24/20          [facility] Call Center    Phone Message    May a detailed message be left on voicemail: no     Reason for Call: Other: Referred by Dr. Genao for 'Floaters, left' Records in Epic     Action Taken: Message routed to:  [facility] [[id number]]    Travel Screening: Not Applicable

## 2020-09-25 LAB — TESTOST SERPL-MCNC: 15 NG/DL (ref 240–950)

## 2020-09-25 RX ORDER — ESTRADIOL 0.04 MG/D
1 PATCH, EXTENDED RELEASE TRANSDERMAL
Qty: 24 PATCH | Refills: 0 | Status: SHIPPED | OUTPATIENT
Start: 2020-09-28 | End: 2021-03-10

## 2020-10-01 ENCOUNTER — OFFICE VISIT (OUTPATIENT)
Dept: OPHTHALMOLOGY | Facility: CLINIC | Age: 65
End: 2020-10-01
Attending: OPHTHALMOLOGY
Payer: COMMERCIAL

## 2020-10-01 DIAGNOSIS — H35.369 DRUSEN: ICD-10-CM

## 2020-10-01 DIAGNOSIS — H02.889 MEIBOMIAN GLAND DYSFUNCTION (MGD): ICD-10-CM

## 2020-10-01 DIAGNOSIS — H43.812 VITREOUS DETACHMENT OF LEFT EYE: Primary | ICD-10-CM

## 2020-10-01 PROCEDURE — 99203 OFFICE O/P NEW LOW 30 MIN: CPT | Mod: GC | Performed by: STUDENT IN AN ORGANIZED HEALTH CARE EDUCATION/TRAINING PROGRAM

## 2020-10-01 PROCEDURE — G0463 HOSPITAL OUTPT CLINIC VISIT: HCPCS

## 2020-10-01 ASSESSMENT — TONOMETRY
IOP_METHOD: TONOPEN
OS_IOP_MMHG: 10
OD_IOP_MMHG: 10

## 2020-10-01 ASSESSMENT — VISUAL ACUITY
METHOD: SNELLEN - LINEAR
OD_CC: 20/20
OS_CC+: -1
OD_CC+: -1
OS_CC: 20/20

## 2020-10-01 ASSESSMENT — REFRACTION_WEARINGRX
OS_AXIS: 150
OD_SPHERE: -1.75
OS_SPHERE: -1.75
OD_CYLINDER: +1.25
SPECS_TYPE: SVL
OD_AXIS: 027
OS_CYLINDER: +1.00

## 2020-10-01 ASSESSMENT — EXTERNAL EXAM - RIGHT EYE: OD_EXAM: NORMAL

## 2020-10-01 ASSESSMENT — SLIT LAMP EXAM - LIDS
COMMENTS: MGD
COMMENTS: MGD

## 2020-10-01 ASSESSMENT — EXTERNAL EXAM - LEFT EYE: OS_EXAM: NORMAL

## 2020-10-01 ASSESSMENT — CONF VISUAL FIELD
OD_NORMAL: 1
METHOD: COUNTING FINGERS
OS_NORMAL: 1

## 2020-10-01 NOTE — NURSING NOTE
Chief Complaints and History of Present Illnesses   Patient presents with     New Patient     Chief Complaint(s) and History of Present Illness(es)     New Patient     Laterality: left eye    Associated symptoms: floaters.  Negative for flashes and dryness    Pain scale: 0/10              Comments     New patient is here for comprehensive eye exam.    He notes he had a floater in his left eye.  Rosetta Keating, COA, COA 3:33 PM 10/01/2020

## 2020-10-01 NOTE — PROGRESS NOTES
HPI  Larissa Rehman is a 64 year old adult noticed floaters left eye, 2 weeks ago, now resolved. He saw them when he was out in the bright light or when he had a brightly lit computer screen.     HARVINDER 20 years ago    PMHx: HLD  POHx: None  Ocular GTTS: None  Soc Hx: Teacher  Fam Hx: None    Assessment & Plan      #PVD, left eye   -Symptoms noticed when out in the bright light or on computer  -Unclear if acute or chronic   -He only noticed them briefly, no flashes  -Rom negative, no RT/RD on exam today  -Discussed pathophysiology, discussed return precautions including flashes, new floaters, curtain, vision loss  -Recheck in 4 weeks, sooner with changes    #Extramacular drusen, each eye   -Observe      **PATIENT WILL CALL TOMORROW TO SCHEDULE FOLLOW-UP APPOINTMENT   -----------------------------------------------------------------------------------    Patient disposition:   Return in about 4 weeks (around 10/29/2020) for Follow Up General/Resident. or sooner as needed.    Aliza Snider MD  Ophthalmology Resident, PGY-3    Discussed and seen with Dr. Mares.    Attending Physician Attestation:  Complete documentation of historical and exam elements from today's encounter can be found in the full encounter summary report (not reduplicated in this progress note).  I personally obtained the chief complaint(s) and history of present illness.  I confirmed and edited as necessary the review of systems, past medical/surgical history, family history, social history, and examination findings as documented by others; and I examined the patient myself.  I personally reviewed the relevant tests, images, and reports as documented above.  I formulated and edited as necessary the assessment and plan and discussed the findings and management plan with the patient and family. . - Sy Mares MD

## 2020-10-29 ENCOUNTER — OFFICE VISIT (OUTPATIENT)
Dept: OPHTHALMOLOGY | Facility: CLINIC | Age: 65
End: 2020-10-29
Attending: OPHTHALMOLOGY
Payer: COMMERCIAL

## 2020-10-29 DIAGNOSIS — H43.812 VITREOUS DETACHMENT OF LEFT EYE: Primary | ICD-10-CM

## 2020-10-29 PROCEDURE — G0463 HOSPITAL OUTPT CLINIC VISIT: HCPCS

## 2020-10-29 PROCEDURE — 92012 INTRM OPH EXAM EST PATIENT: CPT | Performed by: OPHTHALMOLOGY

## 2020-10-29 ASSESSMENT — CUP TO DISC RATIO: OS_RATIO: 0.15

## 2020-10-29 ASSESSMENT — CONF VISUAL FIELD
OD_NORMAL: 1
METHOD: COUNTING FINGERS
OS_NORMAL: 1

## 2020-10-29 ASSESSMENT — REFRACTION_WEARINGRX
OD_SPHERE: -1.75
SPECS_TYPE: SVL
OD_CYLINDER: +1.25
OS_CYLINDER: +1.00
OS_SPHERE: -1.75
OD_AXIS: 027
OS_AXIS: 150

## 2020-10-29 ASSESSMENT — TONOMETRY
IOP_METHOD: TONOPEN
OD_IOP_MMHG: 19
OS_IOP_MMHG: 17

## 2020-10-29 ASSESSMENT — SLIT LAMP EXAM - LIDS
COMMENTS: MGD
COMMENTS: MGD

## 2020-10-29 ASSESSMENT — VISUAL ACUITY
OD_CC: 20/20
OS_CC: 20/20
CORRECTION_TYPE: GLASSES
METHOD: SNELLEN - LINEAR
OS_CC+: -1

## 2020-10-29 ASSESSMENT — EXTERNAL EXAM - LEFT EYE: OS_EXAM: NORMAL

## 2020-10-29 ASSESSMENT — EXTERNAL EXAM - RIGHT EYE: OD_EXAM: NORMAL

## 2020-10-29 NOTE — NURSING NOTE
Chief Complaint(s) and History of Present Illness(es)     Follow Up     In left eye.  Associated symptoms include floaters.  Negative for flashes, dryness, eye pain, redness and tearing.  Pain was noted as 0/10.              Comments     4 week f/u for Vitreous detachment of left eye. Pt notes all symptoms seem better since the last visit. Pt states there seemed like a couple times she did see some floaters, but unsure in LE.     Ocular meds: none    JD Jacobs 3:02 PM October 29, 2020

## 2020-10-29 NOTE — PROGRESS NOTES
Chief Complaint(s) and History of Present Illness(es)     Follow Up     Laterality: left eye    Associated symptoms: floaters.  Negative for flashes, dryness, eye pain,   redness and tearing    Pain scale: 0/10              Comments     4 week f/u for Vitreous detachment of left eye. Pt notes all symptoms   seem better since the last visit. Pt states there seemed like a couple   times she did see some floaters, but unsure in LE.     Ocular meds: none    Tete Garcia, COMT 3:02 PM October 29, 2020                Review of systems for the eyes was negative other than the pertinent positives/negatives listed in the HPI.      Assessment & Plan      Larissa Rehman is a 65 year old adult with the following diagnoses:   1. Vitreous detachment of left eye       Symptoms nearly resolved  Healthy dilated fundus exam today   Discussed symptoms of retinal tear/detachment and the need to be seen urgently should they occur       Patient disposition:   Return in about 1 year (around 10/29/2021) for Optometry clinic.           Attending Physician Attestation:  Complete documentation of historical and exam elements from today's encounter can be found in the full encounter summary report (not reduplicated in this progress note).  I personally obtained the chief complaint(s) and history of present illness.  I confirmed and edited as necessary the review of systems, past medical/surgical history, family history, social history, and examination findings as documented by others; and I examined the patient myself.  I personally reviewed the relevant tests, images, and reports as documented above.  I formulated and edited as necessary the assessment and plan and discussed the findings and management plan with the patient and family. . - Sy Mares MD

## 2021-03-08 ENCOUNTER — TELEPHONE (OUTPATIENT)
Dept: FAMILY MEDICINE | Facility: CLINIC | Age: 66
End: 2021-03-08

## 2021-03-08 NOTE — TELEPHONE ENCOUNTER
Attempted to reach patient to schedule Covid vaccine appointment. LM with main clinic number . If patient returns call please schedule.     Chica Irizarry

## 2021-03-09 DIAGNOSIS — Z90.79 H/O BILATERAL ORCHIECTOMIES: ICD-10-CM

## 2021-03-09 DIAGNOSIS — F64.0 GENDER DYSPHORIA IN ADULT: ICD-10-CM

## 2021-03-09 NOTE — TELEPHONE ENCOUNTER

## 2021-03-10 RX ORDER — ESTRADIOL 0.04 MG/D
1 PATCH, EXTENDED RELEASE TRANSDERMAL
Qty: 24 PATCH | Refills: 1 | Status: SHIPPED | OUTPATIENT
Start: 2021-03-11 | End: 2021-08-25

## 2021-03-11 ENCOUNTER — IMMUNIZATION (OUTPATIENT)
Dept: NURSING | Facility: CLINIC | Age: 66
End: 2021-03-11
Payer: COMMERCIAL

## 2021-03-11 PROCEDURE — 0001A PR COVID VAC PFIZER DIL RECON 30 MCG/0.3 ML IM: CPT

## 2021-03-11 PROCEDURE — 91300 PR COVID VAC PFIZER DIL RECON 30 MCG/0.3 ML IM: CPT

## 2021-04-01 ENCOUNTER — IMMUNIZATION (OUTPATIENT)
Dept: NURSING | Facility: CLINIC | Age: 66
End: 2021-04-01
Attending: INTERNAL MEDICINE
Payer: COMMERCIAL

## 2021-04-01 PROCEDURE — 0002A PR COVID VAC PFIZER DIL RECON 30 MCG/0.3 ML IM: CPT

## 2021-04-01 PROCEDURE — 91300 PR COVID VAC PFIZER DIL RECON 30 MCG/0.3 ML IM: CPT

## 2021-06-01 DIAGNOSIS — E78.01 FAMILIAL HYPERCHOLESTEREMIA: ICD-10-CM

## 2021-06-01 RX ORDER — SIMVASTATIN 20 MG
20 TABLET ORAL AT BEDTIME
Qty: 90 TABLET | Refills: 3 | Status: SHIPPED | OUTPATIENT
Start: 2021-06-01 | End: 2021-09-24

## 2021-06-01 NOTE — TELEPHONE ENCOUNTER
Verify that the refill encounter hasn't been started Yes    [facility] phone call message- patient requesting a refill:    Full Medication Name: simvastatin (ZOCOR) 20 MG tablet    Dose: see chart     Pharmacy confirmed as   [id number] - [place], [place] - [address]  [address] [place] [address]  Phone: [phone] Fax: [phone]  : Yes    Medication tab checked to see if medication has been sent  Yes    Additional Comments: none     OK to leave a message on voice mail? Yes    Advised patient refill may take up to 2 business days? Yes    Primary language: English      needed? No    Call taken on June 1, 2021 at 10:16 AM by Kirstin Ramirez to  [facility]

## 2021-07-10 ENCOUNTER — HOSPITAL ENCOUNTER (EMERGENCY)
Facility: CLINIC | Age: 66
Discharge: LEFT BEFORE DISCHARGE | End: 2021-07-10
Admitting: EMERGENCY MEDICINE
Payer: COMMERCIAL

## 2021-07-10 VITALS
SYSTOLIC BLOOD PRESSURE: 133 MMHG | BODY MASS INDEX: 23.32 KG/M2 | RESPIRATION RATE: 18 BRPM | WEIGHT: 140 LBS | TEMPERATURE: 97.2 F | HEIGHT: 65 IN | DIASTOLIC BLOOD PRESSURE: 73 MMHG | HEART RATE: 65 BPM | OXYGEN SATURATION: 100 %

## 2021-07-10 PROCEDURE — 93005 ELECTROCARDIOGRAM TRACING: CPT

## 2021-07-10 PROCEDURE — 999N000104 HC STATISTIC NO CHARGE

## 2021-07-10 ASSESSMENT — MIFFLIN-ST. JEOR: SCORE: 1336.92

## 2021-07-11 LAB
ATRIAL RATE - MUSE: 57 BPM
DIASTOLIC BLOOD PRESSURE - MUSE: NORMAL MMHG
INTERPRETATION ECG - MUSE: NORMAL
P AXIS - MUSE: 65 DEGREES
PR INTERVAL - MUSE: 204 MS
QRS DURATION - MUSE: 86 MS
QT - MUSE: 460 MS
QTC - MUSE: 447 MS
R AXIS - MUSE: 51 DEGREES
SYSTOLIC BLOOD PRESSURE - MUSE: NORMAL MMHG
T AXIS - MUSE: 45 DEGREES
VENTRICULAR RATE- MUSE: 57 BPM

## 2021-08-12 ENCOUNTER — OFFICE VISIT (OUTPATIENT)
Dept: FAMILY MEDICINE | Facility: CLINIC | Age: 66
End: 2021-08-12
Payer: COMMERCIAL

## 2021-08-12 VITALS
DIASTOLIC BLOOD PRESSURE: 74 MMHG | HEART RATE: 48 BPM | TEMPERATURE: 97.8 F | RESPIRATION RATE: 16 BRPM | BODY MASS INDEX: 23.86 KG/M2 | WEIGHT: 143.4 LBS | SYSTOLIC BLOOD PRESSURE: 117 MMHG | OXYGEN SATURATION: 98 %

## 2021-08-12 DIAGNOSIS — H81.10 BENIGN PAROXYSMAL POSITIONAL VERTIGO, UNSPECIFIED LATERALITY: Primary | ICD-10-CM

## 2021-08-12 PROCEDURE — 99213 OFFICE O/P EST LOW 20 MIN: CPT | Mod: GC | Performed by: STUDENT IN AN ORGANIZED HEALTH CARE EDUCATION/TRAINING PROGRAM

## 2021-08-12 NOTE — PROGRESS NOTES
Assessment & Plan     Vertigo: Despite a negative Maggie-Hallpike, the most likely diagnosis at this time is BPPV, as they are having episodic vertigo worse with head movements in the absence of any concerning causes of central vertigo (e.g. no weakness, diplopia, gait instability, normal HINTS exam). The patient came in prepared with ideas of what was causing their vertigo, and agrees with this assessment - they just wanted documentation of their symptoms for future reference. We also considered Meniere's Disease with the complaint of mild ear ache and history of tinnitus, but they do not fit the triad without hearing loss. Sinus bradycardia may also cause symptoms similar to vertigo - lightheadedness, dizziness - and our patient presented with pulse of 48 today. However, patient states they have a long history of having a sinus bradycardia, so this is less likely. Therefore, we we will provided recommendations based on a likely diagnosis of BPPV and advise patient to return to clinic if symptoms worsen or if any red flag signs appear.    - Offered patient a print out of how to do the Epley maneuver, but patient states they have already looked this up at home and does not need a print out.    - Advised patient to return to clinic if symptoms worsen or red flag signs (changes in vision, gait abnormalities, weakness) appear. Patient voiced understanding.    - Briefly discussed medication options including meclizine- patient prefers to avoid medications at this time which is reasonable given infrequent episodes     Return in about 4 weeks (around 9/9/2021) for for preventative visit .     Quincy Yanes, MS3    I was present with the medical student who participated in the service and in the documentation of this note. I have verified the history and personally performed the physical exam and medical decision making, and have verified the content of the note, which accurately reflects my assessment of the patient and  "the plan of care.   Isabel Gee MD  [facility] DENNISE Bush is a 65 year old with seasonal allergies who presents for the following health issues: vertigo     HPI     Concerns: Vertigo. Dizziness described as being \"drunk without actually drinking\"   When did it start? Started July 9th when they woke up with heartburn, racing heart, dizziness. Had EKG done at [facility] - came back as sinus bradycardia. Took Maalox, which helped alleviate the heartburn, but not the vertigo.   Is it getting better, worse, or staying the same? Was getting better since the episode on July 9th - with small episodes every now and then. Was actually going to cancel appointment, until they had another more severe episode yesterday.   Is it episodic or constant? Has had two \"bad\" episodes, with occasional mild episodes, since July 9th.   How long does each episode last? Minutes  What makes it worse? Worse with looking at computer. Worse with driving. Agrees with it worsening with head movements   What makes it better? Goes away on its own; has not tried any medications   Other symptoms? Some nausea. Feels off balance only when having vertigo. Has ear ringing but this has been present since childhood (states was on a lot of antibiotics as a kid which was thought to cause this symptom). Mild ear ache but says this is normal as they swim often   Denies the following:     Headache     Sinus Pressure    Congestion    Decreased Appetite    Hearing loss     Recent viral infection     Double vision    Dysarthria    Weakness    Review of Systems: Per HPI.         Objective    /74   Pulse (!) 48   Temp 97.8  F (36.6  C) (Oral)   Resp 16   Wt 65 kg (143 lb 6.4 oz)   SpO2 98%   BMI 23.86 kg/m    Body mass index is 23.86 kg/m .     Physical Exam   GENERAL: Healthy, alert, no acute distress. Appears stated age.   EYES: Eyes grossly normal to inspection. EOM intact. No nystagmus present when " stationary. Maggie-Hallpike negative for provoked nystagmus or reproduction of symptoms. HINTS exam normal.   EARS: Mild amount of wax buildup in bilateral ear canals. Normal TMs.   CV: Regular rhythm. Bradycardia to 48. No murmur.  RESP: Normal respiratory effort. Able to participate in a 15 min conversation with no distress.   NEURO: Sensation normal. 5/5 strength with flexion/extension at elbows, knees, and ankles. Speech intact, gait normal.       EKG from ER visit 7/10/2021 reviewed and showed sinus bradycardia. Since symptoms were present at that time, did not elect to repeat EKG today.    No results found for this or any previous visit (from the past 24 hour(s)).

## 2021-08-24 NOTE — PROGRESS NOTES
Preceptor Attestation:   Patient seen, evaluated and discussed with the resident. I have verified the content of the note, which accurately reflects my assessment of the patient and the plan of care.   Supervising Physician:  Zuri Steiner, DO

## 2021-08-25 DIAGNOSIS — F64.0 GENDER DYSPHORIA IN ADULT: ICD-10-CM

## 2021-08-25 DIAGNOSIS — Z90.79 H/O BILATERAL ORCHIECTOMIES: ICD-10-CM

## 2021-08-25 RX ORDER — ESTRADIOL 0.04 MG/D
1 PATCH, EXTENDED RELEASE TRANSDERMAL
Qty: 24 PATCH | Refills: 3 | Status: SHIPPED | OUTPATIENT
Start: 2021-08-26 | End: 2022-10-25

## 2021-08-25 NOTE — TELEPHONE ENCOUNTER
"Request for medication refill:    Estradiol patch     Providers if patient needs an appointment and you are willing to give a one month supply please refill for one month and  send a letter/MyChart using \".SMILLIMITEDREFILL\" .smillimited and route chart to \"P SMI \" (Giving one month refill in non controlled medications is strongly recommended before denial)    If refill has been denied, meaning absolutely no refills without visit, please complete the smart phrase \".smirxrefuse\" and route it to the \"P [facility] MED REFILLS\"  pool to inform the patient and the pharmacy.    Carolina Roberts, CMA        "

## 2021-09-05 ENCOUNTER — HEALTH MAINTENANCE LETTER (OUTPATIENT)
Age: 66
End: 2021-09-05

## 2021-09-22 NOTE — PATIENT INSTRUCTIONS
If you had an XRay/CT/Ultrasound/MRI ordered the number is [phone] to schedule or change your radiology appointment.   Medical Concerns:  If you have urgent medical concerns please call [phone] at any time of the day.    MD DANIEL Blount S MEDICARE PERSONAL PREVENTIVE SERVICES PLAN - SERVICES     Review these tests with your doctor then decide which ones you want and take this page home for your reference  Immunization History   Administered Date(s) Administered     COVID-19,PF,Astrazeneca 01/11/2021, 02/08/2021     COVID-19,PF,Pfizer 03/11/2021, 04/01/2021     Influenza (IIV3) PF 10/11/2001, 10/15/2009, 10/11/2011, 10/19/2012, 10/24/2013, 09/30/2014, 10/06/2017     Influenza Vaccine IM > 6 months Valent IIV4 (Alfuria,Fluzone) 09/25/2018     Influenza, Quad, High Dose, Pf, 65yr+ (Fluzone HD) 09/23/2020     TDAP Vaccine (Boostrix) 07/15/2016          IMMUNIZATIONS Description Recommend today?     Influenza (flu shot) Helps to prevent flu; you should get it every year Yes; Recommended and ordered.   PCV 13 Prevents pneumonia; given once No: is not indicated today.   PPSV 23 Prevents pneumonia; given once Yes; Recommended and ordered.   Tetanus Prevents tetanus; need every 10 years No: is not indicated today.   Herpes Zoster (shingles) Prevents or lessens symptoms from shingles; given once Recommended - pt will get at pharmacy   Hepatitis B  If you have any of the following risk factors you should be immunized for hepatitis B: severe kidney disease, people who live in the same house as a carrier of Hepatitis B virus, people who live in  institutions (e.g. nursing homes or group homes), homosexual men, patients with hemophilia who received Factor VIII or IX concentrates, abusers of illicit injectable drugs No: is not indicated today.           Health Maintenance   Topic Date Due     ADVANCE CARE PLANNING  Never done     MAMMO SCREENING  Never done     ZOSTER IMMUNIZATION (1 of 2) Never done      Pneumococcal Vaccine: 65+ Years (1 of 1 - PPSV23) Never done     NORTH ASSESSMENT  01/31/2019     PHQ-9  01/15/2020     PHQ-2  01/01/2021     INFLUENZA VACCINE (1) 09/01/2021     MEDICARE ANNUAL WELLNESS VISIT  09/23/2021     FALL RISK ASSESSMENT  10/29/2021     LIPID  09/23/2025     DTAP/TDAP/TD IMMUNIZATION (2 - Td or Tdap) 07/15/2026     COLORECTAL CANCER SCREENING  09/21/2028     HEPATITIS C SCREENING  Completed     AORTIC ANEURYSM SCREENING (SYSTEM ASSIGNED)  Completed     COVID-19 Vaccine  Completed     IPV IMMUNIZATION  Aged Out     MENINGITIS IMMUNIZATION  Aged Out     HEPATITIS B IMMUNIZATION  Aged Out       SCREENING TESTS     Description   Year of Last Screening   Recommended Today?   Heart disease screening blood tests    Cholesterol level Reducing cholesterol can reduce your risk of heart attacks by 25%.  Screening is recommended yearly if you are at risk of heart disease otherwise every 4-5 years  Yes; Recommended and ordered.   Diabetes screening tests    Hemoglobin A1c blood test   Finding and treating diabetes early can reduce complications.  Screening recommended/covered yearly if you have high blood pressure, high cholesterol, obesity (BMI >30), or a history of high blood glucose tests; or 2 of the following: family history of diabetes, overweight (BMI >25 but <30), age 65 years or older, and a history of diabetes of pregnancy or gave birth to baby weighing more than 9 lbs.  Yes; Recommended and ordered. Goal is less than 5.7     Hepatitis B screening Finding hepatitis B early can reduce complications.  Screening is recommended for persons with selected risk factors.  No: is not indicated today.   Hepatitis C screening Finding hepatitis C early can reduce complications.  Screening is recommended for all persons born from 1945 through 1965 and for those with selected other risk factors.   No; is up to date.   HIV screening Finding HIV early can reduce complications.  Screening is recommended for  persons with risk factors for HIV infection.  No: is not indicated today.   Glaucoma screening Early detection of glaucoma can prevent blindness.   Please talk to your eye doctor about this.           SCREENING TESTS     Description   Year of Last Screening   Recommended Today?   Colorectal cancer screening    Fecal occult blood test     Screening colonoscopy Screening for colon cancer has been shown to reduce death from colon cancer by 25-30%. Screening recommended to start at 50 years and continuing until age 75 years.    Due 2023 due to history of polyp   Breast Cancer Screening    Mammogram Mammogram screening for breast cancer has been shown to reduce the risk of dying from breast cancer and prolong life. Screening is recommended every 1-2 years for women aged 50 to 74 years.   Recommeded and patient declined.      Rectal exam for prostate  Cervical pap smears can reduce cervical cancer. Screening is recommended annually if high risk (history of abnormal pap smears) otherwise every 2-3 years, stop screening at 65 years of age if history of normal paps.  No: is not indicated today.   Screening for Lung Cancer     Low-dose CT scanning Screening can reduce mortality in persons aged 55-80 who have smoked at least 30 pack-years and who are either still smoking or have quit in the past 15 years.  No: is not indicated today.   Abdominal Aortic Aneurysm (AAA) screening    Ultrasound (US)   An aneurysm treated before rupture is very safe -a ruptured aneurysm can be fatal.  Screening  by US for AAA is limited to patients who meet one of the following criteria:    Men who are 65-75 years old and have smoked more than 100 cigarettes in their lifetime    Anyone with a family history of abdominal aortic aneurysm  No: is not indicated today.       MEDICARE WELLNESS EXAM PATIENT INSTRUCTIONS    Yearly exam:     See your health care provider every year in order to review changes in your health, review medicines that you take,  and discuss preventive care needs such as immunizations and cancer screening.    Get a flu shot each year.     Advance Directive:    If you have not done so, you are encouraged to complete an advance directive. If you would like support with this, please contact the clinic  through the main clinic line. More information about advance directives can be found at:     [email or web address]    Nutrition:     Eat at least 5 servings of fruits and vegetables each day.     Eat whole-grain bread, whole-wheat pasta and brown rice instead of white grains and rice.     Talk to your doctor about Calcium and Vitamin D.     Lifestyle:    Exercise for at least 150 minutes a week (30 minutes a day, 5 days a week). This will help you control your weight and prevent disease.     Limit alcohol to one drink per day.     If you smoke, try to quit - your doctor will be happy to help.     Wear sunscreen to prevent skin cancer.     See your dentist every six months for an exam and cleaning.     See your eye doctor every 1 to 2 years to screen for conditions such as glaucoma, macular degeneration and cataracts.        Personalized Prevention Plan  You are due for the preventive services outlined below.  Your care team is available to assist you in scheduling these services.  If you have already completed any of these items, please share that information with your care team to update in your medical record.  Health Maintenance Due   Topic Date Due     Discuss Advance Care Planning  Never done     Mammogram  Never done     Zoster (Shingles) Vaccine (1 of 2) Never done     Pneumococcal Vaccine (1 of 1 - PPSV23) Never done     Anxiety Assessment  01/31/2019     Depression Assessment  01/15/2020     PHQ-2  01/01/2021     Flu Vaccine (1) 09/01/2021     Annual Wellness Visit  09/23/2021     Personalized Prevention Plan  You are due for the preventive services outlined below.  Your care team  is available to assist you in scheduling these services.  If you have already completed any of these items, please share that information with your care team to update in your medical record.  Health Maintenance Due   Topic Date Due     Discuss Advance Care Planning  Never done     Mammogram  Never done     Zoster (Shingles) Vaccine (1 of 2) Never done     Annual Wellness Visit  09/23/2021

## 2021-09-24 ENCOUNTER — OFFICE VISIT (OUTPATIENT)
Dept: FAMILY MEDICINE | Facility: CLINIC | Age: 66
End: 2021-09-24
Payer: COMMERCIAL

## 2021-09-24 VITALS
HEART RATE: 50 BPM | BODY MASS INDEX: 23.79 KG/M2 | RESPIRATION RATE: 16 BRPM | OXYGEN SATURATION: 97 % | HEIGHT: 65 IN | TEMPERATURE: 98.2 F | SYSTOLIC BLOOD PRESSURE: 123 MMHG | DIASTOLIC BLOOD PRESSURE: 74 MMHG | WEIGHT: 142.8 LBS

## 2021-09-24 DIAGNOSIS — F39 SEASONAL MOOD DISORDER (H): ICD-10-CM

## 2021-09-24 DIAGNOSIS — Z23 ENCOUNTER FOR IMMUNIZATION: ICD-10-CM

## 2021-09-24 DIAGNOSIS — Z23 NEED FOR PROPHYLACTIC VACCINATION AND INOCULATION AGAINST INFLUENZA: ICD-10-CM

## 2021-09-24 DIAGNOSIS — R73.9 ELEVATED BLOOD SUGAR: ICD-10-CM

## 2021-09-24 DIAGNOSIS — Z00.00 ENCOUNTER FOR MEDICARE ANNUAL WELLNESS EXAM: Primary | ICD-10-CM

## 2021-09-24 DIAGNOSIS — Z71.89 ACP (ADVANCE CARE PLANNING): ICD-10-CM

## 2021-09-24 DIAGNOSIS — E78.01 FAMILIAL HYPERCHOLESTEREMIA: ICD-10-CM

## 2021-09-24 PROBLEM — Z78.9 DNI (DO NOT INTUBATE): Status: RESOLVED | Noted: 2019-01-15 | Resolved: 2021-09-24

## 2021-09-24 PROBLEM — Z66 DNR (DO NOT RESUSCITATE): Status: RESOLVED | Noted: 2019-01-15 | Resolved: 2021-09-24

## 2021-09-24 PROBLEM — Z90.79: Status: RESOLVED | Noted: 2018-04-05 | Resolved: 2021-09-24

## 2021-09-24 LAB — HBA1C MFR BLD: 5.4 % (ref 0–5.6)

## 2021-09-24 PROCEDURE — 80061 LIPID PANEL: CPT | Performed by: FAMILY MEDICINE

## 2021-09-24 PROCEDURE — 90471 IMMUNIZATION ADMIN: CPT | Performed by: FAMILY MEDICINE

## 2021-09-24 PROCEDURE — 90662 IIV NO PRSV INCREASED AG IM: CPT | Performed by: FAMILY MEDICINE

## 2021-09-24 PROCEDURE — 83036 HEMOGLOBIN GLYCOSYLATED A1C: CPT | Performed by: FAMILY MEDICINE

## 2021-09-24 PROCEDURE — 36415 COLL VENOUS BLD VENIPUNCTURE: CPT | Performed by: FAMILY MEDICINE

## 2021-09-24 PROCEDURE — 90472 IMMUNIZATION ADMIN EACH ADD: CPT | Performed by: FAMILY MEDICINE

## 2021-09-24 PROCEDURE — 99397 PER PM REEVAL EST PAT 65+ YR: CPT | Mod: 25 | Performed by: FAMILY MEDICINE

## 2021-09-24 PROCEDURE — 90732 PPSV23 VACC 2 YRS+ SUBQ/IM: CPT | Performed by: FAMILY MEDICINE

## 2021-09-24 RX ORDER — SIMVASTATIN 20 MG
20 TABLET ORAL AT BEDTIME
Qty: 90 TABLET | Refills: 3 | Status: SHIPPED | OUTPATIENT
Start: 2021-09-24 | End: 2022-06-22

## 2021-09-24 SDOH — ECONOMIC STABILITY: FOOD INSECURITY: WITHIN THE PAST 12 MONTHS, THE FOOD YOU BOUGHT JUST DIDN'T LAST AND YOU DIDN'T HAVE MONEY TO GET MORE.: NEVER TRUE

## 2021-09-24 SDOH — HEALTH STABILITY: PHYSICAL HEALTH: ON AVERAGE, HOW MANY DAYS PER WEEK DO YOU ENGAGE IN MODERATE TO STRENUOUS EXERCISE (LIKE A BRISK WALK)?: 7 DAYS

## 2021-09-24 SDOH — HEALTH STABILITY: PHYSICAL HEALTH: ON AVERAGE, HOW MANY MINUTES DO YOU ENGAGE IN EXERCISE AT THIS LEVEL?: 60 MIN

## 2021-09-24 SDOH — ECONOMIC STABILITY: FOOD INSECURITY: WITHIN THE PAST 12 MONTHS, YOU WORRIED THAT YOUR FOOD WOULD RUN OUT BEFORE YOU GOT MONEY TO BUY MORE.: NEVER TRUE

## 2021-09-24 ASSESSMENT — SOCIAL DETERMINANTS OF HEALTH (SDOH)
WITHIN THE LAST YEAR, HAVE TO BEEN RAPED OR FORCED TO HAVE ANY KIND OF SEXUAL ACTIVITY BY YOUR PARTNER OR EX-PARTNER?: NO
WITHIN THE LAST YEAR, HAVE YOU BEEN KICKED, HIT, SLAPPED, OR OTHERWISE PHYSICALLY HURT BY YOUR PARTNER OR EX-PARTNER?: NO
WITHIN THE LAST YEAR, HAVE YOU BEEN AFRAID OF YOUR PARTNER OR EX-PARTNER?: NO
WITHIN THE LAST YEAR, HAVE YOU BEEN HUMILIATED OR EMOTIONALLY ABUSED IN OTHER WAYS BY YOUR PARTNER OR EX-PARTNER?: NO
HOW HARD IS IT FOR YOU TO PAY FOR THE VERY BASICS LIKE FOOD, HOUSING, MEDICAL CARE, AND HEATING?: NOT HARD AT ALL

## 2021-09-24 ASSESSMENT — LIFESTYLE VARIABLES
HOW OFTEN DO YOU HAVE A DRINK CONTAINING ALCOHOL: 2-4 TIMES A MONTH
HOW MANY STANDARD DRINKS CONTAINING ALCOHOL DO YOU HAVE ON A TYPICAL DAY: 1 OR 2
HOW OFTEN DO YOU HAVE SIX OR MORE DRINKS ON ONE OCCASION: NEVER

## 2021-09-24 ASSESSMENT — MIFFLIN-ST. JEOR: SCORE: 1355.23

## 2021-09-24 NOTE — PROGRESS NOTES
Medicare Annual Wellness Visit         HPI     This 65 year old adult presents as an established patient  Kirstin Ott who presents for an initial Medicare Wellness Exam.    Diet  Patient presents for an annual cholesterol check as the main concern since they eat a lot of meat. Has been on a low dose of cholesterol medication for a while and is not interested in increasing the dose. Eats pretty healthy with only one meal per day around 3 pm and drinks tea and decaf coffee the rest of the day. Reports to be caffeine sensitive. Chews chicken bones, loves chicken wings. Eats a lot of greens.     Gender  After the surgery, they are at peace. They no longer hate their body and feel a lot better now. After surgery, they had a sex drive but now reports low libido and is fine with that. No current partner. Patient uses the estradiol patch 1-2 times per week. Patient states they usually use it once per week but if hot flash symptoms worsen drastically, they will take two patches.     Mood  Patient reports seasonal disorder but participates in outdoor activities such as hiking and walking which helps with that. Does a little yoga and mindful stretching.     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 Patient denies loneliness or financial stress with COVID. They are an introvert and enjoyed COVID a lot.     Patient Active Problem List   Diagnosis     Familial hypercholesteremia     Gender dysphoria in adult     Seasonal mood disorder (H)     S/P foot surgery, left     DNR (do not resuscitate)     Encounter for immunization     ACP (advance care planning)       Past Medical History:   Diagnosis Date     Attention deficit hyperactivity disorder (ADHD), predominantly hyperactive type      H/O bilateral orchiectomies and zero-depth vaginoplasty 3/29/2018 04/05/2018     High cholesterol         Family History   Problem Relation Age of Onset     Colon Cancer Father      Heart Disease Father       Breast Cancer No family hx of          Past Surgical History:   Procedure Laterality Date     COLONOSCOPY       ORTHOPEDIC SURGERY      foot surgery      TRANSGENDER VAGINOPLASTY ZERO DEPTH N/A 3/29/2018    Procedure: TRANSGENDER VAGINOPLASTY ZERO DEPTH;  Zero Depth Transgender Vaginoplasty;  Surgeon: Madhu Mojica MD;  Location: [facility] OR       Reviewed no other significant FH    Family History and past Medical History reviewed and it is unchanged/updated.       Review of Systems       Constitutional:   fevers, night sweats or unintentional weight change ?  NO      Eyes:   vision change, diplopia or red eyes?  NO      Ears, Nose, Mouth, Throat:   tinnitus or hearing change,  epistaxis or nasal discharge,  oral lesions, throat pain ?  NO      Neck:   stiffness?  NO           Cardiovascular:   chest pain, palpitations, or pain with walking, orthopnea or PND?  NO   Breasts:  Any bumps or unusual discharge?     NO         Respiratory:   dyspnea, cough, shortness of breath or wheezing?  NO         GI:   nausea, vomiting, diarrhea or constipation,  abdominal pain ?  NO         :   change in urine,  dysuria or hematuria,  sexual dysfunction ?  NO        Musculoskeletal:   joint or muscle pain or swelling?  NO            Skin:   concerning lesions or moles?  NO           Nervous System:   loss of strength or sensation,  numbness or tingling,  tremor,  dizziness,  headache?  NO   Endocrine/Homone:   polyuria or polydipsia,  temperature intolerance?  NO            Blood and Lymphnodes:   concerning bumps,  bleeding problems?  NO            Allergy:   environmental allergies?  NO            Mental Health:   depression or anxiety,  sleep problems?  NO          This document serves as a record of the services and decisions personally performed and made by Sravani Boyer MD. It was created on his/her behalf by Kristine Stone, a trained medical scribe. The creation of this document is based the provider's statements to the medical  erikaSarah Sotne 3:29 PM, September 24, 2021         Medical Care     Have you been to an ER or a hospital in the last year? Yes  What other specialists or organizations are involved in your medical care?    Current providers sharing in care for this patient include:  Patient Care Team:  Sravani Boyer MD as PCP - General (Family Medicine)  Sy Mares MD as Assigned Surgical Provider  Kirstin Ott MD as Assigned PCP         Social History     Social History     Tobacco Use     Smoking status: Never Smoker     Smokeless tobacco: Never Used   Substance Use Topics     Alcohol use: Yes     Alcohol/week: 4.0 standard drinks     Types: 4 Glasses of wine per week     Comment: wine     Marital Status:Single  Who lives in your household? patient  Does your home have any of the following safety concerns? Loose rugs in the hallway, no grab bars in the bathroom, no handrails on the stairs or have poorly lit areas?  No  Do you feel threatened or controlled by a partner, ex-partner or anyone in your life? No  Has anyone hurt you physically, for example by pushing, hitting, slapping or kicking you   or forcing you to have sex? No  Do you need help with the phone, transportation, shopping, preparing meals, housework, laundry, medications or managing money? No   Have you noticed any hearing difficulties? No    Social History     Socioeconomic History     Marital status: Single     Spouse name: None     Number of children: None     Years of education: None     Highest education level: None   Occupational History     Occupation: ,  at [employer]   Tobacco Use     Smoking status: Never Smoker     Smokeless tobacco: Never Used   Vaping Use     Vaping Use: Never used   Substance and Sexual Activity     Alcohol use: Yes     Alcohol/week: 4.0 standard drinks     Types: 4 Glasses of wine per week     Comment: wine     Drug use: No     Sexual activity: Not Currently   Other  Topics Concern     None   Social History Narrative    No caffeine in diet. Loves organ meats, generally health diet besides this.     Calcium sufficient in diet, high greens     Plans to retire next year     Exercises with hiking, walking, run, yoga somewhat     Social Determinants of Health     Financial Resource Strain: Low Risk      Difficulty of Paying Living Expenses: Not hard at all   Food Insecurity: No Food Insecurity     Worried About Running Out of Food in the Last Year: Never true     Ran Out of Food in the Last Year: Never true   Transportation Needs:      Lack of Transportation (Medical):      Lack of Transportation (Non-Medical):    Physical Activity: Sufficiently Active     Days of Exercise per Week: 7 days     Minutes of Exercise per Session: 60 min   Stress:      Feeling of Stress :    Social Connections:      Frequency of Communication with Friends and Family:      Frequency of Social Gatherings with Friends and Family:      Attends [employer] Services:      Active Member of Clubs or Organizations:      Attends Club or Organization Meetings:      Marital Status:    Intimate Partner Violence: Not At Risk     Fear of Current or Ex-Partner: No     Emotionally Abused: No     Physically Abused: No     Sexually Abused: No           Risk Behaviors and Healthy Habits     How many servings of fruits and vegetables do you eat a day? 4-8  How often do you exercise and what do you do? One hour minutes 7 days a week  Do you frequently ride without a seatbelt? No  Do you use tobacco?  No  Do you use any other drugs? No         Do you use alcohol?Yes  Number of drinks per day   Number of drinking days a week 4     Today's PHQ-2 Score:  0    Sexual Health     Are you sexually active?  No   Have you had any sexually transmitted infections? No   Any sexual concerns? No       FUNCTIONAL ABILITY/SAFETY SCREENING     Fall Risk Assessment Today: Fallen 2 or more times in the past year?: No  Any fall with injury in the  past year?: No    Hearing evaluation if done: No    EVALUATION OF COGNITIVE FUNCTION     Mood/affect:Normal  Appearance:Normal  Family member/caregiver input: N/A    Mini Cog Scoring   3 points   Clock Draw Test result:  Normal      SCREENING FOR PREVENTION and EARLY DETECTION       Corrected Visual acuity: L 20/20   R 20/20  Screening Lipid Level (covered every 5 years ): Recommended and patient accepted testing.  HIV screening (at risk ):  Testing not indicated   Colon CA Screening (>50-75 ) (FITT annually or colonoscopy every 10years):  Date done   Result(s) Positive polyp  Breast CA Screenin-2 years 50-74years:  Recommended and patient declined testing. and Diabetes Screening : FBG covered if at risk (obesity, HTN, dyslipidemia, FH): Recommended and patient accepted testing.    CV Risk based on Pooled Cohort Risk:  The 10-year ASCVD risk score (Norris HICKMAN Jr., et al., 2013) is: 11.2%    Values used to calculate the score:      Age: 65 years      Sex: Male      Is Non- : No      Diabetic: No      Tobacco smoker: No      Systolic Blood Pressure: 123 mmHg      Is BP treated: No      HDL Cholesterol: 75.4 mg/dL      Total Cholesterol: 200.7 mg/dL      Advanced Directives: Discussed and patient desires to be DNR/DNI.      Immunization History   Administered Date(s) Administered     COVID-19,PF,Astrazeneca 2021, 2021     COVID-19,PF,Pfizer 2021, 2021     Influenza (IIV3) PF 10/11/2001, 10/15/2009, 10/11/2011, 10/19/2012, 10/24/2013, 2014, 10/06/2017     Influenza Vaccine IM > 6 months Valent IIV4 (Alfuria,Fluzone) 2018     Influenza, Quad, High Dose, Pf, 65yr+ (Fluzone HD) 2020, 2021     Pneumococcal 23 valent 2021     TDAP Vaccine (Boostrix) 07/15/2016       Reviewed Immunization Record Today  Pneumoccocal Vaccine: Yes  Varicella Vaccine: Not needed  TDaP: Not needed         Physical Exam     Vitals: /74   Pulse 50   Temp 98.2  " F (36.8  C) (Oral)   Resp 16   Ht 1.66 m (5' 5.35\")   Wt 64.8 kg (142 lb 12.8 oz)   SpO2 97%   BMI 23.51 kg/m    BMI= Body mass index is 23.51 kg/m .  GENERAL APPEARANCE: healthy, alert and no distress  EYES: Eyes grossly normal to inspection, PERRL and conjunctivae and sclerae normal  HENT: ear canals and TM's normal, nose and mouth without ulcers or lesions, oropharynx clear and oral mucous membranes moist  NECK: no adenopathy, no asymmetry, masses, or scars and thyroid normal to palpation  RESP: lungs clear to auscultation - no rales, rhonchi or wheezes   CV: regular rate and rhythm, normal S1 S2, no S3 or S4, no murmur, click or rub, no peripheral edema and peripheral pulses strong  ABDOMEN: soft, nontender, no hepatosplenomegaly, no masses and bowel sounds normal   MS: no musculoskeletal defects are noted and gait is age appropriate without ataxia, prominent DIP on the lateral 5th digits , with moderate ulnar deviation, no nodules   SKIN: no suspicious lesions or rashes   NEURO: Negative romberg test, negative pronator drift, no tremor. Finger nose finger normal in the right, left with a mild intention tremor with the index finger  PSYCH: mentation appears normal and affect normal/bright      Assessment and Plan   subsequent   Medicare Wellness Exam    Eleazar was seen today for wellness visit and imm/inj.    Diagnoses and all orders for this visit:    Encounter for Medicare annual wellness exam  See detatiled recommendation in AVS    Familial hypercholesteremia  Reviewed risk factors with ascvd between 6 and 11 percent depending on gender marker. Would save once heart attack in 10 years if went on high dose, pt chooses to stay on low dose   -     simvastatin (ZOCOR) 20 MG tablet; Take 1 tablet (20 mg) by mouth At Bedtime  -     Lipid panel; Future    Seasonal mood disorder (H)  Stable no current concerns, continue exercising through the winter.    Elevated blood sugar  -     Hemoglobin A1c; Future    Need " for prophylactic vaccination and inoculation against influenza  -     INFLUENZA, QUAD, HIGH DOSE, PF, 65YR + (FLUZONE HD)    Encounter for immunization  -     Pneumococcal vaccine 23 valent PPSV23  (Pneumovax) [87136]    ACP (advance care planning)  ACP documents on file 2017 with sub decision maker daughter Galina. DNR, DNI.     -     No CPR- Do NOT Intubate      Options for treatment and follow-up care were reviewed with the Larissa Rehman and/or guardian engaged in the decision making process and verbalized understanding of the options discussed and agreed with the final plan.    The information in this document, created by the medical scribe for me, accurately reflects the services I personally performed and the decisions made by me. I have reviewed and approved this document for accuracy prior to leaving the patient care area.  Sravani Boyer MD  3:29 PM, 09/24/21  52 minutes spent on the date of the encounter doing chart review, review of test results, interpretation of tests, patient visit and documentation

## 2021-09-25 LAB
CHOLEST SERPL-MCNC: 196 MG/DL
FASTING STATUS PATIENT QL REPORTED: YES
HDLC SERPL-MCNC: 68 MG/DL
LDLC SERPL CALC-MCNC: 109 MG/DL
NONHDLC SERPL-MCNC: 128 MG/DL
TRIGL SERPL-MCNC: 96 MG/DL

## 2021-10-31 ENCOUNTER — HEALTH MAINTENANCE LETTER (OUTPATIENT)
Age: 66
End: 2021-10-31

## 2022-06-20 DIAGNOSIS — E78.01 FAMILIAL HYPERCHOLESTEREMIA: ICD-10-CM

## 2022-06-20 NOTE — TELEPHONE ENCOUNTER
"Request for medication refill:  Simvastatin 20 Mg tablets    Providers if patient needs an appointment and you are willing to give a one month supply please refill for one month and  send a letter/MyChart using \".SMILLIMITEDREFILL\" .smillimited and route chart to \"P SMI \" (Giving one month refill in non controlled medications is strongly recommended before denial)    If refill has been denied, meaning absolutely no refills without visit, please complete the smart phrase \".smirxrefuse\" and route it to the \"P SMI MED REFILLS\"  pool to inform the patient and the pharmacy.    Adela Darby MA      "

## 2022-06-22 RX ORDER — SIMVASTATIN 20 MG
20 TABLET ORAL AT BEDTIME
Qty: 90 TABLET | Refills: 3 | Status: SHIPPED | OUTPATIENT
Start: 2022-06-22 | End: 2022-10-10

## 2022-10-03 ASSESSMENT — ACTIVITIES OF DAILY LIVING (ADL): CURRENT_FUNCTION: NO ASSISTANCE NEEDED

## 2022-10-03 ASSESSMENT — ENCOUNTER SYMPTOMS
BREAST MASS: 0
HEARTBURN: 0
NAUSEA: 0
SHORTNESS OF BREATH: 0
MYALGIAS: 0
WEAKNESS: 0
DIZZINESS: 0
CONSTIPATION: 0
DYSURIA: 0
ARTHRALGIAS: 0
SORE THROAT: 0
FREQUENCY: 0
FEVER: 0
HEMATURIA: 0
ABDOMINAL PAIN: 0
COUGH: 0
JOINT SWELLING: 0
HEADACHES: 0
DIARRHEA: 0
CHILLS: 0
HEMATOCHEZIA: 0
EYE PAIN: 0
PARESTHESIAS: 0
NERVOUS/ANXIOUS: 0
PALPITATIONS: 0

## 2022-10-10 ENCOUNTER — OFFICE VISIT (OUTPATIENT)
Dept: FAMILY MEDICINE | Facility: CLINIC | Age: 67
End: 2022-10-10
Payer: COMMERCIAL

## 2022-10-10 VITALS
HEART RATE: 44 BPM | BODY MASS INDEX: 24.46 KG/M2 | TEMPERATURE: 97.9 F | OXYGEN SATURATION: 98 % | HEIGHT: 65 IN | WEIGHT: 146.8 LBS | RESPIRATION RATE: 16 BRPM | DIASTOLIC BLOOD PRESSURE: 76 MMHG | SYSTOLIC BLOOD PRESSURE: 114 MMHG

## 2022-10-10 DIAGNOSIS — M27.9 MANDIBULAR BONE LOSS: ICD-10-CM

## 2022-10-10 DIAGNOSIS — Z23 HIGH PRIORITY FOR 2019 NOVEL CORONAVIRUS VACCINATION: ICD-10-CM

## 2022-10-10 DIAGNOSIS — Z23 NEED FOR PROPHYLACTIC VACCINATION AND INOCULATION AGAINST INFLUENZA: ICD-10-CM

## 2022-10-10 DIAGNOSIS — F39 SEASONAL MOOD DISORDER (H): ICD-10-CM

## 2022-10-10 DIAGNOSIS — Z86.0100 HISTORY OF COLONIC POLYPS: ICD-10-CM

## 2022-10-10 DIAGNOSIS — Z12.31 ENCOUNTER FOR SCREENING MAMMOGRAM FOR BREAST CANCER: ICD-10-CM

## 2022-10-10 DIAGNOSIS — Z23 HIGH PRIORITY FOR 2019-NCOV VACCINE: ICD-10-CM

## 2022-10-10 DIAGNOSIS — H91.90 HEARING LOSS, UNSPECIFIED HEARING LOSS TYPE, UNSPECIFIED LATERALITY: ICD-10-CM

## 2022-10-10 DIAGNOSIS — R00.1 BRADYCARDIA: ICD-10-CM

## 2022-10-10 DIAGNOSIS — E78.01 FAMILIAL HYPERCHOLESTEREMIA: ICD-10-CM

## 2022-10-10 DIAGNOSIS — Z00.00 MEDICARE ANNUAL WELLNESS VISIT, SUBSEQUENT: Primary | ICD-10-CM

## 2022-10-10 DIAGNOSIS — F64.9 GENDER INCONGRUENCE: ICD-10-CM

## 2022-10-10 LAB
CHOLEST SERPL-MCNC: 217 MG/DL
FASTING STATUS PATIENT QL REPORTED: YES
GLUCOSE SERPL-MCNC: 112 MG/DL (ref 70–99)
HDLC SERPL-MCNC: 64 MG/DL
LDLC SERPL CALC-MCNC: 123 MG/DL
NONHDLC SERPL-MCNC: 153 MG/DL
TRIGL SERPL-MCNC: 150 MG/DL

## 2022-10-10 PROCEDURE — 90472 IMMUNIZATION ADMIN EACH ADD: CPT | Performed by: FAMILY MEDICINE

## 2022-10-10 PROCEDURE — 82947 ASSAY GLUCOSE BLOOD QUANT: CPT | Performed by: FAMILY MEDICINE

## 2022-10-10 PROCEDURE — 36415 COLL VENOUS BLD VENIPUNCTURE: CPT | Performed by: FAMILY MEDICINE

## 2022-10-10 PROCEDURE — 0124A COVID-19,PF,PFIZER BOOSTER BIVALENT: CPT | Performed by: FAMILY MEDICINE

## 2022-10-10 PROCEDURE — 91312 COVID-19,PF,PFIZER BOOSTER BIVALENT: CPT | Performed by: FAMILY MEDICINE

## 2022-10-10 PROCEDURE — 90662 IIV NO PRSV INCREASED AG IM: CPT | Performed by: FAMILY MEDICINE

## 2022-10-10 PROCEDURE — 90471 IMMUNIZATION ADMIN: CPT | Performed by: FAMILY MEDICINE

## 2022-10-10 PROCEDURE — 90677 PCV20 VACCINE IM: CPT | Performed by: FAMILY MEDICINE

## 2022-10-10 PROCEDURE — 80061 LIPID PANEL: CPT | Performed by: FAMILY MEDICINE

## 2022-10-10 PROCEDURE — G0439 PPPS, SUBSEQ VISIT: HCPCS | Mod: 25 | Performed by: FAMILY MEDICINE

## 2022-10-10 RX ORDER — SIMVASTATIN 20 MG
20 TABLET ORAL AT BEDTIME
Qty: 90 TABLET | Refills: 3 | Status: SHIPPED | OUTPATIENT
Start: 2022-10-10 | End: 2023-10-13

## 2022-10-10 SDOH — HEALTH STABILITY: PHYSICAL HEALTH: ON AVERAGE, HOW MANY MINUTES DO YOU ENGAGE IN EXERCISE AT THIS LEVEL?: 30 MIN

## 2022-10-10 SDOH — HEALTH STABILITY: PHYSICAL HEALTH: ON AVERAGE, HOW MANY DAYS PER WEEK DO YOU ENGAGE IN MODERATE TO STRENUOUS EXERCISE (LIKE A BRISK WALK)?: 6 DAYS

## 2022-10-10 NOTE — PROGRESS NOTES
SUBJECTIVE:   Eleazar is a 67 year old who presents for Preventive Visit.    Are you in the first 12 months of your Medicare coverage?  No    HPI  Do you feel safe in your environment? Yes    Have you ever done Advance Care Planning? (For example, a Health Directive, POLST, or a discussion with a medical provider or your loved ones about your wishes): Yes, advance care planning is on file.    Fall risk  Fallen 2 or more times in the past year?: No  Any fall with injury in the past year?: No    Cognitive Screening   1) Repeat 3 items (Leader, Season, Table)    2) Clock draw: NORMAL  3) 3 item recall: Recalls 3 objects  Results: 3 items recalled: COGNITIVE IMPAIRMENT LESS LIKELY    Mini-CogTM Copyright S Chase. Licensed by the author for use in [facility] [employer]; reprinted with permission (kristan@[facility]). All rights reserved.      Do you have sleep apnea, excessive snoring or daytime drowsiness?: no    Reviewed and updated as needed this visit by clinical staff   Tobacco  Allergies  Meds  Problems  Med Hx  Surg Hx  Fam Hx          Reviewed and updated as needed this visit by Provider   Tobacco  Allergies  Meds  Problems  Med Hx  Surg Hx  Fam Hx         Social History     Tobacco Use     Smoking status: Never     Smokeless tobacco: Never   Substance Use Topics     Alcohol use: Yes     Alcohol/week: 4.0 standard drinks     Types: 4 Glasses of wine per week     Comment: wine         Alcohol Use 10/3/2022   Prescreen: >3 drinks/day or >7 drinks/week? No   Prescreen: >3 drinks/day or >7 drinks/week? -         Patient was told there was some bone loss in the corners of her jaw on examination during recent dental appointment. They report that michel's apple is smaller than it was which they attribute both of theses changes to hormones. They deny any tracheal surgery of facial bone shaving.     Patient has a mostly vegetarian diet, but also frequently eats organ meats, chicken feet. 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 They still experience misgendering at work with many students referring to them as \"Mr. Rehman,\" even after top surgery.     Patient has been experiencing delays in hormone administration due to cost of estrogen patches and lack of coverage under current insurance. These delays are associated with intense hot flashes and discomfort which takes multiple make-up patches to alleviate. They are planning on switching insurance from [employer] due to poor coverage. They are willing to switch hormone intake methods if it is significantly cheaper. They are trying to ween themself off of hormone over the next couple of years, but withdrawal symptoms are too severe to tolerate.     Patient attests that heart rate has been low historically due to history of running. They deny any dizziness or associated symptoms with this.     Patient denies any new sexual partners.    Patient affirms that they are hard of hearing, and notice that they have to concentrate quite hard to maintain one-on-one conversations. They are not concerned about this and denies any formal hearing appointment.     Current providers sharing in care for this patient include:   Patient Care Team:  Sravani Boyer MD as PCP - General (Family Medicine)  Sravani Boyer MD as Assigned PCP    The following health maintenance items are reviewed in Epic and correct as of today:  Health Maintenance   Topic Date Due     MAMMO SCREENING  Never done     HEPATITIS B IMMUNIZATION (1 of 3 - 3-dose series) Never done     ZOSTER IMMUNIZATION (1 of 2) Never done     NORTH ASSESSMENT  01/31/2019     PHQ-9  01/15/2020     INFLUENZA VACCINE (1) 09/01/2022     MEDICARE ANNUAL WELLNESS VISIT  09/24/2022     Pneumococcal Vaccine: 65+ Years (2 - PCV) 09/24/2022     COLORECTAL CANCER SCREENING  09/21/2023     FALL RISK ASSESSMENT  " "10/10/2023     DTAP/TDAP/TD IMMUNIZATION (2 - Td or Tdap) 07/15/2026     LIPID  09/24/2026     ADVANCE CARE PLANNING  10/10/2027     HEPATITIS C SCREENING  Completed     PHQ-2 (once per calendar year)  Completed     AORTIC ANEURYSM SCREENING (SYSTEM ASSIGNED)  Completed     COVID-19 Vaccine  Completed     IPV IMMUNIZATION  Aged Out     MENINGITIS IMMUNIZATION  Aged Out     Pneumonia Vaccine:For adults 65 years or older who do not have an immunocompromising condition, cerebrospinal fluid leak, or cochlear implant and want to receive PCV13 AND PPSV23: Administer 1 dose of PCV13 first then give 1 dose of PPSV23 at least 1 year later. If the patient already received PPSV23, give the dose of PCV13 at least 1 year after they received the most recent dose of PPSV23. Anyone who received any doses of PPSV23 before age 65 should receive 1 final dose of the vaccine at age 65 or older. Administer this last dose at least 5 years after the prior PPSV23 dose.  Mammogram Screening: Mammogram Screening: Recommended mammography every 1-2 years with patient discussion and risk factor consideration        Review of Systems  Positive for: Bone loss in jaw, hot flashes, poor hearing  Negative for: Dizziness    OBJECTIVE:   /76   Pulse (!) 44   Temp 97.9  F (36.6  C) (Oral)   Resp 16   Ht 1.66 m (5' 5.35\")   Wt 66.6 kg (146 lb 12.8 oz)   SpO2 98%   BMI 24.16 kg/m   Estimated body mass index is 24.16 kg/m  as calculated from the following:    Height as of this encounter: 1.66 m (5' 5.35\").    Weight as of this encounter: 66.6 kg (146 lb 12.8 oz).  Physical Exam  GENERAL: healthy, alert and no distress  EYES: Eyes grossly normal to inspection, PERRL and conjunctivae and sclerae normal  HENT: Hard of hearing  NECK: no adenopathy, no asymmetry, masses, or scars and thyroid normal to palpation  RESP: lungs clear to auscultation - no rales, rhonchi or wheezes  CV: regular rate and rhythm, normal S1 S2, no S3 or S4, no murmur, click " or rub, no peripheral edema and peripheral pulses strong  ABDOMEN: soft, nontender, no hepatosplenomegaly, no masses and bowel sounds normal  MS: no gross musculoskeletal defects noted, no edema  SKIN: no suspicious lesions or rashes  NEURO: Normal  and tone, mentation intact and speech normal  PSYCH: mentation appears normal, affect normal/bright        ASSESSMENT / PLAN:   Larissa was seen today for wellness visit.    Diagnoses and all orders for this visit:    Medicare annual wellness visit, subsequent  See AVS instruction, reviewed in detail.    Seasonal mood disorder (H)  In remission.    High priority for 2019 novel coronavirus vaccination    Bradycardia  Chronic, no symptoms  Mandibular bone loss  Comments:  follow-up with annual dental visits    Familial hypercholesteremia  On simvastatin, exercises daily, repeat labs    Gender incongruence  Status post lower surgery and is happy with this. Has breast tissue and is due for mammogram which was ordered. Looking into [facility] for compounded medications which may be cheaper. No further gender affirmation goals identified.           COUNSELING:  Reviewed preventive health counseling, as reflected in patient instructions       Regular exercise       Healthy diet/nutrition       Hearing screening       Dental care       Alcohol Use        Colon cancer screening       Advanced Planning     She reports that she has never smoked. She has never used smokeless tobacco.      Appropriate preventive services were discussed with this patient, including applicable screening as appropriate for cardiovascular disease, diabetes, osteopenia/osteoporosis, and glaucoma.  As appropriate for age/gender, discussed screening for colorectal cancer, prostate cancer, breast cancer, and cervical cancer. Checklist reviewing preventive services available has been given to the patient.    Reviewed patients plan of care and provided an AVS. The Basic Care Plan (routine screening as  "documented in Health Maintenance) for Larissa meets the Care Plan requirement. This Care Plan has been established and reviewed with the Patient.    Counseling Resources:  ATP IV Guidelines  Pooled Cohorts Equation Calculator  Breast Cancer Risk Calculator  Breast Cancer: Medication to Reduce Risk  FRAX Risk Assessment  ICSI Preventive Guidelines  Dietary Guidelines for Americans, 2010  [employer]'s MyPlate  ASA Prophylaxis  Lung CA Screening    The information in this document, created by the medical scribe for me, accurately reflects the services I personally performed and the decisions made by me. I have reviewed and approved this document for accuracy prior to leaving the patient care area.  Sravani Boyer MD  10:35 AM, 10/10/22  30 minutes spent on the date of the encounter doing chart review, review of test results, interpretation of tests, patient visit and documentation excluding time for preventive care.        [facility]    Identified Health Risks:Answers for HPI/ROS submitted by the patient on 10/3/2022  In general, how would you rate your overall physical health?: excellent  Frequency of exercise:: 6-7 days/week  Do you usually eat at least 4 servings of fruit and vegetables a day, include whole grains & fiber, and avoid regularly eating high fat or \"junk\" foods? : Yes  Taking medications regularly:: Yes  Medication side effects:: Not applicable, None  Activities of Daily Living: no assistance needed  Home safety: no safety concerns identified  Hearing Impairment:: no hearing concerns  In the past 6 months, have you been bothered by leaking of urine?: No  abdominal pain: No  Blood in stool: No  Blood in urine: No  chest pain: No  chills: No  congestion: No  constipation: No  cough: No  diarrhea: No  dizziness: No  ear pain: No  eye pain: No  nervous/anxious: No  fever: No  frequency: No  genital sores: No  headaches: No  hearing loss: No  heartburn: No  arthralgias: No  joint swelling: " No  peripheral edema: No  mood changes: No  myalgias: No  nausea: No  dysuria: No  palpitations: No  Skin sensation changes: No  sore throat: No  urgency: No  rash: No  shortness of breath: No  visual disturbance: No  weakness: No  pelvic pain: No  vaginal bleeding: No  vaginal discharge: No  tenderness: No  breast mass: No  breast discharge: No  impotence: No  penile discharge: No  In general, how would you rate your overall mental or emotional health?: excellent  Additional concerns today:: Yes  Duration of exercise:: 30-45 minutes

## 2022-10-10 NOTE — PATIENT INSTRUCTIONS
We will look into appropriate dosage for Estrogen cream.     Look into [employer] for potentially less expensive hormones.     Mammogram screening at [facility] [phone] or through Imaging [phone]    If you had an XRay/CT/Ultrasound/MRI ordered the number is [phone] to schedule or change your radiology appointment.   Medical Concerns:  If you have urgent medical concerns please call [phone] at any time of the day.    Sravani Boyer MD  [facility] - SERVICES     Review these tests with your doctor then decide which ones you want and take this page home for your reference  Immunization History   Administered Date(s) Administered    COVID-19,PF,Astrazeneca 01/11/2021, 02/08/2021    COVID-19,PF,Pfizer (12+ Yrs) 03/11/2021, 04/01/2021, 10/03/2021    COVID-19,PF,Pfizer 12+ Yrs (2022 and After) 04/06/2022    Influenza (IIV3) PF 10/11/2001, 10/15/2009, 10/11/2011, 10/19/2012, 10/24/2013, 09/30/2014, 10/06/2017    Influenza Vaccine IM > 6 months Valent IIV4 (Alfuria,Fluzone) 09/25/2018    Influenza, Quad, High Dose, Pf, 65yr+ (Fluzone HD) 09/23/2020, 09/24/2021    Pneumococcal 23 valent 09/24/2021    TDAP Vaccine (Boostrix) 07/15/2016          IMMUNIZATIONS Description Recommend today?     Influenza (flu shot) Helps to prevent flu; you should get it every year Yes; Recommended and ordered.   PCV 13 Prevents pneumonia; given once Yes; Recommended and ordered.   PPSV 23 Prevents pneumonia; given once No; is up to date.   Tetanus Prevents tetanus; need every 10 years No; is up to date.   Herpes Zoster (shingles) Prevents or lessens symptoms from shingles; given once Yes; Recommended and need to get at the PHARMACY   Hepatitis B  If you have any of the following risk factors you should be immunized for hepatitis B: severe kidney disease, people who live in the same house as a carrier of Hepatitis B virus, people who live in  institutions (e.g. nursing  homes or group homes), homosexual men, patients with hemophilia who received Factor VIII or IX concentrates, abusers of illicit injectable drugs No: is not indicated today.           Health Maintenance   Topic Date Due    MAMMO SCREENING  Never done    HEPATITIS B IMMUNIZATION (1 of 3 - 3-dose series) Never done    ZOSTER IMMUNIZATION (1 of 2) Never done    NORTH ASSESSMENT  01/31/2019    PHQ-9  01/15/2020    INFLUENZA VACCINE (1) 09/01/2022    FALL RISK ASSESSMENT  09/24/2022    MEDICARE ANNUAL WELLNESS VISIT  09/24/2022    Pneumococcal Vaccine: 65+ Years (2 - PCV) 09/24/2022    COLORECTAL CANCER SCREENING  09/21/2023    DTAP/TDAP/TD IMMUNIZATION (2 - Td or Tdap) 07/15/2026    LIPID  09/24/2026    ADVANCE CARE PLANNING  09/24/2026    HEPATITIS C SCREENING  Completed    PHQ-2 (once per calendar year)  Completed    AORTIC ANEURYSM SCREENING (SYSTEM ASSIGNED)  Completed    COVID-19 Vaccine  Completed    IPV IMMUNIZATION  Aged Out    MENINGITIS IMMUNIZATION  Aged Out       SCREENING TESTS     Description   Year of Last Screening   Recommended Today?   Heart disease screening blood tests  Cholesterol level Reducing cholesterol can reduce your risk of heart attacks by 25%.  Screening is recommended yearly if you are at risk of heart disease otherwise every 4-5 years  Ordered   Diabetes screening tests  Hemoglobin A1c blood test   Finding and treating diabetes early can reduce complications.  Screening recommended/covered yearly if you have high blood pressure, high cholesterol, obesity (BMI >30), or a history of high blood glucose tests; or 2 of the following: family history of diabetes, overweight (BMI >25 but <30), age 65 years or older, and a history of diabetes of pregnancy or gave birth to baby weighing more than 9 lbs.  Ordered   Hepatitis B screening Finding hepatitis B early can reduce complications.  Screening is recommended for persons with selected risk factors.  No: is not indicated today.   Hepatitis C  screening Finding hepatitis C early can reduce complications.  Screening is recommended for all persons born from 1945 through 1965 and for those with selected other risk factors.   No: is not indicated today.   HIV screening Finding HIV early can reduce complications.  Screening is recommended for persons with risk factors for HIV infection.  No: is not indicated today.   Glaucoma screening Early detection of glaucoma can prevent blindness.   Please talk to your eye doctor about this.           SCREENING TESTS     Description   Year of Last Screening   Recommended Today?   Colorectal cancer screening  Fecal occult blood test   Screening colonoscopy Screening for colon cancer has been shown to reduce death from colon cancer by 25-30%. Screening recommended to start at 50 years and continuing until age 75 years.    No; is up to date. Would repeat next year.   Breast Cancer Screening  Mammogram Mammogram screening for breast cancer has been shown to reduce the risk of dying from breast cancer and prolong life. Screening is recommended every 1-2 years for women aged 50 to 74 years.   Yes; Recommended and ordered.   Screening for Lung Cancer   Low-dose CT scanning Screening can reduce mortality in persons aged 55-80 who have smoked at least 30 pack-years and who are either still smoking or have quit in the past 15 years.  No; is up to date.       MEDICARE WELLNESS EXAM PATIENT INSTRUCTIONS    Yearly exam:   See your health care provider every year in order to review changes in your health, review medicines that you take, and discuss preventive care needs such as immunizations and cancer screening.  Get a flu shot each year.     Advance Directive:  If you have not done so, you are encouraged to complete an advance directive. If you would like support with this, please contact the clinic  through the main clinic line. More information about advance directives can be found at:    [email or web address]    Nutrition:   Eat at least 5 servings of fruits and vegetables each day.   Eat whole-grain bread, whole-wheat pasta and brown rice instead of white grains and rice.   Talk to your doctor about Calcium and Vitamin D.     Lifestyle:  Exercise for at least 150 minutes a week (30 minutes a day, 5 days a week). This will help you control your weight and prevent disease.   Limit alcohol to one drink per day.   If you smoke, try to quit - your doctor will be happy to help.   Wear sunscreen to prevent skin cancer.   See your dentist every six months for an exam and cleaning.   See your eye doctor every 1 to 2 years to screen for conditions such as glaucoma, macular degeneration and cataracts.

## 2022-10-10 NOTE — PROGRESS NOTES
Patient requested Pfizer Bivalent, High dose flu and Prevnar 20 all be given in the left deltoid.    Tolerated well, asked to wait for 15 minutes, and escorted to lab    Delmy Reno RN

## 2022-10-14 ENCOUNTER — MYC MEDICAL ADVICE (OUTPATIENT)
Dept: FAMILY MEDICINE | Facility: CLINIC | Age: 67
End: 2022-10-14

## 2022-10-14 DIAGNOSIS — Z90.79 H/O BILATERAL ORCHIECTOMIES: ICD-10-CM

## 2022-10-14 DIAGNOSIS — F64.0 GENDER DYSPHORIA IN ADULT: ICD-10-CM

## 2022-10-25 RX ORDER — ESTRADIOL 0.04 MG/D
1 PATCH, EXTENDED RELEASE TRANSDERMAL
Qty: 24 PATCH | Refills: 3 | Status: SHIPPED | OUTPATIENT
Start: 2022-10-27 | End: 2023-02-10

## 2022-11-10 ENCOUNTER — HOSPITAL ENCOUNTER (OUTPATIENT)
Dept: MAMMOGRAPHY | Facility: CLINIC | Age: 67
Discharge: HOME | End: 2022-11-10
Attending: FAMILY MEDICINE | Admitting: FAMILY MEDICINE
Payer: COMMERCIAL

## 2022-11-10 DIAGNOSIS — Z12.31 ENCOUNTER FOR SCREENING MAMMOGRAM FOR BREAST CANCER: ICD-10-CM

## 2022-11-10 PROCEDURE — 77067 SCR MAMMO BI INCL CAD: CPT

## 2023-02-10 ENCOUNTER — OFFICE VISIT (OUTPATIENT)
Dept: FAMILY MEDICINE | Facility: CLINIC | Age: 68
End: 2023-02-10
Payer: COMMERCIAL

## 2023-02-10 VITALS
SYSTOLIC BLOOD PRESSURE: 126 MMHG | OXYGEN SATURATION: 97 % | BODY MASS INDEX: 24.16 KG/M2 | DIASTOLIC BLOOD PRESSURE: 75 MMHG | HEART RATE: 91 BPM | TEMPERATURE: 98.4 F | WEIGHT: 145 LBS | HEIGHT: 65 IN

## 2023-02-10 DIAGNOSIS — F39 SEASONAL MOOD DISORDER (H): ICD-10-CM

## 2023-02-10 DIAGNOSIS — F64.9 GENDER INCONGRUENCE: Primary | ICD-10-CM

## 2023-02-10 PROCEDURE — 99214 OFFICE O/P EST MOD 30 MIN: CPT | Performed by: FAMILY MEDICINE

## 2023-02-10 RX ORDER — ESTRADIOL 0.1 MG/D
1 FILM, EXTENDED RELEASE TRANSDERMAL
Qty: 24 PATCH | Refills: 3 | Status: SHIPPED | OUTPATIENT
Start: 2023-02-13 | End: 2024-01-04

## 2023-02-10 ASSESSMENT — PATIENT HEALTH QUESTIONNAIRE - PHQ9: SUM OF ALL RESPONSES TO PHQ QUESTIONS 1-9: 0

## 2023-02-10 NOTE — PROGRESS NOTES
Assessment & Plan     Gender incongruence  Patient has been rationing medications due to cost but has hypogonadal symptoms. No success with [facility], reviewed [facility] and set up an account. I sent estrogen patches at 100 micrograms biweekly, though patient interested in lower dosing. Instructed they can cut patches in half to 50 micrograms without concern. This is slightly higher than previous dose of 37.5 micrograms which will be helpful for avoidance of hypogonadal symptoms that interrupt sleep.     Continuing to feminize at low doses and while access to certain sex segregated spaces at gym have been a problem, general gender goal being met.   - estradiol (VIVELLE-DOT) 0.1 MG/24HR bi-weekly patch  Dispense: 24 patch; Refill: 3    Seasonal mood disorder (H)  Reports being mcgill recently, but happy at work, and symptoms well controlled with exercise and cold plunge routine daily. No indication for medications at this time.        34 minutes spent on the date of the encounter doing chart review, history and exam, documentation and further activities per the note     No follow-ups on file.    The information in this document, created by the medical scribe for me, accurately reflects the services I personally performed and the decisions made by me. I have reviewed and approved this document for accuracy prior to leaving the patient care area.  Sravani Boyer MD  4:19 PM, 02/10/23  [facility] DENNISE Bush is a 67 year old, presenting for the following health issues:  Hormone Patch Replacement (Patient stated that had originally discussed a gel, and then was prescribed a patch; wanted to figure out why that was changed)      HPI     Patient has been stretching out hormone use, and did not even  their last dose in part due to exceptional cost of patches. They have been purchasing patches from [employer] that have been ineffective as they  with increased itching and  "heat flashes. Patient can afford hormones, but wants to be getting value out of their purchases.    Patient reaffirms that they want low dose estrogen such that they can sleep well without side effects.     They report taking off patches prior to going to [employer] to swim, and reapplies afterward. They use the men's locker room and reports some feelings of intimidation.    Patient attests to occasional moodiness, especially with the weather. Overall they feel that they can rationally handle their mood through behavioral and lifestyle decisions. They are greatly fulfilled with their work. Generally they report being happy.    Review of Systems   Positive for: Heat flashes, itchiness.   Negative for:    This document serves as a record of the services and decisions personally performed and made by Sravani Boyer MD. It was created on his/her behalf by Stu Ospina, a trained medical scribe. The creation of this document is based the provider's statements to the medical scribe.  Scribharvey Ospina 4:20 PM, February 10, 2023        Objective    /75   Pulse 91   Temp 98.4  F (36.9  C) (Oral)   Ht 1.655 m (5' 5.16\")   Wt 65.8 kg (145 lb)   SpO2 97%   BMI 24.01 kg/m    Body mass index is 24.01 kg/m .  Physical Exam   PHQ 1/15/2019 2/10/2023   PHQ-9 Total Score 1 0   Q9: Thoughts of better off dead/self-harm past 2 weeks Not at all Not at all   Some encounter information is confidential and restricted. Go to Review Flowsheets activity to see all data.       GENERAL: healthy, alert and no distress  PSYCH: mentation appears normal, affect normal/bright            "

## 2023-02-10 NOTE — PATIENT INSTRUCTIONS
Ordered estrogen though [facility], use half a patch.     2.   Continue exercise.    Follow-up as scheduled

## 2023-10-04 ASSESSMENT — ENCOUNTER SYMPTOMS
DIARRHEA: 0
BREAST MASS: 0
SHORTNESS OF BREATH: 0
FREQUENCY: 0
HEADACHES: 0
ABDOMINAL PAIN: 0
HEMATOCHEZIA: 0
HEARTBURN: 0
HEMATURIA: 0
CONSTIPATION: 0
MYALGIAS: 0
FEVER: 0
WEAKNESS: 0
NERVOUS/ANXIOUS: 0
EYE PAIN: 0
CHILLS: 0
JOINT SWELLING: 0
PARESTHESIAS: 0
NAUSEA: 0
DYSURIA: 0
PALPITATIONS: 0
COUGH: 0
DIZZINESS: 0
ARTHRALGIAS: 0
SORE THROAT: 0

## 2023-10-04 ASSESSMENT — ACTIVITIES OF DAILY LIVING (ADL): CURRENT_FUNCTION: NO ASSISTANCE NEEDED

## 2023-10-11 ENCOUNTER — OFFICE VISIT (OUTPATIENT)
Dept: FAMILY MEDICINE | Facility: CLINIC | Age: 68
End: 2023-10-11
Payer: COMMERCIAL

## 2023-10-11 VITALS
DIASTOLIC BLOOD PRESSURE: 82 MMHG | SYSTOLIC BLOOD PRESSURE: 147 MMHG | BODY MASS INDEX: 23.33 KG/M2 | OXYGEN SATURATION: 100 % | RESPIRATION RATE: 16 BRPM | HEART RATE: 46 BPM | WEIGHT: 140.9 LBS

## 2023-10-11 DIAGNOSIS — Z12.11 COLON CANCER SCREENING: ICD-10-CM

## 2023-10-11 DIAGNOSIS — E78.01 FAMILIAL HYPERCHOLESTEREMIA: ICD-10-CM

## 2023-10-11 DIAGNOSIS — Z00.00 ENCOUNTER FOR MEDICARE ANNUAL WELLNESS EXAM: Primary | ICD-10-CM

## 2023-10-11 DIAGNOSIS — M19.90 ARTHRITIS: ICD-10-CM

## 2023-10-11 LAB
CHOLEST SERPL-MCNC: 209 MG/DL
HDLC SERPL-MCNC: 82 MG/DL
LDLC SERPL CALC-MCNC: 113 MG/DL
NONHDLC SERPL-MCNC: 127 MG/DL
TRIGL SERPL-MCNC: 71 MG/DL

## 2023-10-11 PROCEDURE — 90662 IIV NO PRSV INCREASED AG IM: CPT | Performed by: FAMILY MEDICINE

## 2023-10-11 PROCEDURE — 90480 ADMN SARSCOV2 VAC 1/ONLY CMP: CPT | Performed by: FAMILY MEDICINE

## 2023-10-11 PROCEDURE — G0008 ADMIN INFLUENZA VIRUS VAC: HCPCS | Performed by: FAMILY MEDICINE

## 2023-10-11 PROCEDURE — G0439 PPPS, SUBSEQ VISIT: HCPCS | Performed by: FAMILY MEDICINE

## 2023-10-11 PROCEDURE — 80061 LIPID PANEL: CPT | Performed by: FAMILY MEDICINE

## 2023-10-11 PROCEDURE — 99214 OFFICE O/P EST MOD 30 MIN: CPT | Mod: 25 | Performed by: FAMILY MEDICINE

## 2023-10-11 PROCEDURE — 36415 COLL VENOUS BLD VENIPUNCTURE: CPT | Performed by: FAMILY MEDICINE

## 2023-10-11 PROCEDURE — 91320 SARSCV2 VAC 30MCG TRS-SUC IM: CPT | Performed by: FAMILY MEDICINE

## 2023-10-11 NOTE — PATIENT INSTRUCTIONS
Labs today to check cholesterol.  Flu and COVID vaccine given today. Okay to do RSV and Shingles vaccine at the same time in the future.  Call [facility] about colonoscopy. Referral sent. [phone]  Follow-up with me in 1 year or sooner if blood pressure remains high.    Patient Education   Personalized Prevention Plan  You are due for the preventive services outlined below.  Your care team is available to assist you in scheduling these services.  If you have already completed any of these items, please share that information with your care team to update in your medical record.  Health Maintenance Due   Topic Date Due    Zoster (Shingles) Vaccine (1 of 2) Never done    RSV VACCINE 60+ (1 - 1-dose 60+ series) Never done    Flu Vaccine (1) 09/01/2023    FALL RISK ASSESSMENT  10/10/2023    Colorectal Cancer Screening  09/21/2023     Preventive Health Recommendations  See your health care provider every year to  Review health changes.   Discuss preventive care.    Review your medicines if your doctor has prescribed any.  Talk with your health care provider about whether you should have a test to screen for prostate cancer (PSA).  Every 3 years, have a diabetes test (fasting glucose). If you are at risk for diabetes, you should have this test more often.  Every 5 years, have a cholesterol test. Have this test more often if you are at risk for high cholesterol or heart disease.   Every 10 years, have a colonoscopy. Or, have a yearly FIT test (stool test). These exams will check for colon cancer.  Talk to with your health care provider about screening for Abdominal Aortic Aneurysm if you have a family history of AAA or have a history of smoking.  Breast cancer screening should be done every 2 years until age 74    Shots:   Get a flu shot each year.   Get a tetanus shot every 10 years.   Talk to your doctor about your pneumonia vaccines. There are now two you should receive - Pneumovax (PPSV 23) and  Prevnar (PCV 13).  Talk to your pharmacist about a shingles vaccine.   Talk to your doctor about the hepatitis B vaccine.    Nutrition:   Eat at least 5 servings of fruits and vegetables each day.   Eat whole-grain bread, whole-wheat pasta and brown rice instead of white grains and rice.   Get adequate Calcium and Vitamin D.     Lifestyle  Exercise for at least 150 minutes a week (30 minutes a day, 5 days a week). This will help you control your weight and prevent disease.   Limit alcohol to one drink per day.   No smoking.   Wear sunscreen to prevent skin cancer.   See your dentist every six months for an exam and cleaning.   See your eye doctor every 1 to 2 years to screen for conditions such as glaucoma, macular degeneration and cataracts.    Personalized Prevention Plan  You are due for the preventive services outlined below.  Your care team is available to assist you in scheduling these services.  If you have already completed any of these items, please share that information with your care team to update in your medical record.  Health Maintenance   Topic Date Due    ZOSTER IMMUNIZATION (1 of 2) Never done    RSV VACCINE 60+ (1 - 1-dose 60+ series) Never done    INFLUENZA VACCINE (1) 09/01/2023    FALL RISK ASSESSMENT  10/10/2023    COLORECTAL CANCER SCREENING  09/21/2023    NORTH ASSESSMENT  02/01/2024 (Originally 1/31/2019)    PHQ-9  02/10/2024    MEDICARE ANNUAL WELLNESS VISIT  10/11/2024    MAMMO SCREENING  11/10/2024    DTAP/TDAP/TD IMMUNIZATION (2 - Td or Tdap) 07/15/2026    LIPID  10/10/2027    ADVANCE CARE PLANNING  10/14/2027    HEPATITIS C SCREENING  Completed    PHQ-2 (once per calendar year)  Completed    Pneumococcal Vaccine: 65+ Years  Completed    AORTIC ANEURYSM SCREENING (SYSTEM ASSIGNED)  Completed    COVID-19 Vaccine  Completed    IPV IMMUNIZATION  Aged Out    HPV IMMUNIZATION  Aged Out    MENINGITIS IMMUNIZATION  Aged Out

## 2023-10-11 NOTE — PROGRESS NOTES
SUBJECTIVE:   Eleazar is a 68 year old who presents for Preventive Visit.      10/11/2023     1:30 PM   Additional Questions   Roomed by Davie   Accompanied by Self       Are you in the first 12 months of your Medicare coverage?  No    HPI  General  She is planning to retire this December. 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 She does not have too many concerns with her health. She did fall on a trail recently which resulted in a blemish on her face. She has started doing cold plunges for around three minutes every day at [employer]. She feels that her hay fever and allergies have decreased in severity since starting cold plunges. Her diet, which includes high amounts of calcium, has not changed in the last year and she plans to keep this diet. She notes that her vegetable and fruit consumption has decreased in the last year. She has not noticed any changes in energy levels.    Cholesterol  She is still adherent to her cholesterol medication and is open to increasing his dose if her cholesterol levels increase. She has noticed foot cramps after starting her cholesterol medication. These have decreased in frequency over time, and when she does cramp they are relieved with pickle juice.     Right hand  She notes that at last years visit her hand was significantly swollen but in the year since the swelling has gone down with cold plunges and thinks this is more arthritis instead of a broken bone or tendon issue.     Seasonal Affective Disorder  She feels that her seasonal affective disorder hasn't been as bad recently. She attributes this to cold plunges and having gained a little weight. She still feels some irritability and other symptoms when it is cloudy and when she doesn't get enough sun.     Estrogen  She notes that the quality of her estrogen patches have decreased significantly in the last couple of months. Her hot flashes come and go without reason since this change in  quality. The hot flashes are decreasing in severity but they are still bothersome. She does note that she has had some very slow breast growth.     Today's PHQ-2 Score:       10/10/2023     3:07 PM   PHQ-2 ( 1999 [employer])   Q1: Little interest or pleasure in doing things 0   Q2: Feeling down, depressed or hopeless 0   PHQ-2 Score 0   Q1: Little interest or pleasure in doing things Not at all   Q2: Feeling down, depressed or hopeless Not at all   PHQ-2 Score 0           Have you ever done Advance Care Planning? (For example, a Health Directive, POLST, or a discussion with a medical provider or your loved ones about your wishes): Yes, advance care planning is on file.       Fall risk   Normal     Cognitive Screening   1) Repeat 3 items (Leader, Season, Table)    2) Clock draw: NORMAL  3) 3 item recall: Recalls 2 objects   Results: NORMAL clock, 1-2 items recalled: COGNITIVE IMPAIRMENT LESS LIKELY    Mini-CogTM Copyright S Chase. Licensed by the author for use in [facility]; reprinted with permission ([email or web address]). All rights reserved.          Reviewed and updated as needed this visit by clinical staff                Family History   Problem Relation Age of Onset     Colon Cancer Father      Heart Disease Father      Breast Cancer No family hx of      Osteoporosis No family hx of      Past Surgical History:   Procedure Laterality Date     COLONOSCOPY       ORTHOPEDIC SURGERY      foot surgery      TRANSGENDER VAGINOPLASTY ZERO DEPTH N/A 3/29/2018    Procedure: TRANSGENDER VAGINOPLASTY ZERO DEPTH;  Zero Depth Transgender Vaginoplasty;  Surgeon: Madhu Mojica MD;  Location:  OR     Past Medical History:   Diagnosis Date     Attention deficit hyperactivity disorder (ADHD), predominantly hyperactive type      H/O bilateral orchiectomies and zero-depth vaginoplasty 3/29/2018 04/05/2018     High cholesterol          Reviewed and updated as needed this visit by Provider                 Social History      Tobacco Use     Smoking status: Never     Smokeless tobacco: Never   Substance Use Topics     Alcohol use: Yes     Alcohol/week: 4.0 standard drinks of alcohol     Types: 4 Glasses of wine per week     Comment: wine             10/4/2023     9:18 AM   Alcohol Use   Prescreen: >3 drinks/day or >7 drinks/week? No     Do you have a current opioid prescription? No  Do you use any other controlled substances or medications that are not prescribed by a provider? None              Current providers sharing in care for this patient include:   Patient Care Team:  Sravani Boyer MD as PCP - General (Family Medicine)  Sravani Boyer MD as Assigned PCP    The following health maintenance items are reviewed in Epic and correct as of today:  Health Maintenance   Topic Date Due     ZOSTER IMMUNIZATION (1 of 2) Never done     RSV VACCINE 60+ (1 - 1-dose 60+ series) Never done     INFLUENZA VACCINE (1) 09/01/2023     FALL RISK ASSESSMENT  10/10/2023     COLORECTAL CANCER SCREENING  09/21/2023     MEDICARE ANNUAL WELLNESS VISIT  10/10/2023     NORTH ASSESSMENT  02/01/2024 (Originally 1/31/2019)     PHQ-9  02/10/2024     MAMMO SCREENING  11/10/2024     DTAP/TDAP/TD IMMUNIZATION (2 - Td or Tdap) 07/15/2026     LIPID  10/10/2027     ADVANCE CARE PLANNING  10/14/2027     HEPATITIS C SCREENING  Completed     PHQ-2 (once per calendar year)  Completed     Pneumococcal Vaccine: 65+ Years  Completed     AORTIC ANEURYSM SCREENING (SYSTEM ASSIGNED)  Completed     COVID-19 Vaccine  Completed     IPV IMMUNIZATION  Aged Out     HPV IMMUNIZATION  Aged Out     MENINGITIS IMMUNIZATION  Aged Out       Review of Systems  Positive for:   Negative for: dizziness with standing    This document serves as a record of the services and decisions personally performed and made by Sravani Boyer MD. It was created on his/her behalf by Juan Park, a trained medical scribe. The creation of this document is based the provider's statements to the medical  "[email or web address] Park 1:56 PM, October 11, 2023     OBJECTIVE:   There were no vitals taken for this visit. Estimated body mass index is 24.01 kg/m  as calculated from the following:    Height as of 2/10/23: 1.655 m (5' 5.16\").    Weight as of 2/10/23: 65.8 kg (145 lb).  BP Readings from Last 6 Encounters:   10/11/23 (!) 147/82   02/10/23 126/75   10/10/22 114/76   09/24/21 123/74   08/12/21 117/74   09/23/20 121/77      Physical Exam  GENERAL: healthy, alert and no distress  EYES: Eyes grossly normal to inspection, PERRL and conjunctivae and sclerae normal  HENT: ear canals and TM's normal, nose and mouth without ulcers or lesions  NECK: no adenopathy, no asymmetry, masses, or scars and thyroid normal to palpation  RESP: lungs clear to auscultation - no rales, rhonchi or wheezes  CV: bradycardic rate and regular rhythm, normal S1 S2, no S3 or S4, no murmur, click or rub, no peripheral edema and peripheral pulses strong  ABDOMEN: soft, nontender, no hepatosplenomegaly, no masses and bowel sounds normal  MS: Very mild ulnar deviation of the right hand with some MCP enlargement.  SKIN: Abrasion over maxillary prominence on the right and small abrasions on the hands   NEURO: Normal strength and tone, mentation intact and speech normal  PSYCH: mentation appears normal, affect normal/bright    ASSESSMENT / PLAN:   (Z00.00) Encounter for Medicare annual wellness exam  (primary encounter diagnosis)  Comment: See AVS. All items reviewed. Still has not had shingles vaccine which I recommended today. Patient is at no increased risks for falls though did recently have one when trail running so assume this was mechanical.  Plan: Lipid Panel        Lipids elevated =- see MyChart msg. ASCVD 18.6, could drop to 14% if incr statin. Sending incr dose    (M19.90) Arthritis  Comment: Noticeable symptoms developed in last year. Manageable with cold plunges.  Plan:     (E78.01) Familial hypercholesteremia  Comment: On 20 of " simvastatin. Last year's numbers were slightly elevated above goal, though had a protective HDL of 64, and patient did not want to increase medication. They are willing to increase if needed.  Plan:     (Z12.11) Colon cancer screening  Comment: Would prefer to do at [facility]. Referral sent and phone number provided.  Plan: Colonoscopy Screening  Referral          COUNSELING:  Reviewed preventive health counseling, as reflected in patient instructions       Regular exercise       Healthy diet/nutrition       Dental care       Alcohol Use        Osteoporosis prevention/bone health       Colon cancer screening       Breast Cancer screening        She reports that she has never smoked. She has never used smokeless tobacco.      Appropriate preventive services were discussed with this patient, including applicable screening as appropriate for fall prevention, nutrition, physical activity, Tobacco-use cessation, weight loss and cognition.  Checklist reviewing preventive services available has been given to the patient.    Reviewed patients plan of care and provided an AVS. The Basic Care Plan (routine screening as documented in Health Maintenance) for Larissa meets the Care Plan requirement. This Care Plan has been established and reviewed with the Patient.          Sravani Boyer MD  [facility]    Identified Health Risks:  I have reviewed Opioid Use Disorder and Substance Use Disorder risk factors and made any needed referrals.   Answers submitted by the patient for this visit:  Annual Preventive Visit (Submitted on 10/4/2023)  Chief Complaint: Annual Exam:  Blood in stool: No  heartburn: No  peripheral edema: No  mood changes: No  Skin sensation changes: No  pelvic pain: No  vaginal bleeding: No  vaginal discharge: No  tenderness: No  breast mass: No  breast discharge: No  impotence: No  penile discharge: No

## 2023-10-13 RX ORDER — SIMVASTATIN 40 MG
40 TABLET ORAL AT BEDTIME
Qty: 90 TABLET | Refills: 3 | Status: SHIPPED | OUTPATIENT
Start: 2023-10-13 | End: 2024-10-12

## 2023-10-15 ENCOUNTER — TRANSFERRED RECORDS (OUTPATIENT)
Dept: MULTI SPECIALTY CLINIC | Facility: CLINIC | Age: 68
End: 2023-10-15

## 2023-10-26 ENCOUNTER — TRANSFERRED RECORDS (OUTPATIENT)
Dept: HEALTH INFORMATION MANAGEMENT | Facility: CLINIC | Age: 68
End: 2023-10-26

## 2023-10-27 DIAGNOSIS — E78.01 FAMILIAL HYPERCHOLESTEREMIA: ICD-10-CM

## 2023-10-30 RX ORDER — SIMVASTATIN 20 MG
20 TABLET ORAL AT BEDTIME
Qty: 90 TABLET | Refills: 3 | Status: SHIPPED | OUTPATIENT
Start: 2023-10-30 | End: 2023-12-22

## 2023-10-30 NOTE — TELEPHONE ENCOUNTER
"Request for medication refill:  simvastatin (ZOCOR) 40 MG tablet   Providers if patient needs an appointment and you are willing to give a one month supply please refill for one month and  send a letter/MyChart using \".SMILLIMITEDREFILL\" .smillimited and route chart to \"[facility] \" (Giving one month refill in non controlled medications is strongly recommended before denial)    If refill has been denied, meaning absolutely no refills without visit, please complete the smart phrase \".smirxrefuse\" and route it to the \"P SMI MED REFILLS\"  pool to inform the patient and the pharmacy.    Leslye Hare MA        "

## 2023-12-22 ENCOUNTER — OFFICE VISIT (OUTPATIENT)
Dept: FAMILY MEDICINE | Facility: CLINIC | Age: 68
End: 2023-12-22
Payer: COMMERCIAL

## 2023-12-22 VITALS
WEIGHT: 142 LBS | OXYGEN SATURATION: 100 % | HEART RATE: 50 BPM | BODY MASS INDEX: 23.66 KG/M2 | RESPIRATION RATE: 17 BRPM | HEIGHT: 65 IN | DIASTOLIC BLOOD PRESSURE: 75 MMHG | SYSTOLIC BLOOD PRESSURE: 126 MMHG

## 2023-12-22 DIAGNOSIS — Z29.11 NEED FOR VACCINATION AGAINST RESPIRATORY SYNCYTIAL VIRUS: ICD-10-CM

## 2023-12-22 DIAGNOSIS — S90.851A FOREIGN BODY IN RIGHT FOOT, INITIAL ENCOUNTER: Primary | ICD-10-CM

## 2023-12-22 PROCEDURE — 28190 REMOVAL OF FOOT FOREIGN BODY: CPT | Mod: RT | Performed by: STUDENT IN AN ORGANIZED HEALTH CARE EDUCATION/TRAINING PROGRAM

## 2023-12-22 RX ORDER — RESPIRATORY SYNCYTIAL VIRUS VACCINE 120MCG/0.5
0.5 KIT INTRAMUSCULAR ONCE
Qty: 1 EACH | Refills: 0 | Status: CANCELLED | OUTPATIENT
Start: 2023-12-22 | End: 2023-12-22

## 2023-12-22 NOTE — PROGRESS NOTES
Preceptor Attestation:   I was present for and supervised the entire procedure. I have verified the content of the note, which accurately reflects my assessment of the patient and the plan of care.   Supervising Physician:  Brayan Genao MD.

## 2023-12-22 NOTE — PROGRESS NOTES
CC: Patient here because of foreign body in foot. It was first noticed 3 years ago. Symptoms: Irritation, discomfort when running.  Here to have it removed.    O: Vitals: see above  Alert, well hydrated, nontoxic appearing individual in no acute distress.  Callous over dark spot on R foot.    A: Foreign body in R foot    Procedure: Area was visualized. Anaesthesia: 2% Lidocaine with epi, cleansed area with betadine.   Foreign body removed by, excision with 11 blade and needle . Minimal blood loss.   Patient tolerated procedure well.  Patient instructions: See instruction section.  See prn s/s of additional foreign bodies or complications of having had a foreign body in the area.    Wound Care Instructions    1.  Keep area dry today.    2.  Starting tomorrow wash gently with soap and water once daily.      3.  Apply Vaseline or antibiotic ointment to the area and cover with a bandage if desired.  Do not let it dry out and form a scab as this will make the resulting scar more noticeable.    4. Protect the area from sun for up to one year afterward as the scar is continuing to remodel.  Sun exposure will also make the resulting scar more noticeable.    5.  Call if the area is very red, tender, has a discharge or is very itchy while healing, or if you have any other questions.  These may be signs of early infection or allergy.

## 2024-01-04 ENCOUNTER — MYC REFILL (OUTPATIENT)
Dept: FAMILY MEDICINE | Facility: CLINIC | Age: 69
End: 2024-01-04
Payer: COMMERCIAL

## 2024-01-04 DIAGNOSIS — F64.9 GENDER INCONGRUENCE: ICD-10-CM

## 2024-01-05 RX ORDER — ESTRADIOL 0.1 MG/D
1 FILM, EXTENDED RELEASE TRANSDERMAL
Qty: 24 PATCH | Refills: 3 | Status: SHIPPED | OUTPATIENT
Start: 2024-01-08

## 2024-01-05 NOTE — TELEPHONE ENCOUNTER
"Request for medication refill:  estradiol (VIVELLE-DOT) 0.1 MG/24HR bi-weekly patch     Providers if patient needs an appointment and you are willing to give a one month supply please refill for one month and  send a letter/MyChart using \".SMILLIMITEDREFILL\" .smillimited and route chart to \"P [facility] \" (Giving one month refill in non controlled medications is strongly recommended before denial)    If refill has been denied, meaning absolutely no refills without visit, please complete the smart phrase \".smirxrefuse\" and route it to the \"P SMI MED REFILLS\"  pool to inform the patient and the pharmacy.    Anitha Finnegan, CMA      "

## 2024-03-04 ENCOUNTER — PATIENT OUTREACH (OUTPATIENT)
Dept: GASTROENTEROLOGY | Facility: CLINIC | Age: 69
End: 2024-03-04
Payer: COMMERCIAL

## 2024-04-22 ENCOUNTER — OFFICE VISIT (OUTPATIENT)
Dept: URGENT CARE | Facility: URGENT CARE | Age: 69
End: 2024-04-22
Payer: COMMERCIAL

## 2024-04-22 ENCOUNTER — TELEPHONE (OUTPATIENT)
Dept: FAMILY MEDICINE | Facility: CLINIC | Age: 69
End: 2024-04-22

## 2024-04-22 VITALS
DIASTOLIC BLOOD PRESSURE: 75 MMHG | OXYGEN SATURATION: 98 % | HEART RATE: 51 BPM | TEMPERATURE: 97.1 F | WEIGHT: 144 LBS | BODY MASS INDEX: 23.96 KG/M2 | SYSTOLIC BLOOD PRESSURE: 128 MMHG

## 2024-04-22 DIAGNOSIS — K62.5 BRBPR (BRIGHT RED BLOOD PER RECTUM): ICD-10-CM

## 2024-04-22 DIAGNOSIS — Z86.0100 HISTORY OF COLONIC POLYPS: ICD-10-CM

## 2024-04-22 DIAGNOSIS — R42 DIZZINESS: Primary | ICD-10-CM

## 2024-04-22 LAB — HGB BLD-MCNC: 14.8 G/DL (ref 11.7–17.7)

## 2024-04-22 PROCEDURE — 85018 HEMOGLOBIN: CPT | Performed by: NURSE PRACTITIONER

## 2024-04-22 PROCEDURE — 99213 OFFICE O/P EST LOW 20 MIN: CPT | Performed by: NURSE PRACTITIONER

## 2024-04-22 PROCEDURE — 36415 COLL VENOUS BLD VENIPUNCTURE: CPT | Performed by: NURSE PRACTITIONER

## 2024-04-22 RX ORDER — HYDROCORTISONE ACETATE 25 MG/1
25 SUPPOSITORY RECTAL 2 TIMES DAILY
Qty: 20 SUPPOSITORY | Refills: 0 | Status: SHIPPED | OUTPATIENT
Start: 2024-04-22

## 2024-04-22 NOTE — PROGRESS NOTES
"Assessment & Plan     Dizziness  - Hemoglobin  - Hemoglobin    BRBPR (bright red blood per rectum)  - Hemoglobin  - Hemoglobin  - hydrocortisone (ANUSOL-HC) 25 MG suppository  Dispense: 20 suppository; Refill: 0    History of colonic polyps     Patient Instructions     Results for orders placed or performed in visit on 04/22/24   Hemoglobin     Status: Normal   Result Value Ref Range    Hemoglobin 14.8 11.7 - 17.7 g/dL    Narrative    The generation of reference intervals for this test is currently based on binary male or female sex. If the electronic health record information indicates another gender identity or if Legal Sex is recorded as \"Unknown\", both male and female reference intervals are provided where applicable, and should be considered according to the individual's appropriate clinical context.     Hemoglobin is normal.     Anusol suppositories given for use twice a day as needed.   Follow up with GI if bleeding persists or worsens.        Return in about 1 week (around 4/29/2024) for with regular provider if symptoms persist.    ADEN Murrieta CNP  [facility] [place] Bush is a 68 year old adult who presents to clinic today for the following health issues:  Chief Complaint   Patient presents with    Urgent Care     Large amount of bright red blood with stool- feeling sudden dizziness within the last hour- unsure if stool was formed or loose- still feeling lightheaded/weak, denies abdo pain- was working out at the gym when it started, but all low impact   Hx of hemorrhoids but pt states this is much more/not the same.        HPI    Gastro    Onset of symptoms was 1 hour(s) ago.  Course of illness is same.    Severity mild  Current and Associated symptoms:  diarrhea  Aggravating factors: bowel movement.    Alleviating factors:movement  Diarrhea: Yes  2 stools/day and is persisting  Stools: with blood mixed in  Vomitting: No  Appetite: normal  Risk factors: " none  Since the bloody stool has felt light headed.      Review of Systems  Constitutional, HEENT, cardiovascular, pulmonary, GI, , musculoskeletal, neuro, skin, endocrine and psych systems are negative, except as otherwise noted.      Objective    /75   Pulse 51   Temp 97.1  F (36.2  C) (Tympanic)   Wt 65.3 kg (144 lb)   SpO2 98%   BMI 23.96 kg/m    Physical Exam   GENERAL: alert and no distress  RESP: lungs clear to auscultation - no rales, rhonchi or wheezes  CV: regular rate and rhythm, normal S1 S2, no S3 or S4, no murmur, click or rub, no peripheral edema  ABDOMEN: soft, nontender, no hepatosplenomegaly, no masses and bowel sounds normal  RECTAL (female): declined  MS: no gross musculoskeletal defects noted, no edema

## 2024-04-22 NOTE — PATIENT INSTRUCTIONS
"Results for orders placed or performed in visit on 04/22/24   Hemoglobin     Status: Normal   Result Value Ref Range    Hemoglobin 14.8 11.7 - 17.7 g/dL    Narrative    The generation of reference intervals for this test is currently based on binary male or female sex. If the electronic health record information indicates another gender identity or if Legal Sex is recorded as \"Unknown\", both male and female reference intervals are provided where applicable, and should be considered according to the individual's appropriate clinical context.     Hemoglobin is normal.     Anusol suppositories given for use twice a day as needed.   Follow up with GI if bleeding persists or worsens.      "

## 2024-04-22 NOTE — TELEPHONE ENCOUNTER
[facility] phone call message-patient reporting a symptom:     Symptom: The patient is experiencing 'bright red' blood in stool.    Same Day Visit Offered: Wants to speak with a nurse.    Additional comments: Would like a call back.    OK to leave message on voice mail? Yes    Primary language: English      needed? No    Call taken on April 22, 2024 at 2:27 PM by Yudi Veras

## 2024-04-29 ENCOUNTER — TRANSFERRED RECORDS (OUTPATIENT)
Dept: HEALTH INFORMATION MANAGEMENT | Facility: CLINIC | Age: 69
End: 2024-04-29

## 2024-09-06 ENCOUNTER — MYC REFILL (OUTPATIENT)
Dept: FAMILY MEDICINE | Facility: CLINIC | Age: 69
End: 2024-09-06
Payer: COMMERCIAL

## 2024-09-06 DIAGNOSIS — F64.9 GENDER INCONGRUENCE: ICD-10-CM

## 2024-09-06 RX ORDER — ESTRADIOL 0.1 MG/D
1 FILM, EXTENDED RELEASE TRANSDERMAL
Qty: 24 PATCH | Refills: 3 | Status: CANCELLED | OUTPATIENT
Start: 2024-09-09

## 2024-09-06 RX ORDER — ESTRADIOL 0.1 MG/D
1 FILM, EXTENDED RELEASE TRANSDERMAL
Qty: 25 PATCH | Refills: 3 | Status: SHIPPED | OUTPATIENT
Start: 2024-09-09

## 2024-09-06 NOTE — TELEPHONE ENCOUNTER
"Request for medication refill: estradiol (VIVELLE-DOT) 0.1 MG/24HR bi-weekly patch     Providers if patient needs an appointment and you are willing to give a one month supply please refill for one month and  send a letter/MyChart using \".SMILLIMITEDREFILL\" .smillimited and route chart to \"P [facility] \" (Giving one month refill in non controlled medications is strongly recommended before denial)    If refill has been denied, meaning absolutely no refills without visit, please complete the smart phrase \".smirxrefuse\" and route it to the \"P SMI MED REFILLS\"  pool to inform the patient and the pharmacy.    Juan Damon, CMA    "

## 2024-10-11 ENCOUNTER — PATIENT OUTREACH (OUTPATIENT)
Dept: GASTROENTEROLOGY | Facility: CLINIC | Age: 69
End: 2024-10-11
Payer: COMMERCIAL

## 2024-10-11 DIAGNOSIS — Z12.11 SPECIAL SCREENING FOR MALIGNANT NEOPLASMS, COLON: Primary | ICD-10-CM

## 2024-10-11 NOTE — PROGRESS NOTES
"CRC Screening Colonoscopy Referral Review    Patient meets the inclusion criteria for screening colonoscopy standing order.    Ordering/Referring Provider:  Sravani Boyer      BMI: Estimated body mass index is 23.96 kg/m  as calculated from the following:    Height as of 12/22/23: 1.651 m (5' 5\").    Weight as of 4/22/24: 65.3 kg (144 lb).     Sedation:  Does patient have any of the following conditions affecting sedation?  No medical conditions affecting sedation.    Previous Scopes:  Any previous recommendations or follow up needs based on previous scope?  na / No recommendations.    Medical Concerns to Postpone Order:  Does patient have any of the following medical concerns that should postpone/delay colonoscopy referral?  No medical conditions affecting colonoscopy referral.    Final Referral Details:  Based on patient's medical history patient is appropriate for referral order with moderate sedation. If patient's BMI > 50 do not schedule in ASC.  "

## 2024-10-13 SDOH — HEALTH STABILITY: PHYSICAL HEALTH: ON AVERAGE, HOW MANY DAYS PER WEEK DO YOU ENGAGE IN MODERATE TO STRENUOUS EXERCISE (LIKE A BRISK WALK)?: 7 DAYS

## 2024-10-13 SDOH — HEALTH STABILITY: PHYSICAL HEALTH: ON AVERAGE, HOW MANY MINUTES DO YOU ENGAGE IN EXERCISE AT THIS LEVEL?: 60 MIN

## 2024-10-13 ASSESSMENT — ANXIETY QUESTIONNAIRES
6. BECOMING EASILY ANNOYED OR IRRITABLE: NOT AT ALL
IF YOU CHECKED OFF ANY PROBLEMS ON THIS QUESTIONNAIRE, HOW DIFFICULT HAVE THESE PROBLEMS MADE IT FOR YOU TO DO YOUR WORK, TAKE CARE OF THINGS AT HOME, OR GET ALONG WITH OTHER PEOPLE: NOT DIFFICULT AT ALL
3. WORRYING TOO MUCH ABOUT DIFFERENT THINGS: NOT AT ALL
5. BEING SO RESTLESS THAT IT IS HARD TO SIT STILL: NOT AT ALL
7. FEELING AFRAID AS IF SOMETHING AWFUL MIGHT HAPPEN: NOT AT ALL
4. TROUBLE RELAXING: NOT AT ALL
1. FEELING NERVOUS, ANXIOUS, OR ON EDGE: NOT AT ALL
GAD7 TOTAL SCORE: 0
2. NOT BEING ABLE TO STOP OR CONTROL WORRYING: NOT AT ALL

## 2024-10-13 ASSESSMENT — SOCIAL DETERMINANTS OF HEALTH (SDOH): HOW OFTEN DO YOU GET TOGETHER WITH FRIENDS OR RELATIVES?: PATIENT DECLINED

## 2024-10-17 ASSESSMENT — PATIENT HEALTH QUESTIONNAIRE - PHQ9: SUM OF ALL RESPONSES TO PHQ QUESTIONS 1-9: 0

## 2024-10-18 ASSESSMENT — PATIENT HEALTH QUESTIONNAIRE - PHQ9
SUM OF ALL RESPONSES TO PHQ QUESTIONS 1-9: 0
10. IF YOU CHECKED OFF ANY PROBLEMS, HOW DIFFICULT HAVE THESE PROBLEMS MADE IT FOR YOU TO DO YOUR WORK, TAKE CARE OF THINGS AT HOME, OR GET ALONG WITH OTHER PEOPLE: NOT DIFFICULT AT ALL

## 2024-10-18 ASSESSMENT — ANXIETY QUESTIONNAIRES
8. IF YOU CHECKED OFF ANY PROBLEMS, HOW DIFFICULT HAVE THESE MADE IT FOR YOU TO DO YOUR WORK, TAKE CARE OF THINGS AT HOME, OR GET ALONG WITH OTHER PEOPLE?: NOT DIFFICULT AT ALL
GAD7 TOTAL SCORE: 0
7. FEELING AFRAID AS IF SOMETHING AWFUL MIGHT HAPPEN: NOT AT ALL
GAD7 TOTAL SCORE: 0

## 2024-11-06 DIAGNOSIS — E78.01 FAMILIAL HYPERCHOLESTEREMIA: ICD-10-CM

## 2024-11-07 RX ORDER — SIMVASTATIN 40 MG
40 TABLET ORAL AT BEDTIME
Qty: 90 TABLET | Refills: 3 | Status: SHIPPED | OUTPATIENT
Start: 2024-11-07

## 2024-11-07 NOTE — TELEPHONE ENCOUNTER
"Request for medication refill:    simvastatin (ZOCOR) 40 MG tablet ()     Providers if patient needs an appointment and you are willing to give a one month supply please refill for one month and  send a letter/MyChart using \".SMILLIMITEDREFILL\" .smillimited and route chart to \"P [facility] \" (Giving one month refill in non controlled medications is strongly recommended before denial)    If refill has been denied, meaning absolutely no refills without visit, please complete the smart phrase \".smirxrefuse\" and route it to the \"P SMI MED REFILLS\"  pool to inform the patient and the pharmacy.    Whit Landa, CMA     "

## 2024-11-15 SDOH — HEALTH STABILITY: PHYSICAL HEALTH: ON AVERAGE, HOW MANY DAYS PER WEEK DO YOU ENGAGE IN MODERATE TO STRENUOUS EXERCISE (LIKE A BRISK WALK)?: 7 DAYS

## 2024-11-15 SDOH — HEALTH STABILITY: PHYSICAL HEALTH: ON AVERAGE, HOW MANY MINUTES DO YOU ENGAGE IN EXERCISE AT THIS LEVEL?: 80 MIN

## 2024-11-15 ASSESSMENT — SOCIAL DETERMINANTS OF HEALTH (SDOH): HOW OFTEN DO YOU GET TOGETHER WITH FRIENDS OR RELATIVES?: PATIENT DECLINED

## 2024-11-20 ENCOUNTER — OFFICE VISIT (OUTPATIENT)
Dept: FAMILY MEDICINE | Facility: CLINIC | Age: 69
End: 2024-11-20
Payer: COMMERCIAL

## 2024-11-20 VITALS
HEART RATE: 44 BPM | DIASTOLIC BLOOD PRESSURE: 81 MMHG | BODY MASS INDEX: 24.06 KG/M2 | RESPIRATION RATE: 14 BRPM | TEMPERATURE: 97.4 F | HEIGHT: 65 IN | WEIGHT: 144.4 LBS | OXYGEN SATURATION: 100 % | SYSTOLIC BLOOD PRESSURE: 134 MMHG
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
## 2024-11-20 DIAGNOSIS — H91.90 HARD OF HEARING: ICD-10-CM

## 2024-11-20 DIAGNOSIS — E78.01 FAMILIAL HYPERCHOLESTEREMIA: ICD-10-CM

## 2024-11-20 DIAGNOSIS — Z00.00 ENCOUNTER FOR MEDICARE ANNUAL WELLNESS EXAM: Primary | ICD-10-CM

## 2024-11-20 DIAGNOSIS — F39 SEASONAL MOOD DISORDER (H): ICD-10-CM

## 2024-11-20 DIAGNOSIS — S43.432A LABRAL TEAR OF SHOULDER, LEFT, INITIAL ENCOUNTER: ICD-10-CM

## 2024-11-20 DIAGNOSIS — Z12.31 VISIT FOR SCREENING MAMMOGRAM: ICD-10-CM

## 2024-11-20 NOTE — PATIENT INSTRUCTIONS
Wilfred marin has cold plunges  Mammogram next year  Shingles vaccine at a pharmacy at your convenience.   Gas-x (Simethicone) could help.  Blood draw labs today. Results by [facility].    Patient Education   Preventive Care Advice   This is general advice given by our system to help you stay healthy. However, your care team may have specific advice just for you. Please talk to your care team about your preventive care needs.  Nutrition  Eat 5 or more servings of fruits and vegetables each day.  Try wheat bread, brown rice and whole grain pasta (instead of white bread, rice, and pasta).  Get enough calcium and vitamin D. Check the label on foods and aim for 100% of the RDA (recommended daily allowance).  Lifestyle  Exercise at least 150 minutes each week  (30 minutes a day, 5 days a week).  Do muscle strengthening activities 2 days a week. These help control your weight and prevent disease.  No smoking.  Wear sunscreen to prevent skin cancer.  Have a dental exam and cleaning every 6 months.  Yearly exams  See your health care team every year to talk about:  Any changes in your health.  Any medicines your care team has prescribed.  Preventive care, family planning, and ways to prevent chronic diseases.  Shots (vaccines)   HPV shots (up to age 26), if you've never had them before.  Hepatitis B shots (up to age 59), if you've never had them before.  COVID-19 shot: Get this shot when it's due.  Flu shot: Get a flu shot every year.  Tetanus shot: Get a tetanus shot every 10 years.  Pneumococcal, hepatitis A, and RSV shots: Ask your care team if you need these based on your risk.  Shingles shot (for age 50 and up)  General health tests  Diabetes screening:  Starting at age 35, Get screened for diabetes at least every 3 years.  If you are younger than age 35, ask your care team if you should be screened for diabetes.  Cholesterol test: At age 39, start having a cholesterol test every 5 years, or more often if  advised.  Bone density scan (DEXA): At age 50, ask your care team if you should have this scan for osteoporosis (brittle bones).  Hepatitis C: Get tested at least once in your life.  STIs (sexually transmitted infections)  Before age 24: Ask your care team if you should be screened for STIs.  After age 24: Get screened for STIs if you're at risk. You are at risk for STIs (including HIV) if:  You are sexually active with more than one person.  You don't use condoms every time.  You or a partner was diagnosed with a sexually transmitted infection.  If you are at risk for HIV, ask about PrEP medicine to prevent HIV.  Get tested for HIV at least once in your life, whether you are at risk for HIV or not.  Cancer screening tests  Cervical cancer screening: If you have a cervix, begin getting regular cervical cancer screening tests starting at age 21.  Breast cancer scan (mammogram): If you've ever had breasts, begin having regular mammograms starting at age 40. This is a scan to check for breast cancer.  Colon cancer screening: It is important to start screening for colon cancer at age 45.  Have a colonoscopy test every 10 years (or more often if you're at risk) Or, ask your provider about stool tests like a FIT test every year or Cologuard test every 3 years.  To learn more about your testing options, visit:   .  For help making a decision, visit:   https://bit.ly/aj50794.  Prostate cancer screening test: If you have a prostate, ask your care team if a prostate cancer screening test (PSA) at age 55 is right for you.  Lung cancer screening: If you are a current or former smoker ages 50 to 80, ask your care team if ongoing lung cancer screenings are right for you.  For informational purposes only. Not to replace the advice of your health care provider. Copyright   2023 [employer]. All rights reserved. Clinically reviewed by the [facility] Transitions Program. RVX 076534 - REV 01/24.  Hearing  Loss: Care Instructions  Overview     Hearing loss is a sudden or slow decrease in how well you hear. It can range from slight to profound. Permanent hearing loss can occur with aging. It also can happen when you are exposed long-term to loud noise. Examples include listening to loud music, riding motorcycles, or being around other loud machines.  Hearing loss can affect your work and home life. It can make you feel lonely or depressed. You may feel that you have lost your independence. But hearing aids and other devices can help you hear better and feel connected to others.  Follow-up care is a key part of your treatment and safety. Be sure to make and go to all appointments, and call your doctor if you are having problems. It's also a good idea to know your test results and keep a list of the medicines you take.  How can you care for yourself at home?  Avoid loud noises whenever possible. This helps keep your hearing from getting worse.  Always wear hearing protection around loud noises.  Wear a hearing aid as directed.  A professional can help you pick a hearing aid that will work best for you.  You can also get hearing aids over the counter for mild to moderate hearing loss.  Have hearing tests as your doctor suggests. They can show whether your hearing has changed. Your hearing aid may need to be adjusted.  Use other devices as needed. These may include:  Telephone amplifiers and hearing aids that can connect to a television, stereo, radio, or microphone.  Devices that use lights or vibrations. These alert you to the doorbell, a ringing telephone, or a baby monitor.  Television closed-captioning. This shows the words at the bottom of the screen. Most new TVs can do this.  TTY (text telephone). This lets you type messages back and forth on the telephone instead of talking or listening. These devices are also called TDD. When messages are typed on the keyboard, they are sent over the phone line to a receiving  "TTY. The message is shown on a monitor.  Use text messaging, social media, and email if it is hard for you to communicate by telephone.  Try to learn a listening technique called speechreading. It is not lipreading. You pay attention to people's gestures, expressions, posture, and tone of voice. These clues can help you understand what a person is saying. Face the person you are talking to, and have them face you. Make sure the lighting is good. You need to see the other person's face clearly.  Think about counseling if you need help to adjust to your hearing loss.  When should you call for help?  Watch closely for changes in your health, and be sure to contact your doctor if:    You think your hearing is getting worse.     You have new symptoms, such as dizziness or nausea.   Where can you learn more?  Go to https://www.[employer].net/patiented  Enter R798 in the search box to learn more about \"Hearing Loss: Care Instructions.\"  Current as of: September 27, 2023  Content Version: 14.2 2024 [facility] [employer].   Care instructions adapted under license by your healthcare professional. If you have questions about a medical condition or this instruction, always ask your healthcare professional. [employer] disclaims any warranty or liability for your use of this information.       "

## 2024-11-20 NOTE — PROGRESS NOTES
Preventive Care Visit  [facility] DENNISE Boyer MD, Family Medicine  Nov 20, 2024      Assessment & Plan     Encounter for Medicare annual wellness exam; Familial hypercholesteremia; Visit for screening mammogram  Is taking excellent care of their health. Health is optimized as much as possible given age. Noted ongoing bradycardia which is very stable and they take their vitals everyday and we reviewed them. Blood pressure today in clinic is 10-15 points higher systolic than at home. Reviewed AVS recommendations. They will get their mammogram next year. Lipids ordered. We doubled their simvastatin in the past and they are primarily concerned with stroke prevention. Recommended shingles vaccination  - Lipid panel reflex to direct LDL Non-fasting  - MA Screening Bilateral w/ Willian    Hard of hearing  Noted by patient and friends. Patient is not interested in audiology or treatment at this time.     Seasonal mood disorder (H)  Has been doing well but does struggle with decreased sun exposure. Gets daily exercise. Has excellent community support. Continue to monitor.    Labral tear of shoulder, left, initial encounter  Getting treatment.     Exposure to tanning bed, initial encounter  Offered derm referral for skin exam. Has what appears to be a stucco keratosis of left cheek. Reviewed ABCDE of melanoma.     Counseling  Appropriate preventive services were addressed with this patient via screening, questionnaire, or discussion as appropriate for fall prevention, nutrition, physical activity, Tobacco-use cessation, social engagement, weight loss and cognition.  Checklist reviewing preventive services available has been given to the patient.  Reviewed patient's diet, addressing concerns and/or questions.   The patient was provided with written information regarding signs of hearing loss.     No follow-ups on file.    Alida Bush is a 69 year old, presenting for the following:  Wellness  Visit        11/20/2024     1:45 PM   Additional Questions   Roomed by Sukhi         11/20/2024    Information    services provided? No            Via the Health Maintenance questionnaire, the patient has reported the following services have been completed -Colons[facility]: [facility] 2023-10-15, this information has been sent to the abstraction team.    HPI  General  Is continuing to do cold plunges. These have helped with pain and swelling, in particular with right hand. Has just started playing pickle ball recently.     Notes that during ages [age] had hypothermia 3 times during triathlons.     Sleep  Has noted that when they are in deep sleep, they tense up which has caused an achy pain in legs and hands. This feels manageable. Has bought sleeves to help with this. This tensing only happens when sleeping. Only their feet get muscle spasms. Is not noticing this happening very often. Does feel that cold plunges do help with this.     Cardio  Reports that heart rate is always in the 40s. Does take at home blood pressures and pulses. Reports that he has noticed aging of his heart and it takes longer for warming up prior to exercises. Wears a heart rate monitor during all workouts.     Diet  Reports that they eat one meal a day. Notes occasional hunger after a workout but this is not often. Notes that they do not calorie replace after more strenuous workouts.     Hearing  They feel that their hearing is decreased and college friends did notice this at a reunion last month. Is not interested in hearing evaluation. States that they are aware of effects of hearing on mental health.     Estrogen  Reports that they are missing fewer doses than they were a year ago. Reports that most of the time they do not feel hot flash symptoms. Yesterday, did take off a patch and did not replace. Last night, subsequently had a hot flash. Feels well when taking estrogen.    Cancer screening  Notes  "that breast tissue has become softer. Does self check periodically without any concerns. Has been on estrogen since 2018.     Diet  Reports that favorite cruciferous vegetables do cause a lot of gas and they have reduced the amount of them they consume due to the volume of gas. This happens when these vegetables are cooked as well.     Mental health  Did up their UV tanning from 6-8 minutes. Feels that they get mcgill during shorter days. Endorses being \"solar powered\".     Skin  Reports that there is a small rough spot on their cheek that has been largely unchanged for years. Did recently scrape off a part of this. Is not concerned about this.     Health Care Directive  Patient has a Health Care Directive on file  Advance care planning document is on file and is current.      11/15/2024   General Health   How would you rate your overall physical health? Excellent   Feel stress (tense, anxious, or unable to sleep) Not at all            11/15/2024   Nutrition   Diet: Regular (no restrictions)    Breakfast skipped    Other   If other, please elaborate: Eating mostly 1.5 meal per day, mostly in the 2 to 5 pm time window;  and my body can't handle too much fiber- like the salads and particularly cabbages.       Multiple values from one day are sorted in reverse-chronological order         11/15/2024   Exercise   Days per week of moderate/strenous exercise 7 days   Average minutes spent exercising at this level 80 min            11/15/2024   Social Factors   Frequency of gathering with friends or relatives Patient declined   Worry food won't last until get money to buy more No   Food not last or not have enough money for food? No   Do you have housing? (Housing is defined as stable permanent housing and does not include staying ouside in a car, in a tent, in an abandoned building, in an overnight shelter, or couch-surfing.) Yes   Are you worried about losing your housing? No   Lack of transportation? No   Unable to get " utilities (heat,electricity)? No            11/15/2024   Fall Risk   Fallen 2 or more times in the past year? No    Trouble with walking or balance? No        Patient-reported          11/15/2024   Activities of Daily Living- Home Safety   Needs help with the following daily activites None of the above   Safety concerns in the home None of the above            11/15/2024   Dental   Dentist two times every year? Yes            11/15/2024   Hearing Screening   Hearing concerns? (!) IT'S HARD TO FOLLOW A CONVERSATION IN A NOISY RESTAURANT OR CROWDED ROOM.    (!) TROUBLE UNDERSTANDING SOFT OR WHISPERED SPEECH.       Multiple values from one day are sorted in reverse-chronological order         11/15/2024   Driving Risk Screening   Patient/family members have concerns about driving No            11/15/2024   General Alertness/Fatigue Screening   Have you been more tired than usual lately? No            11/15/2024   Urinary Incontinence Screening   Bothered by leaking urine in past 6 months No            10/13/2024   TB Screening   Were you born outside of the [place]? No          Today's PHQ-9 Score:       10/17/2024     3:19 PM   PHQ-9 SCORE   PHQ-9 Total Score MyChart 0   PHQ-9 Total Score 0    0         11/15/2024   Substance Use   Alcohol more than 3/day or more than 7/wk No   Do you have a current opioid prescription? No   How severe/bad is pain from 1 to 10? 0/10 (No Pain)   Do you use any other substances recreationally? No        Social History     Tobacco Use    Smoking status: Never    Smokeless tobacco: Never   Vaping Use    Vaping status: Never Used   Substance Use Topics    Alcohol use: Yes     Alcohol/week: 4.0 standard drinks of alcohol     Types: 4 Glasses of wine per week     Comment: wine    Drug use: No           11/15/2024   AAA Screening   Family history of Abdominal Aortic Aneurysm (AAA)? No      ASCVD Risk   The 10-year ASCVD risk score (Emy DK, et al., 2019) is: 17.3%    Values used to  calculate the score:      Age: 69 years      Sex: Male      Is Non- : No      Diabetic: No      Tobacco smoker: No      Systolic Blood Pressure: 134 mmHg      Is BP treated: No      HDL Cholesterol: 82 mg/dL      Total Cholesterol: 209 mg/dL    Fracture Risk Assessment Tool  Link to Frax Calculator  Use the information below to complete the Frax calculator  : 1955  Sex: adult  Weight (kg): 65.5 kg (actual weight)  Height (cm): 165.1 cm  Previous Fragility Fracture:  No  History of parent with fractured hip:  No  Current Smoking:  No  Patient has been on glucocorticoids for more than 3 months (5mg/day or more): No  Rheumatoid Arthritis on Problem List:  No  Secondary Osteoporosis on Problem List:  No  Consumes 3 or more units of alcohol per day: No  Femoral Neck BMD (g/cm2)            Reviewed and updated as needed this visit by Provider                    Past Medical History:   Diagnosis Date    Attention deficit hyperactivity disorder (ADHD), predominantly hyperactive type     H/O bilateral orchiectomies and zero-depth vaginoplasty 3/29/2018 2018    High cholesterol     Hypothermia x 3 events during triathlons required EMS      Past Surgical History:   Procedure Laterality Date    COLONOSCOPY      ORTHOPEDIC SURGERY      foot surgery     TRANSGENDER VAGINOPLASTY ZERO DEPTH N/A 3/29/2018    Procedure: TRANSGENDER VAGINOPLASTY ZERO DEPTH;  Zero Depth Transgender Vaginoplasty;  Surgeon: Madhu Mojica MD;  Location:  OR     Current providers sharing in care for this patient include:  Patient Care Team:  Sravani Boyer MD as PCP - General (Family Medicine)  Sravani Boyer MD as Assigned PCP    The following health maintenance items are reviewed in Epic and correct as of today:  Health Maintenance   Topic Date Due    ZOSTER IMMUNIZATION (1 of 2) Never done    LIPID  10/11/2024    MAMMO SCREENING  11/10/2024    GLUCOSE  10/10/2025    MEDICARE ANNUAL WELLNESS VISIT  2025    NORTH  "ASSESSMENT  11/20/2025    FALL RISK ASSESSMENT  11/20/2025    PHQ-9  11/20/2025    DTAP/TDAP/TD IMMUNIZATION (2 - Td or Tdap) 07/15/2026    ADVANCE CARE PLANNING  10/23/2028    COLORECTAL CANCER SCREENING  10/23/2028    HEPATITIS C SCREENING  Completed    PHQ-2 (once per calendar year)  Completed    INFLUENZA VACCINE  Completed    Pneumococcal Vaccine: 65+ Years  Completed    RSV VACCINE  Completed    COVID-19 Vaccine  Completed    HPV IMMUNIZATION  Aged Out    MENINGITIS IMMUNIZATION  Aged Out    RSV MONOCLONAL ANTIBODY  Aged Out       This document serves as a record of the services and decisions personally performed and made by Sravani Boyer MD. It was created on his/her behalf by Juan Park, a trained medical scribe. The creation of this document is based the provider's statements to the medical scribe.  Scribe Juan Park 1:57 PM, November 20, 2024      Objective    Exam  /81   Pulse (!) 44   Temp 97.4  F (36.3  C) (Temporal)   Resp 14   Ht 1.651 m (5' 5\")   Wt 65.5 kg (144 lb 6.4 oz)   SpO2 100%   BMI 24.03 kg/m     Estimated body mass index is 24.03 kg/m  as calculated from the following:    Height as of this encounter: 1.651 m (5' 5\").    Weight as of this encounter: 65.5 kg (144 lb 6.4 oz).    Physical Exam  GENERAL: alert and no distress  EYES: Eyes grossly normal to inspection, PERRL and conjunctivae and sclerae normal  HENT: ear canals and TM's normal, nose and mouth without ulcers or lesions. Some gingival recession and is noticeably hard of hearing.   NECK: no adenopathy, no asymmetry, masses, or scars  RESP: lungs clear to auscultation - no rales, rhonchi or wheezes  CV: regular rate and rhythm, normal S1 S2, no S3 or S4, no murmur, click or rub, no peripheral edema  ABDOMEN: soft, nontender, no hepatosplenomegaly, no masses and bowel sounds normal  MS: no gross musculoskeletal defects noted, no edema  SKIN: Very small 1-2 mm oval slightly hyperpigmented raised with a flat top nevus " on the left cheeky, suspect stucco keratosis.   NEURO: Normal tone, mentation intact and speech normal  PSYCH: mentation appears normal, affect normal/bright        11/20/2024   Mini Cog   Clock Draw Score 0 Abnormal   3 Item Recall 3 objects recalled   Mini Cog Total Score 3                 Signed Electronically by: Sravani Boyer MD

## 2024-11-21 LAB
CHOLEST SERPL-MCNC: 205 MG/DL
FASTING STATUS PATIENT QL REPORTED: ABNORMAL
HDLC SERPL-MCNC: 81 MG/DL
LDLC SERPL CALC-MCNC: 104 MG/DL
NONHDLC SERPL-MCNC: 124 MG/DL
TRIGL SERPL-MCNC: 99 MG/DL

## 2024-11-29 ENCOUNTER — HOSPITAL ENCOUNTER (OUTPATIENT)
Dept: MAMMOGRAPHY | Facility: CLINIC | Age: 69
Discharge: HOME | End: 2024-11-29
Attending: FAMILY MEDICINE | Admitting: FAMILY MEDICINE
Payer: COMMERCIAL

## 2024-11-29 DIAGNOSIS — Z12.31 VISIT FOR SCREENING MAMMOGRAM: ICD-10-CM

## 2024-11-29 PROCEDURE — 77063 BREAST TOMOSYNTHESIS BI: CPT

## 2025-01-31 ENCOUNTER — MYC REFILL (OUTPATIENT)
Dept: FAMILY MEDICINE | Facility: CLINIC | Age: 70
End: 2025-01-31
Payer: COMMERCIAL

## 2025-01-31 DIAGNOSIS — E78.01 FAMILIAL HYPERCHOLESTEREMIA: ICD-10-CM

## 2025-02-04 ENCOUNTER — MYC REFILL (OUTPATIENT)
Dept: FAMILY MEDICINE | Facility: CLINIC | Age: 70
End: 2025-02-04
Payer: COMMERCIAL

## 2025-02-04 DIAGNOSIS — E78.01 FAMILIAL HYPERCHOLESTEREMIA: ICD-10-CM

## 2025-02-04 RX ORDER — SIMVASTATIN 40 MG
40 TABLET ORAL AT BEDTIME
Qty: 90 TABLET | Refills: 3 | Status: CANCELLED | OUTPATIENT
Start: 2025-02-04

## 2025-02-04 RX ORDER — SIMVASTATIN 40 MG
40 TABLET ORAL AT BEDTIME
Qty: 90 TABLET | Refills: 3 | OUTPATIENT
Start: 2025-02-04

## 2025-02-04 NOTE — TELEPHONE ENCOUNTER
Request for medication refill:  simvastatin (ZOCOR) 40 MG tablet     simvastatin (ZOCOR) 40 MG tablet 90 tablet 3 11/7/2024       Delmy Reno RN

## 2025-02-04 NOTE — TELEPHONE ENCOUNTER
Patient informed that [facility] can transfer this medication from [facility].    Delmy Reno RN

## 2025-03-23 ENCOUNTER — HEALTH MAINTENANCE LETTER (OUTPATIENT)
Age: 70
End: 2025-03-23

## 2025-04-07 ENCOUNTER — OFFICE VISIT (OUTPATIENT)
Dept: URGENT CARE | Facility: URGENT CARE | Age: 70
End: 2025-04-07
Payer: COMMERCIAL

## 2025-04-07 VITALS
WEIGHT: 144 LBS | DIASTOLIC BLOOD PRESSURE: 73 MMHG | BODY MASS INDEX: 23.96 KG/M2 | HEART RATE: 87 BPM | OXYGEN SATURATION: 97 % | RESPIRATION RATE: 16 BRPM | SYSTOLIC BLOOD PRESSURE: 112 MMHG | TEMPERATURE: 100 F

## 2025-04-07 DIAGNOSIS — R19.7 DIARRHEA, UNSPECIFIED TYPE: ICD-10-CM

## 2025-04-07 DIAGNOSIS — R10.32 ABDOMINAL PAIN, LEFT LOWER QUADRANT: Primary | ICD-10-CM

## 2025-04-07 LAB
ALBUMIN SERPL-MCNC: 4.1 G/DL (ref 3.4–5)
ALBUMIN UR-MCNC: NEGATIVE MG/DL
ALP SERPL-CCNC: 35 U/L (ref 40–150)
ALT SERPL W P-5'-P-CCNC: 14 U/L (ref 0–70)
ANION GAP SERPL CALCULATED.3IONS-SCNC: 13 MMOL/L (ref 3–14)
APPEARANCE UR: CLEAR
AST SERPL W P-5'-P-CCNC: 26 U/L (ref 0–45)
BACTERIA #/AREA URNS HPF: ABNORMAL /HPF
BILIRUB SERPL-MCNC: 1.6 MG/DL (ref 0.2–1.3)
BILIRUB UR QL STRIP: NEGATIVE
BUN SERPL-MCNC: 14 MG/DL (ref 7–30)
CALCIUM SERPL-MCNC: 10 MG/DL (ref 8.5–10.1)
CHLORIDE BLD-SCNC: 102 MMOL/L (ref 94–109)
CO2 SERPL-SCNC: 23 MMOL/L (ref 20–32)
COLOR UR AUTO: YELLOW
CREAT SERPL-MCNC: 1.2 MG/DL (ref 0.52–1.25)
EGFRCR SERPLBLD CKD-EPI 2021: 65 ML/MIN/1.73M2
ERYTHROCYTE [DISTWIDTH] IN BLOOD BY AUTOMATED COUNT: 12.6 % (ref 10–15)
GLUCOSE BLD-MCNC: 157 MG/DL (ref 70–99)
GLUCOSE UR STRIP-MCNC: NEGATIVE MG/DL
HCT VFR BLD AUTO: 41.8 % (ref 35–53)
HGB BLD-MCNC: 14.2 G/DL (ref 11.7–17.7)
HGB UR QL STRIP: ABNORMAL
KETONES UR STRIP-MCNC: NEGATIVE MG/DL
LEUKOCYTE ESTERASE UR QL STRIP: NEGATIVE
MCH RBC QN AUTO: 31.9 PG (ref 26.5–33)
MCHC RBC AUTO-ENTMCNC: 34 G/DL (ref 31.5–36.5)
MCV RBC AUTO: 94 FL (ref 78–100)
NITRATE UR QL: NEGATIVE
PH UR STRIP: 7 [PH] (ref 5–7)
PLATELET # BLD AUTO: 220 10E3/UL (ref 150–450)
POTASSIUM BLD-SCNC: 4.2 MMOL/L (ref 3.4–5.3)
PROT SERPL-MCNC: 7.5 G/DL (ref 6.8–8.8)
RBC # BLD AUTO: 4.45 10E6/UL (ref 3.8–5.9)
RBC #/AREA URNS AUTO: ABNORMAL /HPF
SODIUM SERPL-SCNC: 138 MMOL/L (ref 135–145)
SP GR UR STRIP: 1.01 (ref 1–1.03)
SQUAMOUS #/AREA URNS AUTO: ABNORMAL /LPF
UROBILINOGEN UR STRIP-ACNC: 0.2 E.U./DL
WBC # BLD AUTO: 14.6 10E3/UL (ref 4–11)
WBC #/AREA URNS AUTO: ABNORMAL /HPF

## 2025-04-07 PROCEDURE — 85027 COMPLETE CBC AUTOMATED: CPT | Performed by: PHYSICIAN ASSISTANT

## 2025-04-07 PROCEDURE — 87086 URINE CULTURE/COLONY COUNT: CPT | Performed by: PHYSICIAN ASSISTANT

## 2025-04-07 PROCEDURE — 80053 COMPREHEN METABOLIC PANEL: CPT | Performed by: PHYSICIAN ASSISTANT

## 2025-04-07 PROCEDURE — 36415 COLL VENOUS BLD VENIPUNCTURE: CPT | Performed by: PHYSICIAN ASSISTANT

## 2025-04-07 PROCEDURE — 81001 URINALYSIS AUTO W/SCOPE: CPT | Performed by: PHYSICIAN ASSISTANT

## 2025-04-07 PROCEDURE — 3078F DIAST BP <80 MM HG: CPT | Performed by: PHYSICIAN ASSISTANT

## 2025-04-07 PROCEDURE — 3074F SYST BP LT 130 MM HG: CPT | Performed by: PHYSICIAN ASSISTANT

## 2025-04-07 PROCEDURE — 99214 OFFICE O/P EST MOD 30 MIN: CPT | Performed by: PHYSICIAN ASSISTANT

## 2025-04-07 NOTE — PATIENT INSTRUCTIONS
You should hear from the Acute and Diagnostic center tomorrow AM, if you have not heard from them by 10A call [phone]     Push fluids/oral rehydration solution today, BRAT diet as you are ready to advance your diet (banana, rice, applesauce, toast)    Please go to the ER if having:  Severe abdominal pain or your symptoms get significantly worse  Oral temperature above 100.5F  You have blood in your stools. This may be bright red or appear as black, tarry stools.   You keep vomiting (throwing up) or cannot drink liquids.  You see blood when you vomit.   You cannot have a bowel movement or you cannot pass gas.  Your stomach gets bloated or bigger.  Your skin or the whites of your eyes look yellow.  You faint.  You have bloody, frequent or painful urination (peeing).  You have new symptoms or anything that worries you.

## 2025-04-07 NOTE — CONFIDENTIAL NOTE
Urgent Care Clinic Visit     Chief Complaint   Patient presents with    Urgent Care    Abdominal Pain     Patient ate some old pork yesterday, diarrhea, stomach pain.

## 2025-04-07 NOTE — PROGRESS NOTES
Assessment & Plan     1. Diarrhea, unspecified type  - Enteric Bacteria and Virus Panel by [facility] Stool; Future  - CBC with platelets; Future  - Comprehensive metabolic panel; Future  - UA Macroscopic with reflex to Microscopic and Culture - Clinic Collect  - Enteric Bacteria and Virus Panel by [facility] Stool  - CBC with platelets  - Comprehensive metabolic panel  - UA Microscopic with Reflex to Culture    2. Abdominal pain, generalized lower (Primary)  - Urine Culture Aerobic Bacterial - lab collect; Future      Patient presents to clinic for evaluation of lower abdominal pain after vomiting and diarrhea beginning last night. She is tender in her lower abdomen with voluntary guarding, she does not have rebound tenderness or rigidity.  Elevated white blood cell count of 14.5. I suspect that patients symptoms are secondary to a gastroenteritis, perhaps food poisoning, given the violent nature of her vomiting.  She is able to tolerate fluids, and denies having nausea at this time.  No evidence for electrolyte abnormality/ derangement or acute kidney injury.  LFT normal. UA is not consistent with UTI. A broad differential diagnosis was considered including colitis, appendicitis, intestinal cramping, pyelonephritis, UTI, kidney stone, constipation, diverticulitis, cholecystitis, pancreatitis, volvulus, ileus, obstruction, etc as possibilities.  Given her lower abdominal pain, I have put in a referral to the acute diagnostic center for further evaluation to be rechecked tomorrow.  Overnight, I would like her to push an oral rehydration solution.  Brat diet when she is able to advance her diet.  We discussed low threshold for follow-up if having any worsening symptoms to the emergency department.    I did put in an order for stool cultures, this would only be necessary in the case of ongoing severe diarrhea.    Alexandria Tiwari PA-C  [facility] [place]    CHIEF COMPLAINT:   Chief Complaint   Patient presents  with    Urgent Care    Abdominal Pain     Patient ate some old pork yesterday, diarrhea, stomach pain.      Alida Bush is a 69 year old adult who presents to clinic today for evaluation of nausea, vomiting and stomach pain.   She ate pork that had been in the fridge for about 4 days, but tasted OK. This was around 6P. Started feeling sick around 8P with some stomach cramping. Then vomiting (X4) shortly after that. Last episode of vomiting was around 10P. Vomit did look dark in color. No blood noted or bile appearing emesis.   She has been trying to push fluids. Feeling that she is dehydrated.   She has a sour sensation in her throat.   She has not seen any blood in her stool.   Stools have been watery, 3 in total.   She feels chills and feverish.   She has not been around others with similar symptoms.     No recent antibiotics usage.       Past Medical History:   Diagnosis Date    Attention deficit hyperactivity disorder (ADHD), predominantly hyperactive type     H/O bilateral orchiectomies and zero-depth vaginoplasty 3/29/2018 04/05/2018    High cholesterol     Hypothermia x 3 events during triathlons required EMS      Past Surgical History:   Procedure Laterality Date    COLONOSCOPY      ORTHOPEDIC SURGERY      foot surgery     TRANSGENDER VAGINOPLASTY ZERO DEPTH N/A 3/29/2018    Procedure: TRANSGENDER VAGINOPLASTY ZERO DEPTH;  Zero Depth Transgender Vaginoplasty;  Surgeon: Madhu Mojica MD;  Location:  OR     Social History     Tobacco Use    Smoking status: Never    Smokeless tobacco: Never   Substance Use Topics    Alcohol use: Yes     Alcohol/week: 4.0 standard drinks of alcohol     Types: 4 Glasses of wine per week     Comment: wine     Current Outpatient Medications   Medication Sig Dispense Refill    ASPIRIN PO Take 81 mg by mouth daily      estradiol (VIVELLE-DOT) 0.1 MG/24HR bi-weekly patch Place 1 patch over 96 hours onto the skin twice a week. 25 patch 3    simvastatin (ZOCOR) 40 MG tablet  TAKE 1 TABLET(40 MG) BY MOUTH AT BEDTIME 90 tablet 3     No current facility-administered medications for this visit.     Allergies   Allergen Reactions    Bee Venom        10 point ROS of systems were all negative except for pertinent positives noted in my HPI.      Exam:   /73   Pulse 87   Temp 100  F (37.8  C) (Tympanic)   Resp 16   Wt 65.3 kg (144 lb)   SpO2 97%   BMI 23.96 kg/m    Constitutional: alert and no distress  Head: Normocephalic, atraumatic.  Eyes: conjunctiva clear, no drainage  ENT: MMM. Throat clear.   Neck: neck is supple, no cervical lymphadenopathy or nuchal rigidity  Cardiovascular: RRR  Respiratory: CTA bilaterally, no rhonchi or rales  Gastrointestinal: soft. Some discomfort of her abdomen. TTP in the lower abdomen B/L. + voluntary guarding. No rebound or rigidity.   Skin: no rashes  Neurologic: Speech clear, gait normal. Moves all extremities.    Results for orders placed or performed in visit on 04/07/25   UA Macroscopic with reflex to Microscopic and Culture - Clinic Collect     Status: Abnormal    Specimen: Urine, Clean Catch   Result Value Ref Range    Color Urine Yellow Colorless, Straw, Light Yellow, Yellow    Appearance Urine Clear Clear    Glucose Urine Negative Negative mg/dL    Bilirubin Urine Negative Negative    Ketones Urine Negative Negative mg/dL    Specific Gravity Urine 1.010 1.003 - 1.035    Blood Urine Small (A) Negative    pH Urine 7.0 5.0 - 7.0    Protein Albumin Urine Negative Negative mg/dL    Urobilinogen Urine 0.2 0.2, 1.0 E.U./dL    Nitrite Urine Negative Negative    Leukocyte Esterase Urine Negative Negative   CBC with platelets     Status: Abnormal   Result Value Ref Range    WBC Count 14.6 (H) 4.0 - 11.0 10e3/uL    RBC Count 4.45 3.80 - 5.90 10e6/uL    Hemoglobin 14.2 11.7 - 17.7 g/dL    Hematocrit 41.8 35.0 - 53.0 %    MCV 94 78 - 100 fL    MCH 31.9 26.5 - 33.0 pg    MCHC 34.0 31.5 - 36.5 g/dL    RDW 12.6 10.0 - 15.0 %    Platelet Count 220 150 - 450  "10e3/uL    Narrative    Sex Specific Reference Ranges:     Female  35.0-47.0 %   Male      40.0-53.0 %  The generation of reference intervals for this test is currently based on binary male or female sex. If the electronic health record information indicates another gender identity or if Legal Sex is recorded as \"Unknown\", both male and female reference intervals are provided where applicable, and should be considered according to the individual's appropriate clinical context.   Comprehensive metabolic panel     Status: Abnormal   Result Value Ref Range    Sodium 138 135 - 145 mmol/L    Potassium 4.2 3.4 - 5.3 mmol/L    Chloride 102 94 - 109 mmol/L    Carbon Dioxide (CO2) 23 20 - 32 mmol/L    Anion Gap 13 3 - 14 mmol/L    Urea Nitrogen 14 7 - 30 mg/dL    Creatinine 1.20 0.52 - 1.25 mg/dL    GFR Estimate 65 >60 mL/min/1.73m2    Calcium 10.0 8.5 - 10.1 mg/dL    Glucose 157 (H) 70 - 99 mg/dL    Alkaline Phosphatase 35 (L) 40 - 150 U/L    AST 26 0 - 45 U/L    ALT 14 0 - 70 U/L    Protein Total 7.5 6.8 - 8.8 g/dL    Albumin 4.1 3.4 - 5.0 g/dL    Bilirubin Total 1.6 (H) 0.2 - 1.3 mg/dL    Narrative    The generation of reference intervals for this test is currently based on binary male or female sex. If the electronic health record information indicates another gender identity or if Legal Sex is recorded as \"Unknown\", both male and female reference intervals are provided where applicable, and should be considered according to the individual's appropriate clinical context.   UA Microscopic with Reflex to Culture     Status: Abnormal   Result Value Ref Range    Bacteria Urine None Seen None Seen /HPF    RBC Urine 2-5 (A) 0-2 /HPF /HPF    WBC Urine 5-10 (A) 0-5 /HPF /HPF    Squamous Epithelials Urine Few (A) None Seen /LPF    Narrative    Urine Culture not indicated               "

## 2025-04-08 ENCOUNTER — APPOINTMENT (OUTPATIENT)
Dept: LAB | Facility: CLINIC | Age: 70
End: 2025-04-08
Payer: COMMERCIAL

## 2025-04-08 LAB
ADV 40+41 DNA STL QL NAA+NON-PROBE: NEGATIVE
ASTRO TYP 1-8 RNA STL QL NAA+NON-PROBE: NEGATIVE
C CAYETANENSIS DNA STL QL NAA+NON-PROBE: NEGATIVE
CAMPYLOBACTER DNA SPEC NAA+PROBE: NEGATIVE
CRYPTOSP DNA STL QL NAA+NON-PROBE: NEGATIVE
E COLI O157 DNA STL QL NAA+NON-PROBE: NORMAL
E HISTOLYT DNA STL QL NAA+NON-PROBE: NEGATIVE
EAEC ASTA GENE ISLT QL NAA+PROBE: NEGATIVE
EC STX1+STX2 GENES STL QL NAA+NON-PROBE: NEGATIVE
EPEC EAE GENE STL QL NAA+NON-PROBE: NEGATIVE
ETEC LTA+ST1A+ST1B TOX ST NAA+NON-PROBE: NEGATIVE
G LAMBLIA DNA STL QL NAA+NON-PROBE: NEGATIVE
NOROVIRUS GI+II RNA STL QL NAA+NON-PROBE: NEGATIVE
P SHIGELLOIDES DNA STL QL NAA+NON-PROBE: NEGATIVE
RVA RNA STL QL NAA+NON-PROBE: NEGATIVE
SALMONELLA SP RPOD STL QL NAA+PROBE: NEGATIVE
SAPO I+II+IV+V RNA STL QL NAA+NON-PROBE: NEGATIVE
SHIGELLA SP+EIEC IPAH ST NAA+NON-PROBE: NEGATIVE
V CHOLERAE DNA SPEC QL NAA+PROBE: NEGATIVE
VIBRIO DNA SPEC NAA+PROBE: NEGATIVE
Y ENTEROCOL DNA STL QL NAA+PROBE: NEGATIVE

## 2025-04-08 PROCEDURE — 87507 IADNA-DNA/RNA PROBE TQ 12-25: CPT | Mod: GZ | Performed by: PHYSICIAN ASSISTANT

## 2025-04-09 ENCOUNTER — HOSPITAL ENCOUNTER (INPATIENT)
Facility: CLINIC | Age: 70
DRG: 372 | End: 2025-04-09
Attending: EMERGENCY MEDICINE | Admitting: INTERNAL MEDICINE
Payer: COMMERCIAL

## 2025-04-09 ENCOUNTER — OFFICE VISIT (OUTPATIENT)
Dept: PEDIATRICS | Facility: CLINIC | Age: 70
End: 2025-04-09
Payer: COMMERCIAL

## 2025-04-09 ENCOUNTER — ANCILLARY PROCEDURE (OUTPATIENT)
Dept: CT IMAGING | Facility: CLINIC | Age: 70
DRG: 372 | End: 2025-04-09
Attending: PHYSICIAN ASSISTANT
Payer: COMMERCIAL

## 2025-04-09 VITALS
HEART RATE: 89 BPM | DIASTOLIC BLOOD PRESSURE: 78 MMHG | HEIGHT: 65 IN | SYSTOLIC BLOOD PRESSURE: 122 MMHG | WEIGHT: 141.9 LBS | BODY MASS INDEX: 23.64 KG/M2 | OXYGEN SATURATION: 99 % | RESPIRATION RATE: 16 BRPM | TEMPERATURE: 97.4 F

## 2025-04-09 DIAGNOSIS — R10.84 ABDOMINAL PAIN, GENERALIZED: ICD-10-CM

## 2025-04-09 DIAGNOSIS — F64.9 GENDER INCONGRUENCE: ICD-10-CM

## 2025-04-09 DIAGNOSIS — K35.32 RUPTURED APPENDICITIS: ICD-10-CM

## 2025-04-09 DIAGNOSIS — R11.2 NAUSEA VOMITING AND DIARRHEA: ICD-10-CM

## 2025-04-09 DIAGNOSIS — R19.7 NAUSEA VOMITING AND DIARRHEA: ICD-10-CM

## 2025-04-09 DIAGNOSIS — K35.32 ACUTE PERFORATED APPENDICITIS: ICD-10-CM

## 2025-04-09 LAB
ALBUMIN SERPL BCG-MCNC: 3.5 G/DL (ref 3.5–5.2)
ALBUMIN SERPL BCG-MCNC: 4 G/DL (ref 3.5–5.2)
ALBUMIN SERPL-MCNC: 3.8 G/DL (ref 3.4–5)
ALBUMIN UR-MCNC: 100 MG/DL
ALP SERPL-CCNC: 42 U/L (ref 40–150)
ALP SERPL-CCNC: 82 U/L (ref 40–150)
ALP SERPL-CCNC: 82 U/L (ref 40–150)
ALT SERPL W P-5'-P-CCNC: 11 U/L (ref 0–70)
ALT SERPL W P-5'-P-CCNC: 14 U/L (ref 0–70)
ALT SERPL W P-5'-P-CCNC: 14 U/L (ref 0–70)
ANION GAP SERPL CALCULATED.3IONS-SCNC: 11 MMOL/L (ref 3–14)
ANION GAP SERPL CALCULATED.3IONS-SCNC: 14 MMOL/L (ref 7–15)
ANION GAP SERPL CALCULATED.3IONS-SCNC: 20 MMOL/L (ref 7–15)
APPEARANCE UR: CLEAR
AST SERPL W P-5'-P-CCNC: 16 U/L (ref 0–45)
AST SERPL W P-5'-P-CCNC: 22 U/L (ref 0–45)
AST SERPL W P-5'-P-CCNC: 22 U/L (ref 0–45)
BACTERIA #/AREA URNS HPF: ABNORMAL /HPF
BACTERIA UR CULT: NO GROWTH
BASE EXCESS BLDV CALC-SCNC: -4 MMOL/L (ref -3–3)
BASOPHILS # BLD AUTO: 0 10E3/UL (ref 0–0.2)
BASOPHILS NFR BLD AUTO: 0 %
BILIRUB SERPL-MCNC: 1 MG/DL
BILIRUB SERPL-MCNC: 1.2 MG/DL
BILIRUB SERPL-MCNC: 1.2 MG/DL
BILIRUB UR QL STRIP: ABNORMAL
BUN SERPL-MCNC: 12.3 MG/DL (ref 8–23)
BUN SERPL-MCNC: 13 MG/DL (ref 7–30)
BUN SERPL-MCNC: 13 MG/DL (ref 8–23)
CALCIUM SERPL-MCNC: 8 MG/DL (ref 8.8–10.4)
CALCIUM SERPL-MCNC: 9.3 MG/DL (ref 8.8–10.4)
CALCIUM SERPL-MCNC: 9.3 MG/DL (ref 8.8–10.4)
CHLORIDE BLD-SCNC: 102 MMOL/L (ref 94–109)
CHLORIDE SERPL-SCNC: 101 MMOL/L (ref 98–107)
CHLORIDE SERPL-SCNC: 96 MMOL/L (ref 98–107)
CO2 SERPL-SCNC: 20 MMOL/L (ref 20–32)
COLOR UR AUTO: YELLOW
CREAT SERPL-MCNC: 0.94 MG/DL (ref 0.51–1.17)
CREAT SERPL-MCNC: 0.99 MG/DL (ref 0.51–1.17)
CREAT SERPL-MCNC: 0.99 MG/DL (ref 0.51–1.17)
EGFRCR SERPLBLD CKD-EPI 2021: 81 ML/MIN/1.73M2
EGFRCR SERPLBLD CKD-EPI 2021: 81 ML/MIN/1.73M2
EGFRCR SERPLBLD CKD-EPI 2021: 87 ML/MIN/1.73M2
EOSINOPHIL # BLD AUTO: 0 10E3/UL (ref 0–0.7)
EOSINOPHIL NFR BLD AUTO: 0 %
ERYTHROCYTE [DISTWIDTH] IN BLOOD BY AUTOMATED COUNT: 12.3 % (ref 10–15)
GLUCOSE BLD-MCNC: 122 MG/DL (ref 70–99)
GLUCOSE SERPL-MCNC: 116 MG/DL (ref 70–99)
GLUCOSE SERPL-MCNC: 118 MG/DL (ref 70–99)
GLUCOSE UR STRIP-MCNC: NEGATIVE MG/DL
HCO3 BLDV-SCNC: 22 MMOL/L (ref 21–28)
HCO3 SERPL-SCNC: 17 MMOL/L (ref 22–29)
HCO3 SERPL-SCNC: 20 MMOL/L (ref 22–29)
HCT VFR BLD AUTO: 43.2 % (ref 35–53)
HGB BLD-MCNC: 14.6 G/DL (ref 11.7–17.7)
HGB UR QL STRIP: ABNORMAL
IMM GRANULOCYTES # BLD: 0.1 10E3/UL
IMM GRANULOCYTES NFR BLD: 0 %
KETONES UR STRIP-MCNC: 40 MG/DL
LACTATE BLD-SCNC: 1 MMOL/L (ref 0.7–2)
LEUKOCYTE ESTERASE UR QL STRIP: NEGATIVE
LIPASE SERPL-CCNC: 18 U/L (ref 13–60)
LYMPHOCYTES # BLD AUTO: 0.8 10E3/UL (ref 0.8–5.3)
LYMPHOCYTES NFR BLD AUTO: 5 %
MCH RBC QN AUTO: 31.7 PG (ref 26.5–33)
MCHC RBC AUTO-ENTMCNC: 33.8 G/DL (ref 31.5–36.5)
MCV RBC AUTO: 94 FL (ref 78–100)
MONOCYTES # BLD AUTO: 0.9 10E3/UL (ref 0–1.3)
MONOCYTES NFR BLD AUTO: 6 %
MUCOUS THREADS #/AREA URNS LPF: PRESENT /LPF
NEUTROPHILS # BLD AUTO: 13 10E3/UL (ref 1.6–8.3)
NEUTROPHILS NFR BLD AUTO: 88 %
NITRATE UR QL: NEGATIVE
PCO2 BLDV: 43 MM HG (ref 40–50)
PH BLDV: 7.32 [PH] (ref 7.32–7.43)
PH UR STRIP: 6.5 [PH] (ref 5–7)
PLATELET # BLD AUTO: 228 10E3/UL (ref 150–450)
PO2 BLDV: 20 MM HG (ref 25–47)
POTASSIUM BLD-SCNC: 3.9 MMOL/L (ref 3.4–5.3)
POTASSIUM SERPL-SCNC: 4 MMOL/L (ref 3.4–5.3)
POTASSIUM SERPL-SCNC: 4.2 MMOL/L (ref 3.4–5.3)
PROT SERPL-MCNC: 6.9 G/DL (ref 6.4–8.3)
PROT SERPL-MCNC: 8.1 G/DL (ref 6.4–8.3)
PROT SERPL-MCNC: 8.1 G/DL (ref 6.4–8.3)
RADIOLOGIST FLAGS: ABNORMAL
RBC # BLD AUTO: 4.6 10E6/UL (ref 3.8–5.9)
RBC #/AREA URNS AUTO: ABNORMAL /HPF
SAO2 % BLDV: 28 % (ref 70–75)
SODIUM SERPL-SCNC: 133 MMOL/L (ref 135–145)
SODIUM SERPL-SCNC: 133 MMOL/L (ref 135–145)
SODIUM SERPL-SCNC: 135 MMOL/L (ref 135–145)
SP GR UR STRIP: 1.02 (ref 1–1.03)
UROBILINOGEN UR STRIP-ACNC: 0.2 E.U./DL
WBC # BLD AUTO: 14.8 10E3/UL (ref 4–11)
WBC #/AREA URNS AUTO: ABNORMAL /HPF

## 2025-04-09 PROCEDURE — 84155 ASSAY OF PROTEIN SERUM: CPT | Performed by: EMERGENCY MEDICINE

## 2025-04-09 PROCEDURE — 250N000011 HC RX IP 250 OP 636: Mod: JZ | Performed by: INTERNAL MEDICINE

## 2025-04-09 PROCEDURE — 84460 ALANINE AMINO (ALT) (SGPT): CPT | Performed by: EMERGENCY MEDICINE

## 2025-04-09 PROCEDURE — 80053 COMPREHEN METABOLIC PANEL: CPT | Performed by: PHYSICIAN ASSISTANT

## 2025-04-09 PROCEDURE — 3074F SYST BP LT 130 MM HG: CPT | Performed by: PHYSICIAN ASSISTANT

## 2025-04-09 PROCEDURE — 99223 1ST HOSP IP/OBS HIGH 75: CPT | Performed by: INTERNAL MEDICINE

## 2025-04-09 PROCEDURE — 258N000003 HC RX IP 258 OP 636: Performed by: EMERGENCY MEDICINE

## 2025-04-09 PROCEDURE — 96360 HYDRATION IV INFUSION INIT: CPT | Performed by: PHYSICIAN ASSISTANT

## 2025-04-09 PROCEDURE — 99285 EMERGENCY DEPT VISIT HI MDM: CPT | Mod: 25

## 2025-04-09 PROCEDURE — 250N000011 HC RX IP 250 OP 636: Performed by: EMERGENCY MEDICINE

## 2025-04-09 PROCEDURE — 36415 COLL VENOUS BLD VENIPUNCTURE: CPT | Performed by: PHYSICIAN ASSISTANT

## 2025-04-09 PROCEDURE — 258N000003 HC RX IP 258 OP 636: Performed by: INTERNAL MEDICINE

## 2025-04-09 PROCEDURE — 85025 COMPLETE CBC W/AUTO DIFF WBC: CPT | Performed by: PHYSICIAN ASSISTANT

## 2025-04-09 PROCEDURE — 96365 THER/PROPH/DIAG IV INF INIT: CPT

## 2025-04-09 PROCEDURE — 74177 CT ABD & PELVIS W/CONTRAST: CPT

## 2025-04-09 PROCEDURE — 82803 BLOOD GASES ANY COMBINATION: CPT

## 2025-04-09 PROCEDURE — 120N000001 HC R&B MED SURG/OB

## 2025-04-09 PROCEDURE — 81001 URINALYSIS AUTO W/SCOPE: CPT | Performed by: PHYSICIAN ASSISTANT

## 2025-04-09 PROCEDURE — 93005 ELECTROCARDIOGRAM TRACING: CPT

## 2025-04-09 PROCEDURE — 36415 COLL VENOUS BLD VENIPUNCTURE: CPT | Performed by: EMERGENCY MEDICINE

## 2025-04-09 PROCEDURE — 96375 TX/PRO/DX INJ NEW DRUG ADDON: CPT

## 2025-04-09 PROCEDURE — 250N000013 HC RX MED GY IP 250 OP 250 PS 637: Performed by: INTERNAL MEDICINE

## 2025-04-09 PROCEDURE — 83690 ASSAY OF LIPASE: CPT | Performed by: PHYSICIAN ASSISTANT

## 2025-04-09 PROCEDURE — 87040 BLOOD CULTURE FOR BACTERIA: CPT | Performed by: EMERGENCY MEDICINE

## 2025-04-09 PROCEDURE — 99214 OFFICE O/P EST MOD 30 MIN: CPT | Mod: 25 | Performed by: PHYSICIAN ASSISTANT

## 2025-04-09 PROCEDURE — 3078F DIAST BP <80 MM HG: CPT | Performed by: PHYSICIAN ASSISTANT

## 2025-04-09 PROCEDURE — 96361 HYDRATE IV INFUSION ADD-ON: CPT

## 2025-04-09 PROCEDURE — 250N000011 HC RX IP 250 OP 636: Performed by: PHYSICIAN ASSISTANT

## 2025-04-09 RX ORDER — ONDANSETRON 4 MG/1
4 TABLET, ORALLY DISINTEGRATING ORAL EVERY 6 HOURS PRN
Status: DISCONTINUED | OUTPATIENT
Start: 2025-04-09 | End: 2025-04-12 | Stop reason: HOSPADM

## 2025-04-09 RX ORDER — ONDANSETRON 2 MG/ML
4 INJECTION INTRAMUSCULAR; INTRAVENOUS EVERY 30 MIN PRN
Status: DISCONTINUED | OUTPATIENT
Start: 2025-04-09 | End: 2025-04-09

## 2025-04-09 RX ORDER — AMOXICILLIN 250 MG
2 CAPSULE ORAL 2 TIMES DAILY PRN
Status: DISCONTINUED | OUTPATIENT
Start: 2025-04-09 | End: 2025-04-12 | Stop reason: HOSPADM

## 2025-04-09 RX ORDER — NALOXONE HYDROCHLORIDE 0.4 MG/ML
0.2 INJECTION, SOLUTION INTRAMUSCULAR; INTRAVENOUS; SUBCUTANEOUS
Status: DISCONTINUED | OUTPATIENT
Start: 2025-04-09 | End: 2025-04-12 | Stop reason: HOSPADM

## 2025-04-09 RX ORDER — NALOXONE HYDROCHLORIDE 0.4 MG/ML
0.4 INJECTION, SOLUTION INTRAMUSCULAR; INTRAVENOUS; SUBCUTANEOUS
Status: DISCONTINUED | OUTPATIENT
Start: 2025-04-09 | End: 2025-04-12 | Stop reason: HOSPADM

## 2025-04-09 RX ORDER — AMOXICILLIN 250 MG
1 CAPSULE ORAL 2 TIMES DAILY PRN
Status: DISCONTINUED | OUTPATIENT
Start: 2025-04-09 | End: 2025-04-12 | Stop reason: HOSPADM

## 2025-04-09 RX ORDER — PROCHLORPERAZINE MALEATE 5 MG/1
5 TABLET ORAL EVERY 6 HOURS PRN
Status: DISCONTINUED | OUTPATIENT
Start: 2025-04-09 | End: 2025-04-12 | Stop reason: HOSPADM

## 2025-04-09 RX ORDER — PIPERACILLIN SODIUM, TAZOBACTAM SODIUM 4; .5 G/20ML; G/20ML
4.5 INJECTION, POWDER, LYOPHILIZED, FOR SOLUTION INTRAVENOUS ONCE
Status: COMPLETED | OUTPATIENT
Start: 2025-04-09 | End: 2025-04-09

## 2025-04-09 RX ORDER — LIDOCAINE 40 MG/G
CREAM TOPICAL
Status: DISCONTINUED | OUTPATIENT
Start: 2025-04-09 | End: 2025-04-12 | Stop reason: HOSPADM

## 2025-04-09 RX ORDER — HYDROMORPHONE HCL IN WATER/PF 6 MG/30 ML
0.2 PATIENT CONTROLLED ANALGESIA SYRINGE INTRAVENOUS
Status: DISCONTINUED | OUTPATIENT
Start: 2025-04-09 | End: 2025-04-12 | Stop reason: HOSPADM

## 2025-04-09 RX ORDER — HYDROMORPHONE HYDROCHLORIDE 1 MG/ML
0.5 INJECTION, SOLUTION INTRAMUSCULAR; INTRAVENOUS; SUBCUTANEOUS EVERY 30 MIN PRN
Status: DISCONTINUED | OUTPATIENT
Start: 2025-04-09 | End: 2025-04-09

## 2025-04-09 RX ORDER — SODIUM CHLORIDE 9 MG/ML
INJECTION, SOLUTION INTRAVENOUS CONTINUOUS
Status: DISCONTINUED | OUTPATIENT
Start: 2025-04-09 | End: 2025-04-11

## 2025-04-09 RX ORDER — CALCIUM CARBONATE 500 MG/1
1000 TABLET, CHEWABLE ORAL 4 TIMES DAILY PRN
Status: DISCONTINUED | OUTPATIENT
Start: 2025-04-09 | End: 2025-04-12 | Stop reason: HOSPADM

## 2025-04-09 RX ORDER — PIPERACILLIN SODIUM, TAZOBACTAM SODIUM 4; .5 G/20ML; G/20ML
4.5 INJECTION, POWDER, LYOPHILIZED, FOR SOLUTION INTRAVENOUS EVERY 6 HOURS
Status: DISCONTINUED | OUTPATIENT
Start: 2025-04-09 | End: 2025-04-12

## 2025-04-09 RX ORDER — ACETAMINOPHEN 650 MG/1
650 SUPPOSITORY RECTAL EVERY 4 HOURS PRN
Status: DISCONTINUED | OUTPATIENT
Start: 2025-04-09 | End: 2025-04-12 | Stop reason: HOSPADM

## 2025-04-09 RX ORDER — ACETAMINOPHEN 325 MG/1
650 TABLET ORAL EVERY 4 HOURS PRN
Status: DISCONTINUED | OUTPATIENT
Start: 2025-04-09 | End: 2025-04-12 | Stop reason: HOSPADM

## 2025-04-09 RX ORDER — HYDROMORPHONE HCL IN WATER/PF 6 MG/30 ML
0.4 PATIENT CONTROLLED ANALGESIA SYRINGE INTRAVENOUS
Status: DISCONTINUED | OUTPATIENT
Start: 2025-04-09 | End: 2025-04-12 | Stop reason: HOSPADM

## 2025-04-09 RX ORDER — OXYCODONE HYDROCHLORIDE 5 MG/1
5 TABLET ORAL EVERY 4 HOURS PRN
Status: DISCONTINUED | OUTPATIENT
Start: 2025-04-09 | End: 2025-04-12 | Stop reason: HOSPADM

## 2025-04-09 RX ORDER — ONDANSETRON 2 MG/ML
4 INJECTION INTRAMUSCULAR; INTRAVENOUS EVERY 6 HOURS PRN
Status: DISCONTINUED | OUTPATIENT
Start: 2025-04-09 | End: 2025-04-12 | Stop reason: HOSPADM

## 2025-04-09 RX ORDER — IOPAMIDOL 755 MG/ML
70 INJECTION, SOLUTION INTRAVASCULAR ONCE
Status: COMPLETED | OUTPATIENT
Start: 2025-04-09 | End: 2025-04-09

## 2025-04-09 RX ORDER — ONDANSETRON 2 MG/ML
4 INJECTION INTRAMUSCULAR; INTRAVENOUS
Status: ACTIVE | OUTPATIENT
Start: 2025-04-09 | End: 2025-04-10

## 2025-04-09 RX ADMIN — ONDANSETRON 4 MG: 2 INJECTION INTRAMUSCULAR; INTRAVENOUS at 11:44

## 2025-04-09 RX ADMIN — HYDROMORPHONE HYDROCHLORIDE 0.2 MG: 0.2 INJECTION, SOLUTION INTRAMUSCULAR; INTRAVENOUS; SUBCUTANEOUS at 17:41

## 2025-04-09 RX ADMIN — HYDROMORPHONE HYDROCHLORIDE 0.5 MG: 1 INJECTION, SOLUTION INTRAMUSCULAR; INTRAVENOUS; SUBCUTANEOUS at 15:33

## 2025-04-09 RX ADMIN — PIPERACILLIN AND TAZOBACTAM 4.5 G: 4; .5 INJECTION, POWDER, FOR SOLUTION INTRAVENOUS at 13:45

## 2025-04-09 RX ADMIN — IOPAMIDOL 70 ML: 755 INJECTION, SOLUTION INTRAVENOUS at 11:59

## 2025-04-09 RX ADMIN — HYDROMORPHONE HYDROCHLORIDE 0.5 MG: 1 INJECTION, SOLUTION INTRAMUSCULAR; INTRAVENOUS; SUBCUTANEOUS at 14:17

## 2025-04-09 RX ADMIN — SODIUM CHLORIDE: 9 INJECTION, SOLUTION INTRAVENOUS at 15:34

## 2025-04-09 RX ADMIN — PIPERACILLIN AND TAZOBACTAM 4.5 G: 4; .5 INJECTION, POWDER, FOR SOLUTION INTRAVENOUS at 20:13

## 2025-04-09 RX ADMIN — Medication 1000 ML: at 12:09

## 2025-04-09 RX ADMIN — HYDROMORPHONE HYDROCHLORIDE 0.2 MG: 0.2 INJECTION, SOLUTION INTRAMUSCULAR; INTRAVENOUS; SUBCUTANEOUS at 20:13

## 2025-04-09 RX ADMIN — OXYCODONE HYDROCHLORIDE 5 MG: 5 TABLET ORAL at 22:27

## 2025-04-09 RX ADMIN — SODIUM CHLORIDE: 9 INJECTION, SOLUTION INTRAVENOUS at 17:27

## 2025-04-09 RX ADMIN — SODIUM CHLORIDE 1000 ML: 0.9 INJECTION, SOLUTION INTRAVENOUS at 13:06

## 2025-04-09 ASSESSMENT — ACTIVITIES OF DAILY LIVING (ADL)
ADLS_ACUITY_SCORE: 24
ADLS_ACUITY_SCORE: 41
ADLS_ACUITY_SCORE: 24
ADLS_ACUITY_SCORE: 41
ADLS_ACUITY_SCORE: 41
ADLS_ACUITY_SCORE: 24

## 2025-04-09 ASSESSMENT — COLUMBIA-SUICIDE SEVERITY RATING SCALE - C-SSRS
2. HAVE YOU ACTUALLY HAD ANY THOUGHTS OF KILLING YOURSELF IN THE PAST MONTH?: NO
1. IN THE PAST MONTH, HAVE YOU WISHED YOU WERE DEAD OR WISHED YOU COULD GO TO SLEEP AND NOT WAKE UP?: NO
6. HAVE YOU EVER DONE ANYTHING, STARTED TO DO ANYTHING, OR PREPARED TO DO ANYTHING TO END YOUR LIFE?: NO

## 2025-04-09 NOTE — CONSULTS
Consult received for abdominal fluid collection drain placement. Reviewed imaging demonstrating perforated appendicitis. Small incomplete rim enhancing fluid and gas collection along the appendiceal tip . Given size (borderline to fit drain) and incomplete organization of the collection would not recommend drain at this time. Consider antibiotics and serial imaging. Will defer surgical plan to General surgery.     Corine Ch MD

## 2025-04-09 NOTE — ED PROVIDER NOTES
Emergency Department Note      History of Present Illness     Chief Complaint   Abdominal Pain      HPI   Larissa Rehman is a 69 year old adult who presents to the emergency department with abdominal pain she has been experiencing since Sunday.  She was seen in clinic on the seventh and had labs done.  They recommended she follow-up the next day for reevaluation.  She was seen in urgent care today and was found to have ruptured appendicitis.  This is a microperforation with a few areas of phlegmon/developing abscesses.  She has been n.p.o. since last night.  She has had some nausea without vomiting.  No history of abdominal surgeries in the past.    Independent Historian   None    Review of External Notes   I reviewed her urgent care visit from today as well as her CT scan showing the ruptured appendicitis.    Past Medical History     Medical History and Problem List   Past Medical History:   Diagnosis Date    Attention deficit hyperactivity disorder (ADHD), predominantly hyperactive type     H/O bilateral orchiectomies and zero-depth vaginoplasty 3/29/2018 04/05/2018    High cholesterol     Hypothermia x 3 events during triathlons required EMS        Medications   ASPIRIN PO  estradiol (VIVELLE-DOT) 0.1 MG/24HR bi-weekly patch  simvastatin (ZOCOR) 40 MG tablet        Surgical History   Past Surgical History:   Procedure Laterality Date    COLONOSCOPY      ORTHOPEDIC SURGERY      foot surgery     TRANSGENDER VAGINOPLASTY ZERO DEPTH N/A 3/29/2018    Procedure: TRANSGENDER VAGINOPLASTY ZERO DEPTH;  Zero Depth Transgender Vaginoplasty;  Surgeon: Madhu Mojica MD;  Location: [facility] OR       Physical Exam     Patient Vitals for the past 24 hrs:   BP Temp Pulse Resp SpO2   04/09/25 1342 121/65 98  F (36.7  C) 79 16 100 %     Physical Exam  Constitutional: Vital signs reviewed as above  General: Alert  HEENT: Moist mucous membranes  Eyes: Conjunctiva normal.   Neck: Normal range of motion  Cardiovascular: Regular rate,  Regular rhythm and normal heart sounds.  No MRG  Pulmonary/Chest: Effort normal and breath sounds normal. No respiratory distress. Patient has no wheezes. Patient has no rales.   Abdominal: Soft. Positive bowel sounds. No MRG.  Musculoskeletal/Extremities: Full ROM.  Endo: No pitting edema  Neurological: Alert, no focal deficits.  Skin: Skin is warm and dry.   Psychiatric: Pleasant     Diagnostics     Lab Results   Labs Ordered and Resulted from Time of ED Arrival to Time of ED Departure   COMPREHENSIVE METABOLIC PANEL - Abnormal       Result Value    Sodium 135      Potassium 4.0      Carbon Dioxide (CO2) 20 (*)     Anion Gap 14      Urea Nitrogen 12.3      Creatinine 0.94      GFR Estimate 87      Calcium 8.0 (*)     Chloride 101      Glucose 116 (*)     Alkaline Phosphatase 42      AST 16      ALT 11      Protein Total 6.9      Albumin 3.5      Bilirubin Total 1.0     ISTAT GASES LACTATE VENOUS POCT   BLOOD CULTURE   BLOOD CULTURE   I-STAT gases here showed a lactate of 1.0.    Imaging   No orders to display       EKG   EKG shows sinus rhythm, rate 68, no acute concerning ST or T wave changes.    Independent Interpretation   None    ED Course      Medications Administered   Medications   ondansetron (ZOFRAN) injection 4 mg (has no administration in time range)   HYDROmorphone (PF) (DILAUDID) injection 0.5 mg (0.5 mg Intravenous $Given 4/9/25 1417)   piperacillin-tazobactam (ZOSYN) 4.5 g vial to attach to  mL bag (0 g Intravenous Stopped 4/9/25 1417)   sodium chloride 0.9% BOLUS 1,000 mL (0 mLs Intravenous Stopped 4/9/25 1417)       Procedures   Procedures     Discussion of Management   I discussed the case with general surgery who recommends antibiotics, admission to medicine, and IR consultation  I discussed the case with Dr. Ch from interventional radiology who feels these areas are not amenable to IR drainage at this time.  I spoke to Dr. Pate from the hospitalist service who will be admitting  the patient.    ED Course        Additional Documentation  None    Medical Decision Making / Diagnosis     CMS Diagnoses: IV Antibiotics given and/or elevated Lactate of 1 and no sepsis note found - Delete this reminder and enter the sepsis note or '.edcms' before signing chart.>>>The patient has signs of sepsis   Sepsis ED evaluation   The patient has signs of sepsis as evidenced by:  1. Presence of 2 SIRS criteria, suspected infection, AND  2. Organ dysfunction: Sepsis work up in progress. Will continue to monitor for signs of organ dysfunction    Sepsis Care Initiation: Starting at  1309 PM on 04/09/25 after I reviewed his notes from urgent care including his ruptured appendicitis with possible abscess and his elevated white count., until specified. Prior to this documentation, sepsis, severe sepsis, or septic shock was NOT thought to be a significant cause of illness. This order represents the first time infection was seriously considered to be affecting the patient.    Lactic Acid Results:  Recent Labs   Lab Test 04/09/25  1415   LACT 1.0       3 Hour Bundle 6 Hour Bundle (Reassessment)   Blood Cultures before IV Antibiotics: No, antibiotics were started prior to BCx collection b/c waiting for BCx to be collected would have been detrimental to the patient  Antibiotics given: see below  Prehospital fluid volume (mL):                     Total fluids given (ED +Pre-hospital):  The patient responded to a lesser volume of IV fluids. The initial volume ordered was 1100 mL.    Repeat Lactic Acid Level: Ordered by reflex for 2 hours after initial lactic acid collection.  Initial lactate was normal at 1.0.  Vasopressors:  None .  Repeat perfusion exam: I attest to having performed a repeat sepsis exam and assessment of perfusion at 3:29 PM .   BMI Readings from Last 1 Encounters:   04/09/25 23.61 kg/m        Anti-infectives (From admission through now)      Start     Dose/Rate Route Frequency Ordered Stop    04/09/25  "1335  piperacillin-tazobactam (ZOSYN) 4.5 g vial to attach to  mL bag        Note to Pharmacy: For [facility], [facility] and [facility]: For Zosyn-naive patients, use the \"Zosyn initial dose + extended infusion\" order panel.    4.5 g  over 30 Minutes Intravenous ONCE 04/09/25 1303 04/09/25 1417                MIPS       None    [facility]   Larissa Rehman is a 69 year old adult who presents from urgent care after presenting with abdominal pain since Sunday.  They did labs and a CT scan.  His white count there was 14.8.  He also had acute appendicitis with microperforation and 2 areas of phlegmon/early abscess measuring up to 2.5 cm.  Immediately upon arrival here he was given fluids, Dilaudid and a dose of Zosyn.  I discussed the case with general surgery who states that we should admit to the hospitalist with continued antibiotics and IR consultation.  I spoke with IR and they feel that this area is too small to drain at this time.  I discussed the case with the hospitalist service who was agreeable to admit him.  Antibiotics again were started immediately as I thought he be going to the operating room.  I then got the i-STAT which did not show an elevated lactate.  I also obtained blood cultures at that time.  Far he has not met severe sepsis or septic shock measures.    Disposition   The patient was admitted to the hospital.     Diagnosis     ICD-10-CM    1. Ruptured appendicitis  K35.32            Discharge Medications   New Prescriptions    No medications on file         MD Jasson Maurice Brent Aaron, MD  04/09/25 1531    "

## 2025-04-09 NOTE — PROGRESS NOTES
Acute and Diagnostic Services Clinic Visit    Assessment & Plan     (K35.32) Acute perforated appendicitis  (primary encounter diagnosis)  Comment:   Plan:     (R10.84) Abdominal pain, generalized  Comment:   Plan: sodium chloride 0.9% BOLUS 1,000 mL,         ondansetron (ZOFRAN) injection 4 mg, CBC with         platelets differential, Comprehensive metabolic        panel, Lipase, UA with Microscopic reflex to         Culture, CT Abdomen Pelvis w Contrast, UA         Microscopic with Reflex to Culture            (R11.2,  R19.7) Nausea vomiting and diarrhea  Comment:   Plan:     The patient is a 69-year-old patient with a past medical history of hypercholesterolemia, bradycardia who presents for evaluation of generalized abdominal pain, onset 3 days ago. Eleazar describes having severe epigastric and lower abdominal pain 2 days ago which has improved.  However, the patient continues to experience mild bloating and generalized abdominal pain.    CT imaging of the abdomen showed acute and complicated appendicitis with microperforation.  There are few small organizing fluid and air collections which represent phlegmonous changes/developing abscesses.  There are some small bowel loops that are mildly dilated and edema does, thought to be reactive and secondary to acute appendicitis.    WBC count elevated at 14.8, similar to what was noted 2 days ago (WBC count 14.6 on 4/7/2025).  Urinalysis shows elevated bilirubin, elevated ketones and elevated protein albumin.    The patient is uncomfortable but in no apparent distress.  Vital signs are within normal limits today.  Transferred the patient immediately to the emergency department for further evaluation and management.  Called to inform [facility] emergency department of the patient's arrival and urgent presenting findings of perforated appendicitis.                No follow-ups on file.    Alida Bush is a 69 year old, presenting for the following health  issues:  No chief complaint on file.    KENDRA Bush is a 69-year-old patient with a past medical history of hypercholesterolemia, bradycardia who presents for evaluation of generalized abdominal pain, onset 3 days ago. Eleazar describes having severe epigastric and lower abdominal pain 2 days ago which has improved.  However, the patient continues to experience mild bloating and generalized abdominal pain.  Has also had watery diarrhea over the past 3 days.  States that a small amount of blood was noted in the stool about 2 days ago on 1 occasion, has a history of hemorrhoids.  No other blood in the stool.  Has been unable to tolerate food and fluids due to nausea and dry heaves.  The patient was seen and evaluated in urgent care on 4/7/2025 for lower abdominal pain, vomiting and diarrhea.  Had an elevated white blood cell count 14.5, pain thought to be due to gastroenteritis versus foodborne illness.  Bacterial and viral stool panel was negative.  Patient thinks that symptoms may be related to foodborne illness, ate pork that have been in the fridge for about 4 days on 4/7/2025. Started feeling sick about 8 PM with some stomach cramping, then vomited shortly afterward.  No blood or bilious appearance to the vomit.    No fevers or chills currently.  No chest discomfort, breathing concerns, cough or shortness of breath.  No swelling or pain in the lower extremities, no rashes.    Abdominal/Flank Pain  Onset/Duration: Sunday night  Description:   Character: Fullness  Location: epigastric region danny-umbilical region  Radiation: None  Intensity: moderate  Progression of Symptoms:  same  Accompanying Signs & Symptoms:  Fever/chills: YES- had on Monday, normalized now  Gas/Bloating: YES  Nausea: YES  Vomitting: no   Diarrhea: YES  Constipation:YES  Dysuria: no            Hematuria: no            Frequency: no            Incontinence of urine: no   History:            Last bowel movement: today  Trauma: no   Previous similar  pain: no    Previous tests done: none           Previous Abdominal surgery: no   Precipitating factors:   Does the pain change with:     Food: no      Bowel Movement: no     Urination: no              Other factors: no   Therapies tried and outcome:  Dulcolax    When food last eaten: Yesterday        Review of Systems  Constitutional, neuro, ENT, endocrine, pulmonary, cardiac, gastrointestinal, genitourinary, musculoskeletal, integument and psychiatric systems are negative, except as otherwise noted.      Objective    There were no vitals taken for this visit.  There is no height or weight on file to calculate BMI.  Physical Exam  Vitals reviewed.   Constitutional:       General: She is not in acute distress.     Appearance: Normal appearance. She is not ill-appearing, toxic-appearing or diaphoretic.   HENT:      Head: Normocephalic and atraumatic.      Right Ear: No middle ear effusion. No mastoid tenderness. Tympanic membrane is not injected, perforated, erythematous, retracted or bulging.      Left Ear:  No middle ear effusion. No mastoid tenderness. Tympanic membrane is not injected, perforated, erythematous, retracted or bulging.      Mouth/Throat:      Mouth: Mucous membranes are moist.      Pharynx: Oropharynx is clear. No oropharyngeal exudate or posterior oropharyngeal erythema.   Cardiovascular:      Rate and Rhythm: Normal rate and regular rhythm.      Pulses: Normal pulses.      Heart sounds: Normal heart sounds. No murmur heard.  Pulmonary:      Effort: Pulmonary effort is normal. No respiratory distress.      Breath sounds: Normal breath sounds. No stridor. No wheezing or rhonchi.   Abdominal:      General: Bowel sounds are decreased. There is distension.      Tenderness: There is generalized abdominal tenderness. There is guarding. There is no right CVA tenderness, left CVA tenderness or rebound. Negative signs include Wolff's sign, McBurney's sign, psoas sign and obturator sign.      Hernia: No  hernia is present.   Musculoskeletal:      Cervical back: Neck supple. No rigidity or tenderness. No muscular tenderness.   Lymphadenopathy:      Cervical: No cervical adenopathy.      Right cervical: No superficial, deep or posterior cervical adenopathy.     Left cervical: No superficial, deep or posterior cervical adenopathy.      Lower Body: No right inguinal adenopathy. No left inguinal adenopathy.   Neurological:      Mental Status: She is alert and oriented to person, place, and time.      Sensory: No sensory deficit.                    Signed Electronically by: Bimal Ortiz PA-C

## 2025-04-09 NOTE — ED TRIAGE NOTES
Pt presents from  with dx Perforated Appy. Labs and CT done     Triage Assessment (Adult)       Row Name 04/09/25 1257          Triage Assessment    Airway WDL WDL

## 2025-04-09 NOTE — PROGRESS NOTES
RECEIVING UNIT ED HANDOFF REVIEW    ED Nurse Handoff Report was reviewed by: Sujata Vasquez RN on April 9, 2025 at 5:01 PM       Yes

## 2025-04-09 NOTE — PROGRESS NOTES
Vitals labs and imaging reviewed.  4 days of abdominal pain.  Leukocytosis.  CT with phlegmon evolving to abscess, small volumes of free air.  Most likely and appropriately treated with non-operative management, per stable patient with perforated appendicitis algorithm. Appreciate hospitalist admission in this setting.  If he decompensates, will discuss OR and associated risks of visceral injury, open surgery and extended resection.  Full consult to follow.

## 2025-04-09 NOTE — ED NOTES
[facility]  ED Nurse Handoff Report    ED Chief complaint: Abdominal Pain      ED Diagnosis:   Final diagnoses:   Ruptured appendicitis       Code Status: confirm with hospitalist    Allergies:   Allergies   Allergen Reactions    Bee Venom        Patient Story: Pt sent from  for report of ruptured appy    Focused Assessment:  Pt is alert and oriented, male transition to female, states they do not use pronouns. Reports feeling bloated and pain throughout the abdomen. Surgery deferred to IR to place drain for abscess but IR states area is too small for drain. Waiting for plan from surgery. Has received 1 dose antibiotics. Dilaudid effective for pain control.     Treatments and/or interventions provided: Surgery unknown timing  Labs Ordered and Resulted from Time of ED Arrival to Time of ED Departure   COMPREHENSIVE METABOLIC PANEL - Abnormal       Result Value    Sodium 135      Potassium 4.0      Carbon Dioxide (CO2) 20 (*)     Anion Gap 14      Urea Nitrogen 12.3      Creatinine 0.94      GFR Estimate 87      Calcium 8.0 (*)     Chloride 101      Glucose 116 (*)     Alkaline Phosphatase 42      AST 16      ALT 11      Protein Total 6.9      Albumin 3.5      Bilirubin Total 1.0     ISTAT GASES LACTATE VENOUS POCT   BLOOD CULTURE   BLOOD CULTURE         Patient's response to treatments and/or interventions: Tolerating medications well    To be done/followed up on inpatient unit:  Continue plan of care    Does this patient have any cognitive concerns?:  none noted    Activity level - Baseline/Home:  Independent  Activity Level - Current:   Independent and Stand with Assist    Patient's Preferred language: English   Needed?: No    Isolation: None  Infection: Not Applicable  Patient tested for COVID 19 prior to admission: NO  Bariatric?: No    Vital Signs:   Vitals:    04/09/25 1342   BP: 121/65   Pulse: 79   Resp: 16   Temp: 98  F (36.7  C)   SpO2: 100%       Cardiac Rhythm:     Was the  PSS-3 completed:   Yes  What interventions are required if any?               Family Comments: Not present, daughter aware of pt admission  OBS brochure/video discussed/provided to patient/family: No              Name of person given brochure if not patient: n/a              Relationship to patient: n/a    For the majority of the shift this patient's behavior was Green.   Behavioral interventions performed were rounding.    ED NURSE PHONE NUMBER: *[id number]

## 2025-04-09 NOTE — PHARMACY-ADMISSION MEDICATION HISTORY
Pharmacist Admission Medication History    Admission medication history is complete. The information provided in this note is only as accurate as the sources available at the time of the update.    Information Source(s): Patient and CareEverywhere/SureScripts via in-person    Pertinent Information:   - Estradiol patch is prescribed as 1 patch 2x/week.  Pt usually uses 0.5 patch 1-2x/week for hot flashes.    Changes made to PTA medication list:  Added: None  Deleted: None  Changed: Estradiol patch.    Allergies reviewed with patient and updates made in EHR: no    Medication History Completed By: Sivan Dove PharmD 4/9/2025 3:04 PM    Current Facility-Administered Medications for the 4/9/25 encounter (Hospital Encounter)   Medication    ondansetron (ZOFRAN) injection 4 mg    [COMPLETED] sodium chloride 0.9% BOLUS 1,000 mL     PTA Med List   Medication Sig Note Last Dose/Taking    ASPIRIN PO Take 81 mg by mouth every other day.  4/6/2025    estradiol (VIVELLE-DOT) 0.1 MG/24HR bi-weekly patch Place 1 patch over 96 hours onto the skin twice a week. (Patient taking differently: Place 0.5 patches onto the skin twice a week. Once to twice weekly for hot flashes.) 4/9/2025: Currently is on behind the right ear. 4/7/2025    simvastatin (ZOCOR) 40 MG tablet TAKE 1 TABLET(40 MG) BY MOUTH AT BEDTIME  4/6/2025

## 2025-04-09 NOTE — H&P
[facility]    History and Physical - Hospitalist Service       Date of Admission:  4/9/2025    Assessment & Plan      Larissa Rehman is a 69 year old transgender adult (does not use any specific pronouns) with PMHx hyperlipidemia and bradycardia who was hyperlipidemia and bradycardia who was admitted on 4/9/2025 for evaluation of abdominal pain due to perforated appendicitis    Acute perforated appendicitis with abscess formation  *Was initially seen in Urgent Care on 4/7/2025 for evaluation of abdominal pain, vomiting and diarrhea.  Labs at that time notable for WBC 14.5 but remainder of labs were stable.  Bacterial/viral stool studies were negative, UC neg.  Presentation was thought to be due to viral gastroenteritis and patient was discharged home.  Symptoms of generalized abdominal pain persisted. Had some associated watery stools and 1 episode of some bloody stool.  Unable to tolerate oral intake due to nausea, pain.  No reported fever/chills, chest pain shortness of breath or cough.  Presented back to Urgent Care on 4/9 for further evaluation. Repeat labs showed persistent leukocytosis with WBC 14.8. Lipase 18. CT abd/pelvis showed Acute and complicated appendicitis with microperforation and few small organizing fluid and air collections which represent phlegmonous changes/developing abscesses.  Some small bowel loops are mildly dilated or edematous which are favored reactive in the setting of acute appendicitis. Was referred to the ED for further evaluation.   *In the ED, was afebrile and hemodynamically stable.  Repeat BMP stable, lactate normal. She appeared in no acute distress.  Was started on IV fluids and Zosyn.  General surgery service was contacted.  Did not recommend urgent surgical intervention and instead recommended IR consultation for placement of drains for developing abscesses.  -- lactate normal, blood cultures pending  -- cont IV Zosyn (4/9-present)  -- IR consulted  for abscess drainage  -- general surgery consulted -- anticipate surgical intervention in the coming day(s)  -- cont supportive cares with IVFs, prns for pain/nausea  -- NPO for now      Transgender female  *Hx of bilateral orchiectomies and zero-depth vaginoplasty in 2018.   *Uses estrogen patch.   *Patient does not have pronoun preference    Hyperlipidemia  *Chronic and stable on statin            Diet:  NPO  DVT Prophylaxis: Pneumatic Compression Devices  Packer Catheter: Not present  Lines: None     Cardiac Monitoring: None  Code Status:  DNR/DNI per patient, but okay with undergoing surgery    Clinically Significant Risk Factors Present on Admission                 # Drug Induced Platelet Defect: home medication list includes an antiplatelet medication                        Disposition Plan       Medically Ready for Discharge: Anticipated in 2-4 Days           Amara Pate DO  Hospitalist Service  [facility]  Securely message with [employer] (more info)  Text page via [employer] Paging/Directory     ______________________________________________________________________    Chief Complaint   Abdominal pain    History is obtained from the patient, electronic health record, and emergency department physician    History of Present Illness   Larissa Rehman is a 69 year old transgender adult (does not use any specific pronouns) with PMHx hyperlipidemia and bradycardia who was hyperlipidemia and bradycardia who was seen in ED for evaluation of abdominal pain.     Was initially seen in Urgent Care on 4/7/2025 for evaluation of abdominal pain, vomiting and diarrhea.  Labs at that time notable for WBC 14.5 but remainder of labs were stable.  Bacterial/viral stool studies were negative, UC neg.  Presentation was thought to be due to viral gastroenteritis and patient was discharged home.  Symptoms of generalized abdominal pain persisted. Had some associated watery stools and 1 episode of some  bloody stool.  Unable to tolerate oral intake due to nausea, pain.  No reported fever/chills, chest pain shortness of breath or cough.  Presented back to Urgent Care on 4/9 for further evaluation. Repeat labs showed persistent leucocytosis with WBC 14.8. Lipase 18. CT abd/pelvis showed Acute and complicated appendicitis with microperforation and few small organizing fluid and air collections which represent phlegmonous changes/developing abscesses.  Some small bowel loops are mildly dilated or edematous which are favored reactive in the setting of acute appendicitis. Was referred to the ED for further evaluation.     In the ED, was afebrile and hemodynamically stable.  BMP notable for AGMA with bicarb 17 and AG 20. She appeared in no acute distress.  Was started on IV fluids and Zosyn.  General surgery service was contacted.  Did not recommend urgent surgical intervention and instead recommended IR consultation for placement of drains for developing abscesses.    I saw patient this afternoon. Patient was resting comfortably in bed, alert and conversing appropriately. Pain presently controlled. Mouth felt dry. No cp/sob/cough.       Past Medical History    Past Medical History:   Diagnosis Date    Attention deficit hyperactivity disorder (ADHD), predominantly hyperactive type     H/O bilateral orchiectomies and zero-depth vaginoplasty 3/29/2018 04/05/2018    High cholesterol     Hypothermia x 3 events during triathlons required EMS        Past Surgical History   Past Surgical History:   Procedure Laterality Date    COLONOSCOPY      ORTHOPEDIC SURGERY      foot surgery     TRANSGENDER VAGINOPLASTY ZERO DEPTH N/A 3/29/2018    Procedure: TRANSGENDER VAGINOPLASTY ZERO DEPTH;  Zero Depth Transgender Vaginoplasty;  Surgeon: Madhu Mojica MD;  Location: [facility] OR       Prior to Admission Medications   Prior to Admission Medications   Prescriptions Last Dose Informant Patient Reported? Taking?   ASPIRIN PO 4/6/2025  Yes Yes    Sig: Take 81 mg by mouth every other day.   estradiol (VIVELLE-DOT) 0.1 MG/24HR bi-weekly patch 4/7/2025  No Yes   Sig: Place 1 patch over 96 hours onto the skin twice a week.   Patient taking differently: Place 0.5 patches onto the skin twice a week. Once to twice weekly for hot flashes.   simvastatin (ZOCOR) 40 MG tablet 4/6/2025  No Yes   Sig: TAKE 1 TABLET(40 MG) BY MOUTH AT BEDTIME          Review of Systems    The 10 point Review of Systems is negative other than noted in the HPI or here.      Physical Exam   Vital Signs: Temp: 98  F (36.7  C)   BP: 121/65 Pulse: 79   Resp: 16 SpO2: 100 %      Weight: 0 lbs 0 oz    General Appearance: WNWD adult, resting comfortably, alert and conversing appropriately, NAD  Respiratory: CTAB, no wheeze, no increased work of breathing  Cardiovascular: HRRR, no MGR, no LE edema  GI: +TTP throughout but mostly over RLQ, non-distended, no guarding  Skin: warm/dry  Other:     Medical Decision Making       75 MINUTES SPENT BY ME on the date of service doing chart review, history, exam, documentation & further activities per the note.      Data     I have personally reviewed the following data over the past 24 hrs:    14.8 (H)  \   14.6   / 228     N/A N/A N/A /  N/A   N/A N/A N/A \     ALT: N/A AST: N/A AP: N/A TBILI: N/A   ALB: N/A TOT PROTEIN: N/A LIPASE: 18       Imaging results reviewed over the past 24 hrs:   Recent Results (from the past 24 hours)   CT Abdomen Pelvis w Contrast   Result Value    Radiologist flags Perforated appendicitis (AA)    Narrative    EXAM: CT ABDOMEN PELVIS W CONTRAST  LOCATION: [facility]  DATE: 4/9/2025    INDICATION:  Abdominal pain, generalized  COMPARISON: None.  TECHNIQUE: CT scan of the abdomen and pelvis was performed following injection of IV contrast. Multiplanar reformats were obtained. Dose reduction techniques were used.  CONTRAST: 70 ml isovue 370    FINDINGS:   LOWER CHEST: Distal esophageal wall thickening can  be seen with esophagitis.    HEPATOBILIARY: No suspicious liver lesion. Normal gallbladder and bile ducts.    PANCREAS: Normal.    SPLEEN: Normal.    ADRENAL GLANDS: Normal.    KIDNEYS/BLADDER: No significant mass, stone, or hydronephrosis.    BOWEL: The mid to distal appendix is diffusely dilated measuring up to 21 mm (series 4, image 43) with diffuse inflammatory changes centered about the appendix in the anterior pelvis. There is microperforation with trace pneumoperitoneum and few small   organizing fluid and air collections such as a 2.5 x 2.5 x 3.1 cm collection along the appendiceal tip (series 3, image 41), another 2.3 x 2.3 cm rim-enhancing collection within the right lower quadrant subjacent to the proximal appendix (image 144), and   a bilobed 5.1 x 3.6 cm within the posterior pelvis (image 171). A few left upper quadrant small bowel loops are mildly dilated and a few bowel loops within the pelvis near the appendix are edematous, both of which are favored reactive. Trace ascites.   Colonic diverticulosis.    LYMPH NODES: No lymphadenopathy.    VASCULATURE: No abdominal aortic aneurysm.    PELVIC ORGANS: Normal prostate gland and seminal vesicles. Changes from gender affirmation surgery.    MUSCULOSKELETAL: No aggressive osseous lesion.        Impression    IMPRESSION:   1.  Acute and complicated appendicitis with microperforation and few small organizing fluid and air collections which represent phlegmonous changes/developing abscesses.  2.  Some small bowel loops are mildly dilated or edematous which are favored reactive in the setting of acute appendicitis.        [Critical Result: Perforated appendicitis]    Finding was identified on 4/9/2025 12:19 PM CDT.     YESSY Ortiz was contacted by me on 4/9/2025 12:38 PM CDT and verbalized understanding of the critical result.

## 2025-04-10 VITALS
DIASTOLIC BLOOD PRESSURE: 53 MMHG | RESPIRATION RATE: 16 BRPM | SYSTOLIC BLOOD PRESSURE: 113 MMHG | HEART RATE: 60 BPM | TEMPERATURE: 98.3 F | OXYGEN SATURATION: 99 %

## 2025-04-10 LAB
ANION GAP SERPL CALCULATED.3IONS-SCNC: 12 MMOL/L (ref 7–15)
ATRIAL RATE - MUSE: 68 BPM
BACTERIA BLD CULT: NORMAL
BACTERIA BLD CULT: NORMAL
BUN SERPL-MCNC: 11.9 MG/DL (ref 8–23)
CALCIUM SERPL-MCNC: 7.8 MG/DL (ref 8.8–10.4)
CHLORIDE SERPL-SCNC: 103 MMOL/L (ref 98–107)
CREAT SERPL-MCNC: 0.95 MG/DL (ref 0.51–1.17)
DIASTOLIC BLOOD PRESSURE - MUSE: NORMAL MMHG
EGFRCR SERPLBLD CKD-EPI 2021: 86 ML/MIN/1.73M2
ERYTHROCYTE [DISTWIDTH] IN BLOOD BY AUTOMATED COUNT: 12.6 % (ref 10–15)
GLUCOSE SERPL-MCNC: 106 MG/DL (ref 70–99)
HCO3 SERPL-SCNC: 21 MMOL/L (ref 22–29)
HCT VFR BLD AUTO: 35.8 % (ref 35–53)
HGB BLD-MCNC: 12.1 G/DL (ref 11.7–17.7)
INTERPRETATION ECG - MUSE: NORMAL
MCH RBC QN AUTO: 31.8 PG (ref 26.5–33)
MCHC RBC AUTO-ENTMCNC: 33.8 G/DL (ref 31.5–36.5)
MCV RBC AUTO: 94 FL (ref 78–100)
P AXIS - MUSE: 69 DEGREES
PLATELET # BLD AUTO: 203 10E3/UL (ref 150–450)
POTASSIUM SERPL-SCNC: 3.6 MMOL/L (ref 3.4–5.3)
PR INTERVAL - MUSE: 198 MS
QRS DURATION - MUSE: 80 MS
QT - MUSE: 430 MS
QTC - MUSE: 457 MS
R AXIS - MUSE: 53 DEGREES
RBC # BLD AUTO: 3.8 10E6/UL (ref 3.8–5.9)
SODIUM SERPL-SCNC: 136 MMOL/L (ref 135–145)
SYSTOLIC BLOOD PRESSURE - MUSE: NORMAL MMHG
T AXIS - MUSE: 30 DEGREES
VENTRICULAR RATE- MUSE: 68 BPM
WBC # BLD AUTO: 10.7 10E3/UL (ref 4–11)

## 2025-04-10 PROCEDURE — 250N000013 HC RX MED GY IP 250 OP 250 PS 637: Performed by: INTERNAL MEDICINE

## 2025-04-10 PROCEDURE — 99232 SBSQ HOSP IP/OBS MODERATE 35: CPT | Performed by: INTERNAL MEDICINE

## 2025-04-10 PROCEDURE — 36415 COLL VENOUS BLD VENIPUNCTURE: CPT | Performed by: INTERNAL MEDICINE

## 2025-04-10 PROCEDURE — 120N000001 HC R&B MED SURG/OB

## 2025-04-10 PROCEDURE — 85027 COMPLETE CBC AUTOMATED: CPT | Performed by: INTERNAL MEDICINE

## 2025-04-10 PROCEDURE — 258N000003 HC RX IP 258 OP 636: Performed by: INTERNAL MEDICINE

## 2025-04-10 PROCEDURE — 250N000011 HC RX IP 250 OP 636: Performed by: INTERNAL MEDICINE

## 2025-04-10 PROCEDURE — 80048 BASIC METABOLIC PNL TOTAL CA: CPT | Performed by: INTERNAL MEDICINE

## 2025-04-10 RX ORDER — SIMVASTATIN 40 MG
40 TABLET ORAL AT BEDTIME
Status: DISCONTINUED | OUTPATIENT
Start: 2025-04-10 | End: 2025-04-12 | Stop reason: HOSPADM

## 2025-04-10 RX ORDER — ESTRADIOL 0.05 MG/D
1 PATCH, EXTENDED RELEASE TRANSDERMAL
Status: DISCONTINUED | OUTPATIENT
Start: 2025-04-10 | End: 2025-04-12 | Stop reason: HOSPADM

## 2025-04-10 RX ADMIN — SODIUM CHLORIDE: 9 INJECTION, SOLUTION INTRAVENOUS at 01:37

## 2025-04-10 RX ADMIN — PIPERACILLIN AND TAZOBACTAM 4.5 G: 4; .5 INJECTION, POWDER, FOR SOLUTION INTRAVENOUS at 14:09

## 2025-04-10 RX ADMIN — OXYCODONE HYDROCHLORIDE 5 MG: 5 TABLET ORAL at 15:43

## 2025-04-10 RX ADMIN — PIPERACILLIN AND TAZOBACTAM 4.5 G: 4; .5 INJECTION, POWDER, FOR SOLUTION INTRAVENOUS at 01:37

## 2025-04-10 RX ADMIN — OXYCODONE HYDROCHLORIDE 5 MG: 5 TABLET ORAL at 22:46

## 2025-04-10 RX ADMIN — PIPERACILLIN AND TAZOBACTAM 4.5 G: 4; .5 INJECTION, POWDER, FOR SOLUTION INTRAVENOUS at 19:31

## 2025-04-10 RX ADMIN — PIPERACILLIN AND TAZOBACTAM 4.5 G: 4; .5 INJECTION, POWDER, FOR SOLUTION INTRAVENOUS at 08:04

## 2025-04-10 RX ADMIN — SODIUM CHLORIDE: 9 INJECTION, SOLUTION INTRAVENOUS at 22:48

## 2025-04-10 RX ADMIN — SIMVASTATIN 40 MG: 40 TABLET, FILM COATED ORAL at 22:46

## 2025-04-10 RX ADMIN — OXYCODONE HYDROCHLORIDE 5 MG: 5 TABLET ORAL at 06:37

## 2025-04-10 RX ADMIN — SODIUM CHLORIDE: 9 INJECTION, SOLUTION INTRAVENOUS at 12:45

## 2025-04-10 ASSESSMENT — ACTIVITIES OF DAILY LIVING (ADL)
ADLS_ACUITY_SCORE: 32
ADLS_ACUITY_SCORE: 29
ADLS_ACUITY_SCORE: 29
ADLS_ACUITY_SCORE: 32
ADLS_ACUITY_SCORE: 29
ADLS_ACUITY_SCORE: 32
ADLS_ACUITY_SCORE: 29
ADLS_ACUITY_SCORE: 32
ADLS_ACUITY_SCORE: 32
ADLS_ACUITY_SCORE: 29
ADLS_ACUITY_SCORE: 29
ADLS_ACUITY_SCORE: 28
ADLS_ACUITY_SCORE: 32
ADLS_ACUITY_SCORE: 32
ADLS_ACUITY_SCORE: 29
ADLS_ACUITY_SCORE: 32
ADLS_ACUITY_SCORE: 29
ADLS_ACUITY_SCORE: 28
ADLS_ACUITY_SCORE: 29

## 2025-04-10 NOTE — PLAN OF CARE
"Patient Name: Jassi  MRN: [id number]  Date of Admission: 4/9/2025  Reason for Admission: Perforated appendix  Level of Care: medical    Vitals:   BP Readings from Last 1 Encounters:   04/10/25 108/59     Pulse Readings from Last 1 Encounters:   04/10/25 61     Wt Readings from Last 1 Encounters:   04/09/25 64.4 kg (141 lb 14.4 oz)     Ht Readings from Last 1 Encounters:   04/09/25 1.651 m (5' 5\")     Estimated body mass index is 23.61 kg/m  as calculated from the following:    Height as of an earlier encounter on 4/9/25: 1.651 m (5' 5\").    Weight as of an earlier encounter on 4/9/25: 64.4 kg (141 lb 14.4 oz).  Temp Readings from Last 1 Encounters:   04/10/25 98.2  F (36.8  C) (Oral)       Pain: Pain goal 0 Pain Rating 4 Effective pain medication/regimen PO oxycodone    CV Surgery Patient: No    Assessment    Resp: Lung sounds diminished. On room air  Telemetry: N/A  Neuro: A+Ox4, neuros intact  GI/: Continent of bladder. Bowels active, flatus+, no BM. Abdomen distended and tender to touch  Skin/Wounds: WDL  Lines/Drains: PIV infusing NS  Activity: up SBA  Sleep: good  Abnormal Labs: N/A    Aggression Stop Light: Green          Patient Care Plan: pain management, IV abx. General surgery following   "

## 2025-04-10 NOTE — PLAN OF CARE
"Patient Name: Jassi  MRN: [id number]  Date of Admission: 4/9/2025  Reason for Admission: Perforated appendicitis    Level of Care: Medical     Vitals:   BP Readings from Last 1 Encounters:   04/09/25 119/70     Pulse Readings from Last 1 Encounters:   04/09/25 65     Wt Readings from Last 1 Encounters:   04/09/25 64.4 kg (141 lb 14.4 oz)     Ht Readings from Last 1 Encounters:   04/09/25 1.651 m (5' 5\")     Estimated body mass index is 23.61 kg/m  as calculated from the following:    Height as of an earlier encounter on 4/9/25: 1.651 m (5' 5\").    Weight as of an earlier encounter on 4/9/25: 64.4 kg (141 lb 14.4 oz).  Temp Readings from Last 1 Encounters:   04/09/25 97.7  F (36.5  C) (Axillary)       Pain: Pain goal 0 Pain Rating 4 Effective pain medication/regimen PRN dilaudid and oxy    CV Surgery Patient: No    Assessment    Resp: LS clear on room air.   Telemetry: NA  Neuro: A&Ox4  GI/: Abd rounded, firm, and tender. AUO. NPO.   Skin/Wounds: Intact  Lines/Drains: L PIV infusing NS @ 100 ml/hr. IV abx.   Activity: SBA  Sleep: Awake   Abnormal Labs: NA    Aggression Stop Light: Green          Patient Care Plan: Continue pain management, IV fluids/abx. Gen surgery following.                           "

## 2025-04-10 NOTE — PROGRESS NOTES
[facility]    Medicine Progress Note - Hospitalist Service    Date of Admission:  4/9/2025    Assessment & Plan     Larissa Rehman is a 69 year old transgender adult (does not use any specific pronouns) with PMHx hyperlipidemia and bradycardia who was hyperlipidemia and bradycardia who was admitted on 4/9/2025 for evaluation of abdominal pain due to perforated appendicitis     Acute perforated appendicitis with abscess formation  *Was initially seen in Urgent Care on 4/7/2025 for evaluation of abdominal pain, vomiting and diarrhea.  Labs at that time notable for WBC 14.5 but remainder of labs were stable.  Bacterial/viral stool studies were negative, UC neg.  Presentation was thought to be due to viral gastroenteritis and patient was discharged home.  Symptoms of generalized abdominal pain persisted. Had some associated watery stools and 1 episode of some bloody stool.  Unable to tolerate oral intake due to nausea, pain.  No reported fever/chills, chest pain shortness of breath or cough.  Presented back to Urgent Care on 4/9 for further evaluation. Repeat labs showed persistent leukocytosis with WBC 14.8. Lipase 18. CT abd/pelvis showed Acute and complicated appendicitis with microperforation and few small organizing fluid and air collections which represent phlegmonous changes/developing abscesses.  Some small bowel loops are mildly dilated or edematous which are favored reactive in the setting of acute appendicitis. Was referred to the ED for further evaluation.   *In the ED, was afebrile and hemodynamically stable.  Repeat BMP stable, lactate normal. She appeared in no acute distress.  Was started on IV fluids and Zosyn.  General surgery service was contacted.  Did not recommend urgent surgical intervention and instead recommended IR consultation for placement of drains for developing abscesses.    -- Lactate normal, Blood cultures pending  -- Continue IV Zosyn (4/9-present)  -- IR  consulted for abscess drainage   -Given abscess size and incomplete organization of collection, drain not recommended   -Consider antibiotics and serial imaging   -Surgical plan per general surgery  -- general surgery consulted   - Anticipate surgical intervention in the coming day(s), following closely   - Awaiting consult to be completed, per patient surgery is recommending continuing IV antibiotics  -Patient will be started on diet  -- cont supportive cares with IVFs, prns for pain/nausea  -- Will appreciate surgery team ongoing help with management of surgical/nonsurgical appendicitis.        Transgender female  *Hx of bilateral orchiectomies and zero-depth vaginoplasty in 2018.   *Uses estrogen patch.   *Patient does not have pronoun preference  -- Start patient on PTA Estradiol patch      Hyperlipidemia  *Chronic and stable on statin  -- start Patient on PTA statin  -- Keep holding ASA        Diet: NPO for Procedure/Surgery per Anesthesia Guidelines Except for: Meds; Clear liquids before procedure/surgery: ADULT (Age GREATER than or Equal to 18 years) - Clear liquids 2 hours before procedure/surgery    DVT Prophylaxis: Pneumatic Compression Devices and Ambulate every shift  Packer Catheter: Not present  Lines: None     Cardiac Monitoring: None  Code Status: No CPR- Do NOT Intubate      Clinically Significant Risk Factors Present on Admission         # Hyponatremia: Lowest Na = 133 mmol/L in last 2 days, will monitor as appropriate  # Hypochloremia: Lowest Cl = 96 mmol/L in last 2 days, will monitor as appropriate  # Hypocalcemia: Lowest Ca = 7.8 mg/dL in last 2 days, will monitor and replace as appropriate    # Anion Gap Metabolic Acidosis: Highest Anion Gap = 20 mmol/L in last 2 days, will monitor and treat as appropriate    # Drug Induced Platelet Defect: home medication list includes an antiplatelet medication                        Social Drivers of Health    Social Connections: Unknown (11/15/2024)     Social Connection and Isolation Panel [NHANES]     Frequency of Social Gatherings with Friends and Family: Patient declined          Disposition Plan     Medically Ready for Discharge: Anticipated in 2-4 Days         Zoila Alcazar MD  Hospitalist Service  [facility]  Securely message with [employer] (more info)  Text page via [employer] Paging/Directory   ______________________________________________________________________    Interval History     Patient care was assumed this morning, patient was seen and examined.  Patient currently remains n.p.o., waiting to be seen by surgery, understood that currently surgery team is not recommending surgical intervention.  He is aware that IR has been consulted but currently patient does not meet the criteria for drain placement.  Plan to start patient on diet and home medications once surgery plan has been finalized.    Physical Exam   Vital Signs: Temp: 98.2  F (36.8  C) Temp src: Oral BP: 108/59 Pulse: 61   Resp: 16 SpO2: 98 % O2 Device: None (Room air)    Weight: 0 lbs 0 oz    Physical Exam  Vitals and nursing note reviewed.   Constitutional:       Appearance: She is well-developed.   HENT:      Head: Normocephalic and atraumatic.   Eyes:      Conjunctiva/sclera: Conjunctivae normal.      Pupils: Pupils are equal, round, and reactive to light.   Neck:      Thyroid: No thyromegaly.   Cardiovascular:      Rate and Rhythm: Normal rate and regular rhythm.      Heart sounds: Normal heart sounds. No murmur heard.  Pulmonary:      Effort: Pulmonary effort is normal. No respiratory distress.      Breath sounds: Normal breath sounds. No wheezing.   Abdominal:      General: Bowel sounds are normal.      Palpations: Abdomen is soft.      Tenderness: There is abdominal tenderness. There is no guarding.   Musculoskeletal:         General: No deformity. Normal range of motion.      Cervical back: Normal range of motion and neck supple.   Skin:     General: Skin is warm  and dry.   Neurological:      Mental Status: She is alert and oriented to person, place, and time.   Psychiatric:         Behavior: Behavior normal.          Medical Decision Making       40 MINUTES SPENT BY ME on the date of service doing chart review, history, exam, documentation & further activities per the note.      Data     I have personally reviewed the following data over the past 24 hrs:    10.7  \   12.1   / 203     136 103 11.9 /  106 (H)   3.6 21 (L) 0.95 \       Imaging results reviewed over the past 24 hrs:   No results found for this or any previous visit (from the past 24 hours).  Recent Labs   Lab 04/10/25  0554 04/09/25  1410 04/09/25  1141 04/07/25  1831   WBC 10.7  --  14.8* 14.6*   HGB 12.1  --  14.6 14.2   MCV 94  --  94 94     --  228 220    135 133*  133* 138   POTASSIUM 3.6 4.0 3.9  4.2 4.2   CHLORIDE 103 101 102  96* 102   CO2 21* 20* 20  17* 23   BUN 11.9 12.3 13  13.0 14   CR 0.95 0.94 0.99  0.99 1.20   ANIONGAP 12 14 11  20* 13   DEBORA 7.8* 8.0* 9.3  9.3 10.0   * 116* 122*  118* 157*   ALBUMIN  --  3.5 3.8  4.0 4.1   PROTTOTAL  --  6.9 8.1  8.1 7.5   BILITOTAL  --  1.0 1.2  1.2 1.6*   ALKPHOS  --  42 82  82 35*   ALT  --  11 14  14 14   AST  --  16 22  22 26   LIPASE  --   --  18  --

## 2025-04-10 NOTE — CONSULTS
"    IR consult request for a tunneled peritoneal catheter placement made.     Larissa Rehman is a 69 year old transgender woman admitted 4/9/25 with abdominal pain and found to have a ruptured appendicitis with a microperforation and a few areas of phlegmon/developing abscesses.      4/9 request there was a request for radiology to place a drain which was declined as there was \"Small incomplete rim enhancing fluid and gas collection along the appendiceal tip . Given size (borderline to fit drain) and incomplete organization of the collection would not recommend drain at this time. Consider antibiotics and serial imaging. Will defer surgical plan to General surgery.  Corine Ch MD\"    Reviewed above request with IR Dr Hammer. Tunneled intraperitoneal drains are placed for reasons of recurrent ascites. They are not placed with active infections occurring. Eleazar does not meet the criteria of recurrent ascites, there is not enough fluid to drain with aspiration only and the fluid is infected.     Consult completed.     Total time: 15 minutes     Thanks, [facility] Interventional Radiology CNP ([phone]) (phone [phone])       "

## 2025-04-10 NOTE — CONSULTS
[facility] Surgery Consultation    Larissa Rehman MRN# [id number]     Age: 69 year old   YOB: 1955       Date of Admission: 4/9/2025 12:59 PM      Reason for consult: Perforated appendicitis       Requesting physician: Amara Pate       Level of consult: Consult, follow and place orders      Assessment/Plan/Recommendations:    This 69 year old year old adult has a perforated appendicitis and has developing abscesses.  .  He has a 5 cm posterior pelvic fluid collection and two smaller collections.   Agree with admission and IV antibiotics.  Consult IR for possible drainage or aspiration of the 5 cm fluid collection.   He is hemodynamically stable and so, non-surgical treatment is recommended.  Risks of extended resection, open surgery, visceral injury will be higher with immediate appendectomy.  If he worsens, we will have to consider surgery.  If the abscess is not drainable, continue treatment with antibiotics.  IV then po at discharge.  Ok to eat regular diet, if IR finds this is not drainable.  We may rescan to evaluate fluid collections at a later date.             Chief Complaint:     Abd pain since Sunday.          Past Medical History:     Past Medical History:   Diagnosis Date    Attention deficit hyperactivity disorder (ADHD), predominantly hyperactive type     H/O bilateral orchiectomies and zero-depth vaginoplasty 3/29/2018 04/05/2018    High cholesterol     Hypothermia x 3 events during triathlons required EMS              Past Surgical History:     Past Surgical History:   Procedure Laterality Date    COLONOSCOPY      ORTHOPEDIC SURGERY      foot surgery     TRANSGENDER VAGINOPLASTY ZERO DEPTH N/A 3/29/2018    Procedure: TRANSGENDER VAGINOPLASTY ZERO DEPTH;  Zero Depth Transgender Vaginoplasty;  Surgeon: Madhu Mojica MD;  Location:  OR                Family History:     Family History   Problem Relation Age of Onset    Colon Cancer Father     Heart Disease Father      Breast Cancer No family hx of     Osteoporosis No family hx of                 Medications:     Current Facility-Administered Medications   Medication Dose Route Frequency Provider Last Rate Last Admin    acetaminophen (TYLENOL) tablet 650 mg  650 mg Oral Q4H PRN Amara Pate DO        Or    acetaminophen (TYLENOL) Suppository 650 mg  650 mg Rectal Q4H PRN Amara Pate DO        calcium carbonate (TUMS) chewable tablet 1,000 mg  1,000 mg Oral 4x Daily PRN Amara Pate DO        estradiol (VIVELLE-DOT) 0.05 MG/24HR BIW patch 1 patch  1 patch Transdermal Once per day on Monday Thursday Zoila Alcazar MD        estradiol biweekly (VIVELLE-DOT) Patch in Place   Transdermal Q8H TIFFANY Amara Pate DO        HYDROmorphone (DILAUDID) injection 0.2 mg  0.2 mg Intravenous Q2H PRN Amara Pate DO   0.2 mg at 04/09/25 2013    HYDROmorphone (DILAUDID) injection 0.4 mg  0.4 mg Intravenous Q2H PRN Amara Pate DO        lidocaine (LMX4) cream   Topical Q1H PRN Amara Pate DO        lidocaine 1 % 0.1-1 mL  0.1-1 mL Other Q1H PRN Amara Pate DO        naloxone (NARCAN) injection 0.2 mg  0.2 mg Intravenous Q2 Min PRN Amara Pate DO        Or    naloxone (NARCAN) injection 0.4 mg  0.4 mg Intravenous Q2 Min PRN Amara Pate DO        Or    naloxone (NARCAN) injection 0.2 mg  0.2 mg Intramuscular Q2 Min PRN Amara Pate,         Or    naloxone (NARCAN) injection 0.4 mg  0.4 mg Intramuscular Q2 Min PRN Amara Pate DO        ondansetron (ZOFRAN ODT) ODT tab 4 mg  4 mg Oral Q6H PRN Amara Pate DO        Or    ondansetron (ZOFRAN) injection 4 mg  4 mg Intravenous Q6H PRN Amara Pate DO        oxyCODONE (ROXICODONE) tablet 5 mg  5 mg Oral Q4H PRN Amara Pate DO   5 mg at 04/10/25 1543    oxyCODONE IR (ROXICODONE) half-tab 2.5 mg  2.5 mg Oral  Q4H PRN Herlinda, Amara Raegan, DO        piperacillin-tazobactam (ZOSYN) 4.5 g vial to attach to  mL bag  4.5 g Intravenous Q6H White, Amara Raegan, DO   4.5 g at 04/10/25 1409    prochlorperazine (COMPAZINE) injection 5 mg  5 mg Intravenous Q6H PRN White, Amara Raegan, DO        Or    prochlorperazine (COMPAZINE) tablet 5 mg  5 mg Oral Q6H PRN White, Amara Raegan, DO        senna-docusate (SENOKOT-S/PERICOLACE) 8.6-50 MG per tablet 1 tablet  1 tablet Oral BID PRN White, Amara Raegan, DO        Or    senna-docusate (SENOKOT-S/PERICOLACE) 8.6-50 MG per tablet 2 tablet  2 tablet Oral BID PRN White, Amara Raegan, DO        simvastatin (ZOCOR) tablet 40 mg  40 mg Oral At Bedtime Zoila Alcazar MD        sodium chloride (PF) 0.9% PF flush 3 mL  3 mL Intracatheter Q8H TIFFANY Herlinda Amara Raegan, DO        sodium chloride (PF) 0.9% PF flush 3 mL  3 mL Intracatheter q1 min prn Nelson Patesten Raegan, DO        sodium chloride 0.9 % infusion   Intravenous Continuous Nelson Patesten Raegan,  mL/hr at 04/10/25 1245 New Bag at 04/10/25 1245     Facility-Administered Medications Ordered in Other Encounters   Medication Dose Route Frequency Provider Last Rate Last Admin    sodium chloride 0.9 % bag for CT scan flush  40 mL Intravenous Once Bimal Ortiz PA-C                 Review of Systems:     Review of system is negative but for HPI.    Abdomen is moderately distended and minimally tender.       Data:     Labs and vitals reviewed.

## 2025-04-10 NOTE — PLAN OF CARE
"Patient Name: Jassi  MRN: [id number]  Date of Admission: 4/9/2025  Reason for Admission: Perforated appendicitis   Level of Care: Medical     Vitals:   BP Readings from Last 1 Encounters:   04/10/25 119/63     Pulse Readings from Last 1 Encounters:   04/10/25 62     Wt Readings from Last 1 Encounters:   04/09/25 64.4 kg (141 lb 14.4 oz)     Ht Readings from Last 1 Encounters:   04/09/25 1.651 m (5' 5\")     Estimated body mass index is 23.61 kg/m  as calculated from the following:    Height as of an earlier encounter on 4/9/25: 1.651 m (5' 5\").    Weight as of an earlier encounter on 4/9/25: 64.4 kg (141 lb 14.4 oz).  Temp Readings from Last 1 Encounters:   04/10/25 98.7  F (37.1  C) (Oral)       Pain: Pain goal 0 Pain Rating 5 Effective pain medication/regimen PRN oxy    CV Surgery Patient: No    Assessment    Resp: LS clear on room air.    Telemetry: NA  Neuro: A&Ox4  GI/: Abd distended, firm. C/o discomfort. -BM, passing flatus. AUO. Started on regular diet.   Skin/Wounds: Intact. Estrogen patch behind R ear  Lines/Drains: L PIV infusing NS @ 100 ml/hr. IV abx.   Activity: Ind  Sleep: Between cares   Abnormal Labs: None    Aggression Stop Light: Green          Patient Care Plan: Continue non-surgical management: IV abx, pain management.                           "

## 2025-04-11 LAB
ANION GAP SERPL CALCULATED.3IONS-SCNC: 11 MMOL/L (ref 7–15)
BUN SERPL-MCNC: 8.7 MG/DL (ref 8–23)
CALCIUM SERPL-MCNC: 7.6 MG/DL (ref 8.8–10.4)
CHLORIDE SERPL-SCNC: 102 MMOL/L (ref 98–107)
CREAT SERPL-MCNC: 0.93 MG/DL (ref 0.51–1.17)
CRP SERPL-MCNC: 54.11 MG/L
EGFRCR SERPLBLD CKD-EPI 2021: 88 ML/MIN/1.73M2
ERYTHROCYTE [DISTWIDTH] IN BLOOD BY AUTOMATED COUNT: 12.7 % (ref 10–15)
GLUCOSE SERPL-MCNC: 89 MG/DL (ref 70–99)
HCO3 SERPL-SCNC: 22 MMOL/L (ref 22–29)
HCT VFR BLD AUTO: 33.2 % (ref 35–53)
HGB BLD-MCNC: 11 G/DL (ref 11.7–17.7)
MCH RBC QN AUTO: 31.5 PG (ref 26.5–33)
MCHC RBC AUTO-ENTMCNC: 33.1 G/DL (ref 31.5–36.5)
MCV RBC AUTO: 95 FL (ref 78–100)
PLATELET # BLD AUTO: 200 10E3/UL (ref 150–450)
POTASSIUM SERPL-SCNC: 3.6 MMOL/L (ref 3.4–5.3)
PROCALCITONIN SERPL IA-MCNC: 0.82 NG/ML
RBC # BLD AUTO: 3.49 10E6/UL (ref 3.8–5.9)
SODIUM SERPL-SCNC: 135 MMOL/L (ref 135–145)
WBC # BLD AUTO: 9.6 10E3/UL (ref 4–11)

## 2025-04-11 PROCEDURE — 250N000013 HC RX MED GY IP 250 OP 250 PS 637: Performed by: PHYSICIAN ASSISTANT

## 2025-04-11 PROCEDURE — 250N000011 HC RX IP 250 OP 636: Mod: JZ | Performed by: INTERNAL MEDICINE

## 2025-04-11 PROCEDURE — 36415 COLL VENOUS BLD VENIPUNCTURE: CPT | Performed by: PHYSICIAN ASSISTANT

## 2025-04-11 PROCEDURE — 84145 PROCALCITONIN (PCT): CPT | Performed by: INTERNAL MEDICINE

## 2025-04-11 PROCEDURE — 85018 HEMOGLOBIN: CPT | Performed by: PHYSICIAN ASSISTANT

## 2025-04-11 PROCEDURE — 82374 ASSAY BLOOD CARBON DIOXIDE: CPT | Performed by: PHYSICIAN ASSISTANT

## 2025-04-11 PROCEDURE — 120N000001 HC R&B MED SURG/OB

## 2025-04-11 PROCEDURE — 250N000013 HC RX MED GY IP 250 OP 250 PS 637: Performed by: INTERNAL MEDICINE

## 2025-04-11 PROCEDURE — 99233 SBSQ HOSP IP/OBS HIGH 50: CPT | Performed by: PHYSICIAN ASSISTANT

## 2025-04-11 PROCEDURE — 99232 SBSQ HOSP IP/OBS MODERATE 35: CPT | Performed by: INTERNAL MEDICINE

## 2025-04-11 PROCEDURE — 86140 C-REACTIVE PROTEIN: CPT | Performed by: INTERNAL MEDICINE

## 2025-04-11 PROCEDURE — 80048 BASIC METABOLIC PNL TOTAL CA: CPT | Performed by: PHYSICIAN ASSISTANT

## 2025-04-11 PROCEDURE — 85041 AUTOMATED RBC COUNT: CPT | Performed by: PHYSICIAN ASSISTANT

## 2025-04-11 RX ORDER — SIMETHICONE 80 MG
80 TABLET,CHEWABLE ORAL 4 TIMES DAILY
Status: DISCONTINUED | OUTPATIENT
Start: 2025-04-11 | End: 2025-04-12 | Stop reason: HOSPADM

## 2025-04-11 RX ORDER — POLYETHYLENE GLYCOL 3350 17 G/17G
17 POWDER, FOR SOLUTION ORAL 2 TIMES DAILY
Status: DISCONTINUED | OUTPATIENT
Start: 2025-04-11 | End: 2025-04-12 | Stop reason: HOSPADM

## 2025-04-11 RX ORDER — BISACODYL 10 MG
10 SUPPOSITORY, RECTAL RECTAL ONCE
Status: COMPLETED | OUTPATIENT
Start: 2025-04-11 | End: 2025-04-11

## 2025-04-11 RX ORDER — SODIUM CHLORIDE, SODIUM LACTATE, POTASSIUM CHLORIDE, CALCIUM CHLORIDE 600; 310; 30; 20 MG/100ML; MG/100ML; MG/100ML; MG/100ML
INJECTION, SOLUTION INTRAVENOUS CONTINUOUS
Status: DISCONTINUED | OUTPATIENT
Start: 2025-04-11 | End: 2025-04-12

## 2025-04-11 RX ADMIN — SIMVASTATIN 40 MG: 40 TABLET, FILM COATED ORAL at 22:27

## 2025-04-11 RX ADMIN — PIPERACILLIN AND TAZOBACTAM 4.5 G: 4; .5 INJECTION, POWDER, FOR SOLUTION INTRAVENOUS at 01:37

## 2025-04-11 RX ADMIN — HYDROMORPHONE HYDROCHLORIDE 0.2 MG: 0.2 INJECTION, SOLUTION INTRAMUSCULAR; INTRAVENOUS; SUBCUTANEOUS at 04:00

## 2025-04-11 RX ADMIN — OXYCODONE HYDROCHLORIDE 5 MG: 5 TABLET ORAL at 03:13

## 2025-04-11 RX ADMIN — SIMETHICONE 80 MG: 80 TABLET, CHEWABLE ORAL at 17:49

## 2025-04-11 RX ADMIN — PIPERACILLIN AND TAZOBACTAM 4.5 G: 4; .5 INJECTION, POWDER, FOR SOLUTION INTRAVENOUS at 20:03

## 2025-04-11 RX ADMIN — PIPERACILLIN AND TAZOBACTAM 4.5 G: 4; .5 INJECTION, POWDER, FOR SOLUTION INTRAVENOUS at 14:27

## 2025-04-11 RX ADMIN — ACETAMINOPHEN 650 MG: 325 TABLET, FILM COATED ORAL at 11:44

## 2025-04-11 RX ADMIN — POLYETHYLENE GLYCOL 3350 17 G: 17 POWDER, FOR SOLUTION ORAL at 20:06

## 2025-04-11 RX ADMIN — ACETAMINOPHEN 650 MG: 325 TABLET, FILM COATED ORAL at 22:27

## 2025-04-11 RX ADMIN — SIMETHICONE 80 MG: 80 TABLET, CHEWABLE ORAL at 11:44

## 2025-04-11 RX ADMIN — PIPERACILLIN AND TAZOBACTAM 4.5 G: 4; .5 INJECTION, POWDER, FOR SOLUTION INTRAVENOUS at 08:33

## 2025-04-11 RX ADMIN — OXYCODONE HYDROCHLORIDE 5 MG: 5 TABLET ORAL at 08:35

## 2025-04-11 ASSESSMENT — ACTIVITIES OF DAILY LIVING (ADL)
ADLS_ACUITY_SCORE: 32

## 2025-04-11 NOTE — PROGRESS NOTES
General Surgery Progress Note    Admission Date: 4/9/2025  Today's Date: 4/11/2025         Assessment:      Larissa Rehman is a 69 year old adult with acute perforated appendicitis with developing abscess. Radiology reviewed CT scan 4/9, determined no drainable fluid collection at that time.    Patient is afebrile, white count normalized 4/10. Not tolerating much oral intake.         Plan:   - Clear liquid diet today. If tolerates well can advance as tolerated tomorrow   - Dulcolax suppository for stimulation. Start miralax BID  - Work on more out of bed, ambulate in halls QID, wear PCDs while resting  - Pain meds prn, minimize narcotics as able  - On Zosyn, transition to PO antibiotics at discharge  - Recheck CBC and BMP today. Only one episode of voiding charted yesterday, amount not measured. Unclear if having adequate urine output. Not having adequate oral intake and has been off IV fluids  - Resume maintenance IV fluids, can saline lock once having adequate oral intake and urine output  - Pending patient improvement would consider repeat CT scan in the next 1-2 days to reassess for drainable fluid collection vs could repeat as outpatient   - Medical management per hospitalist         Interval History:   Afebrile, vitals stable on room air. Eleazar reports that overall his abdominal pain and bloating is better than it was when he came to the hospital. However, he only took in a few bites of soup yesterday and ended up with worsening bloating and abdominal cramping. Had a loose stool and then felt somewhat better, but is still bloated today. No nausea or vomiting. Using pain meds as needed.          Physical Exam:   /56 (BP Location: Right arm)   Pulse 59   Temp 97.8  F (36.6  C) (Oral)   Resp 16   SpO2 97%   I/O last 3 completed shifts:  In: 2408 [P.O.:240; I.V.:1968; IV Piggyback:200]  Out: -   General: NAD, pleasant, alert and oriented x3  Respiratory: non-labored breathing   Abdomen: moderately  distended and slightly tympanic but soft, mild RLQ tenderness to palpation, remainder of abdomen nontender  Extremities: no lower extremity edema, no calf tenderness    LABS:  Recent Labs   Lab Test 04/10/25  0554 04/09/25  1141 04/07/25  1831   WBC 10.7 14.8* 14.6*   HGB 12.1 14.6 14.2   MCV 94 94 94    228 220      Recent Labs   Lab Test 04/10/25  0554 04/09/25  1410 04/09/25  1141   POTASSIUM 3.6 4.0 3.9  4.2   CHLORIDE 103 101 102  96*   CO2 21* 20* 20  17*   BUN 11.9 12.3 13  13.0   CR 0.95 0.94 0.99  0.99   ANIONGAP 12 14 11  20*        -------------------------------    Time spent: 50 minutes total time spent on the date of this encounter doing: chart review, review of test results, patient visit/obtaining a history, physical exam, education, counseling, developing plan of care, consulting with other providers, and documenting.     Jaylyn Castro PA-C  [facility]  [phone]

## 2025-04-11 NOTE — PLAN OF CARE
"  Patient Name: Jassi  MRN: [id number]  Date of Admission: 4/9/2025  Reason for Admission: Perforated appendix  Level of Care: medical     Vitals:       BP Readings from Last 1 Encounters:   04/10/25 108/59          Pulse Readings from Last 1 Encounters:   04/10/25 61          Wt Readings from Last 1 Encounters:   04/09/25 64.4 kg (141 lb 14.4 oz)          Ht Readings from Last 1 Encounters:   04/09/25 1.651 m (5' 5\")      Estimated body mass index is 23.61 kg/m  as calculated from the following:    Height as of an earlier encounter on 4/9/25: 1.651 m (5' 5\").    Weight as of an earlier encounter on 4/9/25: 64.4 kg (141 lb 14.4 oz).      Temp Readings from Last 1 Encounters:   04/10/25 98.2  F (36.8  C) (Oral)         Pain: Pain goal 0 Pain Rating 4 Effective pain medication/regimen PO oxycodone x2 IV dilaudid x1     CV Surgery Patient: No     Assessment     Resp: Lung sounds diminished. On room air  Telemetry: N/A  Neuro: A+Ox4, neuros intact  GI/: Continent of bladder. Bowels active, flatus+, 1 diarrheal BM. Abdomen distended and tender to touch  Skin/Wounds: WDL  Lines/Drains: PIV infusing NS  Activity: IND  Sleep: good  Abnormal Labs: N/A     Aggression Stop Light: Green          Patient Care Plan: pain management, IV abx. General surgery following         "

## 2025-04-11 NOTE — PLAN OF CARE
Shift Summary [id number]:    VSS on RA. A/Ox4. Pain managed w/ PRN meds (see MAR). LS clear, denies SOB. Voiding adequately. Pt reports having 2 BMs this shift, +BS and passing flatus. Abdomen distension noted. Tolerating clear liquid diet. IV zosyn q6h. Up ad slade, frequently ambulates in the halls. Consulted teams: Surgery. Discharge tbd. Care plan updated.     See flowsheet for full assessment. See MAR for meds given.    Peter Brothers RN   3:07 PM    Goal Outcome Evaluation:      Plan of Care Reviewed With: patient    Overall Patient Progress: improving    Problem: Infection  Goal: Absence of Infection Signs and Symptoms  Outcome: Progressing    Problem: Pain Acute  Goal: Optimal Pain Control and Function  Outcome: Progressing  Intervention: Develop Pain Management Plan  Intervention: Prevent or Manage Pain

## 2025-04-11 NOTE — PLAN OF CARE
Neuro: A&O x4  Tele/cardiac: N/A  Respiration: RA  Activity: Ind  Pain: 2/10 abdominal pain. Did not need pain meds  Drips: PIV SL  Drains/tubes: none  Skin: intact  GI/: 1 small BM this shift, declined dulcolax suppository, tolerating clear liquid diet  Aggression color: green  Isolation: none  Plan: continue plan of care

## 2025-04-11 NOTE — PROGRESS NOTES
[facility]    Medicine Progress Note - Hospitalist Service    Date of Admission:  4/9/2025    Assessment & Plan     Larissa Rehman is a 69 year old transgender adult (does not use any specific pronouns) with PMHx hyperlipidemia and bradycardia who was hyperlipidemia and bradycardia who was admitted on 4/9/2025 for evaluation of abdominal pain due to perforated appendicitis     Acute perforated appendicitis with abscess formation  *Was initially seen in Urgent Care on 4/7/2025 for evaluation of abdominal pain, vomiting and diarrhea.  Labs at that time notable for WBC 14.5 but remainder of labs were stable.  Bacterial/viral stool studies were negative, UC neg.  Presentation was thought to be due to viral gastroenteritis and patient was discharged home.  Symptoms of generalized abdominal pain persisted. Had some associated watery stools and 1 episode of some bloody stool.  Unable to tolerate oral intake due to nausea, pain.  No reported fever/chills, chest pain shortness of breath or cough.  Presented back to Urgent Care on 4/9 for further evaluation. Repeat labs showed persistent leukocytosis with WBC 14.8. Lipase 18. CT abd/pelvis showed Acute and complicated appendicitis with microperforation and few small organizing fluid and air collections which represent phlegmonous changes/developing abscesses.  Some small bowel loops are mildly dilated or edematous which are favored reactive in the setting of acute appendicitis. Was referred to the ED for further evaluation.   *In the ED, was afebrile and hemodynamically stable.  Repeat BMP stable, lactate normal. She appeared in no acute distress.  Was started on IV fluids and Zosyn.  General surgery service was contacted.  Did not recommend urgent surgical intervention and instead recommended IR consultation for placement of drains for developing abscesses.    -- Lactate normal, Blood cultures negative so far  -- Continue IV Zosyn (4/9-present)  --  IR consulted for abscess drainage   -Given abscess size and incomplete organization of collection, drain not recommended   -Consider antibiotics and serial imaging   -Surgical plan per general surgery  -- General surgery consulted   - Recommending non surgical intervention  - Will F/U with surgery when CT scan should be repeated   - Discontinued IV fluids   -- PRN for pain/nausea  -- Regular abdominal assessment as patient worsened last evening after oral intake , diet is decreased to clear liquid diet   -- Will appreciate surgery team ongoing help with management of surgical/nonsurgical appendicitis.     Transgender female  *Hx of bilateral orchiectomies and zero-depth vaginoplasty in 2018.   *Uses estrogen patch.   *Patient does not have pronoun preference  -- Conrtinue PTA Estradiol patch      Hyperlipidemia  *Chronic and stable on statin  -- Continue PTA statin  -- Keep holding ASA for tentative surgery      Diet: Clear Liquid Diet    DVT Prophylaxis: Pneumatic Compression Devices and Ambulate every shift  Packer Catheter: Not present  Lines: None     Cardiac Monitoring: None  Code Status: No CPR- Do NOT Intubate      Clinically Significant Risk Factors                                         Social Drivers of Health    Social Connections: Unknown (11/15/2024)    Social Connection and Isolation Panel [NHANES]     Frequency of Social Gatherings with Friends and Family: Patient declined          Disposition Plan     Medically Ready for Discharge: Anticipated in 2-4 Days once cleared by general surgery, expecting patient would be able to return home on discharge     Zoila Alcazar MD  Hospitalist Service  [facility]  Securely message with [employer] (more info)  Text page via [employer] Paging/Directory   ______________________________________________________________________    Interval History     Patient was seen and examined, lying in bed, his pain got worse and last night after oral intake after  dinner.  Patient diet is changed to clear liquid diet, will follow-up on surgery recommendations regarding when CAT scan needs to be repeated continue on IV antibiotics.    Physical Exam   Vital Signs: Temp: 97.5  F (36.4  C) Temp src: Oral BP: 124/62 Pulse: 58   Resp: 18 SpO2: 99 % O2 Device: None (Room air)    Weight: 0 lbs 0 oz    Physical Exam  Vitals and nursing note reviewed.   Constitutional:       Appearance: She is well-developed.   HENT:      Head: Normocephalic and atraumatic.   Eyes:      Conjunctiva/sclera: Conjunctivae normal.      Pupils: Pupils are equal, round, and reactive to light.   Neck:      Thyroid: No thyromegaly.   Cardiovascular:      Rate and Rhythm: Normal rate and regular rhythm.      Heart sounds: Normal heart sounds. No murmur heard.  Pulmonary:      Effort: Pulmonary effort is normal. No respiratory distress.      Breath sounds: Normal breath sounds. No wheezing.   Abdominal:      General: Bowel sounds are normal.      Palpations: Abdomen is soft.      Tenderness: There is abdominal tenderness. There is no guarding.   Musculoskeletal:         General: No deformity. Normal range of motion.      Cervical back: Normal range of motion and neck supple.   Skin:     General: Skin is warm and dry.   Neurological:      Mental Status: She is alert and oriented to person, place, and time.   Psychiatric:         Behavior: Behavior normal.          Medical Decision Making       40 MINUTES SPENT BY ME on the date of service doing chart review, history, exam, documentation & further activities per the note.      Data     I have personally reviewed the following data over the past 24 hrs:    9.6  \   11.0 (L)   / 200     135 102 8.7 /  89   3.6 22 0.93 \       Imaging results reviewed over the past 24 hrs:   No results found for this or any previous visit (from the past 24 hours).  Recent Labs   Lab 04/11/25  1037 04/10/25  0554 04/09/25  1410 04/09/25  1141   WBC 9.6 10.7  --  14.8*   HGB 11.0* 12.1   --  14.6   MCV 95 94  --  94    203  --  228    136 135 133*  133*   POTASSIUM 3.6 3.6 4.0 3.9  4.2   CHLORIDE 102 103 101 102  96*   CO2 22 21* 20* 20  17*   BUN 8.7 11.9 12.3 13  13.0   CR 0.93 0.95 0.94 0.99  0.99   ANIONGAP 11 12 14 11  20*   DEBORA 7.6* 7.8* 8.0* 9.3  9.3   GLC 89 106* 116* 122*  118*   ALBUMIN  --   --  3.5 3.8  4.0   PROTTOTAL  --   --  6.9 8.1  8.1   BILITOTAL  --   --  1.0 1.2  1.2   ALKPHOS  --   --  42 82  82   ALT  --   --  11 14  14   AST  --   --  16 22  22   LIPASE  --   --   --  18

## 2025-04-12 VITALS
SYSTOLIC BLOOD PRESSURE: 121 MMHG | TEMPERATURE: 97.6 F | DIASTOLIC BLOOD PRESSURE: 62 MMHG | OXYGEN SATURATION: 99 % | HEART RATE: 54 BPM | RESPIRATION RATE: 18 BRPM

## 2025-04-12 PROCEDURE — 250N000011 HC RX IP 250 OP 636: Performed by: INTERNAL MEDICINE

## 2025-04-12 PROCEDURE — 99239 HOSP IP/OBS DSCHRG MGMT >30: CPT | Performed by: INTERNAL MEDICINE

## 2025-04-12 PROCEDURE — 99232 SBSQ HOSP IP/OBS MODERATE 35: CPT | Performed by: SURGERY

## 2025-04-12 PROCEDURE — 250N000013 HC RX MED GY IP 250 OP 250 PS 637: Performed by: PHYSICIAN ASSISTANT

## 2025-04-12 PROCEDURE — 250N000013 HC RX MED GY IP 250 OP 250 PS 637: Performed by: INTERNAL MEDICINE

## 2025-04-12 RX ORDER — ONDANSETRON 4 MG/1
4 TABLET, ORALLY DISINTEGRATING ORAL EVERY 6 HOURS PRN
Qty: 12 TABLET | Refills: 0 | Status: SHIPPED | OUTPATIENT
Start: 2025-04-12

## 2025-04-12 RX ORDER — ACETAMINOPHEN 325 MG/1
650 TABLET ORAL EVERY 4 HOURS PRN
COMMUNITY
Start: 2025-04-12

## 2025-04-12 RX ORDER — SIMETHICONE 80 MG
80 TABLET,CHEWABLE ORAL EVERY 6 HOURS PRN
Qty: 12 TABLET | Refills: 0 | Status: SHIPPED | OUTPATIENT
Start: 2025-04-12

## 2025-04-12 RX ORDER — PIPERACILLIN SODIUM, TAZOBACTAM SODIUM 3; .375 G/15ML; G/15ML
3.38 INJECTION, POWDER, LYOPHILIZED, FOR SOLUTION INTRAVENOUS EVERY 6 HOURS
Status: DISCONTINUED | OUTPATIENT
Start: 2025-04-12 | End: 2025-04-12 | Stop reason: HOSPADM

## 2025-04-12 RX ORDER — OXYCODONE HYDROCHLORIDE 5 MG/1
5 TABLET ORAL EVERY 4 HOURS PRN
Qty: 12 TABLET | Refills: 0 | Status: SHIPPED | OUTPATIENT
Start: 2025-04-12

## 2025-04-12 RX ADMIN — ACETAMINOPHEN 650 MG: 325 TABLET, FILM COATED ORAL at 04:35

## 2025-04-12 RX ADMIN — PIPERACILLIN AND TAZOBACTAM 3.38 G: 3; .375 INJECTION, POWDER, FOR SOLUTION INTRAVENOUS at 13:49

## 2025-04-12 RX ADMIN — PIPERACILLIN AND TAZOBACTAM 4.5 G: 4; .5 INJECTION, POWDER, FOR SOLUTION INTRAVENOUS at 02:21

## 2025-04-12 RX ADMIN — SIMETHICONE 80 MG: 80 TABLET, CHEWABLE ORAL at 13:49

## 2025-04-12 RX ADMIN — PIPERACILLIN AND TAZOBACTAM 4.5 G: 4; .5 INJECTION, POWDER, FOR SOLUTION INTRAVENOUS at 08:21

## 2025-04-12 RX ADMIN — SIMETHICONE 80 MG: 80 TABLET, CHEWABLE ORAL at 08:21

## 2025-04-12 ASSESSMENT — ACTIVITIES OF DAILY LIVING (ADL)
ADLS_ACUITY_SCORE: 32

## 2025-04-12 NOTE — PLAN OF CARE
Date & Time: 4/11/2025 21:15-23:30    Problem: Perforated appendicitis and has developing abscesses    Behavior & Aggression: Green  Fall Risk: No  Orientation: A&Ox4  ABNL VS/O2: VSS on RA, bradycardic at times  ABNL Labs: See chart  Pain Management: PRN tylenol given for abd pain  Bowel/Bladder: Continent of B/B. X1 loose stools this shift  Drains: N/A  Wounds/incisions: N/A  Diet: Clears  Number of times OUT OF BED this shift: Up up independently in room. Up to bathroom x2  Tests/Procedures: Repeat CT scan in 1-2 days  Anticipated DC Date: Possible 4/12/2025

## 2025-04-12 NOTE — PROGRESS NOTES
[facility]    Medicine Progress Note - Hospitalist Service    Date of Admission:  4/9/2025    Assessment & Plan     Larissa Rehman is a 69 year old transgender adult (does not use any specific pronouns) with PMHx hyperlipidemia and bradycardia who was hyperlipidemia and bradycardia who was admitted on 4/9/2025 for evaluation of abdominal pain due to perforated appendicitis     Acute perforated appendicitis with abscess formation  *Was initially seen in Urgent Care on 4/7/2025 for evaluation of abdominal pain, vomiting and diarrhea.  Labs at that time notable for WBC 14.5 but remainder of labs were stable.  Bacterial/viral stool studies were negative, UC neg.  Presentation was thought to be due to viral gastroenteritis and patient was discharged home.  Symptoms of generalized abdominal pain persisted. Had some associated watery stools and 1 episode of some bloody stool.  Unable to tolerate oral intake due to nausea, pain.  No reported fever/chills, chest pain shortness of breath or cough.  Presented back to Urgent Care on 4/9 for further evaluation. Repeat labs showed persistent leukocytosis with WBC 14.8. Lipase 18. CT abd/pelvis showed Acute and complicated appendicitis with microperforation and few small organizing fluid and air collections which represent phlegmonous changes/developing abscesses.  Some small bowel loops are mildly dilated or edematous which are favored reactive in the setting of acute appendicitis. Was referred to the ED for further evaluation.   *In the ED, was afebrile and hemodynamically stable.  Repeat BMP stable, lactate normal. She appeared in no acute distress.  Was started on IV fluids and Zosyn.  General surgery service was contacted.  Did not recommend urgent surgical intervention and instead recommended IR consultation for placement of drains for developing abscesses.    -- Lactate normal, Blood cultures negative so far  -- Continue IV Zosyn (4/9-present)  --  IR consulted for abscess drainage   -Given abscess size and incomplete organization of collection, drain not recommended   -Consider antibiotics and serial imaging     -- General surgery consulted   - Recommending non surgical intervention  - Will F/U with surgery when CT scan should be repeated   - Discontinued IV fluids     -- PRN for pain/nausea  -- Regular abdominal assessment as patient worsened last evening after oral intake , diet is decreased to clear liquid diet   -- Further diet advancement per surgery, currently on clears  --Patient is eager to be discharged and would like to follow-up in an outpatient setting with surgery team for repeat imaging and further management  --Will appreciate surgery team further guidance regarding need for repeat CT, timing for repeating CAT scan and giving further guidance if patient should remain in hospital or is ready for discharge with close surgery follow-up.     Transgender female  *Hx of bilateral orchiectomies and zero-depth vaginoplasty in 2018.   *Uses estrogen patch.   *Patient does not have pronoun preference  -- Conrtinue PTA Estradiol patch      Hyperlipidemia  *Chronic and stable on statin  -- Continue PTA statin  -- Keep holding ASA for tentative surgery      Diet: Clear Liquid Diet    DVT Prophylaxis: Pneumatic Compression Devices and Ambulate every shift  Packer Catheter: Not present  Lines: None     Cardiac Monitoring: None  Code Status: No CPR- Do NOT Intubate      Clinically Significant Risk Factors        Social Drivers of Health    Social Connections: Unknown (11/15/2024)    Social Connection and Isolation Panel [NHANES]     Frequency of Social Gatherings with Friends and Family: Patient declined          Disposition Plan     Medically Ready for Discharge: Tentative discharge later today or tomorrow once cleared by surgery team, patient is eager to be discharged, remains on clear liquid diet and is on IV antibiotics.    Zoila Alcazar MD  Hospitalist  [facility]  Securely message with [employer] (more info)  Text page via [employer] Paging/Directory   ______________________________________________________________________    Interval History     Patient was seen and examined, lying in bed.  Currently tolerating clear liquid diet with does not want to go up on the diet.  Patient is eager to be discharged and told me that he would come back for his repeat imaging if he can be switched to oral antibiotics, discussed with patient that he would need surgery clearance before he can be discharged patient showed understanding. Bedside nursing will page surgery.    Physical Exam   Vital Signs: Temp: 97.6  F (36.4  C) Temp src: Oral BP: 121/62 Pulse: 54   Resp: 18 SpO2: 99 % O2 Device: None (Room air)    Weight: 0 lbs 0 oz    Physical Exam  Vitals and nursing note reviewed.   Constitutional:       Appearance: She is well-developed.   HENT:      Head: Normocephalic and atraumatic.   Eyes:      Conjunctiva/sclera: Conjunctivae normal.      Pupils: Pupils are equal, round, and reactive to light.   Neck:      Thyroid: No thyromegaly.   Cardiovascular:      Rate and Rhythm: Normal rate and regular rhythm.      Heart sounds: Normal heart sounds. No murmur heard.  Pulmonary:      Effort: Pulmonary effort is normal. No respiratory distress.      Breath sounds: Normal breath sounds. No wheezing.   Abdominal:      General: Bowel sounds are normal.      Palpations: Abdomen is soft.      Tenderness: There is abdominal tenderness. There is no guarding.   Musculoskeletal:         General: No deformity. Normal range of motion.      Cervical back: Normal range of motion and neck supple.   Skin:     General: Skin is warm and dry.   Neurological:      Mental Status: She is alert and oriented to person, place, and time.   Psychiatric:         Behavior: Behavior normal.        Medical Decision Making       38 MINUTES SPENT BY ME on the date of service doing chart  review, history, exam, documentation & further activities per the note.      Data     I have personally reviewed the following data over the past 24 hrs:    Procal: N/A CRP: N/A Lactic Acid: N/A         Imaging results reviewed over the past 24 hrs:   No results found for this or any previous visit (from the past 24 hours).  Recent Labs   Lab 04/11/25  1037 04/10/25  0554 04/09/25  1410 04/09/25  1141   WBC 9.6 10.7  --  14.8*   HGB 11.0* 12.1  --  14.6   MCV 95 94  --  94    203  --  228    136 135 133*  133*   POTASSIUM 3.6 3.6 4.0 3.9  4.2   CHLORIDE 102 103 101 102  96*   CO2 22 21* 20* 20  17*   BUN 8.7 11.9 12.3 13  13.0   CR 0.93 0.95 0.94 0.99  0.99   ANIONGAP 11 12 14 11  20*   DEBORA 7.6* 7.8* 8.0* 9.3  9.3   GLC 89 106* 116* 122*  118*   ALBUMIN  --   --  3.5 3.8  4.0   PROTTOTAL  --   --  6.9 8.1  8.1   BILITOTAL  --   --  1.0 1.2  1.2   ALKPHOS  --   --  42 82  82   ALT  --   --  11 14  14   AST  --   --  16 22  22   LIPASE  --   --   --  18

## 2025-04-12 NOTE — DISCHARGE SUMMARY
[facility]  Hospitalist Discharge Summary      Date of Admission:  4/9/2025  Date of Discharge:  4/12/2025  Discharging Provider: Zoila Alcazar MD  Discharge Service: Hospitalist Service    Discharge Diagnoses     Acute perforated appendicitis with abscess formation.  Hyperlipidemia  Transgender female    Clinically Significant Risk Factors          Follow-ups Needed After Discharge   Follow-up Appointments       Follow Up      Follow-up with general surgery in 5 days        Hospital Follow-up with Existing Primary Care Provider (PCP)          Schedule Primary Care visit within: 7 Days   Recommended labs and Imaging (to be ordered by Primary Care Provider): BMP and CBC               Unresulted Labs Ordered in the Past 30 Days of this Admission       Date and Time Order Name Status Description    4/9/2025  1:56 PM Blood Culture Arm, Left Preliminary     4/9/2025  1:56 PM Blood Culture Arm, Left Preliminary         These results will be followed up by surgery    Discharge Disposition   Discharged to home  Condition at discharge: Stable    Hospital Course     Patient is a pleasant 69-year-old transgender female with past medical history significant for hyperlipidemia who got admitted on 04/09/2025 for evaluation abdominal pain due to perforated appendicitis.  Patient was initially seen in urgent care on 0/7/25 for evaluation of abdominal pain, vomiting and diarrhea.  Initially WBC were 14.5.  In urgent care presentation was thought to be due to viral gastroenteritis and patient was discharged home.  Symptoms of generalized abdominal pain persisted.  Patient returned back to urgent care on 04/09 for further evaluation.  Repeat labs showed persistent leukocytosis, lipase of 18.  CT abdomen pelvis showed acute uncomplicated appendicitis with microperforation and few small organizing fluid and air collections which represent phlegmonous changes/developing abscesses.  Some small bowel loops are  mildly dilated or edematous which are favored reactive in the setting of acute appendicitis.  Patient was referred to the ED.    In the emergency department general surgery service was contacted who did not recommend urgent surgical intervention and recommended IR consultation for possible placement of drains for developing abscess.  Patient was admitted was started on IV Zosyn IR consultation was requested who did not recommend drain placement considering abscess size and incomplete organization of collection.  They recommended antibiotics and serial imaging.  General surgery team has been following, they did recommend follow-up imaging but exact time has not been decided, they are planning to follow-up with patient in an outpatient setting.  Patient currently is on clear liquid diet has been receiving IV antibiotics and is eager to discharge, per surgery team patient is okay to be discharged on oral antibiotics, advance diet as tolerated and they are planning to follow-up on patient closely.  Will ask care coordinator to assure that patient has follow-up appointment on Monday or Tuesday with surgery, I did discuss with patient to return to ED if her symptoms worsen.  Patient showed understanding she will be discharged on 2 more weeks of oral Augmentin, further duration of antibiotics to be determined by surgery team on hospital follow-up.  Patient will be continued on her home medications.  Patient was also given Zofran, Roxicodone and Tylenol on discharge.      Consultations This Hospital Stay   INTERVENTIONAL RADIOLOGY ADULT/PEDS IP CONSULT  SURGERY GENERAL IP CONSULT  INTERVENTIONAL RADIOLOGY ADULT/PEDS IP CONSULT  INTERVENTIONAL RADIOLOGY ADULT/PEDS IP CONSULT    Code Status   Full Code    Time Spent on this Encounter   Zoila MILLS MD, personally saw the patient today and spent greater than 30 minutes discharging this patient.       Zoila Alcazar MD  [facility] SURGERY  [address]  [place] [address]  Phone: [phone]  Fax: [phone]  ______________________________________________________________________    Physical Exam   Vital Signs: Temp: 97.6  F (36.4  C) Temp src: Oral BP: 121/62 Pulse: 54   Resp: 18 SpO2: 99 % O2 Device: None (Room air)    Weight: 0 lbs 0 oz  Physical Exam  Vitals and nursing note reviewed.   Constitutional:       Appearance: She is well-developed.   HENT:      Head: Normocephalic and atraumatic.   Eyes:      Conjunctiva/sclera: Conjunctivae normal.      Pupils: Pupils are equal, round, and reactive to light.   Neck:      Thyroid: No thyromegaly.   Cardiovascular:      Rate and Rhythm: Normal rate and regular rhythm.      Heart sounds: Normal heart sounds. No murmur heard.  Pulmonary:      Effort: Pulmonary effort is normal. No respiratory distress.      Breath sounds: Normal breath sounds. No wheezing.   Abdominal:      General: Bowel sounds are normal.      Palpations: Abdomen is soft.      Tenderness: There is no abdominal tenderness. There is no guarding or rebound.      Comments: Right lower quadrant discomfort on deep palpation, no guarding no rigidity   Musculoskeletal:         General: No deformity. Normal range of motion.      Cervical back: Normal range of motion and neck supple.   Skin:     General: Skin is warm and dry.   Neurological:      Mental Status: She is alert and oriented to person, place, and time.   Psychiatric:         Behavior: Behavior normal.          Primary Care Physician   Sravani Boyer    Discharge Orders      Reason for your hospital stay    You were admitted to the hospital secondary to appendicitis you were evaluated by general surgery currently you are discharged on oral antibiotics.     Activity    Your activity upon discharge: activity as tolerated and no driving for today     Discharge Instructions    You were admitted to the hospital secondary to appendicitis you were also evaluated by general surgery currently you  are recommended not to undergo surgery and treated with antibiotics.  You are discharged on 14 days of Augmentin, please keep your follow-up appointment with surgery next week.  Please increase your diet to full liquid diet and then advance to low fiber diet as you are able to tolerate     Follow Up    Follow-up with general surgery in 5 days     Full Code     Diet    Follow this diet upon discharge: Orders Placed This Encounter  Full Liquid Diet please advance your diet to low fiber diet as you are able to tolerate diet further.     Hospital Follow-up with Existing Primary Care Provider (PCP)            Significant Results and Procedures   Results for orders placed or performed in visit on 04/09/25   CT Abdomen Pelvis w Contrast     Value    Radiologist flags Perforated appendicitis (AA)    Narrative    EXAM: CT ABDOMEN PELVIS W CONTRAST  LOCATION: [facility]  DATE: 4/9/2025    INDICATION:  Abdominal pain, generalized  COMPARISON: None.  TECHNIQUE: CT scan of the abdomen and pelvis was performed following injection of IV contrast. Multiplanar reformats were obtained. Dose reduction techniques were used.  CONTRAST: 70 ml isovue 370    FINDINGS:   LOWER CHEST: Distal esophageal wall thickening can be seen with esophagitis.    HEPATOBILIARY: No suspicious liver lesion. Normal gallbladder and bile ducts.    PANCREAS: Normal.    SPLEEN: Normal.    ADRENAL GLANDS: Normal.    KIDNEYS/BLADDER: No significant mass, stone, or hydronephrosis.    BOWEL: The mid to distal appendix is diffusely dilated measuring up to 21 mm (series 4, image 43) with diffuse inflammatory changes centered about the appendix in the anterior pelvis. There is microperforation with trace pneumoperitoneum and few small   organizing fluid and air collections such as a 2.5 x 2.5 x 3.1 cm collection along the appendiceal tip (series 3, image 41), another 2.3 x 2.3 cm rim-enhancing collection within the right lower quadrant  subjacent to the proximal appendix (image 144), and   a bilobed 5.1 x 3.6 cm within the posterior pelvis (image 171). A few left upper quadrant small bowel loops are mildly dilated and a few bowel loops within the pelvis near the appendix are edematous, both of which are favored reactive. Trace ascites.   Colonic diverticulosis.    LYMPH NODES: No lymphadenopathy.    VASCULATURE: No abdominal aortic aneurysm.    PELVIC ORGANS: Normal prostate gland and seminal vesicles. Changes from gender affirmation surgery.    MUSCULOSKELETAL: No aggressive osseous lesion.        Impression    IMPRESSION:   1.  Acute and complicated appendicitis with microperforation and few small organizing fluid and air collections which represent phlegmonous changes/developing abscesses.  2.  Some small bowel loops are mildly dilated or edematous which are favored reactive in the setting of acute appendicitis.        [Critical Result: Perforated appendicitis]    Finding was identified on 4/9/2025 12:19 PM CDT.     YESSY Ortiz was contacted by me on 4/9/2025 12:38 PM CDT and verbalized understanding of the critical result.         Discharge Medications   Current Discharge Medication List        START taking these medications    Details   acetaminophen (TYLENOL) 325 MG tablet Take 2 tablets (650 mg) by mouth every 4 hours as needed for mild pain or other (and adjunct with moderate or severe pain or per patient request).    Associated Diagnoses: Ruptured appendicitis      amoxicillin-clavulanate (AUGMENTIN) 875-125 MG tablet Take 1 tablet by mouth 2 times daily for 14 days.  Qty: 28 tablet, Refills: 0    Associated Diagnoses: Ruptured appendicitis      ondansetron (ZOFRAN ODT) 4 MG ODT tab Take 1 tablet (4 mg) by mouth every 6 hours as needed for nausea.  Qty: 12 tablet, Refills: 0    Associated Diagnoses: Ruptured appendicitis      oxyCODONE (ROXICODONE) 5 MG tablet Take 1 tablet (5 mg) by mouth every 4 hours as needed for severe pain (IF pain  not managed with non-pharmacological and non-opioid interventions).  Qty: 12 tablet, Refills: 0    Associated Diagnoses: Ruptured appendicitis      simethicone (MYLICON) 80 MG chewable tablet Take 1 tablet (80 mg) by mouth every 6 hours as needed for flatulence or cramping.  Qty: 12 tablet, Refills: 0    Associated Diagnoses: Ruptured appendicitis           CONTINUE these medications which have NOT CHANGED    Details   ASPIRIN PO Take 81 mg by mouth every other day.      estradiol (VIVELLE-DOT) 0.1 MG/24HR bi-weekly patch Place 1 patch over 96 hours onto the skin twice a week.  Qty: 25 patch, Refills: 3    Associated Diagnoses: Gender incongruence      simvastatin (ZOCOR) 40 MG tablet TAKE 1 TABLET(40 MG) BY MOUTH AT BEDTIME  Qty: 90 tablet, Refills: 3    Associated Diagnoses: Familial hypercholesteremia           Allergies   Allergies   Allergen Reactions    Bee Venom

## 2025-04-12 NOTE — PROGRESS NOTES
[facility]  General Surgery Progress Note        Dilip Linares MD, MD   04/12/2025        Interval History:      Patient feels significantly better.  Is anxious about his chances for worsening or recurrent sepsis.  At this point his fever curve and white blood cell count have normalized.  He has been having recurrent bowel movements and has been tolerating his clear liquids.  I think it would be reasonable to advance to full liquids.  He could conceivably discharge today on a full liquid diet.  I would have him follow-up with us next week for short-term follow-up and we will consider whether we want to repeat a CT scan at that point.         Assessment and Plan:      Advance to full liquids.  If he tolerates advanced diet, patient may be discharged on oral antibiotics for an additional 7 days.  I recommend Augmentin.  He should follow-up with us in the surgery acute care clinic next week.                   Physical Exam:      Blood pressure 121/62, pulse 54, temperature 97.6  F (36.4  C), temperature source Oral, resp. rate 18, SpO2 99%.  There were no vitals filed for this visit.  Vital Signs with Ranges  Temp:  [97.3  F (36.3  C)-97.8  F (36.6  C)] 97.6  F (36.4  C)  Pulse:  [] 54  Resp:  [16-18] 18  BP: (121-145)/(62-77) 121/62  SpO2:  [99 %-100 %] 99 %  I/O's Last 24 hours  I/O last 3 completed shifts:  In: 770 [P.O.:660; I.V.:110]  Out: 3250 [Urine:3250]    Constitutional: Pleasant gentleman   Lungs: Breathing comfortably   Cardiovascular: Normotensive   Abdomen: Soft, minimal abdominal pain.  Mild distention.  No obvious surgical scars.   Skin: Warm, dry   Imaging: Admission CT scan reviewed          Medications:        Current Facility-Administered Medications   Medication Dose Route Frequency Provider Last Rate Last Admin    estradiol (VIVELLE-DOT) 0.05 MG/24HR BIW patch 1 patch  1 patch Transdermal Once per day on Monday Thursday Zoila Alcazar MD        estradiol biweekly  (VIVELLE-DOT) Patch in Place   Transdermal Q8H Amara Leahy DO        piperacillin-tazobactam (ZOSYN) 3.375 g vial to attach to  mL bag  3.375 g Intravenous Q6H Zoila Alcazar MD   3.375 g at 04/12/25 1349    polyethylene glycol (MIRALAX) Packet 17 g  17 g Oral BID Jaylyn Castro PA-C   17 g at 04/11/25 2006    simethicone (MYLICON) chewable tablet 80 mg  80 mg Oral 4x Daily Jaylyn Castro PA-C   80 mg at 04/12/25 1349    simvastatin (ZOCOR) tablet 40 mg  40 mg Oral At Bedtime Zoila Alcazar MD   40 mg at 04/11/25 2227    sodium chloride (PF) 0.9% PF flush 3 mL  3 mL Intracatheter Q8H Amara Leahy DO   3 mL at 04/11/25 2231            Data:      All new lab and imaging data was reviewed.   Recent Labs   Lab Test 04/11/25  1037 04/10/25  0554 04/09/25  1141   WBC 9.6 10.7 14.8*   HGB 11.0* 12.1 14.6   MCV 95 94 94    203 228

## 2025-04-12 NOTE — PROGRESS NOTES
Report given and pt transferred to general surgery.     Vitals stable on RA. Pt. A&O x4. C/o abd pain/discomfort. LS clear. Voiding adequately. Had loose BMs per pt report.Tolerating clear liquid diet. IV zosyn q6h, one dose given. Up independently.   Plan: to repeat CT 1-2 days and reassess per general surgery. Advance to regular diet, if tolerates.

## 2025-04-12 NOTE — PLAN OF CARE
Discharge instructions and medications reviewed with pt.  IV removed.  Pt left unit via wheelchair at 1735.  He ambulated to exit door.  Denied pain at time of discharge.

## 2025-04-12 NOTE — PLAN OF CARE
Goal Outcome Evaluation:  Date & Time: 4/11/2025 3182-2082  Problem: Perforated appendicitis and has developing abscesses  Behavior & Aggression: Green  Fall Risk: No  Orientation: A&Ox4  ABNL VS/O2: VSS on RA, zoe at times  ABNL Labs: CRP 54.11  Pain Management: heat packs and tylenol for abd pain  Bowel/Bladder: Continent of B/B. Pt complaining of diarrhea overnight due to having miralax in the evening  Drains: n/a  Wounds/incisions: N/A  Diet: Clears  Number of times OUT OF BED this shift: up independently in room. Ambulating freq to the bathroom d/t diarrhea  Tests/Procedures: Repeat CT scan in 1-2 days  Anticipated DC Date: Possible 4/12/2025  Significant Information: IV zosyn Q6

## 2025-04-14 LAB
BACTERIA BLD CULT: NO GROWTH
BACTERIA BLD CULT: NO GROWTH

## 2025-04-17 ENCOUNTER — TELEPHONE (OUTPATIENT)
Dept: FAMILY MEDICINE | Facility: CLINIC | Age: 70
End: 2025-04-17
Payer: COMMERCIAL

## 2025-04-17 NOTE — TELEPHONE ENCOUNTER
Spoke with patient who reports they are fine right now they took tylenol and their pain is at a 1 or 2. They had very bad gas pain last night and they are worried they might have it again.     They have not been taking simethicone and said that they are using tums instead. Advised to use simethicone as that is what has been prescribed. Did inform them that the hospital notes say that if their pain worsens that they need to go back to the ED. Patient states that they won't do anything for them their. Explained that they may move up the surgery instead. Patient did say that they wanted her to stay in the hospital longer.     Patient says they are comforted by the plan. She will use simethicone for the gas pain. She will also pack a bag and agrees to go to the hospital if the pain gets worse. Has the discharge instructions and will follow the discharge instructions.     Alexa Keene RN

## 2025-04-17 NOTE — TELEPHONE ENCOUNTER
"[facility] phone call message- general phone call:    Reason for call: Pt is on antibiotics after leaving the hospital and is experiencing \"out of the norm biometrics and vitals\" [did not fall within red flag symptoms - temp: ] and would like a callback to discuss if this is common with antibiotics, if he should come into the clinic for a visit, or if he should go back to the hospital.    Return call needed: Yes    OK to leave a message on voice mail? Yes    Primary language: English      needed? No    Call taken on April 17, 2025 at 9:24 AM by Beto Hawkins   "

## 2025-04-18 ENCOUNTER — APPOINTMENT (OUTPATIENT)
Dept: CT IMAGING | Facility: CLINIC | Age: 70
DRG: 372 | End: 2025-04-18
Attending: EMERGENCY MEDICINE
Payer: COMMERCIAL

## 2025-04-18 ENCOUNTER — APPOINTMENT (OUTPATIENT)
Dept: CT IMAGING | Facility: CLINIC | Age: 70
DRG: 372 | End: 2025-04-18
Attending: RADIOLOGY
Payer: COMMERCIAL

## 2025-04-18 ENCOUNTER — HOSPITAL ENCOUNTER (INPATIENT)
Facility: CLINIC | Age: 70
LOS: 5 days | Discharge: HOME | DRG: 372 | End: 2025-04-23
Attending: EMERGENCY MEDICINE | Admitting: EMERGENCY MEDICINE
Payer: COMMERCIAL

## 2025-04-18 DIAGNOSIS — K35.32 RUPTURED APPENDICITIS: Primary | ICD-10-CM

## 2025-04-18 DIAGNOSIS — F64.9 GENDER INCONGRUENCE: ICD-10-CM

## 2025-04-18 DIAGNOSIS — K35.33 APPENDICITIS WITH ABSCESS: ICD-10-CM

## 2025-04-18 LAB
ANION GAP SERPL CALCULATED.3IONS-SCNC: 18 MMOL/L (ref 7–15)
BACTERIA SPEC CULT: ABNORMAL
BASOPHILS # BLD AUTO: 0.1 10E3/UL (ref 0–0.2)
BASOPHILS NFR BLD AUTO: 1 %
BUN SERPL-MCNC: 12.3 MG/DL (ref 8–23)
CALCIUM SERPL-MCNC: 9.1 MG/DL (ref 8.8–10.4)
CHLORIDE SERPL-SCNC: 96 MMOL/L (ref 98–107)
CREAT SERPL-MCNC: 0.95 MG/DL (ref 0.51–1.17)
EGFRCR SERPLBLD CKD-EPI 2021: 86 ML/MIN/1.73M2
EOSINOPHIL # BLD AUTO: 0.1 10E3/UL (ref 0–0.7)
EOSINOPHIL NFR BLD AUTO: 1 %
ERYTHROCYTE [DISTWIDTH] IN BLOOD BY AUTOMATED COUNT: 12.5 % (ref 10–15)
GLUCOSE SERPL-MCNC: 112 MG/DL (ref 70–99)
GRAM STAIN RESULT: ABNORMAL
GRAM STAIN RESULT: ABNORMAL
HCO3 SERPL-SCNC: 20 MMOL/L (ref 22–29)
HCT VFR BLD AUTO: 41.6 % (ref 35–53)
HGB BLD-MCNC: 14.3 G/DL (ref 11.7–17.7)
HOLD SPECIMEN: NORMAL
HOLD SPECIMEN: NORMAL
IMM GRANULOCYTES # BLD: 0.5 10E3/UL
IMM GRANULOCYTES NFR BLD: 3 %
LYMPHOCYTES # BLD AUTO: 1.6 10E3/UL (ref 0.8–5.3)
LYMPHOCYTES NFR BLD AUTO: 9 %
MCH RBC QN AUTO: 31.7 PG (ref 26.5–33)
MCHC RBC AUTO-ENTMCNC: 34.4 G/DL (ref 31.5–36.5)
MCV RBC AUTO: 92 FL (ref 78–100)
MONOCYTES # BLD AUTO: 1.1 10E3/UL (ref 0–1.3)
MONOCYTES NFR BLD AUTO: 6 %
NEUTROPHILS # BLD AUTO: 14.3 10E3/UL (ref 1.6–8.3)
NEUTROPHILS NFR BLD AUTO: 81 %
NRBC # BLD AUTO: 0 10E3/UL
NRBC BLD AUTO-RTO: 0 /100
PLATELET # BLD AUTO: 442 10E3/UL (ref 150–450)
POTASSIUM SERPL-SCNC: 3.6 MMOL/L (ref 3.4–5.3)
RBC # BLD AUTO: 4.51 10E6/UL (ref 3.8–5.9)
SODIUM SERPL-SCNC: 134 MMOL/L (ref 135–145)
WBC # BLD AUTO: 17.8 10E3/UL (ref 4–11)

## 2025-04-18 PROCEDURE — 87075 CULTR BACTERIA EXCEPT BLOOD: CPT | Performed by: RADIOLOGY

## 2025-04-18 PROCEDURE — 99233 SBSQ HOSP IP/OBS HIGH 50: CPT | Mod: 25 | Performed by: STUDENT IN AN ORGANIZED HEALTH CARE EDUCATION/TRAINING PROGRAM

## 2025-04-18 PROCEDURE — 0W9G30Z DRAINAGE OF PERITONEAL CAVITY WITH DRAINAGE DEVICE, PERCUTANEOUS APPROACH: ICD-10-PCS | Performed by: RADIOLOGY

## 2025-04-18 PROCEDURE — 250N000011 HC RX IP 250 OP 636: Performed by: EMERGENCY MEDICINE

## 2025-04-18 PROCEDURE — 36415 COLL VENOUS BLD VENIPUNCTURE: CPT | Performed by: EMERGENCY MEDICINE

## 2025-04-18 PROCEDURE — 250N000011 HC RX IP 250 OP 636: Performed by: RADIOLOGY

## 2025-04-18 PROCEDURE — 250N000009 HC RX 250: Performed by: EMERGENCY MEDICINE

## 2025-04-18 PROCEDURE — 258N000003 HC RX IP 258 OP 636

## 2025-04-18 PROCEDURE — 120N000001 HC R&B MED SURG/OB

## 2025-04-18 PROCEDURE — 99152 MOD SED SAME PHYS/QHP 5/>YRS: CPT

## 2025-04-18 PROCEDURE — 250N000011 HC RX IP 250 OP 636

## 2025-04-18 PROCEDURE — 87205 SMEAR GRAM STAIN: CPT | Performed by: RADIOLOGY

## 2025-04-18 PROCEDURE — 999N000154 HC STATISTIC RADIOLOGY XRAY, US, CT, MAR, NM

## 2025-04-18 PROCEDURE — 49406 IMAGE CATH FLUID PERI/RETRO: CPT

## 2025-04-18 PROCEDURE — 96365 THER/PROPH/DIAG IV INF INIT: CPT | Mod: 59

## 2025-04-18 PROCEDURE — 84520 ASSAY OF UREA NITROGEN: CPT | Performed by: EMERGENCY MEDICINE

## 2025-04-18 PROCEDURE — 99285 EMERGENCY DEPT VISIT HI MDM: CPT | Mod: 25

## 2025-04-18 PROCEDURE — 99223 1ST HOSP IP/OBS HIGH 75: CPT

## 2025-04-18 PROCEDURE — 85025 COMPLETE CBC W/AUTO DIFF WBC: CPT | Performed by: EMERGENCY MEDICINE

## 2025-04-18 PROCEDURE — 87070 CULTURE OTHR SPECIMN AEROBIC: CPT | Performed by: RADIOLOGY

## 2025-04-18 PROCEDURE — 74177 CT ABD & PELVIS W/CONTRAST: CPT

## 2025-04-18 RX ORDER — NALOXONE HYDROCHLORIDE 0.4 MG/ML
0.4 INJECTION, SOLUTION INTRAMUSCULAR; INTRAVENOUS; SUBCUTANEOUS
Status: DISCONTINUED | OUTPATIENT
Start: 2025-04-18 | End: 2025-04-23 | Stop reason: HOSPADM

## 2025-04-18 RX ORDER — HYDROMORPHONE HCL IN WATER/PF 6 MG/30 ML
0.4 PATIENT CONTROLLED ANALGESIA SYRINGE INTRAVENOUS
Status: DISCONTINUED | OUTPATIENT
Start: 2025-04-18 | End: 2025-04-23 | Stop reason: HOSPADM

## 2025-04-18 RX ORDER — ONDANSETRON 4 MG/1
4 TABLET, ORALLY DISINTEGRATING ORAL EVERY 6 HOURS PRN
Status: DISCONTINUED | OUTPATIENT
Start: 2025-04-18 | End: 2025-04-23 | Stop reason: HOSPADM

## 2025-04-18 RX ORDER — AMOXICILLIN 250 MG
1 CAPSULE ORAL 2 TIMES DAILY PRN
Status: DISCONTINUED | OUTPATIENT
Start: 2025-04-18 | End: 2025-04-23 | Stop reason: HOSPADM

## 2025-04-18 RX ORDER — PROCHLORPERAZINE MALEATE 5 MG/1
5 TABLET ORAL EVERY 6 HOURS PRN
Status: DISCONTINUED | OUTPATIENT
Start: 2025-04-18 | End: 2025-04-23 | Stop reason: HOSPADM

## 2025-04-18 RX ORDER — OXYCODONE HYDROCHLORIDE 5 MG/1
5 TABLET ORAL EVERY 4 HOURS PRN
Status: CANCELLED | OUTPATIENT
Start: 2025-04-18

## 2025-04-18 RX ORDER — ACETAMINOPHEN 650 MG/1
650 SUPPOSITORY RECTAL EVERY 4 HOURS PRN
Status: DISCONTINUED | OUTPATIENT
Start: 2025-04-18 | End: 2025-04-23 | Stop reason: HOSPADM

## 2025-04-18 RX ORDER — LIDOCAINE 40 MG/G
CREAM TOPICAL
Status: DISCONTINUED | OUTPATIENT
Start: 2025-04-18 | End: 2025-04-23 | Stop reason: HOSPADM

## 2025-04-18 RX ORDER — NALOXONE HYDROCHLORIDE 0.4 MG/ML
0.4 INJECTION, SOLUTION INTRAMUSCULAR; INTRAVENOUS; SUBCUTANEOUS
Status: DISCONTINUED | OUTPATIENT
Start: 2025-04-18 | End: 2025-04-18

## 2025-04-18 RX ORDER — HYDRALAZINE HYDROCHLORIDE 10 MG/1
10 TABLET, FILM COATED ORAL EVERY 4 HOURS PRN
Status: DISCONTINUED | OUTPATIENT
Start: 2025-04-18 | End: 2025-04-23 | Stop reason: HOSPADM

## 2025-04-18 RX ORDER — NALOXONE HYDROCHLORIDE 0.4 MG/ML
0.2 INJECTION, SOLUTION INTRAMUSCULAR; INTRAVENOUS; SUBCUTANEOUS
Status: DISCONTINUED | OUTPATIENT
Start: 2025-04-18 | End: 2025-04-23 | Stop reason: HOSPADM

## 2025-04-18 RX ORDER — HYDRALAZINE HYDROCHLORIDE 20 MG/ML
10 INJECTION INTRAMUSCULAR; INTRAVENOUS EVERY 4 HOURS PRN
Status: DISCONTINUED | OUTPATIENT
Start: 2025-04-18 | End: 2025-04-23 | Stop reason: HOSPADM

## 2025-04-18 RX ORDER — FLUMAZENIL 0.1 MG/ML
0.2 INJECTION, SOLUTION INTRAVENOUS
Status: DISCONTINUED | OUTPATIENT
Start: 2025-04-18 | End: 2025-04-18

## 2025-04-18 RX ORDER — NALOXONE HYDROCHLORIDE 0.4 MG/ML
0.2 INJECTION, SOLUTION INTRAMUSCULAR; INTRAVENOUS; SUBCUTANEOUS
Status: DISCONTINUED | OUTPATIENT
Start: 2025-04-18 | End: 2025-04-18

## 2025-04-18 RX ORDER — SIMVASTATIN 40 MG
40 TABLET ORAL AT BEDTIME
Status: DISCONTINUED | OUTPATIENT
Start: 2025-04-18 | End: 2025-04-23 | Stop reason: HOSPADM

## 2025-04-18 RX ORDER — PIPERACILLIN SODIUM, TAZOBACTAM SODIUM 4; .5 G/20ML; G/20ML
4.5 INJECTION, POWDER, LYOPHILIZED, FOR SOLUTION INTRAVENOUS ONCE
Status: COMPLETED | OUTPATIENT
Start: 2025-04-18 | End: 2025-04-18

## 2025-04-18 RX ORDER — IOPAMIDOL 755 MG/ML
71 INJECTION, SOLUTION INTRAVASCULAR ONCE
Status: COMPLETED | OUTPATIENT
Start: 2025-04-18 | End: 2025-04-18

## 2025-04-18 RX ORDER — SODIUM CHLORIDE 9 MG/ML
INJECTION, SOLUTION INTRAVENOUS CONTINUOUS
Status: DISCONTINUED | OUTPATIENT
Start: 2025-04-18 | End: 2025-04-19

## 2025-04-18 RX ORDER — HYDROMORPHONE HCL IN WATER/PF 6 MG/30 ML
0.2 PATIENT CONTROLLED ANALGESIA SYRINGE INTRAVENOUS
Status: DISCONTINUED | OUTPATIENT
Start: 2025-04-18 | End: 2025-04-23 | Stop reason: HOSPADM

## 2025-04-18 RX ORDER — FENTANYL CITRATE 50 UG/ML
25-50 INJECTION, SOLUTION INTRAMUSCULAR; INTRAVENOUS EVERY 5 MIN PRN
Status: DISCONTINUED | OUTPATIENT
Start: 2025-04-18 | End: 2025-04-18

## 2025-04-18 RX ORDER — ACETAMINOPHEN 325 MG/1
650 TABLET ORAL EVERY 4 HOURS PRN
Status: DISCONTINUED | OUTPATIENT
Start: 2025-04-18 | End: 2025-04-23 | Stop reason: HOSPADM

## 2025-04-18 RX ORDER — ONDANSETRON 2 MG/ML
4 INJECTION INTRAMUSCULAR; INTRAVENOUS EVERY 6 HOURS PRN
Status: DISCONTINUED | OUTPATIENT
Start: 2025-04-18 | End: 2025-04-23 | Stop reason: HOSPADM

## 2025-04-18 RX ORDER — ONDANSETRON 2 MG/ML
4 INJECTION INTRAMUSCULAR; INTRAVENOUS EVERY 30 MIN PRN
Status: DISCONTINUED | OUTPATIENT
Start: 2025-04-18 | End: 2025-04-18

## 2025-04-18 RX ORDER — PIPERACILLIN SODIUM, TAZOBACTAM SODIUM 4; .5 G/20ML; G/20ML
4.5 INJECTION, POWDER, LYOPHILIZED, FOR SOLUTION INTRAVENOUS EVERY 6 HOURS
Status: DISCONTINUED | OUTPATIENT
Start: 2025-04-18 | End: 2025-04-23 | Stop reason: HOSPADM

## 2025-04-18 RX ORDER — ESTRADIOL 0.1 MG/D
0.5 FILM, EXTENDED RELEASE TRANSDERMAL
Status: DISCONTINUED | OUTPATIENT
Start: 2025-04-20 | End: 2025-04-23 | Stop reason: HOSPADM

## 2025-04-18 RX ORDER — CALCIUM CARBONATE 500 MG/1
1000 TABLET, CHEWABLE ORAL 4 TIMES DAILY PRN
Status: DISCONTINUED | OUTPATIENT
Start: 2025-04-18 | End: 2025-04-23 | Stop reason: HOSPADM

## 2025-04-18 RX ORDER — AMOXICILLIN 250 MG
2 CAPSULE ORAL 2 TIMES DAILY PRN
Status: DISCONTINUED | OUTPATIENT
Start: 2025-04-18 | End: 2025-04-23 | Stop reason: HOSPADM

## 2025-04-18 RX ADMIN — SODIUM CHLORIDE: 9 INJECTION, SOLUTION INTRAVENOUS at 14:56

## 2025-04-18 RX ADMIN — PIPERACILLIN AND TAZOBACTAM 4.5 G: 4; .5 INJECTION, POWDER, FOR SOLUTION INTRAVENOUS at 14:56

## 2025-04-18 RX ADMIN — MIDAZOLAM 1 MG: 1 INJECTION INTRAMUSCULAR; INTRAVENOUS at 13:57

## 2025-04-18 RX ADMIN — FENTANYL CITRATE 50 MCG: 50 INJECTION, SOLUTION INTRAMUSCULAR; INTRAVENOUS at 13:57

## 2025-04-18 RX ADMIN — IOPAMIDOL 71 ML: 755 INJECTION, SOLUTION INTRAVENOUS at 07:57

## 2025-04-18 RX ADMIN — PIPERACILLIN AND TAZOBACTAM 4.5 G: 4; .5 INJECTION, POWDER, FOR SOLUTION INTRAVENOUS at 21:06

## 2025-04-18 RX ADMIN — PIPERACILLIN AND TAZOBACTAM 4.5 G: 4; .5 INJECTION, POWDER, FOR SOLUTION INTRAVENOUS at 09:16

## 2025-04-18 RX ADMIN — SODIUM CHLORIDE 60 ML: 9 INJECTION, SOLUTION INTRAVENOUS at 07:57

## 2025-04-18 ASSESSMENT — ACTIVITIES OF DAILY LIVING (ADL)
ADLS_ACUITY_SCORE: 62
ADLS_ACUITY_SCORE: 59
ADLS_ACUITY_SCORE: 62
ADLS_ACUITY_SCORE: 59
ADLS_ACUITY_SCORE: 59
ADLS_ACUITY_SCORE: 62
ADLS_ACUITY_SCORE: 59
ADLS_ACUITY_SCORE: 59
ADLS_ACUITY_SCORE: 62
ADLS_ACUITY_SCORE: 59
ADLS_ACUITY_SCORE: 62
ADLS_ACUITY_SCORE: 62
ADLS_ACUITY_SCORE: 63
ADLS_ACUITY_SCORE: 63

## 2025-04-18 ASSESSMENT — COLUMBIA-SUICIDE SEVERITY RATING SCALE - C-SSRS
6. HAVE YOU EVER DONE ANYTHING, STARTED TO DO ANYTHING, OR PREPARED TO DO ANYTHING TO END YOUR LIFE?: NO
1. IN THE PAST MONTH, HAVE YOU WISHED YOU WERE DEAD OR WISHED YOU COULD GO TO SLEEP AND NOT WAKE UP?: NO
2. HAVE YOU ACTUALLY HAD ANY THOUGHTS OF KILLING YOURSELF IN THE PAST MONTH?: NO

## 2025-04-18 NOTE — PROGRESS NOTES
Lab called for Pt Gram Stain taken from the Abdominal Aspiration site with:  1+ Gram negative  3+ WBC   Writer was busy with Surgical, Charge was ask to notified MD

## 2025-04-18 NOTE — ED NOTES
RECEIVING UNIT ED HANDOFF REVIEW    ED Nurse Handoff Report was reviewed by: Abena Ortega RN on April 18, 2025 at 4:21 PM

## 2025-04-18 NOTE — CONSULTS
[facility] General Surgery Consultation    Larissa Rehman MRN# [id number]   YOB: 1955 Age: 69 year old      Date of Admission:  4/18/2025  Date of Consult: 4/18/2025  Consult:            Assessment and Plan:   Larissa eRhman is a 69 year old adult who is representing to the ED with nausea, vomiting, abdominal pain due to perforated appendicitis complicated by abscess and SBO. She first presented on 4/9/2025 and was managed conservatively with antibiotics and observation. On CT today, she continues to have significant fluid collection in her pelvis with dilated small bowel concerning for SBO. On exam, her abdomen is just mildly tender to palpation but no evidence of an acute abdomen. VSS.     PLAN:  No surgical intervention at this point. We will continue to watch her closely. If her obstruction does not improve or if she becomes septic, will have to consider surgery. Will try to get her through without surgery as the morbidity and risks are high given her inflamed state in her abdomen.    NG tube to LIS  Consult IR for source control with drain placements  Zosyn for antibiotics  NPO due to bowel obstruction  General surgery will continue to follow along    Elmo Macedo MD            Chief Complaint:     Chief Complaint   Patient presents with    Abdominal Pain            History of Present Illness:   Larissa Rehman is a 69 year old adult who is representing to the ED with nausea, vomiting, abdominal pain due to perforated appendicitis complicated by abscess and SBO. She first presented on 4/9/2025 and was managed conservatively with antibiotics and observation. She had been tolerating an oral diet and was discharged after being hospitalized for 3 days for this (4/9-4/12/2025).     She reports that since being home her pain worsened again. She thought she could tolerate it but really hasn't been able to eat much. She had an episode of diarrhea today but is not really passing any gas.  She became severely nauseous and vomited today.           Physical Exam:   Blood pressure 119/69, pulse 71, temperature 98.2  F (36.8  C), temperature source Temporal, resp. rate 16, SpO2 99%.  0 lbs 0 oz  General: Vital signs reviewed, in no apparent distress  Eyes: Anicteric  HENT: Normocephalic, atraumatic, trachea midline   Respiratory: Breathing nonlabored  Cardiovascular: Regular rate and rhythm  GI: Abdomen soft, moderately distended, tender to palpation in the right lower abdomen and pelvis but no rebound, no peritonitis  Musculoskeletal: No gross deformities  Neurologic: Grossly nonfocal exam  Psychiatric: Normal mood, affect and insight  Integumentary: Warm and dry         Past Medical History:     Past Medical History:   Diagnosis Date    Attention deficit hyperactivity disorder (ADHD), predominantly hyperactive type     H/O bilateral orchiectomies and zero-depth vaginoplasty 3/29/2018 04/05/2018    High cholesterol     Hypothermia x 3 events during triathlons required EMS             Past Surgical History:     Past Surgical History:   Procedure Laterality Date    COLONOSCOPY      ORTHOPEDIC SURGERY      foot surgery     TRANSGENDER VAGINOPLASTY ZERO DEPTH N/A 3/29/2018    Procedure: TRANSGENDER VAGINOPLASTY ZERO DEPTH;  Zero Depth Transgender Vaginoplasty;  Surgeon: Madhu Mojica MD;  Location: [facility] OR            Current Medications:         Current Facility-Administered Medications   Medication Dose Route Frequency Provider Last Rate Last Admin       Current Facility-Administered Medications   Medication Dose Route Frequency Provider Last Rate Last Admin    ondansetron (ZOFRAN) injection 4 mg  4 mg Intravenous Q30 Min PRN Darlene Estes MD                Home Medications:     Prior to Admission medications    Medication Sig Last Dose Taking? Auth Provider Long Term End Date   acetaminophen (TYLENOL) 325 MG tablet Take 2 tablets (650 mg) by mouth every 4 hours as needed for mild pain or other (and  adjunct with moderate or severe pain or per patient request).  Yes Zoila Alcazar MD     amoxicillin-clavulanate (AUGMENTIN) 875-125 MG tablet Take 1 tablet by mouth 2 times daily for 14 days. 4/17/2025 Evening Yes Zoila Alcazar MD  4/26/25   estradiol (VIVELLE-DOT) 0.1 MG/24HR bi-weekly patch Place 1 patch over 96 hours onto the skin twice a week.  Patient taking differently: Place 0.5 patches onto the skin every 48 hours. (Cuts patch in half and replaces it every 2 days behind right ear) 4/18/2025 Morning Yes Sravani Boyer MD Yes    ondansetron (ZOFRAN ODT) 4 MG ODT tab Take 1 tablet (4 mg) by mouth every 6 hours as needed for nausea.  Yes Zoila Alcazar MD     simethicone (MYLICON) 80 MG chewable tablet Take 1 tablet (80 mg) by mouth every 6 hours as needed for flatulence or cramping.  Yes Zoila Alcazar MD     simvastatin (ZOCOR) 40 MG tablet TAKE 1 TABLET(40 MG) BY MOUTH AT BEDTIME 4/16/2025 Yes Sravani Boyer MD Yes             Allergies:     Allergies   Allergen Reactions    Bee Venom             Family History:     Family History   Problem Relation Age of Onset    Colon Cancer Father     Heart Disease Father     Breast Cancer No family hx of     Osteoporosis No family hx of            Social History:   Larissa Rehman  reports that she has never smoked. She has never used smokeless tobacco. She reports current alcohol use of about 4.0 standard drinks of alcohol per week. She reports that she does not use drugs.          Review of Systems:   Constitutional: No fevers or chills  Eyes: No blurred or double vision  HENT: Denies headaches, No rhinorrhea, No sore throat  Respiratory: No cough or shortness of breath  Cardiovascular: Denies chest pain or palpitations  Gastrointestinal: No abdominal pain or nausea/vomiting  Genitourinary: No hematuria or dysuria  Musculoskeletal: Denies arthralgias or myalgias  Neurologic: No numbness or tingling  Integumentary: No skin rashes         Labs/Imaging   All new lab and  "imaging data was reviewed.   Recent Labs   Lab 04/18/25  0745   WBC 17.8*   HGB 14.3   HCT 41.6   MCV 92        Recent Labs   Lab 04/18/25  0745   *   POTASSIUM 3.6   CHLORIDE 96*   CO2 20*   ANIONGAP 18*   *   BUN 12.3   CR 0.95   GFRESTIMATED 86   DEBORA 9.1     No results for input(s): \"LACT\" in the last 168 hours.    I have personally reviewed the imaging studies-   CT abd  IMPRESSION:   1.  Interval increase in size of the central pelvic abscess now measuring 3.2 x 3.5 x 6 cm, previously 2.2 x 2.2 x 3 cm. Significant surrounding inflammatory change with small locules of free air adjacent.  2.  High-grade small bowel obstruction with dilated loops of small bowel extending down the level of the abscess where there is wall thickening and transition point at the level the abscess. Distal small bowel decompressed. Small locules of air within it   adjacent to the collapsed loop of bowel possibly representing the appendix.  3.  Significant interval decrease in the posterior pelvic fluid collection now measuring 3.2 x 1.2 cm, previously 5.1 x 3.6 cm.  "

## 2025-04-18 NOTE — PROCEDURES
[facility]    Procedure: IR Procedure Note    Date/Time: 4/18/2025 2:24 PM    Performed by: Meng Griffith MD  Authorized by: Meng Griffith MD  IR Fellow Physician:    Pre Procedure Diagnosis: abdominal abscess  Post Procedure Diagnosis: abdominal abscess    UNIVERSAL PROTOCOL   Site Marked: Yes  Prior Images Obtained and Reviewed:  Yes  Required items: Required blood products, implants, devices and special equipment available    Patient identity confirmed:  Verbally with patient, arm band and hospital-assigned identification number  Patient was reevaluated immediately before administering moderate or deep sedation or anesthesia  Confirmation Checklist:  Patient's identity using two indicators, correct equipment/implants were available, relevant allergies and procedure was appropriate and matched the consent or emergent situation  Time out: Immediately prior to the procedure a time out was called    Universal Protocol: the Joint Commission Universal Protocol was followed    Preparation: Patient was prepped and draped in usual sterile fashion    ESBL (mL):  0     ANESTHESIA    Anesthesia:  Local infiltration  Local Anesthetic:  Lidocaine 1% without epinephrine      SEDATION  Patient Sedated: Yes    Sedation Type:  Moderate (conscious) sedation  Vital signs: Vital signs monitored during sedation    See dictated procedure note for full details.  Findings: Abdominal abscess    Specimens: fluid and/or tissue for gram stain and culture    Procedural Complications: None    Condition: Stable      PROCEDURE    Patient Tolerance:  Patient tolerated the procedure well with no immediate complications  Length of time physician/provider present for 1:1 monitoring during sedation:  0-22 min

## 2025-04-18 NOTE — PROGRESS NOTES
Reason for Admission: Ruptured appendicitis     Evaluation of Goal:   Patient is A&Ox 4. Vitals are stable on RA. NG tube at 58 cm with minimum output; SANTY patent and draining purulent/serosanguineous drainage. Patient scoring green on the Aggression Stoplight Tool. Pt calm and cooperative with cares, able to make needs known, call light within reach, bed alarm on for safety. Transfer to the floor for further cares.     Mar Hancock RN on 4/18/2025 at 5:06 PM

## 2025-04-18 NOTE — H&P
[facility]    History and Physical - Hospitalist Service       Date of Admission:  4/18/2025    Assessment & Plan      Larissa Rehman is a 69 year old adult admitted on 4/18/2025 with abdominal pain. She was admitted recently admitted from 4/9/2025 to 4/12/2025 for perforated appendicitis with abscess. She was ultimately discharged on Augmentin.      Perforated appendicitis with abscess  Small bowel obstruction  Acute abdominal pain  Ms. Rehman presents on 4/18/2025 with acutely worsening abdominal pain. She was recently hospitalized with abdominal pain from 4/9/2025 to 4/12/2025 for abdominal pain and found to have perforated appendicitis with abscess. At that time, general surgery was consulted, but did not recommend surgical intervention. IR was also consulted but did not recommend drain placement. She was on Zosyn and then transitioned to Augmentin at discharge. She now presents with worsening pain across the upper abdomen and has been having recurrent vomiting since 4/17/2025.  *WBC: 17.8  *Abdominal CT w contrast: Interval increase in size of the central pelvic abscess now measuring 3.2 x 3.5 x 6 cm, previously 2.2 x 2.2 x 3 cm. Significant surrounding inflammatory change with small locules of free air adjacent  High-grade small bowel obstruction with dilated loops of small bowel extending down the level of the abscess where there is wall thickening and transition point at the level the abscess. Distal small bowel decompressed. Small locules of air within it adjacent to the collapsed loop of bowel possibly representing the appendix. Significant interval decrease in the posterior pelvic fluid collection now measuring 3.2 x 1.2 cm, previously 5.1 x 3.6 cm.   -Admit to inpatient  -Q4h vital signs  -Strict intake and output monitoring  -Daily weights  -IV Zosyn  -IR consultation requested  -General surgery consultation requested  -NG tube for gastric decompression  -CT guided drain  placement planned  -IV fluids  ml/h  -Q4h vital signs  -NPO    Hypercholesterolemia   -Resume PTA simvastatin when able to tolerate PO          Diet:  NPO  DVT Prophylaxis: Pneumatic Compression Devices  Packer Catheter: Not present  Lines: None     Cardiac Monitoring: None  Code Status:  DNR/DNI    Clinically Significant Risk Factors Present on Admission         # Hyponatremia: Lowest Na = 134 mmol/L in last 2 days, will monitor as appropriate  # Hypochloremia: Lowest Cl = 96 mmol/L in last 2 days, will monitor as appropriate        # Drug Induced Platelet Defect: home medication list includes an antiplatelet medication                      Disposition Plan   Medically Ready for Discharge: Anticipated in 2-4 Days     The patient's care was discussed with the Attending Physician, Dr. Wilson .    Madhu Infante NP  Hospitalist Service  [facility]  Securely message with Vocera (more info)  Text page via [facility] Paging/Directory     ______________________________________________________________________    Chief Complaint   Abdominal pain and nausea    History is obtained from the patient    History of Present Illness   Larissa Rehman is a 69 year old adult who presents with progressively worsening abdominal pain. Larissa Rehman was recently hospitalized with abdominal pain from 4/9/2025 to 4/12/2025 and found to have perforated appendicitis with abscess. At that time, general surgery was consulted, but did not recommend surgical intervention. IR was also consulted and did not recommend drain placement, favoring antibiotic therapy. She was on Zosyn and then transitioned to Augmentin at discharge.     Larissa Rehman reports that for 3-4 days after being discharged, her symptoms seemed to improve. However, over the course of the past few days, she developed recurring abdominal pain, which has been getting progressively worse. Then, on 4/17/2025 she developed recurrent vomiting. When  this did not improve, she decided to come to the ED.   In the ED, Larissa Rehman is noted to have leukocytosis with WBC of 17.8. She is afebrile and hemodynamically stable. Abdominal CT shows interval worsening of the abdominal abscess, as well as high grade small bowel obstruction. On exam, Larissa Rehman appears non-toxic. Abdomen is soft but mildly tender with palpation. She presently denies any chest pain, shortness of breath or nausea.     General surgery was consulted and recommended non-operative management and consultation with IR. Plan currently is for CT guided placement of drain, inpatient admission and IV antibiotics. She received her first dose of IV Zosyn in the ED. Plan is to continue this on admission. NG tube has been placed to LIS for gastric decompression.      Past Medical History    Past Medical History:   Diagnosis Date    Attention deficit hyperactivity disorder (ADHD), predominantly hyperactive type     H/O bilateral orchiectomies and zero-depth vaginoplasty 3/29/2018 04/05/2018    High cholesterol     Hypothermia x 3 events during triathlons required EMS        Past Surgical History   Past Surgical History:   Procedure Laterality Date    COLONOSCOPY      ORTHOPEDIC SURGERY      foot surgery     TRANSGENDER VAGINOPLASTY ZERO DEPTH N/A 3/29/2018    Procedure: TRANSGENDER VAGINOPLASTY ZERO DEPTH;  Zero Depth Transgender Vaginoplasty;  Surgeon: Madhu Mojica MD;  Location: [facility] OR       Prior to Admission Medications   Prior to Admission Medications   Prescriptions Last Dose Informant Patient Reported? Taking?   ASPIRIN PO   Yes No   Sig: Take 81 mg by mouth every other day.   acetaminophen (TYLENOL) 325 MG tablet   No No   Sig: Take 2 tablets (650 mg) by mouth every 4 hours as needed for mild pain or other (and adjunct with moderate or severe pain or per patient request).   amoxicillin-clavulanate (AUGMENTIN) 875-125 MG tablet   No No   Sig: Take 1 tablet by mouth 2 times daily for 14 days.    estradiol (VIVELLE-DOT) 0.1 MG/24HR bi-weekly patch   No No   Sig: Place 1 patch over 96 hours onto the skin twice a week.   ondansetron (ZOFRAN ODT) 4 MG ODT tab   No No   Sig: Take 1 tablet (4 mg) by mouth every 6 hours as needed for nausea.   oxyCODONE (ROXICODONE) 5 MG tablet   No No   Sig: Take 1 tablet (5 mg) by mouth every 4 hours as needed for severe pain (IF pain not managed with non-pharmacological and non-opioid interventions).   simethicone (MYLICON) 80 MG chewable tablet   No No   Sig: Take 1 tablet (80 mg) by mouth every 6 hours as needed for flatulence or cramping.   simvastatin (ZOCOR) 40 MG tablet   No No   Sig: TAKE 1 TABLET(40 MG) BY MOUTH AT BEDTIME      Facility-Administered Medications: None      Physical Exam   Vital Signs: Temp: 98.2  F (36.8  C) Temp src: Temporal BP: 115/76 Pulse: 105   Resp: 20 SpO2: 99 %      Weight: 0 lbs 0 oz  Physical Exam  Vitals reviewed.   Constitutional:       General: She is not in acute distress.     Appearance: She is not toxic-appearing.   Cardiovascular:      Rate and Rhythm: Normal rate and regular rhythm.      Pulses: Normal pulses.      Heart sounds: Normal heart sounds.   Pulmonary:      Effort: Pulmonary effort is normal.      Breath sounds: Normal breath sounds and air entry.   Abdominal:      Palpations: Abdomen is soft.      Tenderness: There is generalized abdominal tenderness.   Skin:     General: Skin is warm and dry.   Neurological:      General: No focal deficit present.      Mental Status: She is alert.   Psychiatric:         Attention and Perception: Attention normal.         Mood and Affect: Mood normal.         Speech: Speech normal.         Behavior: Behavior normal. Behavior is cooperative.         Thought Content: Thought content normal.         Cognition and Memory: Cognition normal.         Judgment: Judgment normal.     Medical Decision Making   75 MINUTES SPENT BY ME on the date of service doing chart review, history, exam,  documentation & further activities per the note.      Data     I have personally reviewed the following data over the past 24 hrs:    17.8 (H)  \   14.3   / 442     134 (L) 96 (L) 12.3 /  112 (H)   3.6 20 (L) 0.95 \       Imaging results reviewed over the past 24 hrs:   Recent Results (from the past 24 hours)   CT Abdomen Pelvis w Contrast    Narrative    EXAM: CT ABDOMEN PELVIS W CONTRAST  LOCATION: [facility]  DATE: 4/18/2025    INDICATION: Recent perf appy with abscess, worsening pain.  COMPARISON: 04/09/2025  TECHNIQUE: CT scan of the abdomen and pelvis was performed following injection of IV contrast. Multiplanar reformats were obtained. Dose reduction techniques were used.  CONTRAST: 71 mL Isovue 370    FINDINGS:   LOWER CHEST: Minimal bibasilar atelectasis. Minimal esophageal hiatal hernia.    HEPATOBILIARY: No calcified gallstones or biliary dilatation. Hepatic cysts, no followup.    PANCREAS: No ductal dilatation or acute inflammatory change.    SPLEEN: Normal.    ADRENAL GLANDS: Normal.    KIDNEYS/BLADDER: Symmetric renal enhancement. No urinary collecting system dilatation or calculi. Bladder unremarkable.    BOWEL: Interval increase in size of the central pelvic abscess. This fluid and air collection now measures 3.2 x 3.5 x 6 cm and demonstrates a greater peripheral enhancement. Significant surrounding inflammatory change. Small locules of free air   adjacent. High-grade small bowel obstruction with dilated loops of a small bowel extending down to the level of the abscess where there is wall thickening and transition point at the level of the abscess (series 4, images 121-148). Diffuse inflammatory   edema and mesenteric fluid compatible with peritonitis. Distal small bowel decompressed. Small locules of air within the tubular structure adjacent to the collapsed loop of bowel possibly representing the appendix.    LYMPH NODES: No lymphadenopathy.    VASCULATURE: Normal caliber  aorta. Patent celiac, splenic, hepatic, SMA and bilateral renal arteries. Patent KAREN. Patent hepatic, portal, splenic and superior mesenteric veins.    PELVIC ORGANS: Significant interval decrease in the pelvic fluid collection now measuring 3.2 x 1.2 cm, previously 5.1 x 3.6 cm. Minimal free fluid in the pelvis.    MUSCULOSKELETAL: Degenerative changes in the spine.      Impression    IMPRESSION:   1.  Interval increase in size of the central pelvic abscess now measuring 3.2 x 3.5 x 6 cm, previously 2.2 x 2.2 x 3 cm. Significant surrounding inflammatory change with small locules of free air adjacent.  2.  High-grade small bowel obstruction with dilated loops of small bowel extending down the level of the abscess where there is wall thickening and transition point at the level the abscess. Distal small bowel decompressed. Small locules of air within it   adjacent to the collapsed loop of bowel possibly representing the appendix.  3.  Significant interval decrease in the posterior pelvic fluid collection now measuring 3.2 x 1.2 cm, previously 5.1 x 3.6 cm.

## 2025-04-18 NOTE — PROGRESS NOTES
General surgery progress note:    Saw patient in ED. Patient with continued abdominal pain and N/V from perforated appendix. Not septic. No evidence of an acute abdomen. Will manage conservatively for now. NG tube to LIS, IV abx, and consult to IR.    Full consult note to follow.    Elmo Macedo MD

## 2025-04-18 NOTE — CONSULTS
IR consult received for abdominal abscess. Will attempt CT guided drain today. Please keep patient NPO.    Meng Griffith MD

## 2025-04-18 NOTE — ED NOTES
[facility]  ED Nurse Handoff Report    ED Chief complaint: Abdominal Pain      ED Diagnosis:   Final diagnoses:   Appendicitis with abscess - with perforation       Code Status: Full Code    Allergies:   Allergies   Allergen Reactions    Bee Venom        Patient Story: Pt reports recent admission to the hospital for acute perforated appendicitis with abscess formation. Pt notes over the past couple days being unable to hold food down. Pt also notes small amounts of diarrhea and urine.   Focused Assessment:  NG tube measured at 58 cm at the nare. Secured with tape. Set to low continuous suction, clear yellow output noted in suction canister.     Treatments and/or interventions provided:   Medications   ondansetron (ZOFRAN) injection 4 mg (has no administration in time range)   iopamidol (ISOVUE-370) solution 71 mL (71 mLs Intravenous $Given 4/18/25 2127)   sodium chloride 0.9 % bag for CT scan flush (60 mLs Intravenous $Given 4/18/25 0757)   piperacillin-tazobactam (ZOSYN) 4.5 g vial to attach to  mL bag (4.5 g Intravenous $New Bag 4/18/25 4224)     CT Abdomen Pelvis w Contrast   Final Result   IMPRESSION:    1.  Interval increase in size of the central pelvic abscess now measuring 3.2 x 3.5 x 6 cm, previously 2.2 x 2.2 x 3 cm. Significant surrounding inflammatory change with small locules of free air adjacent.   2.  High-grade small bowel obstruction with dilated loops of small bowel extending down the level of the abscess where there is wall thickening and transition point at the level the abscess. Distal small bowel decompressed. Small locules of air within it    adjacent to the collapsed loop of bowel possibly representing the appendix.   3.  Significant interval decrease in the posterior pelvic fluid collection now measuring 3.2 x 1.2 cm, previously 5.1 x 3.6 cm.            CT Abdomen Peritoneum Abscess Drain w Cath Place    (Results Pending)     Patient's response to treatments and/or  interventions: Pt tolerating NG tube    To be done/followed up on inpatient unit:  CT abdomen Peritoneum Abscess Drain with Cath Place    Does this patient have any cognitive concerns?:  none    Activity level - Baseline/Home:  Independent  Activity Level - Current:   Stand with Assist    Patient's Preferred language: English   Needed?: No    Isolation: None  Infection: Not Applicable  Patient tested for COVID 19 prior to admission: NO  Bariatric?: No    Vital Signs:   Vitals:    04/18/25 0730 04/18/25 0742   BP: 115/76    Pulse: 105    Resp: 20    Temp:  98.2  F (36.8  C)   TempSrc:  Temporal   SpO2: 99%        Cardiac Rhythm:Cardiac Rhythm: Sinus tachycardia    Was the PSS-3 completed:   Yes  What interventions are required if any?                 For the majority of the shift this patient's behavior was Green.   Behavioral interventions performed were none.    ED NURSE PHONE NUMBER: [phone]

## 2025-04-18 NOTE — PROGRESS NOTES
"MD was paged if pt can have oral medication and Pt asking for sleeping med. Patient worried about taking Narcotic.   Per MD Pt should \"Should remain NPO for small bowel obstruction. I assume she has NG to LIS still, correct?    \"Hold the PM dose of the Simvastatin (not essential) there shouldn't be any more scheduled PO meds tonight. She also has IV Dilaudid for pain\"  "

## 2025-04-18 NOTE — ED NOTES
16 [place] NG tube placed and set to low continuous suction per Dr. Estes. Pt tolerated procedure.

## 2025-04-18 NOTE — ED NOTES
[facility]  ED Nurse Handoff Report    ED Chief complaint: Abdominal Pain      ED Diagnosis:   Final diagnoses:   None       Code Status: Full Code    Allergies:   Allergies   Allergen Reactions    Bee Venom        Patient Story: Pt reports recent admission to the hospital for acute perforated appendicitis with abscess formation. Pt notes over the past couple days being unable to hold food down. Pt also notes small amounts of diarrhea and urine.   Focused Assessment:  Pt c/o nausea and 4/10 pain to generalized abdominal area.    CT Abdomen Pelvis w Contrast   Preliminary Result   IMPRESSION:    1.  Interval increase in size of the central pelvic abscess now measuring 3.2 x 3.5 x 6 cm, previously 2.2 x 2.2 x 3 cm. Significant surrounding inflammatory change with small locules of free air adjacent.   2.  High-grade small bowel obstruction with dilated loops of small bowel extending down the level of the abscess where there is wall thickening and transition point at the level the abscess. Distal small bowel decompressed. Small locules of air within it    adjacent to the collapsed loop of bowel possibly representing the appendix.   3.  Significant interval decrease in the posterior pelvic fluid collection now measuring 3.2 x 1.2 cm, previously 5.1 x 3.6 cm.              Labs Ordered and Resulted from Time of ED Arrival to Time of ED Departure   BASIC METABOLIC PANEL - Abnormal       Result Value    Sodium 134 (*)     Potassium 3.6      Chloride 96 (*)     Carbon Dioxide (CO2) 20 (*)     Anion Gap 18 (*)     Urea Nitrogen 12.3      Creatinine 0.95      GFR Estimate 86      Calcium 9.1      Glucose 112 (*)    CBC WITH PLATELETS AND DIFFERENTIAL - Abnormal    WBC Count 17.8 (*)     RBC Count 4.51      Hemoglobin 14.3      Hematocrit 41.6      MCV 92      MCH 31.7      MCHC 34.4      RDW 12.5      Platelet Count 442      % Neutrophils 81      % Lymphocytes 9      % Monocytes 6      % Eosinophils 1      %  Basophils 1      % Immature Granulocytes 3      NRBCs per 100 WBC 0      Absolute Neutrophils 14.3 (*)     Absolute Lymphocytes 1.6      Absolute Monocytes 1.1      Absolute Eosinophils 0.1      Absolute Basophils 0.1      Absolute Immature Granulocytes 0.5 (*)     Absolute NRBCs 0.0             Treatments and/or interventions provided: CT, labs  Patient's response to treatments and/or interventions: Pt denies wanting pain meds. Pain remains 4/10     To be done/followed up on inpatient unit:  Per admitting     Does this patient have any cognitive concerns?:  n/a    Activity level - Baseline/Home:  Independent  Activity Level - Current:   Independent    Patient's Preferred language: English   Needed?: No    Isolation: None  Infection: Not Applicable  Patient tested for COVID 19 prior to admission: NO  Bariatric?: No    Vital Signs:   Vitals:    04/18/25 0730 04/18/25 0742   BP: 115/76    Pulse: 105    Resp: 20    Temp:  98.2  F (36.8  C)   TempSrc:  Temporal   SpO2: 99%        Cardiac Rhythm:Cardiac Rhythm: Sinus tachycardia    Was the PSS-3 completed:   Yes  What interventions are required if any?               Family Comments:   OBS brochure/video discussed/provided to patient/family: N/A                 For the majority of the shift this patient's behavior was Green.   Behavioral interventions performed were Care and rounding.    ED NURSE PHONE NUMBER: *[id number]

## 2025-04-18 NOTE — ED TRIAGE NOTES
Pt reports recent admission to the hospital for acute perforated appendicitis with abscess formation. Pt notes over the past couple days being unable to hold food down. Pt also notes small amounts of diarrhea and urine.      Triage Assessment (Adult)       Row Name 04/18/25 0732          Triage Assessment    Airway WDL WDL        Respiratory WDL    Respiratory WDL WDL        Skin Circulation/Temperature WDL    Skin Circulation/Temperature WDL WDL        Cardiac WDL    Cardiac WDL X     Cardiac Rhythm ST        Peripheral/Neurovascular WDL    Peripheral Neurovascular WDL WDL        Cognitive/Neuro/Behavioral WDL    Cognitive/Neuro/Behavioral WDL WDL

## 2025-04-18 NOTE — PROGRESS NOTES
Abdominal Abscess Drain: Pt tolerated well. VSS. Total sedation given - 50 mcg Fentanyl and 1 mg Versed. Drain placed w/o difficulty.  25 cc purulent fluid removed & specimen sent to lab. Drain irrigated with NS. Connected to SANTY bulb - full suction. Stay Fix drsg applied & tube secured.    1425 Pt back to  ED07 per cart & transport. Detailed report called to Ana Luisa SHAIKH.

## 2025-04-18 NOTE — ED PROVIDER NOTES
Emergency Department Note      History of Present Illness     Chief Complaint   Abdominal Pain      HPI   Larissa Rehman is a 69 year old adult who presents to the ED for abdominal pain. The patient was recently admitted to the hospital last week for perforated appendicitis with abscess and was discharged on Augmentin. They report feeling good at discharge, and they did not have any vomiting at that time. Patient states that for the past couple of days, they have had diffuse abdominal pain, which is now upper abdominal cramping. They have been taking the Augmentin as prescribed, though their pain is still persistent. They report that their vomiting began after taking a gas tablet yesterday, and they now endorse both vomiting and diarrhea. They report a temperature of 99 F when experiencing severe pain 2 days ago. They have not been using the pain medication they were prescribed, but did take 1 tylenol 2 days ago. Patient is urinating okay and denies any urinary symptoms. They last ate/drank a sip of water prior to arrival and bone broth yesterday morning. They are not eating solid foods. Patient has not followed up with general surgery yet and does not have an appointment scheduled. They add that they were told to schedule this 6 months down the line.    Independent Historian   None    Review of External Notes   I reviewed 4/9/25-4/12/25 hospital admission for perforated appendicitis with abscess. Started on Zosyn. No drains placed due to small size of abscess. General surgery made recommendation for follow-up imaging as outpatient. Discharged on oral Augmentin.    Past Medical History     Medical History and Problem List   Hypercholesteremia   Gender incongruence  Seasonal mood disorder  Mandibular bone loss  Colonic polyps  Hearing loss  Arthritis  Ruptured appendicitis  ADHD  Bilateral orchiectomies and zero-depth vaginoplasty    Medications   Augmentin  Vivelle-dot  Zofran  Roxicodone  Mylicon  Zocor  Aspirin      Surgical History   Past Surgical History:   Procedure Laterality Date    COLONOSCOPY      ORTHOPEDIC SURGERY      foot surgery     TRANSGENDER VAGINOPLASTY ZERO DEPTH N/A 3/29/2018    Procedure: TRANSGENDER VAGINOPLASTY ZERO DEPTH;  Zero Depth Transgender Vaginoplasty;  Surgeon: Madhu Mojica MD;  Location: [facility] OR       Physical Exam     Patient Vitals for the past 24 hrs:   BP Temp Temp src Pulse Resp SpO2   04/18/25 1030 119/69 -- -- 71 16 --   04/18/25 1000 118/85 -- -- 77 16 --   04/18/25 0930 109/73 -- -- 71 16 99 %   04/18/25 0742 -- 98.2  F (36.8  C) Temporal -- -- --   04/18/25 0730 115/76 -- -- 105 20 99 %     Physical Exam    Physical Exam   Constitutional:  Patient is oriented to person, place, and time. They appear well-developed and well-nourished. Mild distress secondary to abdominal pain.   HENT:   Eyes:    Conjunctivae normal and EOM are normal. Pupils are equal, round, and reactive to light.   Neck:    Normal range of motion.   Cardiovascular: Normal rate, regular rhythm and normal heart sounds.  Exam reveals no gallop and no friction rub.  No murmur heard.  Pulmonary/Chest:  Effort normal and breath sounds normal. Patient has no wheezes. Patient has no rales.   Abdominal:   Soft. Bowel sounds are hypoactive. Patient exhibits no mass. Hypoactive bowel sounds. There is generalized pain over the upper abdomen. There is no rebound and no guarding.   Musculoskeletal:  Normal range of motion. Patient exhibits no edema.   Neurological:   Patient is alert and oriented to person, place, and time. Patient has normal strength. No cranial nerve deficit or sensory deficit. GCS 15  Skin:   Skin is warm and dry. No rash noted. No erythema.   Psychiatric:   Patient has a normal mood and affect. Patient's behavior is normal. Judgment and thought content normal.     Diagnostics     Lab Results   Labs Ordered and Resulted from Time of ED Arrival to Time of ED Departure   BASIC METABOLIC PANEL - Abnormal        Result Value    Sodium 134 (*)     Potassium 3.6      Chloride 96 (*)     Carbon Dioxide (CO2) 20 (*)     Anion Gap 18 (*)     Urea Nitrogen 12.3      Creatinine 0.95      GFR Estimate 86      Calcium 9.1      Glucose 112 (*)    CBC WITH PLATELETS AND DIFFERENTIAL - Abnormal    WBC Count 17.8 (*)     RBC Count 4.51      Hemoglobin 14.3      Hematocrit 41.6      MCV 92      MCH 31.7      MCHC 34.4      RDW 12.5      Platelet Count 442      % Neutrophils 81      % Lymphocytes 9      % Monocytes 6      % Eosinophils 1      % Basophils 1      % Immature Granulocytes 3      NRBCs per 100 WBC 0      Absolute Neutrophils 14.3 (*)     Absolute Lymphocytes 1.6      Absolute Monocytes 1.1      Absolute Eosinophils 0.1      Absolute Basophils 0.1      Absolute Immature Granulocytes 0.5 (*)     Absolute NRBCs 0.0         Imaging   CT Abdomen Pelvis w Contrast   Final Result   IMPRESSION:    1.  Interval increase in size of the central pelvic abscess now measuring 3.2 x 3.5 x 6 cm, previously 2.2 x 2.2 x 3 cm. Significant surrounding inflammatory change with small locules of free air adjacent.   2.  High-grade small bowel obstruction with dilated loops of small bowel extending down the level of the abscess where there is wall thickening and transition point at the level the abscess. Distal small bowel decompressed. Small locules of air within it    adjacent to the collapsed loop of bowel possibly representing the appendix.   3.  Significant interval decrease in the posterior pelvic fluid collection now measuring 3.2 x 1.2 cm, previously 5.1 x 3.6 cm.            CT Abdomen Peritoneum Abscess Drain w Cath Place    (Results Pending)     Independent Interpretation   CT of abdomen shows increasing inter abdominal abscess.    ED Course      Medications Administered   Medications   ondansetron (ZOFRAN) injection 4 mg (has no administration in time range)   iopamidol (ISOVUE-370) solution 71 mL (71 mLs Intravenous $Given 4/18/25 8505)    sodium chloride 0.9 % bag for CT scan flush (60 mLs Intravenous $Given 4/18/25 0758)   piperacillin-tazobactam (ZOSYN) 4.5 g vial to attach to  mL bag (4.5 g Intravenous $New Bag 4/18/25 0916)       Procedures   Procedures     Discussion of Management   Admitting Hospitalist, Sotero Infante  Surgery, Dr Macedo  Interventional Radiology, Dr Griffith    ED Course   ED Course as of 04/18/25 1050   Fri Apr 18, 2025   0747 I obtained history and examined the patient as noted above.     0853 I spoke with Dr. Macedo of general surgery regarding patient's presentation and plan of care.   0855 I rechecked the patient and discussed imaging results.   0916 Dr. Macedo came to ED to see the patient and I discussed with her regarding patient's plan of care.   0929 I consulted with Sotero Infante of the hospitalist service and discussed patient admission. They accepted care of the patient.   0938 I spoke with Dr. Griffith of interventional radiology regarding patient's presentation and plan of care.       Additional Documentation  None    Medical Decision Making / Diagnosis     CMS Diagnoses: None    MIPS       None    MDM   Larissa Rehman is a 69 year old adult who presented to the hospital with worsening pain across the upper abdomen and now recurrent vomiting that started yesterday.  They have been doing a bone broth liquid diet.  Has had a little bit of nonbloody diarrhea.  Exam showed hypoactive bowel sounds pain across the abdomen upper abdomen to palpation but no peritoneal signs.  The heart rate is a little elevated but blood pressure is normal.  Blood work does show a leukocytosis no RIKI but his CT does show a larger abscess than previous.  I wrote for Zosyn.  I discussed with general surgery they do recommend an NG tube given the vomiting.  We discussed whether IR intervention would be appropriate at this time .  After reviewing the imaging and coming down to evaluate the patient Dr. Macedo is recommending speaking with  IR.  I spoke with Dr. Griffith of IR they do think this is appropriate for drainage they will move them over for procedure.  Do not feel they need emergent surgery at this time.  Admit to hospital.    Disposition   The patient was admitted to the hospital.     Diagnosis     ICD-10-CM    1. Appendicitis with abscess  K35.33     with perforation           Discharge Medications   New Prescriptions    No medications on file         Scribe Disclosure:  I, Carolina Ferguson, am serving as a scribe at 8:19 AM on 4/18/2025 to document services personally performed by Darlene Estes MD based on my observations and the provider's statements to me.        Darlene Estes MD  04/18/25 3745

## 2025-04-18 NOTE — PHARMACY-ADMISSION MEDICATION HISTORY
Pharmacist Admission Medication History    Admission medication history is complete. The information provided in this note is only as accurate as the sources available at the time of the update.    Information Source(s): Patient and CareEverywhere/SureScripts via in-person    Pertinent Information: Patient cuts estradiol patch in half and replaces it behind the right ear every 2 days     Changes made to PTA medication list:  Added: None  Deleted: oxycodone   Changed: estradiol patch (updated dose)     Allergies reviewed with patient and updates made in EHR: yes    Medication History Completed By: Alexandria Valentine [facility] 4/18/2025 10:51 AM    PTA Med List   Medication Sig Last Dose/Taking    acetaminophen (TYLENOL) 325 MG tablet Take 2 tablets (650 mg) by mouth every 4 hours as needed for mild pain or other (and adjunct with moderate or severe pain or per patient request). Taking As Needed    amoxicillin-clavulanate (AUGMENTIN) 875-125 MG tablet Take 1 tablet by mouth 2 times daily for 14 days. 4/17/2025 Evening    estradiol (VIVELLE-DOT) 0.1 MG/24HR bi-weekly patch Place 1 patch over 96 hours onto the skin twice a week. (Patient taking differently: Place 0.5 patches onto the skin every 48 hours. (Cuts patch in half and replaces it every 2 days behind right ear)) 4/18/2025 Morning    ondansetron (ZOFRAN ODT) 4 MG ODT tab Take 1 tablet (4 mg) by mouth every 6 hours as needed for nausea. Taking As Needed    simethicone (MYLICON) 80 MG chewable tablet Take 1 tablet (80 mg) by mouth every 6 hours as needed for flatulence or cramping. Taking As Needed    simvastatin (ZOCOR) 40 MG tablet TAKE 1 TABLET(40 MG) BY MOUTH AT BEDTIME 4/16/2025

## 2025-04-19 ENCOUNTER — APPOINTMENT (OUTPATIENT)
Dept: GENERAL RADIOLOGY | Facility: CLINIC | Age: 70
DRG: 372 | End: 2025-04-19
Attending: INTERNAL MEDICINE
Payer: COMMERCIAL

## 2025-04-19 LAB
ALBUMIN SERPL BCG-MCNC: 3.5 G/DL (ref 3.5–5.2)
ALP SERPL-CCNC: 41 U/L (ref 40–150)
ALT SERPL W P-5'-P-CCNC: 10 U/L (ref 0–70)
ANION GAP SERPL CALCULATED.3IONS-SCNC: 16 MMOL/L (ref 7–15)
AST SERPL W P-5'-P-CCNC: 16 U/L (ref 0–45)
BILIRUB SERPL-MCNC: 0.5 MG/DL
BUN SERPL-MCNC: 14.3 MG/DL (ref 8–23)
CALCIUM SERPL-MCNC: 7.8 MG/DL (ref 8.8–10.4)
CHLORIDE SERPL-SCNC: 101 MMOL/L (ref 98–107)
CREAT SERPL-MCNC: 0.84 MG/DL (ref 0.51–1.17)
EGFRCR SERPLBLD CKD-EPI 2021: >90 ML/MIN/1.73M2
ERYTHROCYTE [DISTWIDTH] IN BLOOD BY AUTOMATED COUNT: 12.4 % (ref 10–15)
GLUCOSE SERPL-MCNC: 95 MG/DL (ref 70–99)
HCO3 SERPL-SCNC: 21 MMOL/L (ref 22–29)
HCT VFR BLD AUTO: 39.5 % (ref 35–53)
HGB BLD-MCNC: 13.1 G/DL (ref 11.7–17.7)
MCH RBC QN AUTO: 31.6 PG (ref 26.5–33)
MCHC RBC AUTO-ENTMCNC: 33.2 G/DL (ref 31.5–36.5)
MCV RBC AUTO: 95 FL (ref 78–100)
PLATELET # BLD AUTO: 400 10E3/UL (ref 150–450)
POTASSIUM SERPL-SCNC: 3.6 MMOL/L (ref 3.4–5.3)
PROT SERPL-MCNC: 6.5 G/DL (ref 6.4–8.3)
RBC # BLD AUTO: 4.14 10E6/UL (ref 3.8–5.9)
SODIUM SERPL-SCNC: 138 MMOL/L (ref 135–145)
WBC # BLD AUTO: 12.3 10E3/UL (ref 4–11)

## 2025-04-19 PROCEDURE — 120N000001 HC R&B MED SURG/OB

## 2025-04-19 PROCEDURE — 999N000065 XR ABDOMEN PORT 1 VIEW

## 2025-04-19 PROCEDURE — 84155 ASSAY OF PROTEIN SERUM: CPT

## 2025-04-19 PROCEDURE — 99232 SBSQ HOSP IP/OBS MODERATE 35: CPT | Performed by: PHYSICIAN ASSISTANT

## 2025-04-19 PROCEDURE — 85027 COMPLETE CBC AUTOMATED: CPT

## 2025-04-19 PROCEDURE — 99232 SBSQ HOSP IP/OBS MODERATE 35: CPT | Performed by: INTERNAL MEDICINE

## 2025-04-19 PROCEDURE — 258N000003 HC RX IP 258 OP 636

## 2025-04-19 PROCEDURE — 250N000011 HC RX IP 250 OP 636

## 2025-04-19 PROCEDURE — 36415 COLL VENOUS BLD VENIPUNCTURE: CPT

## 2025-04-19 PROCEDURE — 250N000011 HC RX IP 250 OP 636: Performed by: EMERGENCY MEDICINE

## 2025-04-19 RX ORDER — DEXTROSE MONOHYDRATE, SODIUM CHLORIDE, SODIUM LACTATE, POTASSIUM CHLORIDE, CALCIUM CHLORIDE 5; 600; 310; 179; 20 G/100ML; MG/100ML; MG/100ML; MG/100ML; MG/100ML
INJECTION, SOLUTION INTRAVENOUS CONTINUOUS
Status: DISCONTINUED | OUTPATIENT
Start: 2025-04-19 | End: 2025-04-19

## 2025-04-19 RX ADMIN — PIPERACILLIN AND TAZOBACTAM 4.5 G: 4; .5 INJECTION, POWDER, FOR SOLUTION INTRAVENOUS at 15:09

## 2025-04-19 RX ADMIN — PIPERACILLIN AND TAZOBACTAM 4.5 G: 4; .5 INJECTION, POWDER, FOR SOLUTION INTRAVENOUS at 03:10

## 2025-04-19 RX ADMIN — PIPERACILLIN AND TAZOBACTAM 4.5 G: 4; .5 INJECTION, POWDER, FOR SOLUTION INTRAVENOUS at 21:14

## 2025-04-19 RX ADMIN — SODIUM CHLORIDE: 9 INJECTION, SOLUTION INTRAVENOUS at 01:08

## 2025-04-19 RX ADMIN — PIPERACILLIN AND TAZOBACTAM 4.5 G: 4; .5 INJECTION, POWDER, FOR SOLUTION INTRAVENOUS at 08:47

## 2025-04-19 RX ADMIN — POTASSIUM CHLORIDE: 2 INJECTION, SOLUTION, CONCENTRATE INTRAVENOUS at 23:13

## 2025-04-19 RX ADMIN — SODIUM CHLORIDE: 9 INJECTION, SOLUTION INTRAVENOUS at 11:55

## 2025-04-19 ASSESSMENT — ACTIVITIES OF DAILY LIVING (ADL)
ADLS_ACUITY_SCORE: 59
ADLS_ACUITY_SCORE: 59
ADLS_ACUITY_SCORE: 58
ADLS_ACUITY_SCORE: 59
ADLS_ACUITY_SCORE: 58
ADLS_ACUITY_SCORE: 59

## 2025-04-19 NOTE — SIGNIFICANT EVENT
Significant Event Note    Time of event: 6:56 PM April 18, 2025    Description of event:  Aspirate from abdomen:  Gram Stain Result : 1+ Gram negative bacilli       Plan:  Continue Zosyn    Discussed with: bedside nurse    Yessy Ellington MD

## 2025-04-19 NOTE — PLAN OF CARE
Goal Outcome Evaluation:      Plan of Care Reviewed With: patient    Overall Patient Progress: no changeOverall Patient Progress: no change      Admitting Diagnosis: Appendicitis with abscess   Vitals: VSS  Pain 4/10. On PRN Dilaudid IV and PO  A&Ox4  Voiding: In the bathroom  Mobility: Up SBA/independent   Tele: None  CMS: Intact  Lung Sounds: WDL  GI: GI Tube in place suction set at intermittent    Dressing/Line/Skin:LLQ SANTY site DCI;  PIV infusing NS at 100 mL.hr; intermittent abx   Diet: NPO  Other Informations:  Irrigation SANTY tube Qshift with 10 mL

## 2025-04-19 NOTE — PROGRESS NOTES
[facility]    General Surgery  Daily Progress Note       Assessment and Plan:   Larissa Rehman is a 69 year old adult who presented to the ED 4/18 with nausea, vomiting, abdominal pain due to perforated appendicitis complicated by abscess and high grade SBO. She first presented on 4/9/2025 and was managed conservatively with antibiotics and observation. On CT 4/18 she continues to have significant fluid collection in her pelvis and now dilated small bowel concerning for high grade SBO. On exam, her abdomen is just mildly tender to palpation but no evidence of an acute abdomen. NG accidental removal overnight, replaced this morning. XR with dilated distended loops of small bowel with gas seen distally consistent with known small bowel obstruction.     PLAN:  - Patient with bowel function but still dilated small bowel on XR. NG not correctly placed to suction and this was fixed this morning. Continue NG to LIS. Record NG output in I/O's.  - Drain care per IR.  - Antibiotics per ID discretion.  - Analgesia and antinausea meds prn.  - Ambulate QID to assist with bowel function, PCDs for DVT prophylaxis.   - Encourage deep breathe and IS.   - Medical management per primary team.        Interval History:   Larissa Rehman is seen on surgical rounds. She is quite frustrated with overnight staff given NG accidental removal when going to the bathroom. Then this morning, NG not to suction when seen. XR confirmed placement. Abdominal pain still present but controlled. Loose BM's slowed from yesterday. Continues to have flatus. No nausea. Plans to walk more today. Vitals wnl.         Physical Exam:   Temp: 97.8  F (36.6  C) Temp src: Oral BP: 137/72 Pulse: 72   Resp: 18 SpO2: 98 % O2 Device: None (Room air)      I/O last 3 completed shifts:  In: 10 [Other:10]  Out: 241 [Emesis/NG output:200; Drains:41]    GENERAL: VS reviewed, alert, oriented, no acute distress  LUNGS: Normal respiratory effort, no  wheezing  ABDOMEN:  Distended but soft, minimally tender throughout without rebound or guarding, IR drain with 50 ml cloudy serous output  EXTREMITIES: Moving all extremities, no gross deformities  NEUROLOGICAL: Grossly non-focal, mood & affect appropriate    Data   Recent Labs   Lab 04/18/25  0745   WBC 17.8*   HGB 14.3   MCV 92      *   POTASSIUM 3.6   CHLORIDE 96*   CO2 20*   BUN 12.3   CR 0.95   ANIONGAP 18*   DEBORA 9.1   *     XR ABDOMEN 4/19  IMPRESSION: Orogastric tube projects over stomach. No free air. Dilated distended loops of small bowel with gas seen distally consistent with known small bowel obstruction.       Audrey Mancia PA-C    Please use [employer] to page 7:30am-4pm, page on-call surgeon after 4pm  Office number: [phone]    35 minutes spent on date of the encounter doing patient visit, chart review, and documentation.

## 2025-04-19 NOTE — PROGRESS NOTES
[facility]    Hospitalist Progress Note    Date of Service (when I saw the patient): 04/19/2025  Admit date: 4/18/2025    Interval History   Overnight patient accidentally pulled out her NG tube, requiring replacement.  Follow-up x-ray shows good placement.  IR placed percutaneous drain with 25 cc of purulent fluid aspirated.  Gram stain showing gram-negative bacilli  Overnight she has been afebrile hemodynamically stable O2 is 98% on RA.   Complete metabolic panel looks good with stable creatinine of 0.84.  WBC down to 12.3 (17.8 yesterday)  Patient states that when the NG came out last night she did get more distended he feels much better now that it is in.   Pain well-controlled.  Note that she is not using any narcotic.     Eleazar, has a very peaceful disposition.  Very analytical, and understands the plan of care.  She actually has researched his ruptured appendicitis with small bowel obstruction using [employer].   When I congratulated her on her curiosity.  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  She also discussed that she meditates and is not afraid of pain as she can usually handle this through meditation.     Assessment & Plan   Larissa Rehman is a 69 year old adult admitted on 4/18/2025 with abdominal pain. She was admitted recently admitted from 4/9/2025 to 4/12/2025 for perforated appendicitis with abscess. She was ultimately discharged on Augmentin.      Ms. Rehmna presents on 4/18/2025 with acutely worsening abdominal pain. She was recently hospitalized with abdominal pain from 4/9/2025 to 4/12/2025 for abdominal pain and found to have perforated appendicitis with abscess. At that time, general surgery was consulted, but did not recommend surgical intervention. IR was also consulted but did not recommend drain placement. She was on Zosyn and then  transitioned to Augmentin at discharge. She now presents with worsening pain across the upper abdomen and has been having recurrent vomiting since 4/17/2025.  *WBC: 17.8    *Abdominal CT w contrast: Interval increase in size of the central pelvic abscess now measuring 3.2 x 3.5 x 6 cm, previously 2.2 x 2.2 x 3 cm. Significant surrounding inflammatory change with small locules of free air adjacent  High-grade small bowel obstruction with dilated loops of small bowel extending down the level of the abscess where there is wall thickening and transition point at the level the abscess. Distal small bowel decompressed. Small locules of air within it adjacent to the collapsed loop of bowel possibly representing the appendix. Significant interval decrease in the posterior pelvic fluid collection now measuring 3.2 x 1.2 cm, previously 5.1 x 3.6 cm.     Perforated appendicitis with abscess  Small bowel obstruction  Acute abdominal pain  S/p percutaneous drain placement by IR on 4/19  Abscess culture growing E. coli and Klebsiella  Continue IV Zosyn, follow cultures.   Continue NG to intermittent suction  NPO, Change NS to D5 LR + 20 KCl @ 100 ml/h when current bag is complete  Appreciate general surgery consult  We are trying to avoid surgical intervention given the degree of inflammation in his abdomen making him higher risk for morbidity and mortality with surgery.   Discussed ambulating which patient states he is doing.    Recent Labs   Lab 04/19/25  0931 04/18/25  0745   WBC 12.3* 17.8*         Hypercholesterolemia   Resume PTA simvastatin when able to tolerate PO      Clinically Significant Risk Factors Present on Admission         # Hyponatremia: Lowest Na = 134 mmol/L in last 2 days, will monitor as appropriate  # Hypochloremia: Lowest Cl = 96 mmol/L in last 2 days, will monitor as appropriate                                 Diet: Orders Placed This Encounter      NPO for Procedure/Surgery per Anesthesia Guidelines  Except for: Meds; Clear liquids before procedure/surgery: ADULT (Age GREATER than or Equal to 18 years) - Clear liquids 2 hours before procedure/surgery     IVF: NS > D5 LR + 20 KCl @ 100 ml/h  DVT Prophylaxis: ambulate, PCDs  Packer Catheter: Not present    No CPR- Do NOT Intubate  Disposition:  Medically Ready for Discharge: Anticipated in 2-4 Days    Communication: Discussed with patient and RN on 04/19/25    Juliana Wilson MD    Hospitalist Service  [facility]  Securely message with the Vocera Web Console (learn more here)  Text page via bewarket Paging/Directory      -Data reviewed today: I reviewed all new labs and imaging results over the last 24 hours. I personally reviewed no images or EKG's today.    Physical Exam   Temp: 97.8  F (36.6  C) Temp src: Oral BP: 137/72 Pulse: 72   Resp: 18 SpO2: 98 % O2 Device: None (Room air)    Vitals:    04/19/25 0711   Weight: 64 kg (141 lb 1.5 oz)     Vital Signs with Ranges  Temp:  [97.7  F (36.5  C)-99  F (37.2  C)] 97.8  F (36.6  C)  Pulse:  [60-77] 72  Resp:  [16-18] 18  BP: ()/(53-87) 137/72  SpO2:  [95 %-99 %] 98 %  I/O last 3 completed shifts:  In: 10 [Other:10]  Out: 241 [Emesis/NG output:200; Drains:41]    Today's Exam  Constitutional:  NAD,   Neuropsyche: Very pleasant and calm alert and oriented, answers questions appropriately. Speech normal, face symmetric and moving all 4 extremities without gross focal neurological deficit.   Respiratory:  Breathing comfortably, good air exchange, no wheezes, no crackles.   Cardiovascular:  Regular rate and rhythm, no edema.  GI:  NG in place, abdomen soft, mildly distended, mild diffuse TTP, BS present.   Skin/Integumen:  No acute rash or sign of bleeding.       Medications   All medications reviewed on 04/19/25    Current Facility-Administered Medications   Medication Dose Route Frequency Provider Last Rate Last Admin    sodium chloride 0.9 % infusion   Intravenous Continuous Arelis  Madhu Peters  mL/hr at 04/19/25 0108 New Bag at 04/19/25 0108     Current Facility-Administered Medications   Medication Dose Route Frequency Provider Last Rate Last Admin    [START ON 4/20/2025] estradiol (VIVELLE-DOT) 0.1 MG/24HR BIW patch 0.5 patch  0.5 patch Transdermal Q48H Madhu Infante NP        estradiol biweekly (VIVELLE-DOT) Patch in Place   Transdermal Q8H Darlene Nj MD        piperacillin-tazobactam (ZOSYN) 4.5 g vial to attach to  mL bag  4.5 g Intravenous Q6H Madhu Infante NP   4.5 g at 04/19/25 0310    simvastatin (ZOCOR) tablet 40 mg  40 mg Oral At Bedtime Madhu Infante NP        sodium chloride (PF) 0.9% PF flush 10 mL  10 mL Irrigation Q8H Meng Griffith MD   10 mL at 04/19/25 0116    sodium chloride (PF) 0.9% PF flush 3 mL  3 mL Intracatheter Q8H [facility] Madhu Infante NP         PRN Meds:   Current Facility-Administered Medications   Medication Dose Route Frequency Provider Last Rate Last Admin    acetaminophen (TYLENOL) tablet 650 mg  650 mg Oral Q4H PRN Madhu Infante NP        Or    acetaminophen (TYLENOL) Suppository 650 mg  650 mg Rectal Q4H PRN Madhu Infante NP        calcium carbonate (TUMS) chewable tablet 1,000 mg  1,000 mg Oral 4x Daily PRN Madhu Infante NP        hydrALAZINE (APRESOLINE) tablet 10 mg  10 mg Oral Q4H PRN Madhu Infante NP        Or    hydrALAZINE (APRESOLINE) injection 10 mg  10 mg Intravenous Q4H PRN Madhu Infante NP        HYDROmorphone (DILAUDID) injection 0.2 mg  0.2 mg Intravenous Q2H PRN Madhu Infante NP        HYDROmorphone (DILAUDID) injection 0.4 mg  0.4 mg Intravenous Q2H PRN Madhu Infante, NP        lidocaine (LMX4) cream   Topical Q1H PRN Madhu Infante, NP        lidocaine 1 % 0.1-1 mL  0.1-1 mL Other Q1H PRN Madhu Infante, NP         naloxone (NARCAN) injection 0.2 mg  0.2 mg Intravenous Q2 Min PRN Darlene Estes MD        Or    naloxone (NARCAN) injection 0.4 mg  0.4 mg Intravenous Q2 Min PRN Darlene Estes MD        Or    naloxone (NARCAN) injection 0.2 mg  0.2 mg Intramuscular Q2 Min PRN Darlene Estes MD        Or    naloxone (NARCAN) injection 0.4 mg  0.4 mg Intramuscular Q2 Min PRN Darlene Estes MD        ondansetron (ZOFRAN ODT) ODT tab 4 mg  4 mg Oral Q6H PRN Madhu Infante NP        Or    ondansetron (ZOFRAN) injection 4 mg  4 mg Intravenous Q6H PRN Madhu Infante NP        prochlorperazine (COMPAZINE) injection 5 mg  5 mg Intravenous Q6H PRN Madhu Infante NP        Or    prochlorperazine (COMPAZINE) tablet 5 mg  5 mg Oral Q6H PRN Madhu Infante NP        senna-docusate (SENOKOT-S/PERICOLACE) 8.6-50 MG per tablet 1 tablet  1 tablet Oral BID PRN Madhu Infante NP        Or    senna-docusate (SENOKOT-S/PERICOLACE) 8.6-50 MG per tablet 2 tablet  2 tablet Oral BID PRN Madhu Infante NP        sodium chloride (PF) 0.9% PF flush 3 mL  3 mL Intracatheter q1 min prn Madhu Infante NP           Data   Recent Labs   Lab 04/18/25  0745   WBC 17.8*   HGB 14.3   MCV 92      *   POTASSIUM 3.6   CHLORIDE 96*   CO2 20*   BUN 12.3   CR 0.95   ANIONGAP 18*   DEBORA 9.1   *       Recent Results (from the past 24 hours)   CT Abdomen Peritoneum Abscess Drain w Cath Place    Narrative    EXAM:  1. PERCUTANEOUS DRAIN PLACEMENT ABDOMINAL ABSCESS  2. CT GUIDANCE  3. CONSCIOUS SEDATION  LOCATION: [facility]  DATE: 4/18/2025    INDICATION: abdominal abscess  TECHNIQUE: Dose reduction techniques were used.    PROCEDURE: Informed consent obtained. Site marked. Prior images reviewed. Required items made available. Patient identity confirmed verbally and with arm band. Patient reevaluated  immediately before administering sedation. Universal protocol was   followed. Time out performed. The site was prepped and draped in sterile fashion. 10 mL of 1% lidocaine was infused into the local soft tissues. Using standard technique and under direct CT guidance, a 8 [place] drainage catheter was inserted into the   fluid collection.      SPECIMEN: 25 mL of purulent fluid was aspirated and sent to lab for cultures and Gram stain.    BLOOD LOSS: Minimal.    The patient tolerated the procedure well. No immediate complications.    SEDATION: Versed 1 mg. Fentanyl 50 mcg. The procedure was performed with administration intravenous conscious sedation with appropriate preoperative, intraoperative, and postoperative evaluation.    15 minutes of supervised face to face conscious sedation time was provided by a radiology nurse under my direct supervision.      Impression    IMPRESSION:  1.  Successful CT-guided drain placement into an abdominal abscess.   XR Abdomen Port 1 View    Narrative    EXAM: XR ABDOMEN PORT 1 VIEW  LOCATION: [facility]  DATE: 4/19/2025    INDICATION: confirm placement of NG  COMPARISON: None.      Impression    IMPRESSION: Orogastric tube projects over stomach. No free air. Dilated distended loops of small bowel with gas seen distally consistent with known small bowel obstruction.

## 2025-04-19 NOTE — PROGRESS NOTES
MD Notification    Notified Person: MD     Notified Person Name: Chano Garcia    Notification Date/Time: 4/19/25 at 0428    Notification Interaction: [employer] messaging    Purpose of Notification: Pt accidentally pulled out his NG tube. Do you want it replaced? If yes, can you place an order and an X-ray order? Thanks    Orders Received:Yes, he needs it replaced. I'll order the x-ray after placement.    Comments: Will let bedside RN to follow up

## 2025-04-19 NOTE — PLAN OF CARE
Goal Outcome Evaluation:               Date/Time:4/18/25 [id number]    Trauma/Ortho/Medical (Choose one) Medical    Diagnosis:Perforated appendicitis with abscess  Small bowel obstruction,Acute abdominal pain  POD#:N/A  Mental Status:A&Ox4  Activity/dangle:SBA  Diet:NPO   Pain:Denies pain  Packer/Voiding:[place] up to BR,Loose BM X1  Tele/Restraints/Iso:N/A  02/LDA:PIV infusing NS @ 100ml/hr  D/C Date:Pending/TBD  Other Info:NG tube for gastric decompression to LIS,SANTY drain x 1 to Bulb sucton

## 2025-04-19 NOTE — DISCHARGE INSTRUCTIONS
Drain Placement Discharge Instructions:  You had a small tube or drain(s) placed. This was placed so the abnormal fluid collection within your body can be drained out (externally). Sometimes drains must stay in place for weeks to months. Please follow the below instructions as you recover:    Care Instructions after drain placement:  - Rest after your drain was placed. Avoid strenuous activity and heavy lifting for the next 2 days. Return to your normal activities as you tolerate after the 2 day restriction.  - You may shower; however, you should not submerge site under water like in a tub bath, Jacuzzi or pool. Keep drain exit site covered with plastic wrap and tape when showering.  - Keep dressing clean and dry as long as drain is in place. If you have a gauze dressing, please change the dressing daily and as needed to keep site clean and dry.  - You may eat and drink as normal.  - You may have discomfort after the procedure near the drain exit site. You may take acetaminophen (Tylenol ) or ibuprofen (Motrin ) as needed for any discomfort.  - Protect the drain from being pulled out or dislodged.  - Inspect the tube often for kinks.  - If you have a gravity bag, keep the bag below the drain exit site to allow for free flow of drainage by gravity.  - Flush your drainage tube with 10mL sterile normal saline daily.  - Record your daily drain outputs and amount flushed on your drainage record. Bring your records to your next radiology appointment. Often drains will need to stay in place until the drainage output is less than about 15mL a day for 2-3 days in a row.  - Empty your drainage bag/bulb daily or when it is approximately half way fluid. Follow below instructions for emptying your bag/bulb:       - Clean hands well with soap and water.       - Place a measuring container near the outlet valve of the drainage bag/bulb.       - If you have a drainage BAG: Twist the blue valve at the bottom of the bag while holding  "the valve over your measuring cup and open container to empty. Re-twist the valve closed on the drainage bag once complete.       - If you have a drainage BULB: Open the bulb cap (at the top of the bulb). Empty the fluid into the measuring cup. Squeeze bulb and hold flat. While bulb is squeezed, close the cap.       - After draining fluid record your drainage output.        - Discard drainage into toilet once fluid drained.    Flushing Your Drainage Tube:   If you have a 3 way stop cock:     1. Collect flushing supplies: 10mL of sterile normal saline in syringe, alcohol pad.  2. Clean the flushing (center) port with alcohol and attach the flushing syringe by twisting into place (clockwise).  3. Turn the 3 way stopcock valve \"off\" to the drainage bag/bulb. (Valve should be pointed toward the bag/bulb)  4. Gently inject/flush the drain so fluid is moving towards your body through the drainage catheter.   5. Turn the stopcock valve back to its center position to allow for drainage to resume.   6. Remove flushing syringe from flushing port by untwisting the syringe (counter clockwise).  7. Squeeze bulb or accordion to reapply suction if you have one.  8. Record the output from your drain and flush amount on your drainage record.     If you have a Y flushing port / UreSil Flush Adapter:    1. Collect flushing supplies: 10mL of sterile normal saline in syringe, alcohol pad.  2. Clamp off the tubing to the drain by pinching the white clamp closed. Clean the drain port with an alcohol wipe.   3. Attach the saline syringe to the drain port.   4. Twist the syringe (clockwise) to tighten it to the port   5. Flush sterile normal saline from the syringe into the drain. The saline should flow toward your body not toward the drainage bag.   6. Untwist the syringe (counter clockwise) and remove it.   7. Unclamp the white clamp making sure the drainage is able to flow freely into the bag.   8. Squeeze bulb or accordion to reapply " suction if you have one.  9. Record the output from your drain and flush amount on your drainage record.    Follow-Up:   - Please call [facility] Outpatient Scheduling at [phone] if you are not contacted for drain follow-up within 1 week of hospital discharge.    Please seek medical evaluation for:  - Nausea and/or vomiting   - Diffuse abdominal pain  - Fevers (greater than 101 F)  - Drainage tube falls out, is pulled back or felt to be out of position.     Call Interventional Radiology with tube related questions or concerns:  - Unable to flush drainage tube.   - Leakage (or purulent drainage) around drainage tube site.   - Extreme pain at drainage tube site.   - Outputs suddenly stop or significantly reduces.   - Warmth, redness, swelling or tenderness around the drainage tube    Please call Trish (Interventional Radiology RN clinician) at [phone] or Carrol (Interventional Radiology NP) at [phone] for questions or concerns after the procedure. You can leave a voicemail. We work Monday through Friday 7:30am-4:30 or 5pm. An alternative number to call for questions is Interventional Radiology at [phone].

## 2025-04-20 LAB
BACTERIA ASPIRATE CULT: ABNORMAL

## 2025-04-20 PROCEDURE — 250N000011 HC RX IP 250 OP 636

## 2025-04-20 PROCEDURE — 255N000002 HC RX 255 OP 636: Performed by: PHYSICIAN ASSISTANT

## 2025-04-20 PROCEDURE — 99233 SBSQ HOSP IP/OBS HIGH 50: CPT | Performed by: PHYSICIAN ASSISTANT

## 2025-04-20 PROCEDURE — 250N000011 HC RX IP 250 OP 636: Performed by: EMERGENCY MEDICINE

## 2025-04-20 PROCEDURE — 250N000009 HC RX 250: Performed by: PHYSICIAN ASSISTANT

## 2025-04-20 PROCEDURE — 120N000001 HC R&B MED SURG/OB

## 2025-04-20 PROCEDURE — 99232 SBSQ HOSP IP/OBS MODERATE 35: CPT | Performed by: INTERNAL MEDICINE

## 2025-04-20 RX ADMIN — PIPERACILLIN AND TAZOBACTAM 4.5 G: 4; .5 INJECTION, POWDER, FOR SOLUTION INTRAVENOUS at 08:34

## 2025-04-20 RX ADMIN — POTASSIUM CHLORIDE: 2 INJECTION, SOLUTION, CONCENTRATE INTRAVENOUS at 11:10

## 2025-04-20 RX ADMIN — PIPERACILLIN AND TAZOBACTAM 4.5 G: 4; .5 INJECTION, POWDER, FOR SOLUTION INTRAVENOUS at 14:24

## 2025-04-20 RX ADMIN — WATER 150 ML: 100 IRRIGANT IRRIGATION at 20:17

## 2025-04-20 RX ADMIN — POTASSIUM CHLORIDE: 2 INJECTION, SOLUTION, CONCENTRATE INTRAVENOUS at 22:27

## 2025-04-20 RX ADMIN — PIPERACILLIN AND TAZOBACTAM 4.5 G: 4; .5 INJECTION, POWDER, FOR SOLUTION INTRAVENOUS at 03:50

## 2025-04-20 RX ADMIN — ESTRADIOL 0.5 PATCH: 0.1 FILM, EXTENDED RELEASE TRANSDERMAL at 08:41

## 2025-04-20 RX ADMIN — PIPERACILLIN AND TAZOBACTAM 4.5 G: 4; .5 INJECTION, POWDER, FOR SOLUTION INTRAVENOUS at 20:39

## 2025-04-20 ASSESSMENT — ACTIVITIES OF DAILY LIVING (ADL)
ADLS_ACUITY_SCORE: 62
ADLS_ACUITY_SCORE: 58
ADLS_ACUITY_SCORE: 62
ADLS_ACUITY_SCORE: 62
ADLS_ACUITY_SCORE: 58
ADLS_ACUITY_SCORE: 62
ADLS_ACUITY_SCORE: 62
ADLS_ACUITY_SCORE: 58
ADLS_ACUITY_SCORE: 62
ADLS_ACUITY_SCORE: 58
ADLS_ACUITY_SCORE: 62

## 2025-04-20 NOTE — PLAN OF CARE
Goal Outcome Evaluation:         Pt A&Ox4. IND. NPO. NG tube in place- on low intermittent suction, 50mL out since 2000. Denies pain, nausea. LLQ SANTY drain in place, 5mL out since 2000. D5 in LR w/ KCL infusing @100mL/hr.

## 2025-04-20 NOTE — PROGRESS NOTES
Shift Summary [id number]    Admitting Diagnosis: Appendicitis with abscess [K35.33]   Vitals: VSS.RA  Pain: 1/10. Declined pain med.  A&Ox4  Voiding: BR  Mobility: indep.   CMS: intact  Lung Sounds: clear.  GI: NG tube at LIS. 550 ml output. Loose BM x2. Denied n/v  Dressing: LLQ SANTY site intact. NS flushed. 10ML output.   Diet: NPO  Drain: D5/LR/K infusing at 100 ml with intermittent abx.       Plan: Gastrografin tonight, x ray tomorrow.

## 2025-04-20 NOTE — PROGRESS NOTES
[facility]    Hospitalist Progress Note    Date of Service (when I saw the patient): 04/20/2025  Admit date: 4/18/2025    Interval History   Afebrile, hemodynamically stable, oxygen 98% in RA.   Per nursing notes he is doing quite well.  No acute events overnight  Abscess growing E.coli and Klebsiella, susceptibility pending.   NG output 550 cc today.  Ori states that she had a loose stool this AM and is passing gas. Minimal abdominal pain.  She would like to undergo gastrograffin challenge to have more information, rather than just NG clamping trial. She continues to get up and walk around.     Assessment & Plan   Ori is a 69 year old non-binary adult admitted on 4/18/2025 with abdominal pain. She was admitted recently admitted from 4/9/2025 to 4/12/2025 for perforated appendicitis with abscess. She was ultimately discharged on Augmentin.      Ms. Rehman presents on 4/18/2025 with acutely worsening abdominal pain. She was recently hospitalized with abdominal pain from 4/9/2025 to 4/12/2025 for abdominal pain and found to have perforated appendicitis with abscess. At that time, general surgery was consulted, but did not recommend surgical intervention. IR was also consulted but did not recommend drain placement. She was on Zosyn and then transitioned to Augmentin at discharge. She now presents with worsening pain across the upper abdomen and has been having recurrent vomiting since 4/17/2025.  *WBC: 17.8    *Abdominal CT w contrast: Interval increase in size of the central pelvic abscess now measuring 3.2 x 3.5 x 6 cm, previously 2.2 x 2.2 x 3 cm. Significant surrounding inflammatory change with small locules of free air adjacent  High-grade small bowel obstruction with dilated loops of small bowel extending down the level of the abscess where there is wall thickening and transition point at the level the abscess. Distal small bowel decompressed. Small locules of air within it adjacent to  the collapsed loop of bowel possibly representing the appendix. Significant interval decrease in the posterior pelvic fluid collection now measuring 3.2 x 1.2 cm, previously 5.1 x 3.6 cm.     Perforated appendicitis with abscess  Small bowel obstruction  Acute abdominal pain  S/p percutaneous drain placement by IR on 4/19  Abscess culture growing E. coli and Klebsiella, susceptibility pending.  Continue IV Zosyn, follow cultures.   Continue NG to intermittent suction  NPO Continue D5 LR + 20 KCl @ 100 ml/h when current bag is complete  Appreciate general surgery consult  We are trying to avoid surgical intervention given the degree of inflammation in his abdomen making him higher risk for morbidity and mortality with surgery.   Gastrografin contrast challenge tonight. (Per patient's preference) They are aware of possible abdominal cramping and diarrhea tonight, but wants information.  Drain cares per IR.   Discussed ambulating which patient states they are doing.  BMP and CBC tomorrow AM.     Recent Labs   Lab 04/19/25  0931 04/18/25  0745   WBC 12.3* 17.8*     Hypercholesterolemia   Resume PTA simvastatin when able to tolerate PO      Clinically Significant Risk Factors           # Hypocalcemia: Lowest Ca = 7.8 mg/dL in last 2 days, will monitor and replace as appropriate                                 Diet: Orders Placed This Encounter      NPO for Procedure/Surgery per Anesthesia Guidelines Except for: Meds; Clear liquids before procedure/surgery: ADULT (Age GREATER than or Equal to 18 years) - Clear liquids 2 hours before procedure/surgery     IVF: NS > D5 LR + 20 KCl @ 100 ml/h  DVT Prophylaxis: ambulating regulary, PCDs  Packer Catheter: Not present    No CPR- Do NOT Intubate  Disposition:  Medically Ready for Discharge: Anticipated in 2-4 Days, gastrografin tonight. Will depend on these results.     Communication: Discussed with patient and RN on 04/20/25     Juliana Wilson MD    Hospitalist Service  M  [facility]  Securely message with the Vocera Web Console (learn more here)  Text page via [employer] Paging/Directory      -Data reviewed today: I reviewed all new labs and imaging results over the last 24 hours. I personally reviewed no images or EKG's today.    Physical Exam   Temp: 98.6  F (37  C) Temp src: Oral BP: 126/66 Pulse: 62   Resp: 16 SpO2: 99 % O2 Device: None (Room air)    Vitals:    04/19/25 0711 04/20/25 0634   Weight: 64 kg (141 lb 1.5 oz) 62.2 kg (137 lb 1.6 oz)     Vital Signs with Ranges  Temp:  [97.8  F (36.6  C)-98.6  F (37  C)] 98.6  F (37  C)  Pulse:  [56-72] 62  Resp:  [16-18] 16  BP: (126-133)/(63-69) 126/66  SpO2:  [98 %-99 %] 99 %  I/O last 3 completed shifts:  In: 20 [Other:20]  Out: 820 [Emesis/NG output:750; Drains:70]    Today's Exam  Constitutional:  NAD,   Neuropsyche: Very pleasant and calm alert and oriented, answers questions appropriately. Speech normal, face symmetric and moving all 4 extremities without gross focal neurological deficit.   Respiratory:  Breathing comfortably, good air exchange, no wheezes, no crackles.   Cardiovascular:  Regular rate and rhythm, no edema.  GI:  NG in place, abdomen soft, mildly distended, mild diffuse TTP, BS present. SANTY in place with cloudy drainage.   Skin/Integumen:  No acute rash or sign of bleeding.       Medications   All medications reviewed on 04/20/25   Current Facility-Administered Medications   Medication Dose Route Frequency Provider Last Rate Last Admin    dextrose 5% in lactated ringers 1,000 mL with potassium chloride 20 mEq/L infusion   Intravenous Continuous Darlene Estes  mL/hr at 04/19/25 2313 New Bag at 04/19/25 2313     Current Facility-Administered Medications   Medication Dose Route Frequency Provider Last Rate Last Admin    estradiol (VIVELLE-DOT) 0.1 MG/24HR BIW patch 0.5 patch  0.5 patch Transdermal Q48H Madhu Infante NP   0.5 patch at 04/20/25 0841    estradiol biweekly  (VIVELLE-DOT) Patch in Place   Transdermal Q8H Darlene Nj MD        piperacillin-tazobactam (ZOSYN) 4.5 g vial to attach to  mL bag  4.5 g Intravenous Q6H Madhu Infante NP   4.5 g at 04/20/25 0834    simvastatin (ZOCOR) tablet 40 mg  40 mg Oral At Bedtime aMdhu Infante NP        sodium chloride (PF) 0.9% PF flush 10 mL  10 mL Irrigation Q8H Meng Griffith MD   10 mL at 04/20/25 0840    sodium chloride (PF) 0.9% PF flush 3 mL  3 mL Intracatheter Q8H Madhu Herrera NP         PRN Meds:   Current Facility-Administered Medications   Medication Dose Route Frequency Provider Last Rate Last Admin    acetaminophen (TYLENOL) tablet 650 mg  650 mg Oral Q4H PRN Madhu Infante NP        Or    acetaminophen (TYLENOL) Suppository 650 mg  650 mg Rectal Q4H PRN Madhu Infante NP        calcium carbonate (TUMS) chewable tablet 1,000 mg  1,000 mg Oral 4x Daily PRN Madhu Infante NP        hydrALAZINE (APRESOLINE) tablet 10 mg  10 mg Oral Q4H PRN Madhu Infante NP        Or    hydrALAZINE (APRESOLINE) injection 10 mg  10 mg Intravenous Q4H PRN Madhu Infante NP        HYDROmorphone (DILAUDID) injection 0.2 mg  0.2 mg Intravenous Q2H PRN Madhu Infante NP        HYDROmorphone (DILAUDID) injection 0.4 mg  0.4 mg Intravenous Q2H PRN Madhu Infante NP        lidocaine (LMX4) cream   Topical Q1H PRN Madhu Infante NP        lidocaine 1 % 0.1-1 mL  0.1-1 mL Other Q1H PRN Madhu Infante NP        naloxone (NARCAN) injection 0.2 mg  0.2 mg Intravenous Q2 Min PRN Darlene Estes MD        Or    naloxone (NARCAN) injection 0.4 mg  0.4 mg Intravenous Q2 Min PRN Darlene Estes MD        Or    naloxone (NARCAN) injection 0.2 mg  0.2 mg Intramuscular Q2 Min PRN Darlene Estes MD        Or    naloxone (NARCAN) injection 0.4 mg   0.4 mg Intramuscular Q2 Min PRN Darlene Estes MD        ondansetron (ZOFRAN ODT) ODT tab 4 mg  4 mg Oral Q6H PRN Madhu Infante NP        Or    ondansetron (ZOFRAN) injection 4 mg  4 mg Intravenous Q6H PRN Madhu Infante NP        prochlorperazine (COMPAZINE) injection 5 mg  5 mg Intravenous Q6H PRN Madhu Infante NP        Or    prochlorperazine (COMPAZINE) tablet 5 mg  5 mg Oral Q6H PRN Madhu Infante NP        senna-docusate (SENOKOT-S/PERICOLACE) 8.6-50 MG per tablet 1 tablet  1 tablet Oral BID PRN Madhu Infante NP        Or    senna-docusate (SENOKOT-S/PERICOLACE) 8.6-50 MG per tablet 2 tablet  2 tablet Oral BID PRN Madhu Infante NP        sodium chloride (PF) 0.9% PF flush 3 mL  3 mL Intracatheter q1 min prn Madhu Infante NP           Data   Recent Labs   Lab 04/19/25  0931 04/18/25  0745   WBC 12.3* 17.8*   HGB 13.1 14.3   MCV 95 92    442    134*   POTASSIUM 3.6 3.6   CHLORIDE 101 96*   CO2 21* 20*   BUN 14.3 12.3   CR 0.84 0.95   ANIONGAP 16* 18*   DEBORA 7.8* 9.1   GLC 95 112*   ALBUMIN 3.5  --    PROTTOTAL 6.5  --    BILITOTAL 0.5  --    ALKPHOS 41  --    ALT 10  --    AST 16  --        No results found for this or any previous visit (from the past 24 hours).

## 2025-04-20 NOTE — PROGRESS NOTES
[facility]    General Surgery  Daily Progress Note       Assessment and Plan:   Larissa Rehman is a 69 year old adult who presented to the ED 4/18 with nausea, vomiting, abdominal pain due to perforated appendicitis complicated by abscess and high grade SBO. She first presented on 4/9/2025 and was managed conservatively with antibiotics and observation. On CT 4/18 she continues to have significant fluid collection in her pelvis and now dilated small bowel concerning for high grade SBO. On exam, her abdomen is just mildly tender to palpation but no evidence of an acute abdomen. NG accidental removal overnight, replaced this morning. XR 4/19 with dilated distended loops of small bowel with gas seen distally consistent with known small bowel obstruction.     PLAN:  - Patient with improving abdominal distention, persistent bowel function, and NG output coming down. Patient prefers gastrografin rather than NG clamping to confirm improvement in small bowel obstruction. Discussed that patient may have increased abdominal cramping and diarrhea throughout the night and she is understanding of this.   - Gastrografin contrast challenge tonight. Contrast ordered for 8pm and abdominal films for tomorrow morning. Please clamp NG tube for 2 hours following contrast. If patient does not have increased abdominal distention or nausea, can continue clamping throughout the night. If either of these occur, place back to suction 2 hours after contrast.  - Drain cares per IR. Will defer timing of sinogram to their team.  - White count improving. Antibiotics per ID discretion.  - Analgesia and antinausea meds prn.  - Ambulate QID to assist with bowel function, PCDs for DVT prophylaxis. Encourage deep breathe and IS.   - Hgb stable, okay for DVT chemoprophylaxis from our standpoint.  - Medical management per primary team.    DISPOSITION:  - If patient passes gastrografin contrast challenge, anticipate NG removal  tomorrow and diet advancement. Discharge in ~2 days from our perspective if continues to do well with conservative non-operative management.   - IR to determine drain removal prior to discharge vs sinogram as outpatient.  - Patient would follow up with surgeon in 2 weeks at discharge to consider interval appendectomy in approximately 12 weeks after inflammation improved. Patient with history of normal colonoscopy in 2023.        Interval History:   Larissa Rehman is seen on surgical rounds. Patient feels like a completely different person today. Reports improved abdominal bloating and pain, no nausea. Ambulated 5 times yesterday. NG output coming down (750 ml yesterday) and thin bilious output now. Continues to pass flatus and had several loose BM's yesterday. Hopeful for NG removal soon but quite concerned about this being replaced for a second time. Vitals wnl.         Physical Exam:   Temp: 98.6  F (37  C) Temp src: Oral BP: 126/66 Pulse: 62   Resp: 16 SpO2: 99 % O2 Device: None (Room air)      I/O last 3 completed shifts:  In: 20 [Other:20]  Out: 820 [Emesis/NG output:750; Drains:70]    GENERAL: VS reviewed, alert, oriented, no acute distress  LUNGS: Normal respiratory effort, no wheezing  ABDOMEN:  Distended but soft, minimally tender throughout without rebound or guarding, IR drain with 70 ml cloudy serous output  EXTREMITIES: Moving all extremities, no gross deformities  NEUROLOGICAL: Grossly non-focal, mood & affect appropriate    Data   Recent Labs   Lab 04/19/25  0931 04/18/25  0745   WBC 12.3* 17.8*   HGB 13.1 14.3   MCV 95 92    442    134*   POTASSIUM 3.6 3.6   CHLORIDE 101 96*   CO2 21* 20*   BUN 14.3 12.3   CR 0.84 0.95   ANIONGAP 16* 18*   DEBORA 7.8* 9.1   GLC 95 112*   ALBUMIN 3.5  --    PROTTOTAL 6.5  --    BILITOTAL 0.5  --    ALKPHOS 41  --    ALT 10  --    AST 16  --      XR ABDOMEN 4/19  IMPRESSION: Orogastric tube projects over stomach. No free air. Dilated distended loops of small  bowel with gas seen distally consistent with known small bowel obstruction.     uAdrey Mancia PA-C    Please use [employer] to page 7:30am-4pm, page on-call surgeon after 4pm  Office number: [phone]    50 minutes spent on date of the encounter doing patient visit, chart review, and documentation.

## 2025-04-21 ENCOUNTER — APPOINTMENT (OUTPATIENT)
Dept: GENERAL RADIOLOGY | Facility: CLINIC | Age: 70
DRG: 372 | End: 2025-04-21
Attending: PHYSICIAN ASSISTANT
Payer: COMMERCIAL

## 2025-04-21 LAB
ANION GAP SERPL CALCULATED.3IONS-SCNC: 10 MMOL/L (ref 7–15)
BACTERIA ASPIRATE CULT: ABNORMAL
BUN SERPL-MCNC: 4 MG/DL (ref 8–23)
CALCIUM SERPL-MCNC: 8.3 MG/DL (ref 8.8–10.4)
CHLORIDE SERPL-SCNC: 103 MMOL/L (ref 98–107)
CREAT SERPL-MCNC: 0.79 MG/DL (ref 0.51–1.17)
EGFRCR SERPLBLD CKD-EPI 2021: >90 ML/MIN/1.73M2
ERYTHROCYTE [DISTWIDTH] IN BLOOD BY AUTOMATED COUNT: 12.5 % (ref 10–15)
GLUCOSE SERPL-MCNC: 132 MG/DL (ref 70–99)
HCO3 SERPL-SCNC: 26 MMOL/L (ref 22–29)
HCT VFR BLD AUTO: 36.3 % (ref 35–53)
HGB BLD-MCNC: 12.3 G/DL (ref 11.7–17.7)
MCH RBC QN AUTO: 31.8 PG (ref 26.5–33)
MCHC RBC AUTO-ENTMCNC: 33.9 G/DL (ref 31.5–36.5)
MCV RBC AUTO: 94 FL (ref 78–100)
PLATELET # BLD AUTO: 415 10E3/UL (ref 150–450)
POTASSIUM SERPL-SCNC: 3.5 MMOL/L (ref 3.4–5.3)
RBC # BLD AUTO: 3.87 10E6/UL (ref 3.8–5.9)
SODIUM SERPL-SCNC: 139 MMOL/L (ref 135–145)
WBC # BLD AUTO: 6 10E3/UL (ref 4–11)

## 2025-04-21 PROCEDURE — 99232 SBSQ HOSP IP/OBS MODERATE 35: CPT | Performed by: INTERNAL MEDICINE

## 2025-04-21 PROCEDURE — 82310 ASSAY OF CALCIUM: CPT | Performed by: INTERNAL MEDICINE

## 2025-04-21 PROCEDURE — 250N000011 HC RX IP 250 OP 636: Performed by: EMERGENCY MEDICINE

## 2025-04-21 PROCEDURE — 99232 SBSQ HOSP IP/OBS MODERATE 35: CPT | Performed by: SURGERY

## 2025-04-21 PROCEDURE — 99222 1ST HOSP IP/OBS MODERATE 55: CPT | Performed by: PHYSICIAN ASSISTANT

## 2025-04-21 PROCEDURE — 74018 RADEX ABDOMEN 1 VIEW: CPT

## 2025-04-21 PROCEDURE — 250N000011 HC RX IP 250 OP 636

## 2025-04-21 PROCEDURE — 120N000001 HC R&B MED SURG/OB

## 2025-04-21 PROCEDURE — 85027 COMPLETE CBC AUTOMATED: CPT | Performed by: INTERNAL MEDICINE

## 2025-04-21 PROCEDURE — 36415 COLL VENOUS BLD VENIPUNCTURE: CPT | Performed by: INTERNAL MEDICINE

## 2025-04-21 RX ADMIN — PIPERACILLIN AND TAZOBACTAM 4.5 G: 4; .5 INJECTION, POWDER, FOR SOLUTION INTRAVENOUS at 08:57

## 2025-04-21 RX ADMIN — POTASSIUM CHLORIDE: 2 INJECTION, SOLUTION, CONCENTRATE INTRAVENOUS at 22:39

## 2025-04-21 RX ADMIN — PIPERACILLIN AND TAZOBACTAM 4.5 G: 4; .5 INJECTION, POWDER, FOR SOLUTION INTRAVENOUS at 02:47

## 2025-04-21 RX ADMIN — POTASSIUM CHLORIDE: 2 INJECTION, SOLUTION, CONCENTRATE INTRAVENOUS at 11:01

## 2025-04-21 RX ADMIN — PIPERACILLIN AND TAZOBACTAM 4.5 G: 4; .5 INJECTION, POWDER, FOR SOLUTION INTRAVENOUS at 20:12

## 2025-04-21 RX ADMIN — PIPERACILLIN AND TAZOBACTAM 4.5 G: 4; .5 INJECTION, POWDER, FOR SOLUTION INTRAVENOUS at 14:41

## 2025-04-21 ASSESSMENT — ACTIVITIES OF DAILY LIVING (ADL)
ADLS_ACUITY_SCORE: 62
ADLS_ACUITY_SCORE: 58
ADLS_ACUITY_SCORE: 58
ADLS_ACUITY_SCORE: 62
ADLS_ACUITY_SCORE: 58
ADLS_ACUITY_SCORE: 62
ADLS_ACUITY_SCORE: 58
ADLS_ACUITY_SCORE: 62
ADLS_ACUITY_SCORE: 58
ADLS_ACUITY_SCORE: 62
ADLS_ACUITY_SCORE: 62
ADLS_ACUITY_SCORE: 58
ADLS_ACUITY_SCORE: 58

## 2025-04-21 NOTE — PLAN OF CARE
Goal Outcome Evaluation:    Alert and oriented x 4 VSS on room air denies shortness of breath and chest pain.NG clamped at 2017, x-ray done.NPO,D5/LR/K infusing at 100 ml/hr.  Continue plan of care.

## 2025-04-21 NOTE — CONSULTS
[facility]    Infectious Disease Consultation     Date of Admission:  4/18/2025  Date of Consult (When I saw the patient): 04/21/25    Assessment & Plan   Larissa Rehman is a 69 year old adult who was admitted on 4/18/2025.     Impression:   69 year old adult who was recently hospitalized 4/9-4/12 with perforated appendicitis with developing abscess but no drainable fluid collection at that time, now presenting with worsened abscess and high grade small bowel obstruction.     -Originally hospitalized 4/9-4/12 with perforated appendicitis and abscess. Treated with Zosyn in hospital and discharged on Augmentin.   -Now with worsening of central pelvic abscess, improvement in posterior pelvic fluid collection, and high grade SBO.   -S/p drain placement 4/18/25. Cultures with E.coli, Klebsiella pneumoniae, Bilophila, and Bacteroides.   -Afebrile. Leukocytosis resolved.   -SBO resolved.   -On Zosyn.       Recommendations:   Continue Zosyn for now.   Await CT sinogram to be done tomorrow by IR.   Final antibiotic recommendations to follow those results.   ID will follow.       Patient and plan discussed with Dr. Diehl.     Kat Syed PA-C    Reason for Consult   Reason for consult: Asked to evaluate this patient for abomdinal abscess infection.    Primary Care Physician   Sravani Boyer    Chief Complaint   Abdominal pain and nausea.     History is obtained from the patient and medical records    History of Present Illness   Larissa Rehman is a 69 year old adult who was recently hospitalized 4/9-4/12 with perforated appendicitis with developing abscess. Radiology felt there was no drainable fluid collection at that time. They were treated with Zosyn in the hospital and transitioned to oral Augmentin on discharge.    She initially did well, but then developed recurrent vomiting that did not improve, prompting her to return to the ED on 4/18/25. She was noted to have leukocytosis and worsening of  abdominal abscess on CT as well as high grade small bowel obstruction. IR placed a drain. Cultures grew E.coli, Klebsiella pneumoniae, Bilophila, and Bacteroides. She has been on Zosyn.     Past Medical History   I have reviewed this patient's medical history and updated it with pertinent information if needed.   Past Medical History:   Diagnosis Date    Attention deficit hyperactivity disorder (ADHD), predominantly hyperactive type     H/O bilateral orchiectomies and zero-depth vaginoplasty 3/29/2018 04/05/2018    High cholesterol     Hypothermia x 3 events during triathlons required EMS        Past Surgical History   I have reviewed this patient's surgical history and updated it with pertinent information if needed.  Past Surgical History:   Procedure Laterality Date    COLONOSCOPY      ORTHOPEDIC SURGERY      foot surgery     TRANSGENDER VAGINOPLASTY ZERO DEPTH N/A 3/29/2018    Procedure: TRANSGENDER VAGINOPLASTY ZERO DEPTH;  Zero Depth Transgender Vaginoplasty;  Surgeon: Madhu Mojica MD;  Location: [facility] OR       Prior to Admission Medications   Prior to Admission Medications   Prescriptions Last Dose Informant Patient Reported? Taking?   acetaminophen (TYLENOL) 325 MG tablet   No Yes   Sig: Take 2 tablets (650 mg) by mouth every 4 hours as needed for mild pain or other (and adjunct with moderate or severe pain or per patient request).   amoxicillin-clavulanate (AUGMENTIN) 875-125 MG tablet 4/17/2025 Evening  No Yes   Sig: Take 1 tablet by mouth 2 times daily for 14 days.   estradiol (VIVELLE-DOT) 0.1 MG/24HR bi-weekly patch 4/18/2025 Morning  No Yes   Sig: Place 1 patch over 96 hours onto the skin twice a week.   Patient taking differently: Place 0.5 patches onto the skin every 48 hours. (Cuts patch in half and replaces it every 2 days behind right ear)   ondansetron (ZOFRAN ODT) 4 MG ODT tab   No Yes   Sig: Take 1 tablet (4 mg) by mouth every 6 hours as needed for nausea.   simethicone (MYLICON) 80 MG chewable  tablet   No Yes   Sig: Take 1 tablet (80 mg) by mouth every 6 hours as needed for flatulence or cramping.   simvastatin (ZOCOR) 40 MG tablet 4/16/2025  No Yes   Sig: TAKE 1 TABLET(40 MG) BY MOUTH AT BEDTIME      Facility-Administered Medications: None     Allergies   Allergies   Allergen Reactions    Bee Venom        Immunization History   Immunization History   Administered Date(s) Administered    COVID-19 12+ (Pfizer) 10/11/2023, 09/24/2024, 03/25/2025    COVID-19 Bivalent 12+ (Pfizer) 10/10/2022    COVID-19 MONOVALENT 12+ (Pfizer) 03/11/2021, 04/01/2021, 10/03/2021    COVID-19 Monovalent 12+ (Pfizer 2022) 04/06/2022    COVID-19 Vaccine ([employer]:Vaxzevria,Covishield) 01/11/2021, 02/08/2021    Influenza (High Dose) Trivalent,PF (Fluzone) 09/24/2024    Influenza (IIV3) PF 10/11/2001, 10/15/2009, 10/11/2011, 10/19/2012, 10/24/2013, 09/30/2014, 10/06/2017    Influenza Vaccine 65+ (Fluzone HD) 09/23/2020, 09/24/2021, 10/10/2022, 10/11/2023    Influenza Vaccine >6 months,quad, PF 09/25/2018    Pneumococcal 20 valent Conjugate (Prevnar 20) 10/10/2022, 09/24/2024    Pneumococcal 23 valent 09/24/2021    RSV (Abrysvo) 10/12/2023    RSV Vaccine (Arexvy) 09/24/2024    TDAP Vaccine (Boostrix) 07/15/2016       Social History   I have reviewed this patient's social history and updated it with pertinent information if needed. Larissa Parker Ori  reports that she has never smoked. She has never used smokeless tobacco. She reports current alcohol use of about 4.0 standard drinks of alcohol per week. She reports that she does not use drugs.    Family History   I have reviewed this patient's family history and updated it with pertinent information if needed.   Family History   Problem Relation Age of Onset    Colon Cancer Father     Heart Disease Father     Breast Cancer No family hx of     Osteoporosis No family hx of        Review of Systems   The 10 point Review of Systems is negative    Physical Exam   Temp: 97.8  F (36.6  C)  Temp src: Oral BP: 124/75 Pulse: 62   Resp: 16 SpO2: 99 % O2 Device: None (Room air)    Vital Signs with Ranges  Temp:  [97.5  F (36.4  C)-97.8  F (36.6  C)] 97.8  F (36.6  C)  Pulse:  [58-62] 62  Resp:  [16] 16  BP: (124-134)/(72-75) 124/75  SpO2:  [98 %-99 %] 99 %  134 lbs 12.8 oz  Body mass index is 22.43 kg/m .    GENERAL APPEARANCE:  awake  EYES: Eyes grossly normal to inspection  NECK: no adenopathy  RESP: lungs clear   CV: regular rates and rhythm  ABDOMEN: soft, nontender. SANTY drain with serosanguineous output.   MS: extremities normal  SKIN: no suspicious lesions or rashes        Data   All laboratory data reviewed    Component      Latest Ref [facility] 4/18/2025  7:45 AM 4/19/2025  9:31 AM 4/21/2025  10:13 AM   WBC      4.0 - 11.0 10e3/uL 17.8 (H)  12.3 (H)  6.0    RBC Count      3.80 - 5.90 10e6/uL 4.51  4.14  3.87    Hemoglobin      11.7 - 17.7 g/dL 14.3  13.1  12.3    Hematocrit      35.0 - 53.0 % 41.6  39.5  36.3    MCV      78 - 100 fL 92  95  94    MCH      26.5 - 33.0 pg 31.7  31.6  31.8    MCHC      31.5 - 36.5 g/dL 34.4  33.2  33.9    RDW      10.0 - 15.0 % 12.5  12.4  12.5    Platelet Count      150 - 450 10e3/uL 442  400  415    % Neutrophils      % 81      % Lymphocytes      % 9      % Monocytes      % 6      % Eosinophils      % 1      % Basophils      % 1      % Immature Granulocytes      % 3      NRBCs per 100 WBC      <1 /100 0      Absolute Neutrophils      1.6 - 8.3 10e3/uL 14.3 (H)      Absolute Lymphocytes      0.8 - 5.3 10e3/uL 1.6      Absolute Monocytes      0.0 - 1.3 10e3/uL 1.1      Absolute Eosinophils      0.0 - 0.7 10e3/uL 0.1      Absolute Basophils      0.0 - 0.2 10e3/uL 0.1      Absolute Immature Granulocytes      <=0.4 10e3/uL 0.5 (H)      Absolute NRBCs      10e3/uL 0.0         Component      Latest Ref Rng 4/18/2025  7:45 AM 4/19/2025  9:31 AM 4/21/2025  10:13 AM   Sodium      135 - 145 mmol/L 134 (L)  138  139    Potassium      3.4 - 5.3 mmol/L 3.6  3.6  3.5    Carbon Dioxide  (CO2)      22 - 29 mmol/L 20 (L)  21 (L)  26    Anion Gap      7 - 15 mmol/L 18 (H)  16 (H)  10    Urea Nitrogen      8.0 - 23.0 mg/dL 12.3  14.3  4.0 (L)    Creatinine      0.51 - 1.17 mg/dL 0.95  0.84  0.79    GFR Estimate      >60 mL/min/1.73m2 86  >90  >90    Calcium      8.8 - 10.4 mg/dL 9.1  7.8 (L)  8.3 (L)    Chloride      98 - 107 mmol/L 96 (L)  101  103    Glucose      70 - 99 mg/dL 112 (H)  95  132 (H)    Alkaline Phosphatase      40 - 150 U/L  41     AST      0 - 45 U/L  16     ALT      0 - 70 U/L  10     Protein Total      6.4 - 8.3 g/dL  6.5     Albumin      3.5 - 5.2 g/dL  3.5     Bilirubin Total      <=1.2 mg/dL  0.5                    04/18/2025 1423 04/20/2025 1131 Anaerobic Bacterial Culture Routine [[id number]]   (Abnormal)   Aspirate from Abdomen    Preliminary result Component Value   Culture Culture in progress P    4+ Bacteroides fragilis Abnormal  P    Susceptibilities not routinely done, refer to antibiogram to view typical susceptibility profiles             04/18/2025 1423 04/18/2025 1830 Gram Stain [[id number]]   (Abnormal)   Aspirate from Abdomen    Final result Component Value   GS Culture See corresponding culture for results   Gram Stain Result 1+ Gram negative bacilli Abnormal    Gram Stain Result 3+ WBC seen Abnormal    Predominantly PMNs          04/18/2025 1423 04/20/2025 2223 Aspirate Aerobic Bacterial Culture Routine [[id number]]    (Abnormal)   Aspirate from Abdomen    Final result Component Value   Culture 1+ Escherichia coli Abnormal     2+ Klebsiella pneumoniae Abnormal     1+ Normal angel       Susceptibility     Escherichia coli Klebsiella pneumoniae     DIXIE DIXIE     Ampicillin <=2 ug/mL Susceptible  Resistant 1     Ampicillin/ Sulbactam <=2 ug/mL Susceptible 4 ug/mL Susceptible     Cefepime <=0.12 ug/mL Susceptible <=0.12 ug/mL Susceptible     Ceftazidime <=0.5 ug/mL Susceptible <=0.5 ug/mL Susceptible     Ceftriaxone <=0.25 ug/mL Susceptible <=0.25 ug/mL  Susceptible     Ciprofloxacin <=0.06 ug/mL Susceptible <=0.06 ug/mL Susceptible     Gentamicin <=1 ug/mL Susceptible <=1 ug/mL Susceptible     Levofloxacin <=0.12 ug/mL Susceptible <=0.12 ug/mL Susceptible     Meropenem <=0.25 ug/mL Susceptible <=0.25 ug/mL Susceptible     Piperacillin/Tazobactam <=4 ug/mL Susceptible <=4 ug/mL Susceptible     Trimethoprim/Sulfamethoxazole <=1/19 ug/mL Susceptible <=1/19 ug/mL Susceptible                   EXAM: CT ABDOMEN PELVIS W CONTRAST  LOCATION: [facility]  DATE: 4/18/2025     INDICATION: Recent perf appy with abscess, worsening pain.  COMPARISON: 04/09/2025  TECHNIQUE: CT scan of the abdomen and pelvis was performed following injection of IV contrast. Multiplanar reformats were obtained. Dose reduction techniques were used.  CONTRAST: 71 mL Isovue 370     FINDINGS:   LOWER CHEST: Minimal bibasilar atelectasis. Minimal esophageal hiatal hernia.     HEPATOBILIARY: No calcified gallstones or biliary dilatation. Hepatic cysts, no followup.     PANCREAS: No ductal dilatation or acute inflammatory change.     SPLEEN: Normal.     ADRENAL GLANDS: Normal.     KIDNEYS/BLADDER: Symmetric renal enhancement. No urinary collecting system dilatation or calculi. Bladder unremarkable.     BOWEL: Interval increase in size of the central pelvic abscess. This fluid and air collection now measures 3.2 x 3.5 x 6 cm and demonstrates a greater peripheral enhancement. Significant surrounding inflammatory change. Small locules of free air   adjacent. High-grade small bowel obstruction with dilated loops of a small bowel extending down to the level of the abscess where there is wall thickening and transition point at the level of the abscess (series 4, images 121-148). Diffuse inflammatory   edema and mesenteric fluid compatible with peritonitis. Distal small bowel decompressed. Small locules of air within the tubular structure adjacent to the collapsed loop of bowel possibly  representing the appendix.     LYMPH NODES: No lymphadenopathy.     VASCULATURE: Normal caliber aorta. Patent celiac, splenic, hepatic, SMA and bilateral renal arteries. Patent KAREN. Patent hepatic, portal, splenic and superior mesenteric veins.     PELVIC ORGANS: Significant interval decrease in the pelvic fluid collection now measuring 3.2 x 1.2 cm, previously 5.1 x 3.6 cm. Minimal free fluid in the pelvis.     MUSCULOSKELETAL: Degenerative changes in the spine.                                                                      IMPRESSION:   1.  Interval increase in size of the central pelvic abscess now measuring 3.2 x 3.5 x 6 cm, previously 2.2 x 2.2 x 3 cm. Significant surrounding inflammatory change with small locules of free air adjacent.  2.  High-grade small bowel obstruction with dilated loops of small bowel extending down the level of the abscess where there is wall thickening and transition point at the level the abscess. Distal small bowel decompressed. Small locules of air within it   adjacent to the collapsed loop of bowel possibly representing the appendix.  3.  Significant interval decrease in the posterior pelvic fluid collection now measuring 3.2 x 1.2 cm, previously 5.1 x 3.6 cm.

## 2025-04-21 NOTE — PLAN OF CARE
Goal Outcome Evaluation:      Plan of Care Reviewed With: patient      Patient alert and oriented x 4.  Denies shortness of breath, chest pain, nausea or vomiting.  NG tube removed this morning, tolerating clear liquids.  Patient has had bowel movements this morning.  IR drain flushed 5 mL output for shift.  No adverse reaction to IV antibiotics..

## 2025-04-21 NOTE — PROGRESS NOTES
General surgery note    Feeling better, ambulating out in the halls. Borborygmus present. Having bowel movements and passing flatus.   Abdomen benign     Perforated appendicitis s/p drainage  Ileus  Resolving ileus  Remove ng and start clears slowly.       Total encounter time 35 minutes, more than that half spent in counseling, reviewing data, and coordination of care.

## 2025-04-21 NOTE — PROGRESS NOTES
Larissa Rehman is a 69 year  old female with a history signficant for a perforated appendix s/p drain placement 4/18/25.     Outputs have been decreasing to 10 mL 4/20, 15 mL so far today. Wbc has normalized. Will check with a CT sinogram to be done tomorrow 4/22/25. No further flushes are needed and they were discontinued.     ROUTINE ICU LABS (Last four results)  CMP  Recent Labs   Lab 04/21/25  1013 04/19/25  0931 04/18/25  0745    138 134*   POTASSIUM 3.5 3.6 3.6   CHLORIDE 103 101 96*   CO2 26 21* 20*   ANIONGAP 10 16* 18*   * 95 112*   BUN 4.0* 14.3 12.3   CR 0.79 0.84 0.95   GFRESTIMATED >90 >90 86   DEBORA 8.3* 7.8* 9.1   PROTTOTAL  --  6.5  --    ALBUMIN  --  3.5  --    BILITOTAL  --  0.5  --    ALKPHOS  --  41  --    AST  --  16  --    ALT  --  10  --      CBC  Recent Labs   Lab 04/21/25  1013 04/19/25  0931 04/18/25  0745   WBC 6.0 12.3* 17.8*   RBC 3.87 4.14 4.51   HGB 12.3 13.1 14.3   HCT 36.3 39.5 41.6   MCV 94 95 92   MCH 31.8 31.6 31.7   MCHC 33.9 33.2 34.4   RDW 12.5 12.4 12.5    400 442     INRNo lab results found in last 7 days.  Arterial Blood GasNo lab results found in last 7 days.    Total time: 20 minutes    Thanks [facility] Interventional Radiology CNP ([phone]) (phone [phone])

## 2025-04-21 NOTE — PROGRESS NOTES
Cele Kramer paged from GI via [facility] answering service:  Pt had EGD this morning, they are wondering about NG tube status and diet order. TY~

## 2025-04-21 NOTE — PROGRESS NOTES
[facility]    Medicine Progress Note - Hospitalist Service    Date of Admission:  4/18/2025  Interval History   Patient walking around the room today.  She had her NG removed.  She is now on a liquid diet.  Discussion with ID today for follow-up of polymicrobial organisms.    Assessment & Plan   Ori is a 69 year old non-binary adult admitted on 4/18/2025 with abdominal pain. She was admitted recently admitted from 4/9/2025 to 4/12/2025 for perforated appendicitis with abscess. She was ultimately discharged on Augmentin.       Ms. Rehman presents on 4/18/2025 with acutely worsening abdominal pain. She was recently hospitalized with abdominal pain from 4/9/2025 to 4/12/2025 for abdominal pain and found to have perforated appendicitis with abscess. At that time, general surgery was consulted, but did not recommend surgical intervention. IR was also consulted but did not recommend drain placement. She was on Zosyn and then transitioned to Augmentin at discharge. She now presents with worsening pain across the upper abdomen and has been having recurrent vomiting since 4/17/2025.  *WBC: 17.8     *Abdominal CT w contrast: Interval increase in size of the central pelvic abscess now measuring 3.2 x 3.5 x 6 cm, previously 2.2 x 2.2 x 3 cm. Significant surrounding inflammatory change with small locules of free air adjacent  High-grade small bowel obstruction with dilated loops of small bowel extending down the level of the abscess where there is wall thickening and transition point at the level the abscess. Distal small bowel decompressed. Small locules of air within it adjacent to the collapsed loop of bowel possibly representing the appendix. Significant interval decrease in the posterior pelvic fluid collection now measuring 3.2 x 1.2 cm, previously 5.1 x 3.6 cm.      Perforated appendicitis with abscess  Small bowel obstruction  Acute abdominal pain  S/p percutaneous drain placement by IR on  4/19 4/9-4/12/2025 admit for perforated appendicitis with abscess  4/17/2025  patient returned to the emergency room with complaints of worsening abdominal discomfort.  Symptoms worsened for 3 to 4 days after being discharged.  Started to get recurrent vomiting.  Came to the ER.  White count 17.8.  Afebrile.  4/18 CT abdomen pelvis showing Interval increase in size of the central pelvic abscess now measuring 3.2 x 3.5 x 6 cm, previously 2.2 x 2.2 x 3 cm. Significant surrounding inflammatory change with small locules of free air adjacent. High-grade small bowel obstruction with dilated loops of small bowel extending down the level of the abscess where there is wall thickening and transition point at the level the abscess. Distal small bowel decompressed. Small locules of air within it adjacent to the collapsed loop of bowel possibly representing the appendix. Significant interval decrease in the posterior pelvic fluid collection now measuring 3.2 x 1.2 cm, previously 5.1 x 3.6 cm.  4/18 CT-guided drain in radiology.  General surgery following.   Abscess culture growing E. coli and Klebsiella, and Bacteroides fragilis, bilophila wadsworthia   Continue IV Zosyn, follow cultures.   4/21 Gastrografin challenge contrast material reach the rectum.  No evidence of high-grade bowel obstruction.  Still some dilated small bowel in the left upper abdomen.  4/21 NG removed  4/21 white count now 6 (17.8 on 4/18)   4/21 ID consult  Sinogram scheduled 4/22         Hypercholesterolemia   Resume PTA simvastatin when able to tolerate PO           Diet: NPO for Procedure/Surgery per Anesthesia Guidelines Except for: Meds; Clear liquids before procedure/surgery: ADULT (Age GREATER than or Equal to 18 years) - Clear liquids 2 hours before procedure/surgery    DVT Prophylaxis: Pneumatic Compression Devices  Packer Catheter: Not present  Lines: None     Cardiac Monitoring: None  Code Status: No CPR- Do NOT Intubate      Clinically  Significant Risk Factors           # Hypocalcemia: Lowest Ca = 7.8 mg/dL in last 2 days, will monitor and replace as appropriate                               Social Drivers of Health    Social Connections: Unknown (11/15/2024)    Social Connection and Isolation Panel [NHANES]     Frequency of Social Gatherings with Friends and Family: Patient declined          Disposition Plan     Medically Ready for Discharge: Anticipated in 2-4 Days         YANIV JOSEPH MD  Hospitalist Service  [facility]  Securely message with [employer] (more info)  Text page via [employer] Paging/Directory   ______________________________________________________________________        Physical Exam   Vital Signs: Temp: 97.5  F (36.4  C) Temp src: Oral BP: 128/73 Pulse: 58   Resp: 16 SpO2: 99 % O2 Device: None (Room air)    Weight: 134 lbs 12.8 oz    General Appearance: Patient is awake and alert   Respiratory: Clear to auscultation bilaterally with no wheezes or rhonchi  Cardiovascular: regular rate with no gallop or rub  GI: + Bs, soft, tender drain in lower abdomen       Medical Decision Making       45 MINUTES SPENT BY ME on the date of service doing chart review, history, exam, documentation & further activities per the note.      Data     I have personally reviewed the following data over the past 24 hrs:    6.0  \   12.3   / 415     139 103 4.0 (L) /  132 (H)   3.5 26 0.79 \       Imaging results reviewed over the past 24 hrs:   Recent Results (from the past 24 hours)   XR Gastrografin Challenge    Narrative    EXAM: XR GASTROGRAFIN CHALLENGE  LOCATION: [facility]  DATE: 4/21/2025    INDICATION: Small Bowel Obstruction  COMPARISON: CT 04/18/2025.  TECHNIQUE: Routine water soluble contrast follow-through challenge.      Impression    IMPRESSION:  1.  Water soluble contrast material IS identified within the colon 8 hours post administration, reaching the rectum.  2.  No evidence of high-grade  bowel obstruction. There is still some dilated small bowel in the left upper abdomen.  3.  Enteric tube overlies stomach. Pigtail catheter overlies upper pelvis.

## 2025-04-22 ENCOUNTER — APPOINTMENT (OUTPATIENT)
Dept: CT IMAGING | Facility: CLINIC | Age: 70
DRG: 372 | End: 2025-04-22
Attending: NURSE PRACTITIONER
Payer: COMMERCIAL

## 2025-04-22 PROCEDURE — 99207 PR NO BILLABLE SERVICE THIS VISIT: CPT | Performed by: INTERNAL MEDICINE

## 2025-04-22 PROCEDURE — 99232 SBSQ HOSP IP/OBS MODERATE 35: CPT | Performed by: PHYSICIAN ASSISTANT

## 2025-04-22 PROCEDURE — 99232 SBSQ HOSP IP/OBS MODERATE 35: CPT | Performed by: INTERNAL MEDICINE

## 2025-04-22 PROCEDURE — 250N000011 HC RX IP 250 OP 636

## 2025-04-22 PROCEDURE — 20501 NJX SINUS TRACT DIAGNOSTIC: CPT

## 2025-04-22 PROCEDURE — 120N000001 HC R&B MED SURG/OB

## 2025-04-22 RX ADMIN — PIPERACILLIN AND TAZOBACTAM 4.5 G: 4; .5 INJECTION, POWDER, FOR SOLUTION INTRAVENOUS at 08:11

## 2025-04-22 RX ADMIN — PIPERACILLIN AND TAZOBACTAM 4.5 G: 4; .5 INJECTION, POWDER, FOR SOLUTION INTRAVENOUS at 15:01

## 2025-04-22 RX ADMIN — PIPERACILLIN AND TAZOBACTAM 4.5 G: 4; .5 INJECTION, POWDER, FOR SOLUTION INTRAVENOUS at 20:23

## 2025-04-22 RX ADMIN — PIPERACILLIN AND TAZOBACTAM 4.5 G: 4; .5 INJECTION, POWDER, FOR SOLUTION INTRAVENOUS at 03:07

## 2025-04-22 ASSESSMENT — ACTIVITIES OF DAILY LIVING (ADL)
ADLS_ACUITY_SCORE: 58

## 2025-04-22 NOTE — PLAN OF CARE
Goal Outcome Evaluation:  Dx of Perforated appendicitis with abscess, Pt A&Ox 4, VSS on RA, denies shortness of breath and chest pain. On clear liquids and tolerating ok ,D5/LR/K infusing at 100 ml/hr. No adverse reaction to IV ABX, SANTY intact with minimal output 5 ml. Inde in the RM and  ambulated in hallway. Discharge pending.

## 2025-04-22 NOTE — PROGRESS NOTES
General surgery    Comfortably in bed, tolerating clear liquids. Having bowel movements. Abdomen is soft and not tender. Drain in place with serous drainage. Advanced diet as tolerated to low fiber for 5 days the regular diet. Potentially home as soon as tomorrow with antibiotics to complete 10 day treatment. Eventual sinogram as per radiology recommendations to decide when to take drain out.

## 2025-04-22 NOTE — PROGRESS NOTES
[facility]    Medicine Progress Note - Hospitalist Service    Date of Admission:  4/18/2025  Interval History    Eleazar reports that he feels good today.  Walking around the floors: Belly pain is better.  NG out.  CT sinogram done.   Evidence for fistula communication between collection cavity and small bowel  Feels like got rid a lot of the Gastrografin    Assessment & Plan   Ori is a 69 year old non-binary adult admitted on 4/18/2025 with abdominal pain. She was admitted recently admitted from 4/9/2025 to 4/12/2025 for perforated appendicitis with abscess. She was ultimately discharged on Augmentin.       Ms. Rehman presents on 4/18/2025 with acutely worsening abdominal pain. She was recently hospitalized with abdominal pain from 4/9/2025 to 4/12/2025 for abdominal pain and found to have perforated appendicitis with abscess. At that time, general surgery was consulted, but did not recommend surgical intervention. IR was also consulted but did not recommend drain placement. She was on Zosyn and then transitioned to Augmentin at discharge. She now presents with worsening pain across the upper abdomen and has been having recurrent vomiting since 4/17/2025.  *WBC: 17.8     *Abdominal CT w contrast: Interval increase in size of the central pelvic abscess now measuring 3.2 x 3.5 x 6 cm, previously 2.2 x 2.2 x 3 cm. Significant surrounding inflammatory change with small locules of free air adjacent  High-grade small bowel obstruction with dilated loops of small bowel extending down the level of the abscess where there is wall thickening and transition point at the level the abscess. Distal small bowel decompressed. Small locules of air within it adjacent to the collapsed loop of bowel possibly representing the appendix. Significant interval decrease in the posterior pelvic fluid collection now measuring 3.2 x 1.2 cm, previously 5.1 x 3.6 cm.      Perforated appendicitis with abscess  Small bowel  obstruction  Acute abdominal pain  S/p percutaneous drain placement by IR on 4/19 4/22 CT sinogram: Showing a pigtail drainage in place with resolution of fluid collection and evidence of fistula communication between collection cavity and small bowel  4/9-4/12/2025 admit for perforated appendicitis with abscess  4/17/2025  patient returned to the emergency room with complaints of worsening abdominal discomfort.  Symptoms worsened for 3 to 4 days after being discharged.  Started to get recurrent vomiting.  Came to the ER.  White count 17.8.  Afebrile.  4/18 CT abdomen pelvis showing Interval increase in size of the central pelvic abscess now measuring 3.2 x 3.5 x 6 cm, previously 2.2 x 2.2 x 3 cm. Significant surrounding inflammatory change with small locules of free air adjacent. High-grade small bowel obstruction with dilated loops of small bowel extending down the level of the abscess where there is wall thickening and transition point at the level the abscess. Distal small bowel decompressed. Small locules of air within it adjacent to the collapsed loop of bowel possibly representing the appendix. Significant interval decrease in the posterior pelvic fluid collection now measuring 3.2 x 1.2 cm, previously 5.1 x 3.6 cm.  4/18 CT-guided drain in radiology.  General surgery following.   Abscess culture growing E. coli and Klebsiella, and Bacteroides fragilis, bilophila wadsworthia   Continue IV Zosyn, follow cultures.   4/21 Gastrografin challenge contrast material reach the rectum.  No evidence of high-grade bowel obstruction.  Still some dilated small bowel in the left upper abdomen.  4/21 NG removed  4/21 white count now 6 (17.8 on 4/18)   4/21 ID consult  4/22 sinogram with catheter in place.  Resolution of fluid collection.  Fistulous connection between collection cavity and small bowel  4/22 continue Zosyn.>>  ID recommending Augmentin for 2 weeks.  CT abdomen pelvis 2 weeks.  Await further input from  surgery        Hypercholesterolemia   Resume PTA simvastatin when able to tolerate PO           Diet: Clear Liquid Diet    DVT Prophylaxis: Pneumatic Compression Devices: Patient is ambulating up and down the hallways every day multiple times  Packer Catheter: Not present  Lines: None     Cardiac Monitoring: None  Code Status: No CPR- Do NOT Intubate      Clinically Significant Risk Factors                                         Social Drivers of Health    Social Connections: Unknown (11/15/2024)    Social Connection and Isolation Panel [NHANES]     Frequency of Social Gatherings with Friends and Family: Patient declined          Disposition Plan     Medically Ready for Discharge: Anticipated Tomorrow         YANIV JOSEPH MD  Hospitalist Service  [facility]  Securely message with [employer] (more info)  Text page via [facility] Paging/Directory   ______________________________________________________________________        Physical Exam   Vital Signs: Temp: 97.9  F (36.6  C) Temp src: Oral BP: 123/66 Pulse: 57   Resp: 18 SpO2: 100 % O2 Device: None (Room air)    Weight: 134 lbs 12.8 oz    General Appearance: Patient is awake and alert   Respiratory: Clear to auscultation bilaterally with no wheezes or rhonchi  Cardiovascular: regular rate with no gallop or rub  GI: + Bs, soft, tender drain in lower abdomen       Medical Decision Making       35 MINUTES SPENT BY ME on the date of service doing chart review, history, exam, documentation & further activities per the note.      Data         Imaging results reviewed over the past 24 hrs:   Recent Results (from the past 24 hours)   CT Sinogram Injection    Narrative    CT SINOGRAM INJECTION  4/22/2025 9:30 AM CDT     HISTORY: H/o perforated appendix, s/p drain placement 4/18, Outputs decreasing please evaluate    COMPARISON: CT of the abdomen and pelvis dated 4/18/2025    TECHNIQUE: CT of the abdomen was performed without contrast and following the  administration of contrast injected through an abdominal drain. Radiation lowering techniques utilized.    FINDINGS: There is a drain in the lower abdomen just to the left of midline. Previously seen fluid collection in the area of the pigtail catheter appears to have resolved. There are some mild residual inflammatory changes. Post administration of   contrast, there is filling of a residual collection cavity as well as multiple loops of small bowel consistent with fistulous communication between the collection cavity and small bowel.      Impression    IMPRESSION: Pigtail drainage catheter remains in place. Resolution of the fluid collection in which the pigtail portion of the catheter lies. Evidence for fistulous communication between the collection cavity and small bowel.

## 2025-04-22 NOTE — PLAN OF CARE
Shift Note [id number]:     Reason for admission: Perforated appendicitis with abscess   Cognition/ Mentation: Alert and oriented x4.   Activity: independent  Vital Signs/Tele: VSS  Aggression Stop Light: green    Shift Summary: Patient ambulated in hallway.     Discharge Plan: 2-4 days per hospitalist note    See Flow sheets for assessment

## 2025-04-22 NOTE — PROGRESS NOTES
Eleazar Rehman is a 69 year old female in CT for a CT sinogram. She has a history signficant for a perforated appendix s/p drain placement 4/18/25 with decreasing outputs. She is feeling much better, taking in fluid, abdominal pain improved, NG tube removed, able to have bowel movements.     15 mL contrast injected carefully in the drain with some pressure at the end. No leaking or pain. CT done per and post contrast injection.     Reviewed with Body Radiologist Dr Ch. There was already contrast in the colon prior to contrast injection. The colon contrast was from XR gastrografin challenge. Today, prior to injection of the contrast the fluid cavity appears resolved. With contrast injection communication is seen to the appendix, paracolic gutter and peritoneum. He recommends keeping the drain in until appendectomy.     Discussed above with Shelia KRISHNAMURTHY today with surgery.     Total time: 20 minutes    Thanks, Carrol [facility] Interventional Radiology CNP ([phone]) (phone [phone])

## 2025-04-22 NOTE — PROGRESS NOTES
[facility]    Infectious Disease Progress Note    Date of Service (when I saw the patient): 04/22/2025     Assessment & Plan   Larissa Rehman is a 69 year old adult who was admitted on 4/18/2025.     Impression:   69 year old adult who was recently hospitalized 4/9-4/12 with perforated appendicitis with developing abscess but no drainable fluid collection at that time, now presenting with worsened abscess and high grade small bowel obstruction.      -Originally hospitalized 4/9-4/12 with perforated appendicitis and abscess. Treated with Zosyn in hospital and discharged on Augmentin.   -Now with worsening of central pelvic abscess, improvement in posterior pelvic fluid collection, and high grade SBO.   -S/p drain placement 4/18/25. Cultures with E.coli, Klebsiella pneumoniae, Bilophila, and Bacteroides.   -CT sinogram 4/22 with : Resolution of the fluid collection in which the pigtail portion of the catheter lies. Evidence for fistulous communication between the collection cavity and small bowel   -Afebrile. Leukocytosis resolved.   -SBO resolved. Slowly advancing diet.  -On Zosyn.         Recommendations:   Continue Zosyn for now.   Discharge on Augmentin for 2 weeks.   Recommend repeat CT abdomen/pelvis in 2 weeks.  Will arrange follow-up with ID in 2 weeks.   ID will sign off. Please call us back with questions.     Patient and plan discussed with Dr. Diehl.     Kat Syed PA-C    Interval History   Tolerating antibiotics ok   No new rashes or issues with antibiotics   Labs reviewed   No changes to past medical, social or family history   Tolerating clears, hoping to advance diet. Wants to go home.       Physical Exam   Temp: 97.9  F (36.6  C) Temp src: Oral BP: 123/66 Pulse: 57   Resp: 18 SpO2: 100 % O2 Device: None (Room air)    Vitals:    04/19/25 0711 04/20/25 0634 04/21/25 0700   Weight: 64 kg (141 lb 1.5 oz) 62.2 kg (137 lb 1.6 oz) 61.1 kg (134 lb 12.8 oz)     Vital Signs with  Ranges  Temp:  [97.8  F (36.6  C)-98.4  F (36.9  C)] 97.9  F (36.6  C)  Pulse:  [56-60] 57  Resp:  [16-18] 18  BP: (119-125)/(66-73) 123/66  SpO2:  [98 %-100 %] 100 %    Constitutional: Awake, alert, cooperative, no apparent distress  Lungs: Clear to auscultation bilaterally, no crackles or wheezing  Cardiovascular: Regular rate and rhythm, normal S1 and S2, and no murmur noted  Abdomen: Normal bowel sounds, soft, non-distended, non-tender. SANTY drain with serosanguineous output.   Skin: No rashes, no cyanosis, no edema  Other:     Medications   Current Facility-Administered Medications   Medication Dose Route Frequency Provider Last Rate Last Admin    dextrose 5% in lactated ringers 1,000 mL with potassium chloride 20 mEq/L infusion   Intravenous Continuous Darlene Estes  mL/hr at 04/21/25 2239 New Bag at 04/21/25 2239     Current Facility-Administered Medications   Medication Dose Route Frequency Provider Last Rate Last Admin    estradiol (VIVELLE-DOT) 0.1 MG/24HR BIW patch 0.5 patch  0.5 patch Transdermal Q48H Madhu Infante NP   0.5 patch at 04/20/25 0841    estradiol biweekly (VIVELLE-DOT) Patch in Place   Transdermal Q8H [facility] Darlene Estes MD        piperacillin-tazobactam (ZOSYN) 4.5 g vial to attach to  mL bag  4.5 g Intravenous Q6H Madhu Infante NP   4.5 g at 04/22/25 0811    simvastatin (ZOCOR) tablet 40 mg  40 mg Oral At Bedtime Madhu Infnate NP        sodium chloride (PF) 0.9% PF flush 3 mL  3 mL Intracatheter Q8H [facility] Madhu Infante NP   3 mL at 04/20/25 1428       Data   All microbiology laboratory data reviewed.  Recent Labs   Lab Test 04/21/25  1013 04/19/25  0931 04/18/25  0745   WBC 6.0 12.3* 17.8*   HGB 12.3 13.1 14.3   HCT 36.3 39.5 41.6   MCV 94 95 92    400 442     Recent Labs   Lab Test 04/21/25  1013 04/19/25  0931 04/18/25  0745   CR 0.79 0.84 0.95       04/18/2025 1423 04/21/2025 1230 Anaerobic  Bacterial Culture Routine [[id number]]   (Abnormal)   Aspirate from Abdomen    Preliminary result Component Value   Culture Culture in progress P    4+ Bacteroides fragilis Abnormal  P    Susceptibilities not routinely done, refer to antibiogram to view typical susceptibility profiles    4+ Bilophila wadsworthia Abnormal  P    Susceptibilities not routinely done, refer to antibiogram to view typical susceptibility profiles             04/18/2025 1423 04/18/2025 1830 Gram Stain [[id number]]   (Abnormal)   Aspirate from Abdomen    Final result Component Value   GS Culture See corresponding culture for results   Gram Stain Result 1+ Gram negative bacilli Abnormal    Gram Stain Result 3+ WBC seen Abnormal    Predominantly PMNs          04/18/2025 1423 04/20/2025 2223 Aspirate Aerobic Bacterial Culture Routine [[id number]]    (Abnormal)   Aspirate from Abdomen    Final result Component Value   Culture 1+ Escherichia coli Abnormal     2+ Klebsiella pneumoniae Abnormal     1+ Normal angel       Susceptibility     Escherichia coli Klebsiella pneumoniae     DIXIE DIXIE     Ampicillin <=2 ug/mL Susceptible  Resistant 1     Ampicillin/ Sulbactam <=2 ug/mL Susceptible 4 ug/mL Susceptible     Cefepime <=0.12 ug/mL Susceptible <=0.12 ug/mL Susceptible     Ceftazidime <=0.5 ug/mL Susceptible <=0.5 ug/mL Susceptible     Ceftriaxone <=0.25 ug/mL Susceptible <=0.25 ug/mL Susceptible     Ciprofloxacin <=0.06 ug/mL Susceptible <=0.06 ug/mL Susceptible     Gentamicin <=1 ug/mL Susceptible <=1 ug/mL Susceptible     Levofloxacin <=0.12 ug/mL Susceptible <=0.12 ug/mL Susceptible     Meropenem <=0.25 ug/mL Susceptible <=0.25 ug/mL Susceptible     Piperacillin/Tazobactam <=4 ug/mL Susceptible <=4 ug/mL Susceptible     Trimethoprim/Sulfamethoxazole <=1/19 ug/mL Susceptible <=1/19 ug/mL Susceptible                 CT SINOGRAM INJECTION  4/22/2025 9:30 AM CDT      HISTORY: H/o perforated appendix, s/p drain placement 4/18, Outputs decreasing  please evaluate     COMPARISON: CT of the abdomen and pelvis dated 4/18/2025     TECHNIQUE: CT of the abdomen was performed without contrast and following the administration of contrast injected through an abdominal drain. Radiation lowering techniques utilized.     FINDINGS: There is a drain in the lower abdomen just to the left of midline. Previously seen fluid collection in the area of the pigtail catheter appears to have resolved. There are some mild residual inflammatory changes. Post administration of   contrast, there is filling of a residual collection cavity as well as multiple loops of small bowel consistent with fistulous communication between the collection cavity and small bowel.                                                                      IMPRESSION: Pigtail drainage catheter remains in place. Resolution of the fluid collection in which the pigtail portion of the catheter lies. Evidence for fistulous communication between the collection cavity and small bowel.

## 2025-04-22 NOTE — PLAN OF CARE
Goal Outcome Evaluation:      Plan of Care Reviewed With: patient      Patient alert and oriented x 4.  Denies shortness of breath, chest pain, nausea or vomiting.  Patient tolerating clear liquid fluids drinking over 240 mL at 1 time without any additional bloating, gas, pain, distention.  Patient independent and has been ambulating every hour around the unit.  Discussed with patient regarding increasing diet to full liquid versus low fiber.  Patient very knowledgeable of diet and increasing due to previous hospitalization.  Patient upgraded to full liquid and educated that she can request low fiber if tolerating full liquid.  Patient said she may request low fiber tomorrow morning..  No adverse reaction to IV antibiotics.  Patient passing gas.

## 2025-04-23 VITALS
WEIGHT: 139.99 LBS | SYSTOLIC BLOOD PRESSURE: 111 MMHG | DIASTOLIC BLOOD PRESSURE: 67 MMHG | OXYGEN SATURATION: 100 % | TEMPERATURE: 97.4 F | RESPIRATION RATE: 18 BRPM | BODY MASS INDEX: 23.3 KG/M2 | HEART RATE: 56 BPM

## 2025-04-23 LAB
ANION GAP SERPL CALCULATED.3IONS-SCNC: 12 MMOL/L (ref 7–15)
BUN SERPL-MCNC: 4.4 MG/DL (ref 8–23)
CALCIUM SERPL-MCNC: 9.2 MG/DL (ref 8.8–10.4)
CHLORIDE SERPL-SCNC: 99 MMOL/L (ref 98–107)
CREAT SERPL-MCNC: 1.08 MG/DL (ref 0.51–1.17)
EGFRCR SERPLBLD CKD-EPI 2021: 73 ML/MIN/1.73M2
ERYTHROCYTE [DISTWIDTH] IN BLOOD BY AUTOMATED COUNT: 12.8 % (ref 10–15)
GLUCOSE SERPL-MCNC: 116 MG/DL (ref 70–99)
HCO3 SERPL-SCNC: 26 MMOL/L (ref 22–29)
HCT VFR BLD AUTO: 38.6 % (ref 35–53)
HGB BLD-MCNC: 12.8 G/DL (ref 11.7–17.7)
MCH RBC QN AUTO: 31.4 PG (ref 26.5–33)
MCHC RBC AUTO-ENTMCNC: 33.2 G/DL (ref 31.5–36.5)
MCV RBC AUTO: 95 FL (ref 78–100)
PLATELET # BLD AUTO: 474 10E3/UL (ref 150–450)
POTASSIUM SERPL-SCNC: 3.6 MMOL/L (ref 3.4–5.3)
RBC # BLD AUTO: 4.07 10E6/UL (ref 3.8–5.9)
SODIUM SERPL-SCNC: 137 MMOL/L (ref 135–145)
WBC # BLD AUTO: 6 10E3/UL (ref 4–11)

## 2025-04-23 PROCEDURE — 36415 COLL VENOUS BLD VENIPUNCTURE: CPT | Performed by: INTERNAL MEDICINE

## 2025-04-23 PROCEDURE — 99239 HOSP IP/OBS DSCHRG MGMT >30: CPT | Performed by: INTERNAL MEDICINE

## 2025-04-23 PROCEDURE — 82310 ASSAY OF CALCIUM: CPT | Performed by: INTERNAL MEDICINE

## 2025-04-23 PROCEDURE — 85027 COMPLETE CBC AUTOMATED: CPT | Performed by: INTERNAL MEDICINE

## 2025-04-23 PROCEDURE — 250N000011 HC RX IP 250 OP 636

## 2025-04-23 PROCEDURE — 99207 PR NO BILLABLE SERVICE THIS VISIT: CPT | Performed by: SURGERY

## 2025-04-23 RX ORDER — ACETAMINOPHEN 325 MG/1
650 TABLET ORAL EVERY 6 HOURS PRN
Status: SHIPPED
Start: 2025-04-23

## 2025-04-23 RX ADMIN — PIPERACILLIN AND TAZOBACTAM 4.5 G: 4; .5 INJECTION, POWDER, FOR SOLUTION INTRAVENOUS at 09:16

## 2025-04-23 RX ADMIN — PIPERACILLIN AND TAZOBACTAM 4.5 G: 4; .5 INJECTION, POWDER, FOR SOLUTION INTRAVENOUS at 03:22

## 2025-04-23 ASSESSMENT — ACTIVITIES OF DAILY LIVING (ADL)
ADLS_ACUITY_SCORE: 58

## 2025-04-23 NOTE — PROGRESS NOTES
General surgery    Results of recent sinogram noted.  Continue antibiotics as per ID recommendations and with drain in place so hopefully fistulous track will collapse/close.    Follow-up with the acute surgery clinic in 2 weeks.  The office will contact him to set up an appointment so this is done after the CT scan has occurred.    Total encounter time 30 minutes, more than half spent in counseling, review of data, coordination of care.

## 2025-04-23 NOTE — PROVIDER NOTIFICATION
Writer reached out to Dr. Mariano inquiring about discharge plan.   Per surgeon, will be up to see patient soon.

## 2025-04-23 NOTE — PLAN OF CARE
Goal Outcome Evaluation:    .Date/Time 4/23/25 [id number]    Trauma/Ortho/Medical: Medical     Diagnosis: Perforated appendicitis with abscess/ SBO  POD#: IR drain placement on 4/19  Mental Status: A&Ox4  Activity/dangle: Up ind in the room.   Diet: Low fiber diet, tolerating.  Pain: Denies.   Packer/Voiding: Up to BR voiding adequately.   Tele/Restraints/Iso: N/A  02/LDA: PIV removed at discharge. SANTY drain to bulb suction in place, drain care/irrigation reviewed with patient- verified teaching by teach back.   D/C Date: AVS and discharge instructions reviewed with patient at bedside. Patient discharged with all of her belongings including medications. All questions answered at this time.

## 2025-04-23 NOTE — PLAN OF CARE
Goal Outcome Evaluation:    Orientations: Alert and oriented x4  Vitals/Pain: Vital signs stable on RA. Denies pain.  Tele: N/A  Skin/Wounds: Tiago in place  GI/: Bowel sounds active, flatus +.  Labs: N/A  Ambulation/Assist: Up independently.  Sleep Quality: good  Plan: possible discharge today

## 2025-04-23 NOTE — PROGRESS NOTES
Diagnosis: Appendicitis with Abscess  Mental Status: A&O x4  Activity/dangle Independent  Diet:Full Liquid  Pain: Denied  Packer/Voiding: Bathroom  Tele/Restraints/Iso: NA  02/LDA: On RA. PIV SL  D/C Date: Possible Tomorrow   Other Info: SANTY intact with 30 ml output.

## 2025-04-24 NOTE — DISCHARGE SUMMARY
[facility]  Hospitalist Discharge Summary      Date of Admission:  4/18/2025  Date of Discharge:  4/23/2025  2:18 PM  Discharging Provider: YANIV JOSEPH MD  Discharge Service: Hospitalist Service    Discharge Diagnoses   ***    Clinically Significant Risk Factors          Follow-ups Needed After Discharge   Follow-up Appointments       Hospital Follow-up with Existing Primary Care Provider (PCP)          Schedule Primary Care visit within: 7 Days   Recommended labs and Imaging (to be ordered by Primary Care Provider): basic metabolic profile and cbc/platlet               Unresulted Labs Ordered in the Past 30 Days of this Admission       Date and Time Order Name Status Description    4/18/2025  2:28 PM Anaerobic Bacterial Culture Routine Preliminary         These results will be followed up by ID    Discharge Disposition   Discharged to home  Condition at discharge: Stable    Hospital Course   Ori is a 69 year old non-binary adult admitted on 4/18/2025 with abdominal pain. She was admitted recently admitted from 4/9/2025 to 4/12/2025 for perforated appendicitis with abscess. She was ultimately discharged on Augmentin.       Ms. Rehman presents on 4/18/2025 with acutely worsening abdominal pain. She was recently hospitalized with abdominal pain from 4/9/2025 to 4/12/2025 for abdominal pain and found to have perforated appendicitis with abscess. At that time, general surgery was consulted, but did not recommend surgical intervention. IR was also consulted but did not recommend drain placement. She was on Zosyn and then transitioned to Augmentin at discharge. She now presents with worsening pain across the upper abdomen and has been having recurrent vomiting since 4/17/2025.  *WBC: 17.8     *Abdominal CT w contrast: Interval increase in size of the central pelvic abscess now measuring 3.2 x 3.5 x 6 cm, previously 2.2 x 2.2 x 3 cm. Significant surrounding inflammatory change with small locules  of free air adjacent  High-grade small bowel obstruction with dilated loops of small bowel extending down the level of the abscess where there is wall thickening and transition point at the level the abscess. Distal small bowel decompressed. Small locules of air within it adjacent to the collapsed loop of bowel possibly representing the appendix. Significant interval decrease in the posterior pelvic fluid collection now measuring 3.2 x 1.2 cm, previously 5.1 x 3.6 cm.      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  Symptoms worsened for 3 to 4 days after being discharged.  Started to get recurrent vomiting.  Came to the ER.  White count 17.8.  Afebrile.  4/18 CT abdomen pelvis showing Interval increase in size of the central pelvic abscess now measuring 3.2 x 3.5 x 6 cm, previously 2.2 x 2.2 x 3 cm. Significant surrounding inflammatory change with small locules of free air adjacent. High-grade small bowel obstruction with dilated loops of small bowel extending down the level of the abscess where there is wall thickening and transition point at the level the abscess. Distal small bowel decompressed. Small locules of air within it adjacent to the collapsed loop of bowel possibly representing the appendix. Significant interval decrease in the posterior pelvic fluid collection now measuring 3.2 x 1.2 cm, previously 5.1 x 3.6 cm.  4/18 CT-guided drain in radiology.  General surgery following.   Abscess culture growing E. coli and Klebsiella, and Bacteroides fragilis, bilophila wadsworthia   Continue IV  Zosyn, follow cultures.   4/21 Gastrografin challenge contrast material reach the rectum.  No evidence of high-grade bowel obstruction.  Still some dilated small bowel in the left upper abdomen.  4/21 NG removed  4/21 white count now 6 (17.8 on 4/18)   4/21 ID consult  4/22 sinogram with catheter in place.  Resolution of fluid collection.  Fistulous connection between collection cavity and small bowel  4/22 continue Zosyn.>>  ID recommending Augmentin for 2 weeks.  CT abdomen pelvis 2 weeks.  Await further input from surgery        Hypercholesterolemia   Resume PTA simvastatin when able to tolerate PO         Consultations This Hospital Stay   INTERVENTIONAL RADIOLOGY ADULT/PEDS IP CONSULT  INFECTIOUS DISEASES IP CONSULT    Code Status   Prior    Time Spent on this Encounter   I, YANIV JOSEPH MD, personally saw the patient today and spent greater than 30 minutes discharging this patient.       YANIV JOSEPH MD  [facility]  [address] [facility] [address]  Phone: [phone]  Fax: [phone]  ______________________________________________________________________    Physical Exam   Vital Signs: Temp: 97.4  F (36.3  C) Temp src: Oral BP: 111/67 Pulse: 56   Resp: 18 SpO2: 100 % O2 Device: None (Room air)    Weight: 139 lbs 15.87 oz  General Appearance: ***  Respiratory: ***  Cardiovascular: ***  GI: ***  Skin: ***  Other: ***        Primary Care Physician   Sravani Boyer    Discharge Orders      Reason for your hospital stay    Abdominal abscess and infection     Activity    Your activity upon discharge: activity as tolerated     Tubes and Drains    Drain Closed/Suction LLQ Bulb 4 days     Brief Discharge Instructions    Discharge on Augmentin for 2 weeks.   Recommend repeat CT abdomen/pelvis in 2 weeks.  Will arrange follow-up with ID in 2 weeks.  [phone]     Diet    Follow this diet upon discharge: Current Diet:Orders Placed This Encounter      Low Fiber Diet      Hospital Follow-up with Existing Primary Care Provider (PCP)            Significant Results and Procedures   Most Recent 3 CBC's:  Recent Labs   Lab Test 04/23/25  1255 04/21/25  1013 04/19/25  0931   WBC 6.0 6.0 12.3*   HGB 12.8 12.3 13.1   MCV 95 94 95   * 415 400     Most Recent 3 BMP's:  Recent Labs   Lab Test 04/23/25  1255 04/21/25  1013 04/19/25  0931    139 138   POTASSIUM 3.6 3.5 3.6   CHLORIDE 99 103 101   CO2 26 26 21*   BUN 4.4* 4.0* 14.3   CR 1.08 0.79 0.84   ANIONGAP 12 10 16*   DEBORA 9.2 8.3* 7.8*   * 132* 95     Most Recent 2 LFT's:  Recent Labs   Lab Test 04/19/25  0931 04/09/25  1410   AST 16 16   ALT 10 11   ALKPHOS 41 42   BILITOTAL 0.5 1.0     Most Recent 3 INR's:No lab results found.  Most Recent INR's and Anticoagulation Dosing History:  Anticoagulation Dose History           No data to display              Most Recent 3 Creatinines:  Recent Labs   Lab Test 04/23/25  1255 04/21/25  1013 04/19/25  0931   CR 1.08 0.79 0.84     Most Recent 3 Hemoglobins:  Recent Labs   Lab Test 04/23/25  1255 04/21/25  1013 04/19/25  0931   HGB 12.8 12.3 13.1     Most Recent 3 Troponin's:No lab results found.  Most Recent 3 BNP's:No lab results found.  Most Recent D-dimer:No lab results found.  Most Recent Cholesterol Panel:  Recent Labs   Lab Test 11/20/24  1448   CHOL 205*   *   HDL 81   TRIG 99     7-Day Micro Results       Collected Updated Procedure Result Status      04/18/2025 1423 04/21/2025 1230 Anaerobic Bacterial Culture Routine [[id number]]   (Abnormal)   Aspirate from Abdomen    Preliminary result Component Value   Culture Culture in progress  [P]     4+ Bacteroides fragilis  [P]     Susceptibilities not routinely done, refer to antibiogram to view typical susceptibility profiles    4+ Bilophila wadsworthia  [P]     Susceptibilities not routinely done, refer to antibiogram to view typical susceptibility profiles               04/18/2025 1423 04/18/2025 1830 Gram Stain  [[id number]]   (Abnormal)   Aspirate from Abdomen    Final result Component Value   GS Culture See corresponding culture for results   Gram Stain Result 1+ Gram negative bacilli   Gram Stain Result 3+ WBC seen   Predominantly PMNs            04/18/2025 1423 04/20/2025 2223 Aspirate Aerobic Bacterial Culture Routine [[id number]]    (Abnormal)   Aspirate from Abdomen    Final result Component Value   Culture 1+ Escherichia coli    2+ Klebsiella pneumoniae    1+ Normal angel        Susceptibility        Escherichia coli      DIXIE      Ampicillin <=2 ug/mL Susceptible      Ampicillin/ Sulbactam <=2 ug/mL Susceptible      Cefepime <=0.12 ug/mL Susceptible      Ceftazidime <=0.5 ug/mL Susceptible      Ceftriaxone <=0.25 ug/mL Susceptible      Ciprofloxacin <=0.06 ug/mL Susceptible      Gentamicin <=1 ug/mL Susceptible      Levofloxacin <=0.12 ug/mL Susceptible      Meropenem <=0.25 ug/mL Susceptible      Piperacillin/Tazobactam <=4 ug/mL Susceptible      Trimethoprim/Sulfamethoxazole <=1/19 ug/mL Susceptible                      Susceptibility        Klebsiella pneumoniae      DIXIE      Ampicillin  Resistant  [1]       Ampicillin/ Sulbactam 4 ug/mL Susceptible      Cefepime <=0.12 ug/mL Susceptible      Ceftazidime <=0.5 ug/mL Susceptible      Ceftriaxone <=0.25 ug/mL Susceptible      Ciprofloxacin <=0.06 ug/mL Susceptible      Gentamicin <=1 ug/mL Susceptible      Levofloxacin <=0.12 ug/mL Susceptible      Meropenem <=0.25 ug/mL Susceptible      Piperacillin/Tazobactam <=4 ug/mL Susceptible      Trimethoprim/Sulfamethoxazole <=1/19 ug/mL Susceptible                   [1]  Intrinsically Resistant                          Most Recent TSH and T4:No lab results found.  Most Recent Hemoglobin A1c:  Recent Labs   Lab Test 09/24/21  1609   A1C 5.4     Most Recent 6 glucoses:  Recent Labs   Lab Test 04/23/25  1255 04/21/25  1013 04/19/25  0931 04/18/25  0745 04/11/25  1037 04/10/25  0554   * 132* 95 112* 89 106*      Most Recent Urinalysis:  Recent Labs   Lab Test 04/09/25  1144   COLOR Yellow   APPEARANCE Clear   URINEGLC Negative   URINEBILI Small*   URINEKETONE 40*   SG 1.020   UBLD Moderate*   URINEPH 6.5   PROTEIN 100*   UROBILINOGEN 0.2   NITRITE Negative   LEUKEST Negative   RBCU 5-10*   WBCU 0-5     Most Recent ABG:No lab results found.  Most Recent ESR & CRP:  Recent Labs   Lab Test 04/11/25  1037   CRPI 54.11*     Most Recent Anemia Panel:  Recent Labs   Lab Test 04/23/25  1255   WBC 6.0   HGB 12.8   HCT 38.6   MCV 95   *     Most Recent CPK:No lab results found.    Results for orders placed or performed during the hospital encounter of 04/18/25   CT Abdomen Pelvis w Contrast    Narrative    EXAM: CT ABDOMEN PELVIS W CONTRAST  LOCATION: [facility]  DATE: 4/18/2025    INDICATION: Recent perf appy with abscess, worsening pain.  COMPARISON: 04/09/2025  TECHNIQUE: CT scan of the abdomen and pelvis was performed following injection of IV contrast. Multiplanar reformats were obtained. Dose reduction techniques were used.  CONTRAST: 71 mL Isovue 370    FINDINGS:   LOWER CHEST: Minimal bibasilar atelectasis. Minimal esophageal hiatal hernia.    HEPATOBILIARY: No calcified gallstones or biliary dilatation. Hepatic cysts, no followup.    PANCREAS: No ductal dilatation or acute inflammatory change.    SPLEEN: Normal.    ADRENAL GLANDS: Normal.    KIDNEYS/BLADDER: Symmetric renal enhancement. No urinary collecting system dilatation or calculi. Bladder unremarkable.    BOWEL: Interval increase in size of the central pelvic abscess. This fluid and air collection now measures 3.2 x 3.5 x 6 cm and demonstrates a greater peripheral enhancement. Significant surrounding inflammatory change. Small locules of free air   adjacent. High-grade small bowel obstruction with dilated loops of a small bowel extending down to the level of the abscess where there is wall thickening and transition point at the  level of the abscess (series 4, images 121-148). Diffuse inflammatory   edema and mesenteric fluid compatible with peritonitis. Distal small bowel decompressed. Small locules of air within the tubular structure adjacent to the collapsed loop of bowel possibly representing the appendix.    LYMPH NODES: No lymphadenopathy.    VASCULATURE: Normal caliber aorta. Patent celiac, splenic, hepatic, SMA and bilateral renal arteries. Patent KAREN. Patent hepatic, portal, splenic and superior mesenteric veins.    PELVIC ORGANS: Significant interval decrease in the pelvic fluid collection now measuring 3.2 x 1.2 cm, previously 5.1 x 3.6 cm. Minimal free fluid in the pelvis.    MUSCULOSKELETAL: Degenerative changes in the spine.      Impression    IMPRESSION:   1.  Interval increase in size of the central pelvic abscess now measuring 3.2 x 3.5 x 6 cm, previously 2.2 x 2.2 x 3 cm. Significant surrounding inflammatory change with small locules of free air adjacent.  2.  High-grade small bowel obstruction with dilated loops of small bowel extending down the level of the abscess where there is wall thickening and transition point at the level the abscess. Distal small bowel decompressed. Small locules of air within it   adjacent to the collapsed loop of bowel possibly representing the appendix.  3.  Significant interval decrease in the posterior pelvic fluid collection now measuring 3.2 x 1.2 cm, previously 5.1 x 3.6 cm.       CT Abdomen Peritoneum Abscess Drain w Cath Place    Narrative    EXAM:  1. PERCUTANEOUS DRAIN PLACEMENT ABDOMINAL ABSCESS  2. CT GUIDANCE  3. CONSCIOUS SEDATION  LOCATION: [facility]  DATE: 4/18/2025    INDICATION: abdominal abscess  TECHNIQUE: Dose reduction techniques were used.    PROCEDURE: Informed consent obtained. Site marked. Prior images reviewed. Required items made available. Patient identity confirmed verbally and with arm band. Patient reevaluated immediately before  administering sedation. Universal protocol was   followed. Time out performed. The site was prepped and draped in sterile fashion. 10 mL of 1% lidocaine was infused into the local soft tissues. Using standard technique and under direct CT guidance, a 8 Barb[place] drainage catheter was inserted into the   fluid collection.      SPECIMEN: 25 mL of purulent fluid was aspirated and sent to lab for cultures and Gram stain.    BLOOD LOSS: Minimal.    The patient tolerated the procedure well. No immediate complications.    SEDATION: Versed 1 mg. Fentanyl 50 mcg. The procedure was performed with administration intravenous conscious sedation with appropriate preoperative, intraoperative, and postoperative evaluation.    15 minutes of supervised face to face conscious sedation time was provided by a radiology nurse under my direct supervision.      Impression    IMPRESSION:  1.  Successful CT-guided drain placement into an abdominal abscess.   XR Abdomen Port 1 View    Narrative    EXAM: XR ABDOMEN PORT 1 VIEW  LOCATION: [facility]  DATE: 4/19/2025    INDICATION: confirm placement of NG  COMPARISON: None.      Impression    IMPRESSION: Orogastric tube projects over stomach. No free air. Dilated distended loops of small bowel with gas seen distally consistent with known small bowel obstruction.   XR Gastrografin Challenge    Narrative    EXAM: XR GASTROGRAFIN CHALLENGE  LOCATION: [facility]  DATE: 4/21/2025    INDICATION: Small Bowel Obstruction  COMPARISON: CT 04/18/2025.  TECHNIQUE: Routine water soluble contrast follow-through challenge.      Impression    IMPRESSION:  1.  Water soluble contrast material IS identified within the colon 8 hours post administration, reaching the rectum.  2.  No evidence of high-grade bowel obstruction. There is still some dilated small bowel in the left upper abdomen.  3.  Enteric tube overlies stomach. Pigtail catheter overlies upper pelvis.    CT Sinogram Injection    Narrative    CT SINOGRAM INJECTION  4/22/2025 9:30 AM CDT     HISTORY: H/o perforated appendix, s/p drain placement 4/18, Outputs decreasing please evaluate    COMPARISON: CT of the abdomen and pelvis dated 4/18/2025    TECHNIQUE: CT of the abdomen was performed without contrast and following the administration of contrast injected through an abdominal drain. Radiation lowering techniques utilized.    FINDINGS: There is a drain in the lower abdomen just to the left of midline. Previously seen fluid collection in the area of the pigtail catheter appears to have resolved. There are some mild residual inflammatory changes. Post administration of   contrast, there is filling of a residual collection cavity as well as multiple loops of small bowel consistent with fistulous communication between the collection cavity and small bowel.      Impression    IMPRESSION: Pigtail drainage catheter remains in place. Resolution of the fluid collection in which the pigtail portion of the catheter lies. Evidence for fistulous communication between the collection cavity and small bowel.       Discharge Medications   Discharge Medication List as of 4/23/2025  1:50 PM        START taking these medications    Details   sodium chloride, PF, 0.9% PF flush Irrigate with 10 mLs as directed daily., Disp-140 mL, R-2, E-Prescribe           CONTINUE these medications which have CHANGED    Details   acetaminophen (TYLENOL) 325 MG tablet Take 2 tablets (650 mg) by mouth every 6 hours as needed for mild pain or other (and adjunct with moderate or severe pain or per patient request)., No Print Out      amoxicillin-clavulanate (AUGMENTIN) 875-125 MG tablet Take 1 tablet by mouth 2 times daily., Disp-28 tablet, R-0, E-Prescribe           CONTINUE these medications which have NOT CHANGED    Details   estradiol (VIVELLE-DOT) 0.1 MG/24HR bi-weekly patch Place 1 patch over 96 hours onto the skin twice a week., Disp-25 patch,  R-3, E-Prescribe      ondansetron (ZOFRAN ODT) 4 MG ODT tab Take 1 tablet (4 mg) by mouth every 6 hours as needed for nausea., Disp-12 tablet, R-0, E-Prescribe      simvastatin (ZOCOR) 40 MG tablet TAKE 1 TABLET(40 MG) BY MOUTH AT BEDTIME, Disp-90 tablet, R-3, E-Prescribe           STOP taking these medications       simethicone (MYLICON) 80 MG chewable tablet Comments:   Reason for Stopping:             Allergies   Allergies   Allergen Reactions    Bee Venom

## 2025-04-25 LAB
BACTERIA ASPIRATE CULT: ABNORMAL
BACTERIA ASPIRATE CULT: ABNORMAL

## 2025-05-01 ENCOUNTER — TELEPHONE (OUTPATIENT)
Dept: MULTI SPECIALTY CLINIC | Facility: CLINIC | Age: 70
End: 2025-05-01
Payer: COMMERCIAL

## 2025-05-01 ENCOUNTER — TELEPHONE (OUTPATIENT)
Dept: SURGERY | Facility: CLINIC | Age: 70
End: 2025-05-01
Payer: COMMERCIAL

## 2025-05-01 DIAGNOSIS — K35.32 RUPTURED APPENDICITIS: Primary | ICD-10-CM

## 2025-05-01 NOTE — TELEPHONE ENCOUNTER
Infectious Disease Progress Note     Date of Service (when I saw the patient): 05/01/2025        Assessment & Plan    Impression:   69 year old adult who was recently hospitalized 4/9-4/12 with perforated appendicitis with developing abscess but no drainable fluid collection at that time, who returned with worsened abscess and high grade small bowel obstruction.      -Originally hospitalized 4/9-4/12 with perforated appendicitis and abscess. Treated with Zosyn in hospital and discharged on Augmentin.   -Re-presented with worsening of central pelvic abscess, improvement in posterior pelvic fluid collection, and high grade SBO.   -S/p drain placement 4/18/25. Cultures with E.coli, Klebsiella pneumoniae, Bilophila, and Bacteroides.   -CT sinogram 4/22 with : Resolution of the fluid collection in which the pigtail portion of the catheter lies. Evidence for fistulous communication between the collection cavity and small bowel   -Afebrile. Leukocytosis resolved.   -SBO resolved. Slowly advancing diet.  -Decreased drainage in drain, < 10 ml's x 4 days now.   -On Augmentin.         Recommendations:   Continue Augmentin until repeat imaging is done and then reassess final duration of antibiotics.  Discussed with IR today. They will be calling patient to schedule CT and sinogram and will also message Surgery to arrange follow-up appointment.   Patient may need to leave drain in place if fistula remains until appendectomy can be done.     Patient and plan discussed with Dr. Diehl.      Kat Syed PA-C        Interval History  Tolerating antibiotics ok   No new rashes or issues with antibiotics   Labs reviewed   No changes to past medical, social or family history   Pain has essentially resolved. Decreased outputs in drain for the past week, < 10 ml's/day. No one has called him to set up any imaging or follow-up appointments.            Physical Exam  Temp: 97.9  F (36.6  C) Temp src: Oral BP: 123/66 Pulse: 57   Resp: 18  SpO2: 100 % O2 Device: None (Room air)          Vitals:     04/19/25 0711 04/20/25 0634 04/21/25 0700   Weight: 64 kg (141 lb 1.5 oz) 62.2 kg (137 lb 1.6 oz) 61.1 kg (134 lb 12.8 oz)      Vital Signs with Ranges  Temp:  [97.8  F (36.6  C)-98.4  F (36.9  C)] 97.9  F (36.6  C)  Pulse:  [56-60] 57  Resp:  [16-18] 18  BP: (119-125)/(66-73) 123/66  SpO2:  [98 %-100 %] 100 %     Constitutional: Awake, alert, cooperative, no apparent distress  Lungs: Clear to auscultation bilaterally, no crackles or wheezing  Cardiovascular: Regular rate and rhythm, normal S1 and S2, and no murmur noted  Abdomen: Normal bowel sounds, soft, non-distended, non-tender. SANTY drain with serosanguineous output.   Skin: No rashes, no cyanosis, no edema      Collected 4/18/2025  2:23 PM    Dx: Appendicitis with abscess    Specimen Information: Abdomen; Aspirate   0 Result Notes  Culture 1+ Escherichia coli Abnormal    2+ Klebsiella pneumoniae Abnormal    1+ Normal angel        Resulting Agency: [facility]     Susceptibility       Escherichia coli Klebsiella pneumoniae     DIXIE DIXIE     Ampicillin <=2 ug/mL Susceptible  Resistant 1     Ampicillin/ Sulbactam <=2 ug/mL Susceptible 4 ug/mL Susceptible     Cefepime <=0.12 ug/mL Susceptible <=0.12 ug/mL Susceptible     Ceftazidime <=0.5 ug/mL Susceptible <=0.5 ug/mL Susceptible     Ceftriaxone <=0.25 ug/mL Susceptible <=0.25 ug/mL Susceptible     Ciprofloxacin <=0.06 ug/mL Susceptible <=0.06 ug/mL Susceptible     Gentamicin <=1 ug/mL Susceptible <=1 ug/mL Susceptible     Levofloxacin <=0.12 ug/mL Susceptible <=0.12 ug/mL Susceptible     Meropenem <=0.25 ug/mL Susceptible <=0.25 ug/mL Susceptible     Piperacillin/Tazobactam <=4 ug/mL Susceptible <=4 ug/mL Susceptible     Trimethoprim/Sulfamethoxazole <=1/19 ug/mL Susceptible <=1/19 ug/mL Susceptible       Culture  4+ Bacteroides fragilis Abnormal    Susceptibilities not routinely done, refer to antibiogram to view typical susceptibility profiles   4+ Bilophila  lew Abnormal    Susceptibilities not routinely done, refer to antibiogram to view typical susceptibility profiles        Opt out

## 2025-05-02 ENCOUNTER — MYC REFILL (OUTPATIENT)
Dept: FAMILY MEDICINE | Facility: CLINIC | Age: 70
End: 2025-05-02
Payer: COMMERCIAL

## 2025-05-02 DIAGNOSIS — F64.9 GENDER INCONGRUENCE: ICD-10-CM

## 2025-05-05 RX ORDER — ESTRADIOL 0.1 MG/D
1 FILM, EXTENDED RELEASE TRANSDERMAL
Qty: 25 PATCH | Refills: 3 | Status: SHIPPED | OUTPATIENT
Start: 2025-05-05

## 2025-05-05 NOTE — TELEPHONE ENCOUNTER
"Request for medication refill:    Medication Name: estradiol (VIVELLE-DOT) 0.1 MG/24HR bi-weekly patch     Providers if patient needs an appointment and you are willing to give a one month supply please refill for one month and  send a MyChart using \".SMILLIMITEDREFILL\" .Or route chart to \"[facility] \" . To call patient and inform of limited refill and providers request to make an appointment. (Giving one month refill in non controlled medications is strongly recommended before denial)    If refill has been denied, meaning absolutely no refills without visit, please complete the smart phrase \".SMIRXREFUSE\" and route it to the \"[facility] MED REFILLS\"  pool to inform the patient and the pharmacy.    Delmy Reno RN      "

## 2025-05-06 ENCOUNTER — HOSPITAL ENCOUNTER (OUTPATIENT)
Dept: CT IMAGING | Facility: CLINIC | Age: 70
Discharge: HOME | End: 2025-05-06
Attending: PHYSICIAN ASSISTANT | Admitting: PHYSICIAN ASSISTANT
Payer: COMMERCIAL

## 2025-05-06 DIAGNOSIS — K35.32 RUPTURED APPENDICITIS: ICD-10-CM

## 2025-05-06 PROCEDURE — 76080 X-RAY EXAM OF FISTULA: CPT

## 2025-05-06 NOTE — PROGRESS NOTES
"    Eleazar is here today for a CT sinogram.     Eleazar Rehman is a 69 year old female in CT for a CT sinogram. She has a history signficant for a perforated appendix s/p drain placement 4/18/25 with decreasing outputs. CT sinogram was done 4/22 that demonstrated fluid cavity resolved but communication to the appendix, paracolic gutter and peritoneum. Recommendation was to keep the drain in until appendectomy at that time.     Eleazar has not had any outputs for 3 days. She has not kept the SANTY to bulb suction. She has been flushing with 10 mL daily, with minimal outputs. She feels like the pressure in her chest when she eats has \"opened\" up and is easier. She is afebrile and feeling well overall.      10 mL contrast injected carefully in the drain with some pressure at the end. No leaking or pain. CT done per and post contrast injection. (See results below)    The dressing, SANTY bulb and tubing were changed. Tube left in place. Eleazar was instructed to stop flushing the drain.     Eleazar is seeing Dr Macedo in surgery clinic tomorrow 5/7.     CT SINOGRAM INJECTION  5/6/2025 9:46 AM CDT      HISTORY:  Decreased outputs from drain. Seen infectious disease, recommend sinogram; Ruptured appendicitis      COMPARISON: CT sinogram dated 4/22/2025     TECHNIQUE: CT of the abdomen and pelvis was performed without and following the administration of contrast through an abdominal drain. Radiation lowering techniques utilized.     FINDINGS: Again identified is a drain in the lower abdomen just to the left of midline. There is no significant fluid about the pigtail of the drainage catheter. Post administration of contrast, there is spillage of contrast in the peritoneal space. 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    IMPRESSION: Abdominal drain remains in satisfactory position. No significant residual fluid in the collection cavity in which the pigtail of the catheter " lies. 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     Total time: 25 minutes                                                                    Thanks, Car[facility] CNP ([phone]) (phone [phone])

## 2025-05-07 ENCOUNTER — OFFICE VISIT (OUTPATIENT)
Dept: SURGERY | Facility: CLINIC | Age: 70
End: 2025-05-07
Payer: COMMERCIAL

## 2025-05-07 ENCOUNTER — TELEPHONE (OUTPATIENT)
Dept: FAMILY MEDICINE | Facility: CLINIC | Age: 70
End: 2025-05-07

## 2025-05-07 VITALS
HEART RATE: 88 BPM | OXYGEN SATURATION: 99 % | HEIGHT: 65 IN | BODY MASS INDEX: 23.16 KG/M2 | WEIGHT: 139 LBS | DIASTOLIC BLOOD PRESSURE: 62 MMHG | SYSTOLIC BLOOD PRESSURE: 104 MMHG

## 2025-05-07 DIAGNOSIS — K35.32 PERFORATED APPENDICITIS: ICD-10-CM

## 2025-05-07 DIAGNOSIS — K59.00 CONSTIPATION, UNSPECIFIED CONSTIPATION TYPE: Primary | ICD-10-CM

## 2025-05-07 DIAGNOSIS — K65.1 INTRA-ABDOMINAL ABSCESS (H): ICD-10-CM

## 2025-05-07 PROCEDURE — 3078F DIAST BP <80 MM HG: CPT | Performed by: STUDENT IN AN ORGANIZED HEALTH CARE EDUCATION/TRAINING PROGRAM

## 2025-05-07 PROCEDURE — 99202 OFFICE O/P NEW SF 15 MIN: CPT | Performed by: STUDENT IN AN ORGANIZED HEALTH CARE EDUCATION/TRAINING PROGRAM

## 2025-05-07 PROCEDURE — 3074F SYST BP LT 130 MM HG: CPT | Performed by: STUDENT IN AN ORGANIZED HEALTH CARE EDUCATION/TRAINING PROGRAM

## 2025-05-07 RX ORDER — POLYETHYLENE GLYCOL 3350 17 G/17G
17 POWDER, FOR SOLUTION ORAL DAILY PRN
COMMUNITY
Start: 2025-05-07

## 2025-05-07 NOTE — LETTER
May 8, 2025          Sravani Boyer MD         RE:   Larissa Rehman 1955      Dear Colleague,    Thank you for referring your patient, Larissa Rehman, to [facility]. Please see a copy of my visit note below.    Larissa Rehman is a 69 year old adult who is seen in consultation for perforated appendicitis with abscess s/p IR drain placement (4/18/2025). She first presented to the ED on 4/9/2025 where she was found with a phlegmon around the appendix. At that time there was not an abscess cavity that was amenable to a drain placement. She was discharged with antibiotics. She represented to the ED on 4/18/2025 for high grade small bowel obstruction due to the abscess. IR placed a drain into the abscess, started on IV abx and she was managed conservatively with an NG tube. She did well and was discharged on 4/23/2025.     Today in clinic she reports she's not doing as well as she first did when she discharged. She feels bloated again and is having some crampy pain in her abdomen, particularly overnight. Currently it is a 3-4. She is burping but has been passing plenty of gas and had a very small bowel movement yesterday. She has been having daily bowel movements until a few days ago. She took 3 dulcolax yesterday. She hasn't had much of an appetite for 3 days now but is tolerating broth and water. She denies any fevers, nausea, or vomiting. She's had minimal to no output in her SANTY drain.      REVIEW OF SYSTEMS:  Constitutional: No fevers or chills  Eyes: No blurred or double vision  HENT: Denies headaches, No rhinorrhea, No sore throat  Respiratory: No cough or shortness of breath  Cardiovascular: Denies chest pain or palpitations  Gastrointestinal: No abdominal pain or nausea/vomiting  Genitourinary: No hematuria or dysuria  Musculoskeletal: Denies arthralgias or myalgias  Neurologic: No numbness or tingling  Integumentary: No skin rashes     Past Medical History         Past Medical History:   Diagnosis Date    Attention deficit hyperactivity disorder (ADHD), predominantly hyperactive type      H/O bilateral orchiectomies and zero-depth vaginoplasty 3/29/2018 04/05/2018    High cholesterol      Hypothermia x 3 events during triathlons required EMS              Past Surgical History         Past Surgical History:   Procedure Laterality Date    COLONOSCOPY        ORTHOPEDIC SURGERY         foot surgery     TRANSGENDER VAGINOPLASTY ZERO DEPTH N/A 3/29/2018     Procedure: TRANSGENDER VAGINOPLASTY ZERO DEPTH;  Zero Depth Transgender Vaginoplasty;  Surgeon: Madhu Mojica MD;  Location:  OR            Family History         Family History   Problem Relation Age of Onset    Colon Cancer Father      Heart Disease Father      Breast Cancer No family hx of      Osteoporosis No family hx of              Social History            Tobacco Use    Smoking status: Never    Smokeless tobacco: Never   Substance Use Topics    Alcohol use: Yes       Alcohol/week: 4.0 standard drinks of alcohol       Types: 4 Glasses of wine per week       Comment: wine             Patient Active Problem List   Diagnosis    Familial hypercholesteremia    Gender incongruence    Seasonal mood disorder    S/P foot surgery, left    ACP (advance care planning)    Mandibular bone loss    Bradycardia    History of colonic polyps    Hearing loss, unspecified hearing loss type, unspecified laterality    Arthritis    Hard of hearing    Ruptured appendicitis    Appendicitis with abscess         Allergies        Allergies   Allergen Reactions    Bee Venom              Current Outpatient Prescriptions          Current Outpatient Medications   Medication Sig Dispense Refill    acetaminophen (TYLENOL) 325 MG tablet Take 2 tablets (650 mg) by mouth every 6 hours as needed for mild pain or other (and adjunct with moderate or severe pain or per patient request).        amoxicillin-clavulanate (AUGMENTIN) 875-125 MG tablet Take 1  "tablet by mouth 2 times daily. 28 tablet 0    estradiol (VIVELLE-DOT) 0.1 MG/24HR bi-weekly patch Place 1 patch over 96 hours onto the skin twice a week. 25 patch 3    ondansetron (ZOFRAN ODT) 4 MG ODT tab Take 1 tablet (4 mg) by mouth every 6 hours as needed for nausea. 12 tablet 0    polyethylene glycol (MIRALAX) 17 g packet Take 17 g by mouth daily as needed for constipation.        simvastatin (ZOCOR) 40 MG tablet TAKE 1 TABLET(40 MG) BY MOUTH AT BEDTIME 90 tablet 3    sodium chloride, PF, 0.9% PF flush Irrigate with 10 mLs as directed daily. 140 mL 2            Vitals: /62   Pulse 88   Ht 1.651 m (5' 5\")   Wt 63 kg (139 lb)   SpO2 99%   BMI 23.13 kg/m    BMI= Body mass index is 23.13 kg/m .     EXAM:  General: Vital signs reviewed, in no apparent distress  Eyes: Anicteric  HENT: Normocephalic, atraumatic, trachea midline   Respiratory: Breathing nonlabored  Cardiovascular: Regular rate and rhythm  GI: Abdomen soft, mildly distended. Focally tender to palpation in RLQ. No output in drain. No rebound. No peritonitis.   Musculoskeletal: No gross deformities  Neurologic: Grossly nonfocal exam  Psychiatric: Normal mood, affect and insight  Integumentary: Warm and dry     All labs and imaging personally reviewed and significant for:      FINDINGS: Again identified is a drain in the lower abdomen just to the left of midline. There is no significant fluid about the pigtail of the drainage catheter. Post administration of contrast, there is spillage of contrast in the peritoneal space. 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                                                                      IMPRESSION: Abdominal drain remains in satisfactory position. No significant residual fluid in the collection cavity in which the pigtail of the catheter lies. There is persistent evidence for fistula between the collection cavity and " small bowel.     ASSESSMENT:  Larissa Rehman is a 69 year old who presents 1 month from initial presentation for perforated appendicitis with abscess s/p drain placement. Sinogram from yesterday shows a fistula to the small bowel. We will slowly retract this drain to allow for closure of the fistula tract. In regards to her abdominal pain, her symptoms are not consistent with a bowel obstruction right now and her CT scan from yesterday did not show any dilated bowels. She may be dealing with cramping pain from constipation. Will have her start taking some miralax and prune juice to get her to have a good bowel movement. Alarming signs and symptoms were discussed, and she was encouraged to go to the ED if she was experiencing any fevers, nausea, vomiting, severe abdominal pain. Patient voiced understanding and is agreeable with the plan. She is to follow up with me in 2 weeks.         PLAN:  Follow up in 2 weeks.      It was my pleasure to participate in the care of Larissa Rehman in clinic today. Thank you for this consultation.        Again, thank you for allowing me to participate in the care of your patient.      Sincerely,      Elmo Macedo MD

## 2025-05-07 NOTE — TELEPHONE ENCOUNTER
Please contact pharmacy I can only find Vivelle as a biweekly patch are they talking about a different brand patch?

## 2025-05-07 NOTE — TELEPHONE ENCOUNTER
[facility] phone call message- medication clarification/question:    Full Medication Name: estradiol (VIVELLE-DOT) 0.1 MG/24HR bi-weekly patch        Question: The pharmacy is needing clarification the directions of this medication. The patch the pharmacy have are 24hr patches and the pharmacy wants to know if the patient should apply 1 patch over 24hrs.     Pharmacy confirmed as      [facility] - [place], [place] - [address] [facility] [address]    : Yes    OK to leave a message on voice mail? Yes    Primary language: English      needed? No    Call taken on May 7, 2025 at 12:40 PM by Yudi Veras

## 2025-05-07 NOTE — PROGRESS NOTES
[facility] Consultation    CHIEF COMPLAINT:  Chief Complaint   Patient presents with    RECHECK     F/up after CT 5/6/25 in [facility],recently seen in ED Perforated Appendix with hannah condon and COLETTE. cag  directions sent and discussed          HISTORY OF PRESENT ILLNESS:  Larissa Rehman is a 69 year old adult who is seen in consultation for perforated appendicitis with abscess s/p IR drain placement (4/18/2025). She first presented to the ED on 4/9/2025 where she was found with a phlegmon around the appendix. At that time there was not an abscess cavity that was amenable to a drain placement. She was discharged with antibiotics. She represented to the ED on 4/18/2025 for high grade small bowel obstruction due to the abscess. IR placed a drain into the abscess, started on IV abx and she was managed conservatively with an NG tube. She did well and was discharged on 4/23/2025.    Today in clinic she reports she's not doing as well as she first did when she discharged. She feels bloated again and is having some crampy pain in her abdomen, particularly overnight. Currently it is a 3-4. She is burping but has been passing plenty of gas and had a very small bowel movement yesterday. She has been having daily bowel movements until a few days ago. She took 3 dulcolax yesterday. She hasn't had much of an appetite for 3 days now but is tolerating broth and water. She denies any fevers, nausea, or vomiting. She's had minimal to no output in her SANTY drain.     REVIEW OF SYSTEMS:  Constitutional: No fevers or chills  Eyes: No blurred or double vision  HENT: Denies headaches, No rhinorrhea, No sore throat  Respiratory: No cough or shortness of breath  Cardiovascular: Denies chest pain or palpitations  Gastrointestinal: No abdominal pain or nausea/vomiting  Genitourinary: No hematuria or dysuria  Musculoskeletal: Denies arthralgias or myalgias  Neurologic: No numbness or tingling  Integumentary: No skin  kong    Past Medical History:   Diagnosis Date    Attention deficit hyperactivity disorder (ADHD), predominantly hyperactive type     H/O bilateral orchiectomies and zero-depth vaginoplasty 3/29/2018 04/05/2018    High cholesterol     Hypothermia x 3 events during triathlons required EMS        Past Surgical History:   Procedure Laterality Date    COLONOSCOPY      ORTHOPEDIC SURGERY      foot surgery     TRANSGENDER VAGINOPLASTY ZERO DEPTH N/A 3/29/2018    Procedure: TRANSGENDER VAGINOPLASTY ZERO DEPTH;  Zero Depth Transgender Vaginoplasty;  Surgeon: Madhu Mojica MD;  Location:  OR       Family History   Problem Relation Age of Onset    Colon Cancer Father     Heart Disease Father     Breast Cancer No family hx of     Osteoporosis No family hx of        Social History     Tobacco Use    Smoking status: Never    Smokeless tobacco: Never   Substance Use Topics    Alcohol use: Yes     Alcohol/week: 4.0 standard drinks of alcohol     Types: 4 Glasses of wine per week     Comment: wine       Patient Active Problem List   Diagnosis    Familial hypercholesteremia    Gender incongruence    Seasonal mood disorder    S/P foot surgery, left    ACP (advance care planning)    Mandibular bone loss    Bradycardia    History of colonic polyps    Hearing loss, unspecified hearing loss type, unspecified laterality    Arthritis    Hard of hearing    Ruptured appendicitis    Appendicitis with abscess       Allergies   Allergen Reactions    Bee Venom        Current Outpatient Medications   Medication Sig Dispense Refill    acetaminophen (TYLENOL) 325 MG tablet Take 2 tablets (650 mg) by mouth every 6 hours as needed for mild pain or other (and adjunct with moderate or severe pain or per patient request).      amoxicillin-clavulanate (AUGMENTIN) 875-125 MG tablet Take 1 tablet by mouth 2 times daily. 28 tablet 0    estradiol (VIVELLE-DOT) 0.1 MG/24HR bi-weekly patch Place 1 patch over 96 hours onto the skin twice a week. 25 patch 3  "   ondansetron (ZOFRAN ODT) 4 MG ODT tab Take 1 tablet (4 mg) by mouth every 6 hours as needed for nausea. 12 tablet 0    polyethylene glycol (MIRALAX) 17 g packet Take 17 g by mouth daily as needed for constipation.      simvastatin (ZOCOR) 40 MG tablet TAKE 1 TABLET(40 MG) BY MOUTH AT BEDTIME 90 tablet 3    sodium chloride, PF, 0.9% PF flush Irrigate with 10 mLs as directed daily. 140 mL 2       Vitals: /62   Pulse 88   Ht 1.651 m (5' 5\")   Wt 63 kg (139 lb)   SpO2 99%   BMI 23.13 kg/m    BMI= Body mass index is 23.13 kg/m .    EXAM:  General: Vital signs reviewed, in no apparent distress  Eyes: Anicteric  HENT: Normocephalic, atraumatic, trachea midline   Respiratory: Breathing nonlabored  Cardiovascular: Regular rate and rhythm  GI: Abdomen soft, mildly distended. Focally tender to palpation in RLQ. No output in drain. No rebound. No peritonitis.   Musculoskeletal: No gross deformities  Neurologic: Grossly nonfocal exam  Psychiatric: Normal mood, affect and insight  Integumentary: Warm and dry    All labs and imaging personally reviewed and significant for:     FINDINGS: Again identified is a drain in the lower abdomen just to the left of midline. There is no significant fluid about the pigtail of the drainage catheter. Post administration of contrast, there is spillage of contrast in the peritoneal space. 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                                                                      IMPRESSION: Abdominal drain remains in satisfactory position. No significant residual fluid in the collection cavity in which the pigtail of the catheter lies. 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    ASSESSMENT:  Larissa Rehman is a 69 year old who presents 1 month from initial presentation for perforated appendicitis with abscess s/p drain placement. Sinogram " from yesterday shows a fistula to the small bowel. We will slowly retract this drain to allow for closure of the fistula tract. In regards to her abdominal pain, her symptoms are not consistent with a bowel obstruction right now and her CT scan from yesterday did not show any dilated bowels. She may be dealing with cramping pain from constipation. Will have her start taking some miralax and prune juice to get her to have a good bowel movement. Alarming signs and symptoms were discussed, and she was encouraged to go to the ED if she was experiencing any fevers, nausea, vomiting, severe abdominal pain. Patient voiced understanding and is agreeable with the plan. She is to follow up with me in 2 weeks.       PLAN:  Follow up in 2 weeks.     It was my pleasure to participate in the care of Larissa Rehman in clinic today. Thank you for this consultation.         Elmo Macedo MD    Please route or send letter to:  Primary Care Provider (PCP)

## 2025-05-08 DIAGNOSIS — K35.33 ACUTE APPENDICITIS WITH PERITONEAL ABSCESS: Primary | ICD-10-CM

## 2025-05-08 NOTE — TELEPHONE ENCOUNTER
Called and spoke with [facility] and clarified that patient needs to put a patch on twice a week and yes we are ordering the 24 hr patch.    Additionally, pharmacy stated that the patches come in boxes of 8, so they will dispense 3 boxes per fill.    Delmy Reno RN

## 2025-05-09 ENCOUNTER — LAB (OUTPATIENT)
Dept: LAB | Facility: CLINIC | Age: 70
End: 2025-05-09
Payer: COMMERCIAL

## 2025-05-09 DIAGNOSIS — K35.33 ACUTE APPENDICITIS WITH PERITONEAL ABSCESS: ICD-10-CM

## 2025-05-09 LAB
ALBUMIN SERPL BCG-MCNC: 3.9 G/DL (ref 3.5–5.2)
ALP SERPL-CCNC: 41 U/L (ref 40–150)
ALT SERPL W P-5'-P-CCNC: 10 U/L (ref 0–70)
ANION GAP SERPL CALCULATED.3IONS-SCNC: 12 MMOL/L (ref 7–15)
AST SERPL W P-5'-P-CCNC: 17 U/L (ref 0–45)
BILIRUB SERPL-MCNC: 0.4 MG/DL
BUN SERPL-MCNC: 13 MG/DL (ref 8–23)
CALCIUM SERPL-MCNC: 9 MG/DL (ref 8.8–10.4)
CHLORIDE SERPL-SCNC: 97 MMOL/L (ref 98–107)
CREAT SERPL-MCNC: 0.97 MG/DL (ref 0.51–1.17)
EGFRCR SERPLBLD CKD-EPI 2021: 83 ML/MIN/1.73M2
ERYTHROCYTE [DISTWIDTH] IN BLOOD BY AUTOMATED COUNT: 12.5 % (ref 10–15)
GLUCOSE SERPL-MCNC: 113 MG/DL (ref 70–99)
HCO3 SERPL-SCNC: 25 MMOL/L (ref 22–29)
HCT VFR BLD AUTO: 37.2 % (ref 35–53)
HGB BLD-MCNC: 12.5 G/DL (ref 11.7–17.7)
MCH RBC QN AUTO: 31.3 PG (ref 26.5–33)
MCHC RBC AUTO-ENTMCNC: 33.6 G/DL (ref 31.5–36.5)
MCV RBC AUTO: 93 FL (ref 78–100)
PLATELET # BLD AUTO: 222 10E3/UL (ref 150–450)
POTASSIUM SERPL-SCNC: 3.8 MMOL/L (ref 3.4–5.3)
PROT SERPL-MCNC: 7.2 G/DL (ref 6.4–8.3)
RBC # BLD AUTO: 3.99 10E6/UL (ref 3.8–5.9)
SODIUM SERPL-SCNC: 134 MMOL/L (ref 135–145)
WBC # BLD AUTO: 7 10E3/UL (ref 4–11)

## 2025-05-09 PROCEDURE — 36415 COLL VENOUS BLD VENIPUNCTURE: CPT

## 2025-05-09 PROCEDURE — 84450 TRANSFERASE (AST) (SGOT): CPT

## 2025-05-09 PROCEDURE — 85025 COMPLETE CBC W/AUTO DIFF WBC: CPT

## 2025-05-10 LAB
BASOPHILS # BLD AUTO: 0.1 10E3/UL (ref 0–0.2)
BASOPHILS NFR BLD AUTO: 1 %
EOSINOPHIL # BLD AUTO: 0.2 10E3/UL (ref 0–0.7)
EOSINOPHIL NFR BLD AUTO: 3 %
IMM GRANULOCYTES # BLD: 0.1 10E3/UL
IMM GRANULOCYTES NFR BLD: 1 %
LYMPHOCYTES # BLD AUTO: 1.1 10E3/UL (ref 0.8–5.3)
LYMPHOCYTES NFR BLD AUTO: 15 %
MONOCYTES # BLD AUTO: 0.7 10E3/UL (ref 0–1.3)
MONOCYTES NFR BLD AUTO: 10 %
NEUTROPHILS # BLD AUTO: 5.2 10E3/UL (ref 1.6–8.3)
NEUTROPHILS NFR BLD AUTO: 70 %
NRBC # BLD AUTO: 0 10E3/UL
NRBC BLD AUTO-RTO: 0 /100
WBC # BLD AUTO: 7.5 10E3/UL (ref 4–11)

## 2025-05-15 NOTE — TELEPHONE ENCOUNTER
It is not a 24 hour patch, it lasts for 4 days and should be changed 2x per week. The DOSE is 0.1mg per 24 hours but it lasts longer than than

## 2025-05-16 PROBLEM — K63.2 SMALL BOWEL FISTULA: Status: ACTIVE | Noted: 2025-05-16

## 2025-05-21 ENCOUNTER — OFFICE VISIT (OUTPATIENT)
Dept: SURGERY | Facility: CLINIC | Age: 70
End: 2025-05-21
Payer: COMMERCIAL

## 2025-05-21 ENCOUNTER — TELEPHONE (OUTPATIENT)
Dept: SURGERY | Facility: CLINIC | Age: 70
End: 2025-05-21

## 2025-05-21 VITALS
BODY MASS INDEX: 21.33 KG/M2 | WEIGHT: 128 LBS | HEIGHT: 65 IN | OXYGEN SATURATION: 100 % | DIASTOLIC BLOOD PRESSURE: 64 MMHG | SYSTOLIC BLOOD PRESSURE: 130 MMHG | HEART RATE: 53 BPM

## 2025-05-21 DIAGNOSIS — K35.32 PERFORATED APPENDICITIS: Primary | ICD-10-CM

## 2025-05-21 PROCEDURE — 99212 OFFICE O/P EST SF 10 MIN: CPT | Performed by: STUDENT IN AN ORGANIZED HEALTH CARE EDUCATION/TRAINING PROGRAM

## 2025-05-21 PROCEDURE — 3075F SYST BP GE 130 - 139MM HG: CPT | Performed by: STUDENT IN AN ORGANIZED HEALTH CARE EDUCATION/TRAINING PROGRAM

## 2025-05-21 PROCEDURE — 3078F DIAST BP <80 MM HG: CPT | Performed by: STUDENT IN AN ORGANIZED HEALTH CARE EDUCATION/TRAINING PROGRAM

## 2025-05-21 NOTE — TELEPHONE ENCOUNTER
Type of surgery: robot assisted appendectomy possible bowel resection possible open  Location of surgery: [facility] [place]  Date and time of surgery: 6/10/25 12:00pm  Surgeon: Dr Macedo  Pre-Op Appt Date: pt to schedule  Post-Op Appt Date: pt to schedule   Packet sent out: Yes  Pre-cert/Authorization completed:  Not Applicable  Date: 5/21/25

## 2025-05-21 NOTE — PROGRESS NOTES
[facility] Consultation    CHIEF COMPLAINT:  Chief Complaint   Patient presents with    RECHECK     2nd follow up after appt. 5/7/25...sharmin Follow/up after CT 5/6/25 in [facility],recently seen in ED Perforated Appendix with roseanna, per OLIMPIA and COLETTE. cag  directions sent and discussed          HISTORY OF PRESENT ILLNESS:  Larissa Rehman is a 69 year old adult who is seen in consultation for perforated appendicitis with abscess s/p IR drain placement (4/18/2025). She first presented to the ED on 4/9/2025 where she was found with a phlegmon around the appendix. At that time there was not an abscess cavity that was amenable to a drain placement. She was discharged with antibiotics. She represented to the ED on 4/18/2025 for high grade small bowel obstruction due to the abscess. IR placed a drain into the abscess, started on IV abx and she was managed conservatively with an NG tube. She did well and was discharged on 4/23/2025. She last followed up with me on 5/7/2025 in clinic.    Today she reports she's doing very well. She is eating well and having normal bowel movements. She's trying to still figure out certain foods and amount of food that won't upset her abdomen but overall feeling much better. She denies any pain, nausea, vomiting, or fevers.    REVIEW OF SYSTEMS:  Constitutional: No fevers or chills  Eyes: No blurred or double vision  HENT: Denies headaches, No rhinorrhea, No sore throat  Respiratory: No cough or shortness of breath  Cardiovascular: Denies chest pain or palpitations  Gastrointestinal: No abdominal pain or nausea/vomiting  Genitourinary: No hematuria or dysuria  Musculoskeletal: Denies arthralgias or myalgias  Neurologic: No numbness or tingling  Integumentary: No skin rashes    Past Medical History:   Diagnosis Date    Attention deficit hyperactivity disorder (ADHD), predominantly hyperactive type     H/O bilateral orchiectomies and zero-depth vaginoplasty 3/29/2018 04/05/2018     High cholesterol     Hypothermia x 3 events during triathlons required EMS        Past Surgical History:   Procedure Laterality Date    COLONOSCOPY      ORTHOPEDIC SURGERY      foot surgery     TRANSGENDER VAGINOPLASTY ZERO DEPTH N/A 3/29/2018    Procedure: TRANSGENDER VAGINOPLASTY ZERO DEPTH;  Zero Depth Transgender Vaginoplasty;  Surgeon: Madhu Mojica MD;  Location:  OR       Family History   Problem Relation Age of Onset    Colon Cancer Father     Heart Disease Father     Breast Cancer No family hx of     Osteoporosis No family hx of        Social History     Tobacco Use    Smoking status: Never     Passive exposure: Never    Smokeless tobacco: Never   Substance Use Topics    Alcohol use: Yes     Alcohol/week: 4.0 standard drinks of alcohol     Types: 4 Glasses of wine per week     Comment: wine       Patient Active Problem List   Diagnosis    Familial hypercholesteremia    Gender incongruence    Seasonal mood disorder    S/P foot surgery, left    ACP (advance care planning)    Mandibular bone loss    Bradycardia    History of colonic polyps    Hearing loss, unspecified hearing loss type, unspecified laterality    Arthritis    Hard of hearing    Ruptured appendicitis    Appendicitis with abscess    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      bisacodyl (DULCOLAX) 5 MG EC tablet Take 1 tablet (5 mg) by mouth daily as needed for constipation.      estradiol (VIVELLE-DOT) 0.1 MG/24HR bi-weekly patch Place 1 patch over 96 hours onto the skin twice a week. 25 patch 3    ondansetron (ZOFRAN ODT) 4 MG ODT tab Take 1 tablet (4 mg) by mouth every 6 hours  "as needed for nausea. 12 tablet 0    polyethylene glycol (MIRALAX) 17 g packet Take 17 g by mouth daily as needed for constipation.      polyethylene glycol (MIRALAX) 17 GM/Dose powder Take 17 g (1 Capful) by mouth daily.      simvastatin (ZOCOR) 40 MG tablet TAKE 1 TABLET(40 MG) BY MOUTH AT BEDTIME 90 tablet 3    sodium chloride, PF, 0.9% PF flush Irrigate with 10 mLs as directed daily. 140 mL 2       Vitals: /64   Pulse 53   Ht 1.651 m (5' 5\")   Wt 58.1 kg (128 lb)   SpO2 100%   BMI 21.30 kg/m    BMI= Body mass index is 21.3 kg/m .    EXAM:  General: Vital signs reviewed, in no apparent distress  Eyes: Anicteric  HENT: Normocephalic, atraumatic, trachea midline   Respiratory: Breathing nonlabored  Cardiovascular: Regular rate and rhythm  GI: Abdomen soft, nondistended, nontender. Drain in place in mid lower abdomen. No output in SANTY bulb.  Musculoskeletal: No gross deformities  Neurologic: Grossly nonfocal exam  Psychiatric: Normal mood, affect and insight  Integumentary: Warm and dry    All labs and imaging personally reviewed and significant for: None to review    ASSESSMENT:  Larissa Rehman is a 69 year old who presents 1.5 month from initial presentation for perforated appendicitis with abscess s/p drain placement. I continued to slowly retract her drain to allow for closure of the fistula tract. I also capped her drain as it is not putting anything out. Will remove this drain at her surgery. We will plan for a Robotic Interval Appendectomy.  We discussed the surgery, its pre-, danny-, postoperative course, and its associated risk and benefits.  Patient voiced understanding and would like to proceed with surgery within the next 2 to 3 weeks.    Alarming signs and symptoms were discussed, and she was encouraged to go to the ED if she was experiencing any fevers, nausea, vomiting, severe abdominal pain. Patient voiced understanding and is agreeable with the plan.       PLAN:  Robotic interval " appendectomy  Plan for drain removal at this time    It was my pleasure to participate in the care of Larissa Rehman in clinic today. Thank you for this consultation.         Elmo Macedo MD    Please route or send letter to:  Primary Care Provider (PCP)

## 2025-05-21 NOTE — LETTER
May 22, 2025          Sravani Boyer MD         RE:   Larissa Rehman 1955      Dear Colleague,    Thank you for referring your patient, Larissa Rehman, to [facility] [place] see a copy of my visit note below.    Larissa Rehman is a 69 year old adult who is seen in consultation for perforated appendicitis with abscess s/p IR drain placement (4/18/2025). She first presented to the ED on 4/9/2025 where she was found with a phlegmon around the appendix. At that time there was not an abscess cavity that was amenable to a drain placement. She was discharged with antibiotics. She represented to the ED on 4/18/2025 for high grade small bowel obstruction due to the abscess. IR placed a drain into the abscess, started on IV abx and she was managed conservatively with an NG tube. She did well and was discharged on 4/23/2025. She last followed up with me on 5/7/2025 in clinic.     Today she reports she's doing very well. She is eating well and having normal bowel movements. She's trying to still figure out certain foods and amount of food that won't upset her abdomen but overall feeling much better. She denies any pain, nausea, vomiting, or fevers.     REVIEW OF SYSTEMS:  Constitutional: No fevers or chills  Eyes: No blurred or double vision  HENT: Denies headaches, No rhinorrhea, No sore throat  Respiratory: No cough or shortness of breath  Cardiovascular: Denies chest pain or palpitations  Gastrointestinal: No abdominal pain or nausea/vomiting  Genitourinary: No hematuria or dysuria  Musculoskeletal: Denies arthralgias or myalgias  Neurologic: No numbness or tingling  Integumentary: No skin rashes     Past Medical History        Past Medical History:   Diagnosis Date    Attention deficit hyperactivity disorder (ADHD), predominantly hyperactive type      H/O bilateral orchiectomies and zero-depth vaginoplasty 3/29/2018 04/05/2018    High cholesterol      Hypothermia x 3 events  during triathlons required EMS              Past Surgical History         Past Surgical History:   Procedure Laterality Date    COLONOSCOPY        ORTHOPEDIC SURGERY         foot surgery     TRANSGENDER VAGINOPLASTY ZERO DEPTH N/A 3/29/2018     Procedure: TRANSGENDER VAGINOPLASTY ZERO DEPTH;  Zero Depth Transgender Vaginoplasty;  Surgeon: Madhu Mojica MD;  Location:  OR            Family History         Family History   Problem Relation Age of Onset    Colon Cancer Father      Heart Disease Father      Breast Cancer No family hx of      Osteoporosis No family hx of              Social History            Tobacco Use    Smoking status: Never       Passive exposure: Never    Smokeless tobacco: Never   Substance Use Topics    Alcohol use: Yes       Alcohol/week: 4.0 standard drinks of alcohol       Types: 4 Glasses of wine per week       Comment: wine             Patient Active Problem List   Diagnosis    Familial hypercholesteremia    Gender incongruence    Seasonal mood disorder    S/P foot surgery, left    ACP (advance care planning)    Mandibular bone loss    Bradycardia    History of colonic polyps    Hearing loss, unspecified hearing loss type, unspecified laterality    Arthritis    Hard of hearing    Ruptured appendicitis    Appendicitis with abscess    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        bisacodyl (DULCOLAX) 5 MG EC tablet Take 1 tablet (5 mg) by mouth daily as needed for constipation.        estradiol (VIVELLE-DOT) 0.1 MG/24HR bi-weekly patch Place 1 patch over 96  "hours onto the skin twice a week. 25 patch 3    ondansetron (ZOFRAN ODT) 4 MG ODT tab Take 1 tablet (4 mg) by mouth every 6 hours as needed for nausea. 12 tablet 0    polyethylene glycol (MIRALAX) 17 g packet Take 17 g by mouth daily as needed for constipation.        polyethylene glycol (MIRALAX) 17 GM/Dose powder Take 17 g (1 Capful) by mouth daily.        simvastatin (ZOCOR) 40 MG tablet TAKE 1 TABLET(40 MG) BY MOUTH AT BEDTIME 90 tablet 3    sodium chloride, PF, 0.9% PF flush Irrigate with 10 mLs as directed daily. 140 mL 2            Vitals: /64   Pulse 53   Ht 1.651 m (5' 5\")   Wt 58.1 kg (128 lb)   SpO2 100%   BMI 21.30 kg/m    BMI= Body mass index is 21.3 kg/m .     EXAM:  General: Vital signs reviewed, in no apparent distress  Eyes: Anicteric  HENT: Normocephalic, atraumatic, trachea midline   Respiratory: Breathing nonlabored  Cardiovascular: Regular rate and rhythm  GI: Abdomen soft, nondistended, nontender. Drain in place in mid lower abdomen. No output in SANTY bulb.  Musculoskeletal: No gross deformities  Neurologic: Grossly nonfocal exam  Psychiatric: Normal mood, affect and insight  Integumentary: Warm and dry     All labs and imaging personally reviewed and significant for: None to review     ASSESSMENT:  Larissa Rehman is a 69 year old who presents 1.5 month from initial presentation for perforated appendicitis with abscess s/p drain placement. I continued to slowly retract her drain to allow for closure of the fistula tract. I also capped her drain as it is not putting anything out. Will remove this drain at her surgery. We will plan for a Robotic Interval Appendectomy.  We discussed the surgery, its pre-, danny-, postoperative course, and its associated risk and benefits.  Patient voiced understanding and would like to proceed with surgery within the next 2 to 3 weeks.     Alarming signs and symptoms were discussed, and she was encouraged to go to the ED if she was experiencing any " fevers, nausea, vomiting, severe abdominal pain. Patient voiced understanding and is agreeable with the plan.         PLAN:  Robotic interval appendectomy  Plan for drain removal at this time     It was my pleasure to participate in the care of Larissa Rehman in clinic today. Thank you for this consultation.        Again, thank you for allowing me to participate in the care of your patient.      Sincerely,      Kat Moran MD

## 2025-05-29 NOTE — TELEPHONE ENCOUNTER
FUTURE VISIT INFORMATION      SURGERY INFORMATION:  Date: 6/10/25   Location:  OR   Surgeon:  Elmo Murillo MD   Anesthesia Type:  General  Procedure: appendectomy, robot-assisted, possible bowel resection, possible open    RECORDS REQUESTED FROM:       Primary Care Provider: Elmo Boyer MD    Pertinent Medical History: ADHD, high cholesterol, familial hypercholesteremia, bradychardia    Most recent EKG+ Tracin25

## 2025-06-02 ENCOUNTER — OFFICE VISIT (OUTPATIENT)
Dept: SURGERY | Facility: CLINIC | Age: 70
End: 2025-06-02
Payer: COMMERCIAL

## 2025-06-02 ENCOUNTER — ANESTHESIA EVENT (OUTPATIENT)
Dept: SURGERY | Facility: CLINIC | Age: 70
End: 2025-06-02
Payer: COMMERCIAL

## 2025-06-02 ENCOUNTER — RESULTS FOLLOW-UP (OUTPATIENT)
Dept: SURGERY | Facility: CLINIC | Age: 70
End: 2025-06-02

## 2025-06-02 ENCOUNTER — PRE VISIT (OUTPATIENT)
Dept: SURGERY | Facility: CLINIC | Age: 70
End: 2025-06-02

## 2025-06-02 ENCOUNTER — LAB (OUTPATIENT)
Dept: LAB | Facility: CLINIC | Age: 70
End: 2025-06-02
Payer: COMMERCIAL

## 2025-06-02 VITALS
TEMPERATURE: 98 F | OXYGEN SATURATION: 98 % | RESPIRATION RATE: 16 BRPM | DIASTOLIC BLOOD PRESSURE: 74 MMHG | HEIGHT: 65 IN | WEIGHT: 133 LBS | HEART RATE: 65 BPM | BODY MASS INDEX: 22.16 KG/M2 | SYSTOLIC BLOOD PRESSURE: 124 MMHG

## 2025-06-02 DIAGNOSIS — K35.32 PERFORATED APPENDICITIS: ICD-10-CM

## 2025-06-02 DIAGNOSIS — Z01.818 PRE-OPERATIVE EXAMINATION: Primary | ICD-10-CM

## 2025-06-02 DIAGNOSIS — Z01.818 PRE-OPERATIVE EXAMINATION: ICD-10-CM

## 2025-06-02 LAB
ANION GAP SERPL CALCULATED.3IONS-SCNC: 11 MMOL/L (ref 7–15)
BUN SERPL-MCNC: 10 MG/DL (ref 8–23)
CALCIUM SERPL-MCNC: 9.2 MG/DL (ref 8.8–10.4)
CHLORIDE SERPL-SCNC: 105 MMOL/L (ref 98–107)
CREAT SERPL-MCNC: 0.93 MG/DL (ref 0.51–1.17)
EGFRCR SERPLBLD CKD-EPI 2021: 88 ML/MIN/1.73M2
GLUCOSE SERPL-MCNC: 91 MG/DL (ref 70–99)
HCO3 SERPL-SCNC: 27 MMOL/L (ref 22–29)
POTASSIUM SERPL-SCNC: 3.5 MMOL/L (ref 3.4–5.3)
SODIUM SERPL-SCNC: 143 MMOL/L (ref 135–145)

## 2025-06-02 PROCEDURE — 36415 COLL VENOUS BLD VENIPUNCTURE: CPT | Performed by: PATHOLOGY

## 2025-06-02 PROCEDURE — 99203 OFFICE O/P NEW LOW 30 MIN: CPT | Performed by: PHYSICIAN ASSISTANT

## 2025-06-02 PROCEDURE — 80048 BASIC METABOLIC PNL TOTAL CA: CPT | Performed by: PATHOLOGY

## 2025-06-02 ASSESSMENT — ENCOUNTER SYMPTOMS: SEIZURES: 0

## 2025-06-02 ASSESSMENT — LIFESTYLE VARIABLES: TOBACCO_USE: 0

## 2025-06-02 ASSESSMENT — PAIN SCALES - GENERAL: PAINLEVEL_OUTOF10: NO PAIN (0)

## 2025-06-02 NOTE — PATIENT INSTRUCTIONS
Name:  Larissa Rehman   MRN:  [id number]   :  1955   Today's Date:  2025         You were seen today for a pre-operative assessment in the:    Preoperative Assessment Center (PAC)  [facility]  [address] [place] [id number]  phone [phone]      You will be receiving a call with location, date, arrival time and diet instructions from a Preadmission Nurse at your surgical site:    -[facility]: [phone]      Anesthesia recommendations for medications:    Hold Aspirin for 7 days before procedure.  Hold Multivitamins for 7 days before procedure.   Hold Herbal medications and Supplements for 7 days before procedure.  Hold Ibuprofen for 1 day before procedure.   Hold Naproxen for 4 days before procedure.     No alcohol or cannabis products for 24 hours before your procedure         Please DO NOT take the following medications the day of procedure:    Bisacodyl (Dulcolax)   Miralax       Please take these medications the day of procedure:    Acetaminophen (Tylenol), as needed  Estradiol (Vivelle-Dot)   Ondansetron (Zofran)  Simvastatin (Zocor)         How do I prepare myself?  - Please take 2 showers (one the night prior to surgery and one the morning of surgery) using Scrubcare or Hibiclens soap.    Use this soap only from the neck to your toes.     Leave the soap on your skin for one minute--then rinse thoroughly.      You may use your own shampoo and conditioner. No other hair products.   - Please remove all jewelry and body piercings.  - No lotions, deodorants or fragrance.  - No makeup or fingernail polish.   - Bring your ID and insurance card.    -If you have a Deep Brain Stimulator, a Spinal Cord Stimulator, or any implanted Neuro Device, you must bring the remote to your appointment         For further questions regarding your surgery please call your surgeon's office.      
(1) minimal assist, teach one side; mother does other, staff holds

## 2025-06-02 NOTE — H&P
Pre-Operative H & P     CC:  Preoperative exam to assess for increased cardiopulmonary risk while undergoing surgery and anesthesia.    Date of Encounter: 6/2/2025  Primary Care Physician:  Sravani Boyer     Reason for visit:   Encounter Diagnoses   Name Primary?    Pre-operative examination Yes    Perforated appendicitis        HPI  Larissa Rehman is a 69 year old adult who presents for pre-operative H & P in preparation for  Procedure Information       Case: [id number] Date/Time: 06/10/25 1200    Procedures:       APPENDECTOMY, ROBOT-ASSISTED, (Abdomen)      possible bowel resection (Abdomen)      possible open (Abdomen)    Anesthesia type: General    Diagnosis: Perforated appendicitis [K35.32]    Pre-op diagnosis: Perforated appendicitis [K35.32]    Location:  OR [address] OR    Providers: Elmo Macedo MD            The patient is a 69 year old transgender woman who has a past medical history significant for HLD, laryngitis, ADHD and recently had perforated appendicitis. She was in the hospital multiple times and treated with IR drainage in abscess and antibiotics. She is now scheduled for the procedure as above.     History is obtained from the patient and chart review    Hx of abnormal bleeding or anti-platelet use: none      Past Medical History  Past Medical History:   Diagnosis Date    Attention deficit hyperactivity disorder (ADHD), predominantly hyperactive type     H/O bilateral orchiectomies and zero-depth vaginoplasty 3/29/2018 04/05/2018    High cholesterol     Hypothermia x 3 events during triathlons required EMS     Perforated appendicitis        Past Surgical History  Past Surgical History:   Procedure Laterality Date    COLONOSCOPY      ORTHOPEDIC SURGERY      foot surgery     TRANSGENDER VAGINOPLASTY ZERO DEPTH N/A 3/29/2018    Procedure: TRANSGENDER VAGINOPLASTY ZERO DEPTH;  Zero Depth Transgender Vaginoplasty;  Surgeon: Madhu Mojica MD;  Location: [facility] OR       Prior to Admission  Medications  Current Outpatient Medications   Medication Sig Dispense Refill    acetaminophen (TYLENOL) 325 MG tablet Take 2 tablets (650 mg) by mouth every 6 hours as needed for mild pain or other (and adjunct with moderate or severe pain or per patient request).      bisacodyl (DULCOLAX) 5 MG EC tablet Take 1 tablet (5 mg) by mouth daily as needed for constipation.      estradiol (VIVELLE-DOT) 0.1 MG/24HR bi-weekly patch Place 1 patch over 96 hours onto the skin twice a week. 25 patch 3    ondansetron (ZOFRAN ODT) 4 MG ODT tab Take 1 tablet (4 mg) by mouth every 6 hours as needed for nausea. 12 tablet 0    polyethylene glycol (MIRALAX) 17 GM/Dose powder Take 17 g (1 Capful) by mouth daily.      simvastatin (ZOCOR) 40 MG tablet TAKE 1 TABLET(40 MG) BY MOUTH AT BEDTIME 90 tablet 3    sodium chloride, PF, 0.9% PF flush Irrigate with 10 mLs as directed daily. (Patient not taking: Reported on 6/2/2025) 140 mL 2       Allergies  Allergies   Allergen Reactions    Bee Venom Rash and Swelling       Social History  Social History     Socioeconomic History    Marital status: Single     Spouse name: Not on file    Number of children: 0    Years of education: Not on file    Highest education level: Bachelor's degree (e.g., BA, AB, BS)   Occupational History    Occupation: ,  at [employer]   Tobacco Use    Smoking status: Never     Passive exposure: Never    Smokeless tobacco: Never   Vaping Use    Vaping status: Never Used   Substance and Sexual Activity    Alcohol use: Yes     Alcohol/week: 4.0 standard drinks of alcohol     Types: 4 Glasses of wine per week     Comment: wine    Drug use: No    Sexual activity: Not Currently   Other Topics Concern    Not on file   Social History Narrative    No caffeine in diet. Loves organ meats, generally healthy diet besides this. Self described foodie.    Calcium sufficient in diet, high greens     Exercises with hiking, walking, run, yoga somewhat,  sprinting at the track. Swims a lot. Cold plunge daily.     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    Has a daughter    Updated November 20, 2024      Social Drivers of Health     Financial Resource Strain: Low Risk  (4/9/2025)    Financial Resource Strain     Within the past 12 months, have you or your family members you live with been unable to get utilities (heat, electricity) when it was really needed?: No   Food Insecurity: Low Risk  (4/9/2025)    Food Insecurity     Within the past 12 months, did you worry that your food would run out before you got money to buy more?: No     Within the past 12 months, did the food you bought just not last and you didn t have money to get more?: No   Transportation Needs: Low Risk  (4/9/2025)    Transportation Needs     Within the past 12 months, has lack of transportation kept you from medical appointments, getting your medicines, non-medical meetings or appointments, work, or from getting things that you need?: No   Physical Activity: Sufficiently Active (11/15/2024)    Exercise Vital Sign     Days of Exercise per Week: 7 days     Minutes of Exercise per Session: 80 min   Stress: No Stress Concern Present (11/15/2024)    [employer] - Occupational Stress Questionnaire     Feeling of Stress : Not at all   Social Connections: Unknown (11/15/2024)    Social Connection and Isolation Panel [NHANES]     Frequency of Communication with Friends and Family: Not on file     Frequency of Social Gatherings with Friends and Family: Patient declined     Attends Confucianism Services: Not on file     Active Member of Clubs or Organizations: Not on file     Attends Club or Organization Meetings: Not on file     Marital Status: Not on file   Interpersonal Safety: Low Risk  (4/9/2025)    Interpersonal Safety     Do you feel physically and emotionally safe where you currently live?: Yes     Within the past 12 months, have you been  "hit, slapped, kicked or otherwise physically hurt by someone?: No     Within the past 12 months, have you been humiliated or emotionally abused in other ways by your partner or ex-partner?: No   Housing Stability: Low Risk  (4/9/2025)    Housing Stability     Do you have housing? : Yes     Are you worried about losing your housing?: No       Family History  Family History   Problem Relation Age of Onset    Colon Cancer Father     Heart Disease Father     Breast Cancer No family hx of     Osteoporosis No family hx of        Review of Systems  The complete review of systems is negative other than noted in the HPI or here.   Anesthesia Evaluation   Pt has had prior anesthetic. Type: General.    No history of anesthetic complications       ROS/MED HX  ENT/Pulmonary:  - neg pulmonary ROS  (-) tobacco use   Neurologic:  - neg neurologic ROS  (-) no seizures, no CVA and no TIA   Cardiovascular:     (+) Dyslipidemia - -   -  - -                                 Previous cardiac testing   Echo: Date: Results:    Stress Test:  Date: Results:    ECG Reviewed:  Date: 4/9/25 Results:  SR  Cath:  Date: Results:      METS/Exercise Tolerance: >4 METS    Hematologic:  - neg hematologic  ROS     Musculoskeletal:  - neg musculoskeletal ROS     GI/Hepatic:     (+)         appendicitis (Perforated appendicitis with abscess s/p IR drain),        (-) GERD   Renal/Genitourinary:  - neg Renal ROS     Endo:  - neg endo ROS     Psychiatric/Substance Use:     (+) psychiatric history other (comment) (ADHD)       Infectious Disease:  - neg infectious disease ROS     Malignancy:  - neg malignancy ROS     Other: Comment: Gender dysphoria            /74 (BP Location: Right arm, Patient Position: Sitting, Cuff Size: Adult Large)   Pulse 65   Temp 98  F (36.7  C) (Oral)   Resp 16   Ht 1.651 m (5' 5\")   Wt 60.3 kg (133 lb)   SpO2 98%   BMI 22.13 kg/m      Physical Exam   Constitutional: Awake, alert, cooperative, no apparent distress, and " appears stated age.  Eyes: Left pupil is irregularly shaped, right pupil is round, both are reactive to light, extra ocular muscles intact, sclera clear, conjunctiva normal.  HENT: Normocephalic, oral pharynx with moist mucus membranes, good dentition. No goiter appreciated.   Respiratory: Clear to auscultation bilaterally, no crackles or wheezing.  Cardiovascular: Regular rate and rhythm, normal S1 and S2, and no murmur noted.  Carotids +2, no bruits. No edema. Palpable pulses to radial  DP and PT arteries.   GI: Normal bowel sounds, soft, non-distended, non-tender, no masses palpated, no hepatosplenomegaly.  IR drain in place  Lymph/Hematologic: No cervical lymphadenopathy and no supraclavicular lymphadenopathy.  Genitourinary:  defer  Skin: Warm and dry.  No rashes at anticipated surgical site.   Musculoskeletal: Full ROM of neck. There is no redness, warmth, or swelling of the joints. Gross motor strength is normal.    Neurologic: Awake, alert, oriented to name, place and time. Cranial nerves II-XII are grossly intact. Gait is normal.   Neuropsychiatric: Calm, cooperative. Normal affect.     Prior Labs/Diagnostic Studies   All labs and imaging pertinent to the visit personally reviewed    Latest Reference Range & Units 05/09/25 15:16   WBC 4.0 - 11.0 10e3/uL  4.0 - 11.0 10e3/uL 7.5  7.0   Hemoglobin 11.7 - 17.7 g/dL 12.5   Hematocrit 35.0 - 53.0 % 37.2   Platelet Count 150 - 450 10e3/uL 222   RBC Count 3.80 - 5.90 10e6/uL 3.99   MCV 78 - 100 fL 93   MCH 26.5 - 33.0 pg 31.3   MCHC 31.5 - 36.5 g/dL 33.6   RDW 10.0 - 15.0 % 12.5   % Neutrophils % 70   % Lymphocytes % 15   % Monocytes % 10   % Eosinophils % 3   % Basophils % 1   % Immature Granulocytes % 1   NRBC/W <1 /100 0   Absolute Neutrophil 1.6 - 8.3 10e3/uL 5.2   Absolute Lymphocytes 0.8 - 5.3 10e3/uL 1.1   Absolute Monocytes 0.0 - 1.3 10e3/uL 0.7   Absolute Eosinophils 0.0 - 0.7 10e3/uL 0.2   Absolute Basophils 0.0 - 0.2 10e3/uL 0.1   Absolute Immature  Granulocytes <=0.4 10e3/uL 0.1   Absolute NRBCs 10e3/uL 0.0       EKG/ stress test - if available please see in ROS above       The patient's records and results pertinent to the visit personally reviewed by this provider.     Outside records reviewed from: Care Everywhere    LAB/DIAGNOSTIC STUDIES TODAY:     Latest Reference Range & Units 06/02/25 14:45   Sodium 135 - 145 mmol/L 143   Potassium 3.4 - 5.3 mmol/L 3.5   Chloride 98 - 107 mmol/L 105   Carbon Dioxide (CO2) 22 - 29 mmol/L 27   Urea Nitrogen 8.0 - 23.0 mg/dL 10.0   Creatinine 0.51 - 1.17 mg/dL 0.93   GFR Estimate >60 mL/min/1.73m2 88   Calcium 8.8 - 10.4 mg/dL 9.2   Anion Gap 7 - 15 mmol/L 11   Glucose 70 - 99 mg/dL 91             Assessment    Larissa Rehman is a 69 year old adult seen as a PAC referral for risk assessment and optimization for anesthesia.    Plan/Recommendations  Pt will be optimized for the proposed procedure.  See below for details on the assessment, risk, and preoperative recommendations    NEUROLOGY  - No history of TIA, CVA or seizure  -Post Op delirium risk factors:  No risk identified    ENT  - No current airway concerns.  Will need to be reassessed day of surgery.  Mallampati: I  TM: > 3  ~ The patient's left pupil is slightly irregular shaped compared to the right. She reports no vision changes and just had an opthalmology exam recently.       CARDIAC  - No history of CAD, Hypertension, and Afib  - Hyperlipidemia  Well controlled on home regimen  - METS (Metabolic Equivalents)  Patient performs 4 or more METS exercise without symptoms             Total Score: 0      RCRI-Low risk: Class 2 0.9% complication rate             Total Score: 1    RCRI: High Risk Surgery        PULMONARY  - Obstructive Sleep Apnea  No current risk of obstructive sleep apnea   WILLY Low Risk             Total Score: 1    WILLY: Over 50 ys old      - Denies asthma or inhaler use  - Tobacco History    History   Smoking Status    Never   Smokeless Tobacco     "Never       GI  - perforated appendicitis with abscess - s/p IR drainage and antibiotics. Procedure as above.    PONV Medium Risk  Total Score: 2           1 AN PONV: Pt is Female    1 AN PONV: Patient is not a current smoker        /RENAL  - Baseline Creatinine  0.97    ENDOCRINE    - BMI: Estimated body mass index is 22.13 kg/m  as calculated from the following:    Height as of this encounter: 1.651 m (5' 5\").    Weight as of this encounter: 60.3 kg (133 lb).  Healthy Weight (BMI 18.5-24.9)  - No history of Diabetes Mellitus    HEME  VTE Low Risk 0.26%             Total Score: 1    VTE: Greater than 59 yrs old      - No history of abnormal bleeding or antiplatelet use.      MSK  Patient is NOT Frail             Total Score: 0        PSYCH  - ADHD - not on medications.     OTHER  - gender dysphoria - s/p vaginoplasty. Continue estradiol.     Different anesthesia methods/types have been discussed with the patient, but they are aware that the final plan will be decided by the assigned anesthesia provider on the date of service.    The patient is optimized for their procedure. AVS with information on surgery time/arrival time, meds and NPO status given by nursing staff. No further diagnostic testing indicated.        27 minutes were spent on the date of the encounter performing chart review, history and exam, documentation and/or discussion with other providers about the issues documented above.    Angelique Turner PA-C  Preoperative Assessment Center  [facility]  [facility]  Phone: [phone]  Fax: [phone]    "

## 2025-06-02 NOTE — H&P (VIEW-ONLY)
Pre-Operative H & P     CC:  Preoperative exam to assess for increased cardiopulmonary risk while undergoing surgery and anesthesia.    Date of Encounter: 6/2/2025  Primary Care Physician:  Sravani Boyer     Reason for visit:   Encounter Diagnoses   Name Primary?    Pre-operative examination Yes    Perforated appendicitis        HPI  Larissa Rehman is a 69 year old adult who presents for pre-operative H & P in preparation for  Procedure Information       Case: [id number] Date/Time: 06/10/25 1200    Procedures:       APPENDECTOMY, ROBOT-ASSISTED, (Abdomen)      possible bowel resection (Abdomen)      possible open (Abdomen)    Anesthesia type: General    Diagnosis: Perforated appendicitis [K35.32]    Pre-op diagnosis: Perforated appendicitis [K35.32]    Location:  OR [address] OR    Providers: Elmo Macedo MD            The patient is a 69 year old transgender woman who has a past medical history significant for HLD, laryngitis, ADHD and recently had perforated appendicitis. She was in the hospital multiple times and treated with IR drainage in abscess and antibiotics. She is now scheduled for the procedure as above.     History is obtained from the patient and chart review    Hx of abnormal bleeding or anti-platelet use: none      Past Medical History  Past Medical History:   Diagnosis Date    Attention deficit hyperactivity disorder (ADHD), predominantly hyperactive type     H/O bilateral orchiectomies and zero-depth vaginoplasty 3/29/2018 04/05/2018    High cholesterol     Hypothermia x 3 events during triathlons required EMS     Perforated appendicitis        Past Surgical History  Past Surgical History:   Procedure Laterality Date    COLONOSCOPY      ORTHOPEDIC SURGERY      foot surgery     TRANSGENDER VAGINOPLASTY ZERO DEPTH N/A 3/29/2018    Procedure: TRANSGENDER VAGINOPLASTY ZERO DEPTH;  Zero Depth Transgender Vaginoplasty;  Surgeon: Madhu Mojica MD;  Location: [facility] OR       Prior to Admission  Medications  Current Outpatient Medications   Medication Sig Dispense Refill    acetaminophen (TYLENOL) 325 MG tablet Take 2 tablets (650 mg) by mouth every 6 hours as needed for mild pain or other (and adjunct with moderate or severe pain or per patient request).      bisacodyl (DULCOLAX) 5 MG EC tablet Take 1 tablet (5 mg) by mouth daily as needed for constipation.      estradiol (VIVELLE-DOT) 0.1 MG/24HR bi-weekly patch Place 1 patch over 96 hours onto the skin twice a week. 25 patch 3    ondansetron (ZOFRAN ODT) 4 MG ODT tab Take 1 tablet (4 mg) by mouth every 6 hours as needed for nausea. 12 tablet 0    polyethylene glycol (MIRALAX) 17 GM/Dose powder Take 17 g (1 Capful) by mouth daily.      simvastatin (ZOCOR) 40 MG tablet TAKE 1 TABLET(40 MG) BY MOUTH AT BEDTIME 90 tablet 3    sodium chloride, PF, 0.9% PF flush Irrigate with 10 mLs as directed daily. (Patient not taking: Reported on 6/2/2025) 140 mL 2       Allergies  Allergies   Allergen Reactions    Bee Venom Rash and Swelling       Social History  Social History     Socioeconomic History    Marital status: Single     Spouse name: Not on file    Number of children: 0    Years of education: Not on file    Highest education level: Bachelor's degree (e.g., BA, AB, BS)   Occupational History    Occupation: ,  at [email or web address]   Tobacco Use    Smoking status: Never     Passive exposure: Never    Smokeless tobacco: Never   Vaping Use    Vaping status: Never Used   Substance and Sexual Activity    Alcohol use: Yes     Alcohol/week: 4.0 standard drinks of alcohol     Types: 4 Glasses of wine per week     Comment: wine    Drug use: No    Sexual activity: Not Currently   Other Topics Concern    Not on file   Social History Narrative    No caffeine in diet. Loves organ meats, generally healthy diet besides this. Self described foodie.    Calcium sufficient in diet, high greens     Exercises with hiking, walking, run, yoga somewhat,  sprinting at the track. Swims a lot. Cold plunge daily.     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    Has a daughter    Updated November 20, 2024      Social Drivers of Health     Financial Resource Strain: Low Risk  (4/9/2025)    Financial Resource Strain     Within the past 12 months, have you or your family members you live with been unable to get utilities (heat, electricity) when it was really needed?: No   Food Insecurity: Low Risk  (4/9/2025)    Food Insecurity     Within the past 12 months, did you worry that your food would run out before you got money to buy more?: No     Within the past 12 months, did the food you bought just not last and you didn t have money to get more?: No   Transportation Needs: Low Risk  (4/9/2025)    Transportation Needs     Within the past 12 months, has lack of transportation kept you from medical appointments, getting your medicines, non-medical meetings or appointments, work, or from getting things that you need?: No   Physical Activity: Sufficiently Active (11/15/2024)    Exercise Vital Sign     Days of Exercise per Week: 7 days     Minutes of Exercise per Session: 80 min   Stress: No Stress Concern Present (11/15/2024)    [employer] - Occupational Stress Questionnaire     Feeling of Stress : Not at all   Social Connections: Unknown (11/15/2024)    Social Connection and Isolation Panel [NHANES]     Frequency of Communication with Friends and Family: Not on file     Frequency of Social Gatherings with Friends and Family: Patient declined     Attends Jew Services: Not on file     Active Member of Clubs or Organizations: Not on file     Attends Club or Organization Meetings: Not on file     Marital Status: Not on file   Interpersonal Safety: Low Risk  (4/9/2025)    Interpersonal Safety     Do you feel physically and emotionally safe where you currently live?: Yes     Within the past 12 months, have you been  "hit, slapped, kicked or otherwise physically hurt by someone?: No     Within the past 12 months, have you been humiliated or emotionally abused in other ways by your partner or ex-partner?: No   Housing Stability: Low Risk  (4/9/2025)    Housing Stability     Do you have housing? : Yes     Are you worried about losing your housing?: No       Family History  Family History   Problem Relation Age of Onset    Colon Cancer Father     Heart Disease Father     Breast Cancer No family hx of     Osteoporosis No family hx of        Review of Systems  The complete review of systems is negative other than noted in the HPI or here.   Anesthesia Evaluation   Pt has had prior anesthetic. Type: General.    No history of anesthetic complications       ROS/MED HX  ENT/Pulmonary:  - neg pulmonary ROS  (-) tobacco use   Neurologic:  - neg neurologic ROS  (-) no seizures, no CVA and no TIA   Cardiovascular:     (+) Dyslipidemia - -   -  - -                                 Previous cardiac testing   Echo: Date: Results:    Stress Test:  Date: Results:    ECG Reviewed:  Date: 4/9/25 Results:  SR  Cath:  Date: Results:      METS/Exercise Tolerance: >4 METS    Hematologic:  - neg hematologic  ROS     Musculoskeletal:  - neg musculoskeletal ROS     GI/Hepatic:     (+)         appendicitis (Perforated appendicitis with abscess s/p IR drain),        (-) GERD   Renal/Genitourinary:  - neg Renal ROS     Endo:  - neg endo ROS     Psychiatric/Substance Use:     (+) psychiatric history other (comment) (ADHD)       Infectious Disease:  - neg infectious disease ROS     Malignancy:  - neg malignancy ROS     Other: Comment: Gender dysphoria            /74 (BP Location: Right arm, Patient Position: Sitting, Cuff Size: Adult Large)   Pulse 65   Temp 98  F (36.7  C) (Oral)   Resp 16   Ht 1.651 m (5' 5\")   Wt 60.3 kg (133 lb)   SpO2 98%   BMI 22.13 kg/m      Physical Exam   Constitutional: Awake, alert, cooperative, no apparent distress, and " appears stated age.  Eyes: Left pupil is irregularly shaped, right pupil is round, both are reactive to light, extra ocular muscles intact, sclera clear, conjunctiva normal.  HENT: Normocephalic, oral pharynx with moist mucus membranes, good dentition. No goiter appreciated.   Respiratory: Clear to auscultation bilaterally, no crackles or wheezing.  Cardiovascular: Regular rate and rhythm, normal S1 and S2, and no murmur noted.  Carotids +2, no bruits. No edema. Palpable pulses to radial  DP and PT arteries.   GI: Normal bowel sounds, soft, non-distended, non-tender, no masses palpated, no hepatosplenomegaly.  IR drain in place  Lymph/Hematologic: No cervical lymphadenopathy and no supraclavicular lymphadenopathy.  Genitourinary:  defer  Skin: Warm and dry.  No rashes at anticipated surgical site.   Musculoskeletal: Full ROM of neck. There is no redness, warmth, or swelling of the joints. Gross motor strength is normal.    Neurologic: Awake, alert, oriented to name, place and time. Cranial nerves II-XII are grossly intact. Gait is normal.   Neuropsychiatric: Calm, cooperative. Normal affect.     Prior Labs/Diagnostic Studies   All labs and imaging pertinent to the visit personally reviewed    Latest Reference Range & Units 05/09/25 15:16   WBC 4.0 - 11.0 10e3/uL  4.0 - 11.0 10e3/uL 7.5  7.0   Hemoglobin 11.7 - 17.7 g/dL 12.5   Hematocrit 35.0 - 53.0 % 37.2   Platelet Count 150 - 450 10e3/uL 222   RBC Count 3.80 - 5.90 10e6/uL 3.99   MCV 78 - 100 fL 93   MCH 26.5 - 33.0 pg 31.3   MCHC 31.5 - 36.5 g/dL 33.6   RDW 10.0 - 15.0 % 12.5   % Neutrophils % 70   % Lymphocytes % 15   % Monocytes % 10   % Eosinophils % 3   % Basophils % 1   % Immature Granulocytes % 1   NRBC/W <1 /100 0   Absolute Neutrophil 1.6 - 8.3 10e3/uL 5.2   Absolute Lymphocytes 0.8 - 5.3 10e3/uL 1.1   Absolute Monocytes 0.0 - 1.3 10e3/uL 0.7   Absolute Eosinophils 0.0 - 0.7 10e3/uL 0.2   Absolute Basophils 0.0 - 0.2 10e3/uL 0.1   Absolute Immature  Granulocytes <=0.4 10e3/uL 0.1   Absolute NRBCs 10e3/uL 0.0       EKG/ stress test - if available please see in ROS above       The patient's records and results pertinent to the visit personally reviewed by this provider.     Outside records reviewed from: Care Everywhere    LAB/DIAGNOSTIC STUDIES TODAY:     Latest Reference Range & Units 06/02/25 14:45   Sodium 135 - 145 mmol/L 143   Potassium 3.4 - 5.3 mmol/L 3.5   Chloride 98 - 107 mmol/L 105   Carbon Dioxide (CO2) 22 - 29 mmol/L 27   Urea Nitrogen 8.0 - 23.0 mg/dL 10.0   Creatinine 0.51 - 1.17 mg/dL 0.93   GFR Estimate >60 mL/min/1.73m2 88   Calcium 8.8 - 10.4 mg/dL 9.2   Anion Gap 7 - 15 mmol/L 11   Glucose 70 - 99 mg/dL 91             Assessment    Larissa Rehman is a 69 year old adult seen as a PAC referral for risk assessment and optimization for anesthesia.    Plan/Recommendations  Pt will be optimized for the proposed procedure.  See below for details on the assessment, risk, and preoperative recommendations    NEUROLOGY  - No history of TIA, CVA or seizure  -Post Op delirium risk factors:  No risk identified    ENT  - No current airway concerns.  Will need to be reassessed day of surgery.  Mallampati: I  TM: > 3  ~ The patient's left pupil is slightly irregular shaped compared to the right. She reports no vision changes and just had an opthalmology exam recently.       CARDIAC  - No history of CAD, Hypertension, and Afib  - Hyperlipidemia  Well controlled on home regimen  - METS (Metabolic Equivalents)  Patient performs 4 or more METS exercise without symptoms             Total Score: 0      RCRI-Low risk: Class 2 0.9% complication rate             Total Score: 1    RCRI: High Risk Surgery        PULMONARY  - Obstructive Sleep Apnea  No current risk of obstructive sleep apnea   WILLY Low Risk             Total Score: 1    WILLY: Over 50 ys old      - Denies asthma or inhaler use  - Tobacco History    History   Smoking Status    Never   Smokeless Tobacco     "Never       GI  - perforated appendicitis with abscess - s/p IR drainage and antibiotics. Procedure as above.    PONV Medium Risk  Total Score: 2           1 AN PONV: Pt is Female    1 AN PONV: Patient is not a current smoker        /RENAL  - Baseline Creatinine  0.97    ENDOCRINE    - BMI: Estimated body mass index is 22.13 kg/m  as calculated from the following:    Height as of this encounter: 1.651 m (5' 5\").    Weight as of this encounter: 60.3 kg (133 lb).  Healthy Weight (BMI 18.5-24.9)  - No history of Diabetes Mellitus    HEME  VTE Low Risk 0.26%             Total Score: 1    VTE: Greater than 59 yrs old      - No history of abnormal bleeding or antiplatelet use.      MSK  Patient is NOT Frail             Total Score: 0        PSYCH  - ADHD - not on medications.     OTHER  - gender dysphoria - s/p vaginoplasty. Continue estradiol.     Different anesthesia methods/types have been discussed with the patient, but they are aware that the final plan will be decided by the assigned anesthesia provider on the date of service.    The patient is optimized for their procedure. AVS with information on surgery time/arrival time, meds and NPO status given by nursing staff. No further diagnostic testing indicated.        27 minutes were spent on the date of the encounter performing chart review, history and exam, documentation and/or discussion with other providers about the issues documented above.    Angelique Turner PA-C  Preoperative Assessment Center  [facility]  [facility]  Phone: [phone]  Fax: [phone]    "

## 2025-06-04 ENCOUNTER — MYC MEDICAL ADVICE (OUTPATIENT)
Dept: FAMILY MEDICINE | Facility: CLINIC | Age: 70
End: 2025-06-04
Payer: COMMERCIAL

## 2025-06-04 DIAGNOSIS — F64.9 GENDER INCONGRUENCE: ICD-10-CM

## 2025-06-09 RX ORDER — ESTRADIOL 0.1 MG/D
1 FILM, EXTENDED RELEASE TRANSDERMAL
Qty: 8 PATCH | Refills: 0 | Status: SHIPPED | OUTPATIENT
Start: 2025-06-09 | End: 2025-07-09

## 2025-06-09 ASSESSMENT — ENCOUNTER SYMPTOMS: SEIZURES: 0

## 2025-06-09 ASSESSMENT — LIFESTYLE VARIABLES: TOBACCO_USE: 0

## 2025-06-09 NOTE — ANESTHESIA PREPROCEDURE EVALUATION
Anesthesia Pre-Procedure Evaluation    Patient: Larissa Rehman   MRN: [id number] : 1955          Procedure : Procedure(s):  APPENDECTOMY, ROBOT-ASSISTED,  possible bowel resection  possible open         Past Medical History:   Diagnosis Date    Attention deficit hyperactivity disorder (ADHD), predominantly hyperactive type     H/O bilateral orchiectomies and zero-depth vaginoplasty 3/29/2018 2018    High cholesterol     Hypothermia x 3 events during triathlons required EMS     Perforated appendicitis       Past Surgical History:   Procedure Laterality Date    COLONOSCOPY      ORTHOPEDIC SURGERY      foot surgery     TRANSGENDER VAGINOPLASTY ZERO DEPTH N/A 3/29/2018    Procedure: TRANSGENDER VAGINOPLASTY ZERO DEPTH;  Zero Depth Transgender Vaginoplasty;  Surgeon: Madhu Mojica MD;  Location: [facility] OR      Allergies   Allergen Reactions    Bee Venom Rash and Swelling      Social History     Tobacco Use    Smoking status: Never     Passive exposure: Never    Smokeless tobacco: Never   Substance Use Topics    Alcohol use: Yes     Alcohol/week: 4.0 standard drinks of alcohol     Types: 4 Glasses of wine per week     Comment: wine      Wt Readings from Last 1 Encounters:   25 60.3 kg (133 lb)        Anesthesia Evaluation   Pt has had prior anesthetic. Type: General.    No history of anesthetic complications       ROS/MED HX  ENT/Pulmonary:  - neg pulmonary ROS  (-) tobacco use   Neurologic:  - neg neurologic ROS  (-) no seizures, no CVA and no TIA   Cardiovascular:     (+) Dyslipidemia - -   -  - -                                 Previous cardiac testing   Echo: Date: Results:    Stress Test:  Date: Results:    ECG Reviewed:  Date: 25 Results:  SR  Cath:  Date: Results:      METS/Exercise Tolerance: >4 METS    Hematologic:  - neg hematologic  ROS     Musculoskeletal:  - neg musculoskeletal ROS     GI/Hepatic:     (+)         appendicitis (Perforated appendicitis with abscess s/p IR drain),         "(-) GERD   Renal/Genitourinary:  - neg Renal ROS     Endo:  - neg endo ROS     Psychiatric/Substance Use:     (+) psychiatric history other (comment) (ADHD)       Infectious Disease:  - neg infectious disease ROS     Malignancy:  - neg malignancy ROS     Other: Comment: Gender dysphoria              Physical Exam  Airway  Mallampati: II  TM distance: >3 FB  Neck ROM: full    Cardiovascular - normal exam   Dental   (+) Modest Abnormalities - crowns, retainers, 1 or 2 missing teeth      Pulmonary - normal exam      Neurological - normal exam  She appears awake, alert and oriented x3.    Other Findings       OUTSIDE LABS:  CBC:   Lab Results   Component Value Date    WBC 7.0 05/09/2025    WBC 7.5 05/09/2025    HGB 12.5 05/09/2025    HGB 12.8 04/23/2025    HCT 37.2 05/09/2025    HCT 38.6 04/23/2025     05/09/2025     (H) 04/23/2025     BMP:   Lab Results   Component Value Date     06/02/2025     (L) 05/09/2025    POTASSIUM 3.5 06/02/2025    POTASSIUM 3.8 05/09/2025    CHLORIDE 105 06/02/2025    CHLORIDE 97 (L) 05/09/2025    CO2 27 06/02/2025    CO2 25 05/09/2025    BUN 10.0 06/02/2025    BUN 13.0 05/09/2025    CR 0.93 06/02/2025    CR 0.97 05/09/2025    GLC 91 06/02/2025     (H) 05/09/2025     COAGS: No results found for: \"PTT\", \"INR\", \"FIBR\"  POC:   Lab Results   Component Value Date     (H) 03/30/2018     HEPATIC:   Lab Results   Component Value Date    ALBUMIN 3.9 05/09/2025    PROTTOTAL 7.2 05/09/2025    ALT 10 05/09/2025    AST 17 05/09/2025    ALKPHOS 41 05/09/2025    BILITOTAL 0.4 05/09/2025     OTHER:   Lab Results   Component Value Date    LACT 1.0 04/09/2025    A1C 5.4 09/24/2021    DEBORA 9.2 06/02/2025    MAG 2.1 03/30/2018    LIPASE 18 04/09/2025       Anesthesia Plan    ASA Status:  2      NPO Status: NPO Appropriate   Anesthesia Type: General.  Airway: oral.  Induction: intravenous.  Maintenance: Balanced.   Techniques and Equipment:       - Monitoring Plan: standard " ASA monitoring     Consents    Anesthesia Plan(s) and associated risks, benefits, and realistic alternatives discussed. Questions answered and patient/representative(s) expressed understanding.     - Discussed: CRNA     - Discussed with:                Postoperative Care    Pain management: multimodal analgesia.     Comments:                   Jerzy Peña DO, DO    I have reviewed the pertinent notes and labs in the chart from the past 30 days and (re)examined the patient.  Any updates or changes from those notes are reflected in this note.    Clinically Significant Risk Factors Present on Admission

## 2025-06-10 ENCOUNTER — ANESTHESIA (OUTPATIENT)
Dept: SURGERY | Facility: CLINIC | Age: 70
End: 2025-06-10
Payer: COMMERCIAL

## 2025-06-10 ENCOUNTER — HOSPITAL ENCOUNTER (OUTPATIENT)
Facility: CLINIC | Age: 70
Discharge: HOME | End: 2025-06-10
Attending: STUDENT IN AN ORGANIZED HEALTH CARE EDUCATION/TRAINING PROGRAM | Admitting: STUDENT IN AN ORGANIZED HEALTH CARE EDUCATION/TRAINING PROGRAM
Payer: COMMERCIAL

## 2025-06-10 VITALS
RESPIRATION RATE: 16 BRPM | HEIGHT: 65 IN | DIASTOLIC BLOOD PRESSURE: 75 MMHG | OXYGEN SATURATION: 98 % | WEIGHT: 131.8 LBS | SYSTOLIC BLOOD PRESSURE: 112 MMHG | BODY MASS INDEX: 21.96 KG/M2 | HEART RATE: 62 BPM | TEMPERATURE: 96.8 F

## 2025-06-10 DIAGNOSIS — G89.18 ACUTE POST-OPERATIVE PAIN: Primary | ICD-10-CM

## 2025-06-10 PROCEDURE — 272N000001 HC OR GENERAL SUPPLY STERILE: Performed by: STUDENT IN AN ORGANIZED HEALTH CARE EDUCATION/TRAINING PROGRAM

## 2025-06-10 PROCEDURE — 250N000009 HC RX 250: Performed by: NURSE ANESTHETIST, CERTIFIED REGISTERED

## 2025-06-10 PROCEDURE — 250N000011 HC RX IP 250 OP 636: Performed by: STUDENT IN AN ORGANIZED HEALTH CARE EDUCATION/TRAINING PROGRAM

## 2025-06-10 PROCEDURE — 999N000141 HC STATISTIC PRE-PROCEDURE NURSING ASSESSMENT: Performed by: STUDENT IN AN ORGANIZED HEALTH CARE EDUCATION/TRAINING PROGRAM

## 2025-06-10 PROCEDURE — 360N000080 HC SURGERY LEVEL 7, PER MIN: Performed by: STUDENT IN AN ORGANIZED HEALTH CARE EDUCATION/TRAINING PROGRAM

## 2025-06-10 PROCEDURE — S2900 ROBOTIC SURGICAL SYSTEM: HCPCS | Performed by: STUDENT IN AN ORGANIZED HEALTH CARE EDUCATION/TRAINING PROGRAM

## 2025-06-10 PROCEDURE — 258N000003 HC RX IP 258 OP 636: Performed by: NURSE ANESTHETIST, CERTIFIED REGISTERED

## 2025-06-10 PROCEDURE — 250N000009 HC RX 250: Performed by: STUDENT IN AN ORGANIZED HEALTH CARE EDUCATION/TRAINING PROGRAM

## 2025-06-10 PROCEDURE — 88304 TISSUE EXAM BY PATHOLOGIST: CPT | Mod: TC | Performed by: STUDENT IN AN ORGANIZED HEALTH CARE EDUCATION/TRAINING PROGRAM

## 2025-06-10 PROCEDURE — 710N000009 HC RECOVERY PHASE 1, LEVEL 1, PER MIN: Performed by: STUDENT IN AN ORGANIZED HEALTH CARE EDUCATION/TRAINING PROGRAM

## 2025-06-10 PROCEDURE — 250N000025 HC SEVOFLURANE, PER MIN: Performed by: STUDENT IN AN ORGANIZED HEALTH CARE EDUCATION/TRAINING PROGRAM

## 2025-06-10 PROCEDURE — 258N000001 HC RX 258: Performed by: STUDENT IN AN ORGANIZED HEALTH CARE EDUCATION/TRAINING PROGRAM

## 2025-06-10 PROCEDURE — 44970 LAPAROSCOPY APPENDECTOMY: CPT | Performed by: STUDENT IN AN ORGANIZED HEALTH CARE EDUCATION/TRAINING PROGRAM

## 2025-06-10 PROCEDURE — 370N000017 HC ANESTHESIA TECHNICAL FEE, PER MIN: Performed by: STUDENT IN AN ORGANIZED HEALTH CARE EDUCATION/TRAINING PROGRAM

## 2025-06-10 PROCEDURE — 250N000011 HC RX IP 250 OP 636: Performed by: NURSE ANESTHETIST, CERTIFIED REGISTERED

## 2025-06-10 PROCEDURE — 250N000011 HC RX IP 250 OP 636: Mod: JZ | Performed by: NURSE ANESTHETIST, CERTIFIED REGISTERED

## 2025-06-10 PROCEDURE — 710N000012 HC RECOVERY PHASE 2, PER MINUTE: Performed by: STUDENT IN AN ORGANIZED HEALTH CARE EDUCATION/TRAINING PROGRAM

## 2025-06-10 RX ORDER — HYDROMORPHONE HCL IN WATER/PF 6 MG/30 ML
0.4 PATIENT CONTROLLED ANALGESIA SYRINGE INTRAVENOUS EVERY 5 MIN PRN
Status: DISCONTINUED | OUTPATIENT
Start: 2025-06-10 | End: 2025-06-10 | Stop reason: HOSPADM

## 2025-06-10 RX ORDER — MAGNESIUM HYDROXIDE 1200 MG/15ML
LIQUID ORAL PRN
Status: DISCONTINUED | OUTPATIENT
Start: 2025-06-10 | End: 2025-06-10 | Stop reason: HOSPADM

## 2025-06-10 RX ORDER — CEFAZOLIN SODIUM/WATER 2 G/20 ML
2 SYRINGE (ML) INTRAVENOUS SEE ADMIN INSTRUCTIONS
Status: DISCONTINUED | OUTPATIENT
Start: 2025-06-10 | End: 2025-06-10 | Stop reason: HOSPADM

## 2025-06-10 RX ORDER — OXYCODONE HYDROCHLORIDE 5 MG/1
5 TABLET ORAL
Status: DISCONTINUED | OUTPATIENT
Start: 2025-06-10 | End: 2025-06-10 | Stop reason: HOSPADM

## 2025-06-10 RX ORDER — FENTANYL CITRATE 50 UG/ML
INJECTION, SOLUTION INTRAMUSCULAR; INTRAVENOUS PRN
Status: DISCONTINUED | OUTPATIENT
Start: 2025-06-10 | End: 2025-06-10

## 2025-06-10 RX ORDER — SODIUM CHLORIDE, SODIUM LACTATE, POTASSIUM CHLORIDE, CALCIUM CHLORIDE 600; 310; 30; 20 MG/100ML; MG/100ML; MG/100ML; MG/100ML
INJECTION, SOLUTION INTRAVENOUS CONTINUOUS
Status: DISCONTINUED | OUTPATIENT
Start: 2025-06-10 | End: 2025-06-10 | Stop reason: HOSPADM

## 2025-06-10 RX ORDER — KETOROLAC TROMETHAMINE 30 MG/ML
INJECTION, SOLUTION INTRAMUSCULAR; INTRAVENOUS PRN
Status: DISCONTINUED | OUTPATIENT
Start: 2025-06-10 | End: 2025-06-10

## 2025-06-10 RX ORDER — OXYCODONE HYDROCHLORIDE 5 MG/1
2.5-5 TABLET ORAL EVERY 4 HOURS PRN
Qty: 10 TABLET | Refills: 0 | Status: SHIPPED | OUTPATIENT
Start: 2025-06-10

## 2025-06-10 RX ORDER — FENTANYL CITRATE 50 UG/ML
25 INJECTION, SOLUTION INTRAMUSCULAR; INTRAVENOUS EVERY 5 MIN PRN
Status: DISCONTINUED | OUTPATIENT
Start: 2025-06-10 | End: 2025-06-10 | Stop reason: HOSPADM

## 2025-06-10 RX ORDER — SODIUM CHLORIDE, SODIUM LACTATE, POTASSIUM CHLORIDE, CALCIUM CHLORIDE 600; 310; 30; 20 MG/100ML; MG/100ML; MG/100ML; MG/100ML
INJECTION, SOLUTION INTRAVENOUS CONTINUOUS PRN
Status: DISCONTINUED | OUTPATIENT
Start: 2025-06-10 | End: 2025-06-10

## 2025-06-10 RX ORDER — NALOXONE HYDROCHLORIDE 0.4 MG/ML
0.1 INJECTION, SOLUTION INTRAMUSCULAR; INTRAVENOUS; SUBCUTANEOUS
Status: DISCONTINUED | OUTPATIENT
Start: 2025-06-10 | End: 2025-06-10 | Stop reason: HOSPADM

## 2025-06-10 RX ORDER — DEXAMETHASONE SODIUM PHOSPHATE 4 MG/ML
4 INJECTION, SOLUTION INTRA-ARTICULAR; INTRALESIONAL; INTRAMUSCULAR; INTRAVENOUS; SOFT TISSUE
Status: DISCONTINUED | OUTPATIENT
Start: 2025-06-10 | End: 2025-06-10 | Stop reason: HOSPADM

## 2025-06-10 RX ORDER — AMOXICILLIN 250 MG
1 CAPSULE ORAL 2 TIMES DAILY
Qty: 15 TABLET | Refills: 0 | Status: SHIPPED | OUTPATIENT
Start: 2025-06-10

## 2025-06-10 RX ORDER — BUPIVACAINE HCL/EPINEPHRINE 0.5-1:200K
VIAL (ML) INJECTION PRN
Status: DISCONTINUED | OUTPATIENT
Start: 2025-06-10 | End: 2025-06-10 | Stop reason: HOSPADM

## 2025-06-10 RX ORDER — ONDANSETRON 4 MG/1
4 TABLET, ORALLY DISINTEGRATING ORAL EVERY 30 MIN PRN
Status: DISCONTINUED | OUTPATIENT
Start: 2025-06-10 | End: 2025-06-10 | Stop reason: HOSPADM

## 2025-06-10 RX ORDER — PROPOFOL 10 MG/ML
INJECTION, EMULSION INTRAVENOUS PRN
Status: DISCONTINUED | OUTPATIENT
Start: 2025-06-10 | End: 2025-06-10

## 2025-06-10 RX ORDER — LIDOCAINE 40 MG/G
CREAM TOPICAL
Status: DISCONTINUED | OUTPATIENT
Start: 2025-06-10 | End: 2025-06-10 | Stop reason: HOSPADM

## 2025-06-10 RX ORDER — HYDROMORPHONE HCL IN WATER/PF 6 MG/30 ML
0.2 PATIENT CONTROLLED ANALGESIA SYRINGE INTRAVENOUS EVERY 5 MIN PRN
Status: DISCONTINUED | OUTPATIENT
Start: 2025-06-10 | End: 2025-06-10 | Stop reason: HOSPADM

## 2025-06-10 RX ORDER — ONDANSETRON 2 MG/ML
INJECTION INTRAMUSCULAR; INTRAVENOUS PRN
Status: DISCONTINUED | OUTPATIENT
Start: 2025-06-10 | End: 2025-06-10

## 2025-06-10 RX ORDER — LIDOCAINE HYDROCHLORIDE 20 MG/ML
INJECTION, SOLUTION INFILTRATION; PERINEURAL PRN
Status: DISCONTINUED | OUTPATIENT
Start: 2025-06-10 | End: 2025-06-10

## 2025-06-10 RX ORDER — ONDANSETRON 2 MG/ML
4 INJECTION INTRAMUSCULAR; INTRAVENOUS EVERY 30 MIN PRN
Status: DISCONTINUED | OUTPATIENT
Start: 2025-06-10 | End: 2025-06-10 | Stop reason: HOSPADM

## 2025-06-10 RX ORDER — CEFAZOLIN SODIUM/WATER 2 G/20 ML
2 SYRINGE (ML) INTRAVENOUS
Status: DISCONTINUED | OUTPATIENT
Start: 2025-06-10 | End: 2025-06-10 | Stop reason: HOSPADM

## 2025-06-10 RX ORDER — PROPOFOL 10 MG/ML
INJECTION, EMULSION INTRAVENOUS CONTINUOUS PRN
Status: DISCONTINUED | OUTPATIENT
Start: 2025-06-10 | End: 2025-06-10

## 2025-06-10 RX ORDER — GLYCOPYRROLATE 0.2 MG/ML
INJECTION, SOLUTION INTRAMUSCULAR; INTRAVENOUS PRN
Status: DISCONTINUED | OUTPATIENT
Start: 2025-06-10 | End: 2025-06-10

## 2025-06-10 RX ORDER — ACETAMINOPHEN 325 MG/1
975 TABLET ORAL ONCE
Status: COMPLETED | OUTPATIENT
Start: 2025-06-10 | End: 2025-06-10

## 2025-06-10 RX ORDER — FENTANYL CITRATE 50 UG/ML
50 INJECTION, SOLUTION INTRAMUSCULAR; INTRAVENOUS EVERY 5 MIN PRN
Status: DISCONTINUED | OUTPATIENT
Start: 2025-06-10 | End: 2025-06-10 | Stop reason: HOSPADM

## 2025-06-10 RX ORDER — DEXAMETHASONE SODIUM PHOSPHATE 4 MG/ML
INJECTION, SOLUTION INTRA-ARTICULAR; INTRALESIONAL; INTRAMUSCULAR; INTRAVENOUS; SOFT TISSUE PRN
Status: DISCONTINUED | OUTPATIENT
Start: 2025-06-10 | End: 2025-06-10

## 2025-06-10 RX ADMIN — DEXMEDETOMIDINE HYDROCHLORIDE 8 MCG: 100 INJECTION, SOLUTION INTRAVENOUS at 13:32

## 2025-06-10 RX ADMIN — Medication 2 G: at 13:40

## 2025-06-10 RX ADMIN — DEXMEDETOMIDINE HYDROCHLORIDE 4 MCG: 100 INJECTION, SOLUTION INTRAVENOUS at 13:50

## 2025-06-10 RX ADMIN — PROPOFOL 50 MCG/KG/MIN: 10 INJECTION, EMULSION INTRAVENOUS at 13:50

## 2025-06-10 RX ADMIN — DEXMEDETOMIDINE HYDROCHLORIDE 4 MCG: 100 INJECTION, SOLUTION INTRAVENOUS at 14:10

## 2025-06-10 RX ADMIN — DEXMEDETOMIDINE HYDROCHLORIDE 4 MCG: 100 INJECTION, SOLUTION INTRAVENOUS at 14:00

## 2025-06-10 RX ADMIN — FENTANYL CITRATE 50 MCG: 50 INJECTION INTRAMUSCULAR; INTRAVENOUS at 13:43

## 2025-06-10 RX ADMIN — DEXMEDETOMIDINE HYDROCHLORIDE 4 MCG: 100 INJECTION, SOLUTION INTRAVENOUS at 13:55

## 2025-06-10 RX ADMIN — DEXAMETHASONE SODIUM PHOSPHATE 8 MG: 4 INJECTION, SOLUTION INTRA-ARTICULAR; INTRALESIONAL; INTRAMUSCULAR; INTRAVENOUS; SOFT TISSUE at 13:50

## 2025-06-10 RX ADMIN — SODIUM CHLORIDE, SODIUM LACTATE, POTASSIUM CHLORIDE, AND CALCIUM CHLORIDE: .6; .31; .03; .02 INJECTION, SOLUTION INTRAVENOUS at 13:31

## 2025-06-10 RX ADMIN — FENTANYL CITRATE 50 MCG: 50 INJECTION INTRAMUSCULAR; INTRAVENOUS at 14:08

## 2025-06-10 RX ADMIN — DEXMEDETOMIDINE HYDROCHLORIDE 4 MCG: 100 INJECTION, SOLUTION INTRAVENOUS at 14:15

## 2025-06-10 RX ADMIN — PHENYLEPHRINE HYDROCHLORIDE 0.2 MCG/KG/MIN: 10 INJECTION INTRAVENOUS at 13:54

## 2025-06-10 RX ADMIN — ONDANSETRON 4 MG: 2 INJECTION INTRAMUSCULAR; INTRAVENOUS at 13:50

## 2025-06-10 RX ADMIN — Medication 200 MG: at 15:16

## 2025-06-10 RX ADMIN — PROPOFOL 200 MG: 10 INJECTION, EMULSION INTRAVENOUS at 13:43

## 2025-06-10 RX ADMIN — LIDOCAINE HYDROCHLORIDE 80 MG: 20 INJECTION, SOLUTION INFILTRATION; PERINEURAL at 13:43

## 2025-06-10 RX ADMIN — ROCURONIUM BROMIDE 50 MG: 50 INJECTION, SOLUTION INTRAVENOUS at 13:43

## 2025-06-10 RX ADMIN — DEXMEDETOMIDINE HYDROCHLORIDE 4 MCG: 100 INJECTION, SOLUTION INTRAVENOUS at 14:05

## 2025-06-10 RX ADMIN — KETOROLAC TROMETHAMINE 30 MG: 30 INJECTION, SOLUTION INTRAMUSCULAR at 15:10

## 2025-06-10 RX ADMIN — GLYCOPYRROLATE 0.2 MG: 0.2 INJECTION, SOLUTION INTRAMUSCULAR; INTRAVENOUS at 13:50

## 2025-06-10 ASSESSMENT — ACTIVITIES OF DAILY LIVING (ADL)
ADLS_ACUITY_SCORE: 56

## 2025-06-10 NOTE — OP NOTE
General Surgery Operative Note    PREOPERATIVE DIAGNOSIS:  Acute perforated appendicitis      POSTOPERATIVE DIAGNOSIS:  Acute perforated appendicitis     PROCEDURE: ROBOTIC APPENDECTOMY     ANESTHESIA:  General    SURGEON:  Elmo Macedo MD    ASSISTANT:  Gemma Kramer PA-C assisted in the above procedure. They provided assistance with pre-operative positioning, prepping, and draping of the patient. The assistant provided vital operative assistance with retraction using instruments thus providing the necessary exposure and visualization for the case, manipulation of tissues to achieve hemostasis, suction for visualization and assisted in closure of the wound. Post-operatively they assisted in transfer of the patient off the operative table and transition to the PACU physicians.    INDICATIONS:  Larissa Rehman is a 69 year old adult who presented to the Emergency Department with complaints of abdominal pain. The patient underwent a work-up including physical exam, abdominal CT scan and labs which all were most consistent with acute perforated appendicitis with abscess. She was managed expectantly for an interval appendectomy. Please see the most recent clinic note for further details about the decision making process. The decision was made for the patient to proceed to the operating room for robotic removal of the appendix. The risks and benefits of the procedure were discussed with the patient and consent for the procedure was obtained.     PROCEDURE: The patient was brought to the preoperative area and preoperative antibiotics were administered. The patient was brought back to the operating room and placed in supine position on the operating table. The anesthesia was induced, and the patient was intubated. The patient was secured to the operating table and their pressure points were padded. The patient's abdomen was prepped and draped in sterile fashion. A time out was completed confirming patient, procedure, site of  surgery and any special equipment needed for the procedure.     Following infiltration with local anesthetic, the 11 blade scalpel was used to made a small incision in the patient's left upper quadrant at Palmar's point. An 5 mm optical trocar was used to gain access to the peritoneal cavity. Pneumoperitoneum was then established with carbon dioxide insufflation. Under direct vision two 8 mm robotic ports were placed along the left abdomen in the left lower quadrant and the left lateral abdomen.  The robot was then uneventfully docked. A fenestrated bipolar and suction/ was inserted under direct visualization into the field of view.     Immediately I was able to visualize the drain going into the mesentery. It did not appear to be going into any bowel. The drain was then removed, all parts intact, and passed off the field. We then turned our attention to the right lower quadrant. There was thin adhesions around the small bowel and cecum. This was carefully taken down using the vessel sealer.  The omentum was carefully bluntly dissected off of the bowel in the right abdomen. The patient's appendix was identified coming off the cecum and diving down into posterior abdominal wall. The vessel sealer was used to ligate the mesoappendix to the base. The base appeared healthy and intact. The base of the appendix was then taken using a single firing of 8mm surefire blue load stapler. This allowed me to follow the appendix down the tip using blunt dissection with suction/ and vessel sealer. The tip of the appendix was firmly adhered to the small bowel wall, concerning for the possible fistula identified on the patient's CT sinograms. I also encountered a small pocket of pus. The adhesion was thinned out and then taken using another blue load stapler. The appendix was freed. When the appendix was then completely dissected free, it was placed in an Endo catch bag and removed through the patient's left upper  quadrant port site. The ports were reinserted and the patient's right lower quadrant was inspected. The staple line along the cecum was found to be intact without evidence of bleeding. There was no bleeding from the mesoappendix.  The patient's right lower quadrant was then irrigated with copious amount of normal saline solution. Attention was then turned to the patient's left upper quadrant port site. The port was removed and there was no evidence of bleeding at the port exit site.  The fascia at the port site was reapproximated using an 0 Vicryl stitch and the Jonathan-Ned fascial closure device. The patient's abdomen was then allowed to desufflate fully. The port sites were inspected, and hemostasis was obtained using electrocautery. The port sites were then reapproximated using 4-0 Monocryl subcuticular stitches. The incisions were washed and dried, and sterile dressings were applied. The lap, needle and instrument counts were correct at the end of the procedure. The patient tolerated the procedure well and was extubated and taken to the recovery area in stable condition.     ESTIMATED BLOOD LOSS:  10cc    INTRAOPERATIVE FINDINGS:  Acute ruptured appendicitis     SPECIMENS: Appendix     DRAINS: None    CONDITION: The patient will be discharged to home directly from the Postanesthesia Care Unit with instructions for postoperative cares and medications for pain management. They will follow up in the Surgery Clinic in two to three weeks' time for postoperative monitoring.     Elmo Macedo MD

## 2025-06-10 NOTE — ANESTHESIA CARE TRANSFER NOTE
Patient: Larissa Rehman    Procedure: Procedure(s):  APPENDECTOMY, ROBOT-ASSISTED,       Diagnosis: Perforated appendicitis [K35.32]  Diagnosis Additional Information: No value filed.    Anesthesia Type:   General     Note:    Oropharynx: oropharynx clear of all foreign objects and spontaneously breathing  Level of Consciousness: awake  Oxygen Supplementation: face mask  Level of Supplemental Oxygen (L/min / FiO2): 6  Independent Airway: airway patency satisfactory and stable  Dentition: dentition unchanged  Vital Signs Stable: post-procedure vital signs reviewed and stable  Report to RN Given: handoff report given  Patient transferred to: PACU  Comments: Neuromuscular blockade reversed with sugammadex, spontaneous respirations, adequate tidal volumes, followed commands to voice, oropharynx suctioned with soft flexible catheter, extubated atraumatically, extubated with suction, airway patent after extubation.  Oxygen via facemask at 6 liters per minute to PACU. Oxygen tubing connected to wall O2 in PACU, SpO2, NiBP, and EKG monitors and alarms on and functioning, Brennen Hugger warmer connected to patient gown, report on patient's clinical status given to PACU RN, RN questions answered.       Handoff Report: Identifed the Patient, Identified the Reponsible Provider, Reviewed the pertinent medical history, Discussed the surgical course, Reviewed Intra-OP anesthesia mangement and issues during anesthesia, Set expectations for post-procedure period and Allowed opportunity for questions and acknowledgement of understanding      Vitals:  Vitals Value Taken Time   /68 06/10/25 15:40   Temp     Pulse 74 06/10/25 15:43   Resp 15 06/10/25 15:43   SpO2 100 % 06/10/25 15:43   Vitals shown include unfiled device data.    Electronically Signed By: ADEN Buenrostro CRNA  Teresa 10, 2025  3:44 PM

## 2025-06-10 NOTE — ANESTHESIA PROCEDURE NOTES
Airway       Patient location during procedure: OR       Procedure Start/Stop Times: 6/10/2025 1:49 PM  Staff -        CRNA: Bang Quiles APRN CRNA       Performed By: CRNA  Consent for Airway        Urgency: elective  Indications and Patient Condition       Indications for airway management: danny-procedural       Induction type:intravenous       Mask difficulty assessment: 1 - vent by mask    Final Airway Details       Final airway type: endotracheal airway       Successful airway: ETT - single and Oral  Endotracheal Airway Details        ETT size (mm): 7.0       Cuffed: yes       Successful intubation technique: video laryngoscopy       VL Blade Size: Glidescope 3       Grade View of Cords: 1       Adjucts: stylet       Position: Right       Measured from: gums/teeth       Secured at (cm): 21       Bite block used: None    Post intubation assessment        Placement verified by: capnometry, equal breath sounds and chest rise        Number of attempts at approach: 2       Number of other approaches attempted: 1       Secured with: tape       Ease of procedure: easy       Dentition: Intact and Unchanged    Medication(s) Administered   Medication Administration Time: 6/10/2025 1:49 PM    Additional Comments       .  .  I was unable to obtain any decent view of glottic opening under DL with a Mcwilliams 2.  A grade 1 view was quickly and easily obtained under Video using GlideScope.  The patient was very easy to mask and ventilate.  I recommend video laryngoscopy on all subsequent needed intubations.  .  .

## 2025-06-10 NOTE — DISCHARGE INSTRUCTIONS
Today you received Toradol, an antiinflammatory medication similar to Ibuprofen.  You should not take other antiinflammatory medication, such as Ibuprofen, Motrin, Advil, Aleve, Naprosyn, etc until 9:10PM.     Same Day Surgery Discharge Instructions for  Sedation and General Anesthesia     It's not unusual to feel dizzy, light-headed or faint for up to 24 hours after surgery or while taking pain medication.  If you have these symptoms: sit for a few minutes before standing and have someone assist you when you get up to walk or use the bathroom.    You should rest and relax for the next 24 hours. We recommend you make arrangements to have an adult stay with you for at least 24 hours after your discharge.  Avoid hazardous and strenuous activity.    DO NOT DRIVE any vehicle or operate mechanical equipment for 24 hours following the end of your surgery.  Even though you may feel normal, your reactions may be affected by the medication you have received.    Do not drink alcoholic beverages for 24 hours following surgery.     Slowly progress to your regular diet as you feel able. It's not unusual to feel nauseated and/or vomit after receiving anesthesia.  If you develop these symptoms, drink clear liquids (apple juice, ginger ale, broth, 7-up, etc. ) until you feel better.  If your nausea and vomiting persists for 24 hours, please notify your surgeon.      All narcotic pain medications, along with inactivity and anesthesia, can cause constipation. Drinking plenty of liquids and increasing fiber intake will help.    For any questions of a medical nature, call your surgeon.    Do not make important decisions for 24 hours.    If you had general anesthesia, you may have a sore throat for a couple of days related to the breathing tube used during surgery.  You may use Cepacol lozenges to help with this discomfort.  If it worsens or if you develop a fever, contact your surgeon.     If you feel your pain is not well managed with  the pain medications prescribed by your surgeon, please contact your surgeon's office to let them know so they can address your concerns.          [facility]  Discharge Instructions: Post-Operative Appendectomy    ACTIVITY  Expect to feel tired after your surgery.  This will gradually resolve.    Take frequent, short walks and increase your activity gradually.    Avoid strenuous physical activity or heavy lifting greater than 15-20 lbs. for 2-3 weeks.  You may climb stairs.  You may drive without restrictions when you are not using any prescription pain medication and feel comfortable in a car.  You may return to work/school when you are comfortable without any prescription pain medication.    WOUND CARE  You may remove your outer dressing or Band-Aids and shower 48 hours after the surgery.  Pat your incisions dry and leave them open to air.  Re-apply dressing (Band-Aids or gauze/tape) as needed for comfort or drainage.  You may have steri-strips (looks like white tape) on your incision.  You may peel off the steri-strips 2 weeks after your surgery if they have not peeled off on their own.  If you have skin glue, it will peel up and fall off on its own.  Do not soak your incisions in a tub or pool for 2 weeks.   Do not apply any lotions, creams, or ointments to your incisions.  A ridge under your incisions is normal and will gradually resolve.    DIET  Start with liquids, then gradually resume your regular diet as tolerated.  Avoid heavy, spicy, and greasy meals for 2-3 days.  Drink plenty of fluids to stay hydrated.    PAIN  Expect some tenderness and discomfort at the incision sites.  Use the prescribed pain medication at your discretion.  Expect gradual resolution of your pain over several days.  You may take ibuprofen with food (unless you have been told not to) or acetaminophen/Tylenol instead of or in addition to your prescribed pain medication.  However, if you are taking Norco  [Normal Conjunctiva] : normal conjunctiva or Percocet, do not take any additional acetaminophen/Tylenol.  Do not drink alcohol or drive while you are taking pain medications.  You may apply ice to your incisions in 20 minute intervals as needed for the next 48 hours.  After that time, consider switching to heat if you prefer.    EXPECTATIONS  Pain medications can cause constipation.  Limit use when possible.  Take an over the counter or prescribed stool softener/stimulant, such as Colace or Senna, 1-2 times a day with plenty of water.  You may take a mild over the counter laxative, such as Miralax or a suppository, as needed.  You make discontinue these medications once you are having regular bowel movements and/or are no longer taking your narcotic pain medication.   You may have shoulder or upper back discomfort due to the gas used in surgery.  This is temporary and should resolve in 48-72 hours.  Short, frequent walks may help with this.  If you are unable to urinate for 8 hours or feel as though you are not emptying your bladder adequately, we recommend you seek care at an ER or Urgent Care facility for possible catheter placement.     FOLLOW UP  Follow up with Dr. Macedo in 2 weeks. Call [phone] to schedule an appointment or if you have any concerns. We are located at [address] [place] [address].    CALL OUR OFFICE -[phone] IF YOU HAVE:   Chills or fever above 101 F.  Increased redness, warmth, or drainage at your incisions.  Significant bleeding.  Pain not relieved by your pain medication or rest.  Increasing pain after the first 48 hours.  Any other concerns or questions.             Revised October 2022   [Normal Venous Pressure] : normal venous pressure [No Carotid Bruit] : no carotid bruit [Normal S1, S2] : normal S1, S2 [No Murmur] : no murmur [Normal Gait] : normal gait [No Edema] : no edema [Normal PT B/L] : normal PT B/L [Normal] : alert and oriented, normal memory

## 2025-06-10 NOTE — ANESTHESIA POSTPROCEDURE EVALUATION
Patient: Larissa Rehman    Procedure: Procedure(s):  APPENDECTOMY, ROBOT-ASSISTED,       Anesthesia Type:  General    Note:     Postop Pain Control: Uneventful            Sign Out: Well controlled pain   PONV: No   Neuro/Psych: Uneventful            Sign Out: Acceptable/Baseline neuro status   Airway/Respiratory: Uneventful            Sign Out: Acceptable/Baseline resp. status   CV/Hemodynamics: Uneventful            Sign Out: Acceptable CV status; No obvious hypovolemia; No obvious fluid overload   Other NRE: NONE   DID A NON-ROUTINE EVENT OCCUR? No           Last vitals:  Vitals Value Taken Time   /75 06/10/25 16:16   Temp 36  C (96.8  F) 06/10/25 16:16   Pulse 65 06/10/25 16:16   Resp 16 06/10/25 16:16   SpO2 97 % 06/10/25 16:17   Vitals shown include unfiled device data.    Electronically Signed By: Alonso Fulton MD  Teresa 10, 2025  4:22 PM

## 2025-06-12 LAB
PATH REPORT.COMMENTS IMP SPEC: NORMAL
PATH REPORT.COMMENTS IMP SPEC: NORMAL
PATH REPORT.FINAL DX SPEC: NORMAL
PATH REPORT.GROSS SPEC: NORMAL
PATH REPORT.MICROSCOPIC SPEC OTHER STN: NORMAL
PATH REPORT.RELEVANT HX SPEC: NORMAL
PHOTO IMAGE: NORMAL

## 2025-06-12 PROCEDURE — 88304 TISSUE EXAM BY PATHOLOGIST: CPT | Mod: 26 | Performed by: PATHOLOGY

## 2025-07-02 ENCOUNTER — OFFICE VISIT (OUTPATIENT)
Dept: SURGERY | Facility: CLINIC | Age: 70
End: 2025-07-02
Payer: COMMERCIAL

## 2025-07-02 DIAGNOSIS — K35.32 PERFORATED APPENDICITIS: Primary | ICD-10-CM

## 2025-07-02 DIAGNOSIS — Z90.49 S/P APPENDECTOMY: ICD-10-CM

## 2025-07-02 NOTE — LETTER
July 3, 2025          Sravani Boyer MD         RE:   Larissa Rehman 1955      Dear Colleague,    Thank you for referring your patient, Larissa Rehman, to [facility] - [facility] [place]. Please see a copy of my visit note below.    Larissa Rehman is a 69 year old who is following up in clinic 3 weeks s/p Robotic Interval Appendectomy on 6/10/2025 for perforated appendicitis with abscess.     S: Doing very well. Almost back to all normal activities. Went swimming laps the other day. Eating well and having normal bowel movements. No N,V, or fevers.      Abdomen: incisions are healing very well. No erythema or swelling.     Pathology:   Final Diagnosis   Appendix, appendectomy:  -- Benign appendix with serosal adhesions and periappendiceal fibrosis with associated mild acute and chronic inflammation.         A/P:  Larissa Rehman is recovering from a Robotic interval Appendectomy. I advised her to slowly return to regular activity. I expect she will make a complete recovery without issues. We reviewed her pathology today as well.        Again, thank you for allowing me to participate in the care of your patient.      Sincerely,      Elmo Macedo MD

## 2025-07-02 NOTE — PROGRESS NOTES
General Surgery Post-op Follow up Clinic Note:      Larissa Rehman is a 69 year old who is following up in clinic 3 weeks s/p Robotic Interval Appendectomy on 6/10/2025 for perforated appendicitis with abscess.    S: Doing very well. Almost back to all normal activities. Went swimming laps the other day. Eating well and having normal bowel movements. No N,V, or fevers.     Abdomen: incisions are healing very well. No erythema or swelling.    Pathology:   Final Diagnosis   Appendix, appendectomy:  -- Benign appendix with serosal adhesions and periappendiceal fibrosis with associated mild acute and chronic inflammation.       A/P:  Larissa Rehman is recovering from a Robotic interval Appendectomy. I advised her to slowly return to regular activity. I expect she will make a complete recovery without issues. We reviewed her pathology today as well.     Thank you for the opportunity to help in their care.    Elmo Macedo MD   [facility]  [phone]    Please route or send letter to:  Primary Care Provider (PCP) and Referring Provider

## 2025-08-13 ENCOUNTER — MYC MEDICAL ADVICE (OUTPATIENT)
Dept: FAMILY MEDICINE | Facility: CLINIC | Age: 70
End: 2025-08-13
Payer: COMMERCIAL

## 2025-08-13 DIAGNOSIS — E78.01 FAMILIAL HYPERCHOLESTEREMIA: Primary | ICD-10-CM

## (undated) DEVICE — SUCTION CANISTER MEDIVAC LINER 3000ML W/LID 65651-530

## (undated) DEVICE — SYR BULB IRRIG 50ML LATEX FREE 0035280

## (undated) DEVICE — PACK VAG HYST

## (undated) DEVICE — PITCHER STERILE 1000ML  SSK9004A

## (undated) DEVICE — SPONGE LAP 18X18" X8435

## (undated) DEVICE — DRAPE IOBAN INCISE 23X17" 6650EZ

## (undated) DEVICE — SOL WATER IRRIG 1000ML BOTTLE 2F7114

## (undated) DEVICE — BNDG ABDOMINAL BINDER 09X46-62"

## (undated) DEVICE — DAVINCI XI SEAL UNIVERSAL 5-12MM 470500

## (undated) DEVICE — SU DERMABOND PRINEO 22CM CLR222US

## (undated) DEVICE — LINEN TOWEL PACK X6 WHITE 5487

## (undated) DEVICE — SU VICRYL 3-0 SH 27" J316H

## (undated) DEVICE — GLOVE PROTEXIS W/NEU-THERA 7.5  2D73TE75

## (undated) DEVICE — DAVINCI XI DRAPE COLUMN 470341

## (undated) DEVICE — GLOVE ESTEEM BLUE W/NEU-THERA 7.5  2D73PB75

## (undated) DEVICE — DEVICE SUTURE PASSER 14GA WECK EFX EFXSP2

## (undated) DEVICE — SU VICRYL 0 CT-1 36" J346H

## (undated) DEVICE — SU VICRYL+ 0 27 UR6 VLT VCP603H

## (undated) DEVICE — EVAC SYSTEM CLEAR FLOW SC082500

## (undated) DEVICE — PREP POVIDONE IODINE SCRUB 7.5% 120ML

## (undated) DEVICE — ESU GROUND PAD UNIVERSAL W/O CORD

## (undated) DEVICE — STPL DAVINCI SUREFORM 30MM RELOAD BLUE 48230B

## (undated) DEVICE — DRAIN JACKSON PRATT RESERVOIR 100ML SU130-1305

## (undated) DEVICE — GLOVE PROTEXIS BLUE W/NEU-THERA 6.5  2D73EB65

## (undated) DEVICE — PREP SKIN SCRUB TRAY 4461A

## (undated) DEVICE — CATH PLUG W/CAP 000076

## (undated) DEVICE — SUCTION MANIFOLD DORNOCH ULTRA CART UL-CL500

## (undated) DEVICE — LINEN TOWEL PACK X5 5464

## (undated) DEVICE — LINEN TOWEL PACK X30 5481

## (undated) DEVICE — DAVINCI HOT SHEARS TIP COVER  400180

## (undated) DEVICE — LIGHT HANDLE X2

## (undated) DEVICE — DAVINCI XI SUCTION IRRIGATOR ENDOWRIST 480299

## (undated) DEVICE — SU ETHILON 3-0 PS-1 18" 1663H

## (undated) DEVICE — SOL NACL 0.9% IRRIG 1000ML BOTTLE 2F7124

## (undated) DEVICE — LABEL MEDICATION SYSTEM 3303-P

## (undated) DEVICE — SYR 10ML FINGER CONTROL W/O NDL 309695

## (undated) DEVICE — SOLUTION IRRIGATION 0.9% NACL 1000ML BOTTLE R5200-01

## (undated) DEVICE — SU VICRYL 0 CT-1 27" UND J260H

## (undated) DEVICE — DRAPE BREAST/CHEST 29420

## (undated) DEVICE — SU VICRYL 0 TIE 12X18" J906G

## (undated) DEVICE — SU PROLENE 2-0 SHDA 36" 8523H

## (undated) DEVICE — DRAPE LEGGINGS CLEAR 8430

## (undated) DEVICE — PREP CHLORAPREP 26ML TINTED HI-LITE ORANGE 930815

## (undated) DEVICE — DAVINCI XI HANDPIECE ESU VESSEL SEALER 8MM EXT 480422

## (undated) DEVICE — CLEANER INST PRE-KLENZ SOAK SHIELD TUBE 6 ML MEDIUM 2D66J4

## (undated) DEVICE — CATH TRAY FOLEY SURESTEP 16FR W/URNE MTR STLK LATEX A303316A

## (undated) DEVICE — SPONGE RAY-TEC 4X8" 7318

## (undated) DEVICE — STPL DAVINCI SUREFORM 30X8MM 488530

## (undated) DEVICE — DRSG XEROFORM 5X9" CUR253590W

## (undated) DEVICE — DAVINCI XI DRAPE ARM 470015

## (undated) DEVICE — DAVINCI XI OBTURATOR BLADELESS 8MM 470359

## (undated) DEVICE — PREP POVIDONE IODINE SOLUTION 10% 120ML

## (undated) DEVICE — SU VICRYL 0 TIE 54" J608H

## (undated) DEVICE — Device

## (undated) DEVICE — POUCH TISSUE RETRIEVAL ROBOTIC 8MM 5.1" INTRO TRS-ROBO-8

## (undated) DEVICE — DRAPE U SPLIT 74X120" 29440

## (undated) DEVICE — DRAPE SHEET REV FOLD 3/4 9349

## (undated) DEVICE — DAVINCI XI REDUCER 8-12MM 470381

## (undated) DEVICE — PACK LAP CHOLE SLC15LCFSD

## (undated) DEVICE — LUBRICANT INST ELECTROLUBE EL101

## (undated) DEVICE — WIPES FOLEY CARE SURESTEP PROVON DFC100

## (undated) DEVICE — ESU NDL COLORADO MICRO E1651

## (undated) DEVICE — SPONGE PACK VAGINAL 2"X9

## (undated) DEVICE — ENDO TROCAR FIRST ENTRY KII FIOS Z-THRD 05X100MM CTF03

## (undated) DEVICE — BLADE KNIFE SURG 15 371115

## (undated) DEVICE — ANTIFOG SOLUTION SEE SHARP 150M TROCAR SWABS 30978 (COI)

## (undated) DEVICE — SUCTION MANIFOLD NEPTUNE 2 SYS 4 PORT 0702-020-000

## (undated) DEVICE — NEEDLE HYPO MONOJECT STANDARD 22GA 1 1/2IN BLUE 1188822112

## (undated) DEVICE — KIT TURNOVER FAIRVIEW SOUTHDALE FULL SP3889

## (undated) DEVICE — SU VICRYL 3-0 SH 27" UND J416H

## (undated) DEVICE — TUBING SUCTION 10'X3/16" N510

## (undated) DEVICE — BAG DECANTER STERILE WHITE DYNJDEC09

## (undated) DEVICE — SU MONOCRYL 4-0 PS-2 18" UND Y496G

## (undated) DEVICE — SU VICRYL 4-0 PS-2 18" UND J496H

## (undated) RX ORDER — FENTANYL CITRATE 50 UG/ML
INJECTION, SOLUTION INTRAMUSCULAR; INTRAVENOUS
Status: DISPENSED
Start: 2018-03-29

## (undated) RX ORDER — PROPOFOL 10 MG/ML
INJECTION, EMULSION INTRAVENOUS
Status: DISPENSED
Start: 2018-03-29

## (undated) RX ORDER — ONDANSETRON 2 MG/ML
INJECTION INTRAMUSCULAR; INTRAVENOUS
Status: DISPENSED
Start: 2018-03-29

## (undated) RX ORDER — KETOROLAC TROMETHAMINE 30 MG/ML
INJECTION, SOLUTION INTRAMUSCULAR; INTRAVENOUS
Status: DISPENSED
Start: 2025-06-10

## (undated) RX ORDER — DEXAMETHASONE SODIUM PHOSPHATE 4 MG/ML
INJECTION, SOLUTION INTRA-ARTICULAR; INTRALESIONAL; INTRAMUSCULAR; INTRAVENOUS; SOFT TISSUE
Status: DISPENSED
Start: 2025-06-10

## (undated) RX ORDER — LIDOCAINE HYDROCHLORIDE 10 MG/ML
INJECTION, SOLUTION EPIDURAL; INFILTRATION; INTRACAUDAL; PERINEURAL
Status: DISPENSED
Start: 2025-04-18

## (undated) RX ORDER — GLYCOPYRROLATE 0.2 MG/ML
INJECTION, SOLUTION INTRAMUSCULAR; INTRAVENOUS
Status: DISPENSED
Start: 2018-03-29

## (undated) RX ORDER — BUPIVACAINE HYDROCHLORIDE AND EPINEPHRINE 5; 5 MG/ML; UG/ML
INJECTION, SOLUTION EPIDURAL; INTRACAUDAL; PERINEURAL
Status: DISPENSED
Start: 2025-06-10

## (undated) RX ORDER — CEFAZOLIN SODIUM 2 G/100ML
INJECTION, SOLUTION INTRAVENOUS
Status: DISPENSED
Start: 2018-03-29

## (undated) RX ORDER — FENTANYL CITRATE 50 UG/ML
INJECTION, SOLUTION INTRAMUSCULAR; INTRAVENOUS
Status: DISPENSED
Start: 2025-06-10

## (undated) RX ORDER — FLUMAZENIL 0.1 MG/ML
INJECTION, SOLUTION INTRAVENOUS
Status: DISPENSED
Start: 2025-04-18

## (undated) RX ORDER — GLYCOPYRROLATE 0.2 MG/ML
INJECTION, SOLUTION INTRAMUSCULAR; INTRAVENOUS
Status: DISPENSED
Start: 2025-06-10

## (undated) RX ORDER — INDOCYANINE GREEN AND WATER 25 MG
KIT INJECTION
Status: DISPENSED
Start: 2025-06-10

## (undated) RX ORDER — ACETAMINOPHEN 325 MG/1
TABLET ORAL
Status: DISPENSED
Start: 2025-06-10

## (undated) RX ORDER — FENTANYL CITRATE 50 UG/ML
INJECTION, SOLUTION INTRAMUSCULAR; INTRAVENOUS
Status: DISPENSED
Start: 2025-04-18

## (undated) RX ORDER — DEXAMETHASONE SODIUM PHOSPHATE 4 MG/ML
INJECTION, SOLUTION INTRA-ARTICULAR; INTRALESIONAL; INTRAMUSCULAR; INTRAVENOUS; SOFT TISSUE
Status: DISPENSED
Start: 2018-03-29

## (undated) RX ORDER — CEFAZOLIN SODIUM 1 G/3ML
INJECTION, POWDER, FOR SOLUTION INTRAMUSCULAR; INTRAVENOUS
Status: DISPENSED
Start: 2018-03-29

## (undated) RX ORDER — LIDOCAINE HYDROCHLORIDE 20 MG/ML
INJECTION, SOLUTION EPIDURAL; INFILTRATION; INTRACAUDAL; PERINEURAL
Status: DISPENSED
Start: 2018-03-29

## (undated) RX ORDER — CEFAZOLIN SODIUM/WATER 2 G/20 ML
SYRINGE (ML) INTRAVENOUS
Status: DISPENSED
Start: 2025-06-10

## (undated) RX ORDER — PROPOFOL 10 MG/ML
INJECTION, EMULSION INTRAVENOUS
Status: DISPENSED
Start: 2025-06-10

## (undated) RX ORDER — NALOXONE HYDROCHLORIDE 0.4 MG/ML
INJECTION, SOLUTION INTRAMUSCULAR; INTRAVENOUS; SUBCUTANEOUS
Status: DISPENSED
Start: 2025-04-18

## (undated) RX ORDER — ONDANSETRON 2 MG/ML
INJECTION INTRAMUSCULAR; INTRAVENOUS
Status: DISPENSED
Start: 2025-06-10